# Patient Record
Sex: MALE | Race: WHITE | Employment: FULL TIME | URBAN - METROPOLITAN AREA
[De-identification: names, ages, dates, MRNs, and addresses within clinical notes are randomized per-mention and may not be internally consistent; named-entity substitution may affect disease eponyms.]

---

## 2017-01-05 ENCOUNTER — OFFICE VISIT (OUTPATIENT)
Dept: FAMILY MEDICINE | Facility: CLINIC | Age: 52
End: 2017-01-05
Payer: COMMERCIAL

## 2017-01-05 VITALS
BODY MASS INDEX: 42.76 KG/M2 | WEIGHT: 298 LBS | DIASTOLIC BLOOD PRESSURE: 78 MMHG | SYSTOLIC BLOOD PRESSURE: 134 MMHG | OXYGEN SATURATION: 97 % | TEMPERATURE: 98 F | HEART RATE: 101 BPM

## 2017-01-05 DIAGNOSIS — Z91.89 10 YEAR RISK OF MI OR STROKE 7.5% OR GREATER: ICD-10-CM

## 2017-01-05 DIAGNOSIS — J30.2 SEASONAL ALLERGIC RHINITIS, UNSPECIFIED ALLERGIC RHINITIS TRIGGER: ICD-10-CM

## 2017-01-05 DIAGNOSIS — J98.01 ACUTE BRONCHOSPASM: ICD-10-CM

## 2017-01-05 DIAGNOSIS — J01.90 ACUTE SINUSITIS WITH SYMPTOMS > 10 DAYS: ICD-10-CM

## 2017-01-05 DIAGNOSIS — G43.909 MIGRAINE WITHOUT STATUS MIGRAINOSUS, NOT INTRACTABLE, UNSPECIFIED MIGRAINE TYPE: ICD-10-CM

## 2017-01-05 DIAGNOSIS — E11.9 TYPE 2 DIABETES MELLITUS WITHOUT COMPLICATION, WITHOUT LONG-TERM CURRENT USE OF INSULIN (H): Primary | ICD-10-CM

## 2017-01-05 PROCEDURE — 99214 OFFICE O/P EST MOD 30 MIN: CPT | Performed by: FAMILY MEDICINE

## 2017-01-05 RX ORDER — FLUTICASONE PROPIONATE 50 MCG
2 SPRAY, SUSPENSION (ML) NASAL
Qty: 16 G | Refills: 11 | Status: SHIPPED | OUTPATIENT
Start: 2017-01-05 | End: 2017-11-04

## 2017-01-05 RX ORDER — ALBUTEROL SULFATE 90 UG/1
2 AEROSOL, METERED RESPIRATORY (INHALATION) EVERY 6 HOURS PRN
Qty: 1 INHALER | Refills: 0 | Status: SHIPPED | OUTPATIENT
Start: 2017-01-05 | End: 2018-04-05

## 2017-01-05 RX ORDER — PRAVASTATIN SODIUM 10 MG
10 TABLET ORAL DAILY
Qty: 90 TABLET | Refills: 3 | Status: CANCELLED | OUTPATIENT
Start: 2017-01-05

## 2017-01-05 RX ORDER — ELETRIPTAN HYDROBROMIDE 40 MG/1
40 TABLET, FILM COATED ORAL
Qty: 12 TABLET | Refills: 5 | Status: SHIPPED | OUTPATIENT
Start: 2017-01-05 | End: 2020-06-22

## 2017-01-05 RX ORDER — LIRAGLUTIDE 6 MG/ML
0.6 INJECTION SUBCUTANEOUS DAILY
Qty: 6 ML | Refills: 0 | Status: SHIPPED
Start: 2017-01-05 | End: 2017-01-17

## 2017-01-05 RX ORDER — AMOXICILLIN 875 MG
875 TABLET ORAL 2 TIMES DAILY
Qty: 20 TABLET | Refills: 0 | Status: SHIPPED | OUTPATIENT
Start: 2017-01-05 | End: 2017-01-11

## 2017-01-05 NOTE — PROGRESS NOTES
SUBJECTIVE:                                                    Lan Potts Jr. is a 51 year old male who presents to clinic today for the following health issues:      Diabetes, cough congestion    Problem list and histories reviewed & adjusted, as indicated.  --------------------------------------------------------------------------------------------------------------------------------------  To access diabetes educational materials with in EPIC use <dot>DALE      Put this in AFTER VISIT SUMMARY if needed for the patient to access information online [email or web address]      SUBJECTIVE:  Lan Potts Jr. is a 51 year old male who presents today for a follow up appointment for management of DIABETES MELLITUS.    Patient Active Problem List    Diagnosis Date Noted     Type 2 diabetes mellitus without complication, without long-term current use of insulin (H) 01/05/2017     Priority: Medium     Migraine without status migrainosus, not intractable, unspecified migraine type 01/21/2016     Priority: Medium     High triglycerides 12/11/2014     Priority: Medium     HDL deficiency 12/11/2014     Priority: Medium     Hyperlipidemia with target LDL less than 100 02/17/2014     Priority: Medium     Diagnosis updated by automated process. Provider to review and confirm.       Chronic back pain      Priority: Medium     Seasonal allergies      Priority: Medium          The patient checks his blood sugar as follows: one time daily, once daily.   Results as follows: post-lunch glucose- 130 -150    The patient reports that he IS taking the medication as prescribed.     He has pain in his calves when he takes the statin.    ----------------------------------------------------------------------------------------------------------------------------------------    BP Readings from Last 3 Encounters:   01/05/17 134/78   01/21/16 116/78   09/23/15 115/70     The patient reports that he IS NOT currently  "smoking.  History   Smoking status     Former Smoker     Types: Cigarettes   Smokeless tobacco     Never Used           The 10-year ASCVD risk score (Maribelerna OH Jr., et al., 2013) is: 13.3%    Values used to calculate the score:      Age: 51 years      Sex: Male      Is an : No      Diabetic: Yes      Tobacco smoker: No      Systolic Blood Pressure: 134 mmHg      Prescribed Antihypertensives: No      HDL Cholesterol: 29 mg/dL      Total Cholesterol: 223 mg/dL        Current Outpatient Prescriptions   Medication Sig Dispense Refill     liraglutide (VICTOZA) 18 MG/3ML soln Inject 0.6 mg Subcutaneous daily Need to keep upcoming appointment for further refills 6 mL 0     fluticasone (FLONASE) 50 MCG/ACT spray Spray 2 sprays into both nostrils 2 times daily 16 g 11     eletriptan (RELPAX) 40 MG tablet Take 1 tablet (40 mg) by mouth at onset of headache May repeat in 2 hours as needed but no more than 2 pills in 24 hours. 12 tablet 5     amoxicillin (AMOXIL) 875 MG tablet Take 1 tablet (875 mg) by mouth 2 times daily for 10 days 20 tablet 0     albuterol (PROAIR HFA/PROVENTIL HFA/VENTOLIN HFA) 108 (90 BASE) MCG/ACT Inhaler Inhale 2 puffs into the lungs every 6 hours as needed for shortness of breath / dyspnea or wheezing 1 Inhaler 0     STATIN NOT PRESCRIBED, INTENTIONAL, continuous prn for other Statin not prescribed intentionally due to Intolerance (with supporting documentation of trying a statin at least once within the last 5 years)  0     pravastatin (PRAVACHOL) 10 MG tablet Take 1 tablet (10 mg) by mouth daily 90 tablet 3     insulin pen needle (BD RAKEL U/F) 32G X 4 MM Use 1 daily or as directed. Please dispense per patients insurance preference. Patient requesting \"Rakel needle\" 100 each prn     fexofenadine (ALLEGRA ALLERGY) 180 MG tablet Take by mouth daily       blood glucose (ACCU-CHEK SMARTVIEW) test strip 1 strip by In Vitro route 3 times daily 3 Box 3     blood glucose monitoring (ACCU-CHEK " FASTCLIX) lancets 1 each 3 times daily 3 Box 3     blood glucose calibration (ACCU-CHEK SMARTVIEW CONTROL) solution 1 drop by In Vitro route as needed 1 each 3     ASPIRIN NOT PRESCRIBED, INTENTIONAL, Antiplatelet medication not prescribed intentionally due to not indicated at this age 1 each 0     [DISCONTINUED] liraglutide (VICTOZA) 18 MG/3ML soln Inject 0.6 mg Subcutaneous daily Need to keep upcoming appointment for further refills 6 mL 0       The patient reports that he IS taking a statin but it is causing calf pains.   (Reminder all diabetics with a ASCVD risk greater or equal to 7.5% should be on a high intensity statin, otherwise on a moderate intensity statin)    The patient reports that he IS NOT taking mg of  aspirin daily.  (Reminder all diabetic patients with a cardiovascular risk factor and > 50 should take a daily aspirin)   he is not taking aspirin because he is under 50.  (Reminder to intentionally not prescribe aspirin in )    The patient reports that he IS doing a self foot exam weekly.  The patient reports that he does not exercise lately secondary to foot and calf pain.      The patient reports that he IS following the recommended diabetic diet.  The patient reports that his last eye exam was 1 years ago.         Immunization History   Administered Date(s) Administered     Influenza (IIV3) 12/21/2012, 09/27/2013     Pneumococcal 23 valent 09/04/2014     TDAP (ADACEL AGES 11-64) 09/20/2011     The patient reports that he has not had the hepatitis B vaccine series in the past. (recommended for age 19-59 and can be given to age 60 or older)  The patient reports that he has had a pnuemovax in the past.  The patient reports that he has not had a flu shot for the current influenza season.  The patient would like to have a Influenza    Patient concerns: is concerned about see below    EXAM:  /78 mmHg  Pulse 101  Temp(Src) 98  F (36.7  C) (Oral)  Wt 298 lb (135.172 kg)  SpO2 97%  Wt  "Readings from Last 4 Encounters:   01/05/17 298 lb (135.172 kg)   01/21/16 296 lb (134.265 kg)   09/23/15 294 lb (133.358 kg)   12/19/14 286 lb (129.729 kg)     Body mass index is 42.76 kg/(m^2).    General:  Lan is awake, alert, and cooperative.  NAD.                                           Foot Exam: declined by the patient today.       Previsit labs drawn include:  Office Visit on 01/21/2016   Component Date Value Ref Range Status     Hemoglobin A1C 01/21/2016 6.6* 4.3 - 6.0 % Final         ASSESSMENT and PLAN:    Type II Diabetes, Unknown.    DIABETES Type II:                       A1C      6.6   1/21/2016    Control   unable to assess  A1C      6.6   1/21/2016  A1C      6.4   9/3/2015  A1C      6.4   12/11/2014      The pt will make a future lab appoinment for all bloodwork as they are not fasting today, Check a HGBA1C , Check a microalbumin, Check a Creatinine/GFR, Recommended to take ASA  mg daily for appropriate patient, Compliance with the recommended diabetic diet was stressed, Regular aerobic exercise was encouraged, Home glucose monitoring encouraged, Annual eye exam recommended, Flu vaccine recommended, Self foot exam demonstrated and recommended to be done nightly, Apply moisturizer to feet with in 3 minutes of showering or bathing, Follow up in clinic in 3 months for a diabetes check and Future labs ordered and the patient was instructed to make a lab appt 1 week prior to next diabetes visit      BP/HTN:   BP Readings from Last 3 Encounters:   01/05/17 134/78   01/21/16 116/78   09/23/15 115/70     Control   fair    - Medication:{WDS MEDICATION CHANGES:134572  (make sure to order \"Ace not prescribed intentionally if not on an ACE/ARB)  The patient was advised to do the following therapuetic life style changes  - Dietary sodium restriction and increase potassium and Calcium intake  - Regular aerobic exercise  - Weight loss  - Discontinue smoking if applicable  - Avoid regular NSAID use if " applicable  - Avoid regular decongestant use if applicable  - Follow up in clinic in 6 months for a recheck  - Check a basic metabolic panel today if needed    Patient Education: Reviewed risks of hypertension and principles of   Treatment.    HYPERCHOLESTEROLEMIA:     The 10-year ASCVD risk score (Maribel OH Jr., et al., 2013) is: 13.3%    Values used to calculate the score:      Age: 51 years      Sex: Male      Is an : No      Diabetic: Yes      Tobacco smoker: No      Systolic Blood Pressure: 134 mmHg      Prescribed Antihypertensives: No      HDL Cholesterol: 29 mg/dL      Total Cholesterol: 223 mg/dL    LDL      157   9/3/2015   Control   poor  CHOLESTEROL   Date Value Ref Range Status   09/03/2015 223* <200 mg/dL Final     Comment:     LDL Cholesterol is the primary guide to therapy.   The NCEP recommends further evaluation of: patients with cholesterol greater   than 200 mg/dL if additional risk factors are present, cholesterol greater   than   240 mg/dL, triglycerides greater than 150 mg/dL, or HDL less than 40 mg/dL.     12/11/2014 216* <200 mg/dL Final     Comment:     LDL Cholesterol is the primary guide to therapy.   The NCEP recommends further evaluation of: patients with cholesterol greater   than 200 mg/dL if additional risk factors are present, cholesterol greater   than   240 mg/dL, triglycerides greater than 150 mg/dL, or HDL less than 40 mg/dL.       HDL CHOLESTEROL   Date Value Ref Range Status   09/03/2015 29* >40 mg/dL Final   12/11/2014 37* >40 mg/dL Final     LDL CHOLESTEROL CALCULATED   Date Value Ref Range Status   09/03/2015 157* 0 - 129 mg/dL Final     Comment:     LDL Cholesterol is the primary guide to therapy: LDL-cholesterol goal in high   risk patients is <100 mg/dL and in very high risk patients is <70 mg/dL.     12/11/2014 142* 0 - 129 mg/dL Final     Comment:     LDL Cholesterol is the primary guide to therapy: LDL-cholesterol goal in high   risk patients is <100  mg/dL and in very high risk patients is <70 mg/dL.       TRIGLYCERIDES   Date Value Ref Range Status   09/03/2015 184* 0 - 150 mg/dL Final     Comment:     Fasting specimen   12/11/2014 183* 0 - 150 mg/dL Final     Comment:     Fasting specimen     CHOLESTEROL/HDL RATIO   Date Value Ref Range Status   09/03/2015 7.7* 0.0 - 5.0 Final   12/11/2014 5.8* 0.0 - 5.0 Final         The patient is not  fasting today and he  will recheck the fasting lipid panel at a future laboratory appointment.      --------------------------------------------------------------------------------------------------------------------------------------    SUBJECTIVE:   Lan Potts Jr. is a 51 year old male presenting with a chief complaint of sinus pressure.  The patient first noted the onset of symptoms was 2 week(s) ago.  The patient (or parent) reports that he first had symptoms of muscle aches and nasal congestion . After that he started having symptoms of sinus pressure, sinus pain and a cough. After that he started having symptoms of fever. Other symptoms that followed include rhinorrhea which is yellow .    He (or parent) denies: fever, shortness of breath and wheezing.    The patient (or parent) reports that he had tried a decongestant  and it has not been helpful.  The patient has the following predisposing factors for infection:None.    Patient Active Problem List   Diagnosis     Chronic back pain     Seasonal allergies     Hyperlipidemia with target LDL less than 100     High triglycerides     HDL deficiency     Migraine without status migrainosus, not intractable, unspecified migraine type     Type 2 diabetes mellitus without complication, without long-term current use of insulin (H)     Current Outpatient Prescriptions   Medication Sig Dispense Refill     liraglutide (VICTOZA) 18 MG/3ML soln Inject 0.6 mg Subcutaneous daily Need to keep upcoming appointment for further refills 6 mL 0     fluticasone (FLONASE) 50 MCG/ACT spray  "Spray 2 sprays into both nostrils 2 times daily 16 g 11     eletriptan (RELPAX) 40 MG tablet Take 1 tablet (40 mg) by mouth at onset of headache May repeat in 2 hours as needed but no more than 2 pills in 24 hours. 12 tablet 5     amoxicillin (AMOXIL) 875 MG tablet Take 1 tablet (875 mg) by mouth 2 times daily for 10 days 20 tablet 0     albuterol (PROAIR HFA/PROVENTIL HFA/VENTOLIN HFA) 108 (90 BASE) MCG/ACT Inhaler Inhale 2 puffs into the lungs every 6 hours as needed for shortness of breath / dyspnea or wheezing 1 Inhaler 0     STATIN NOT PRESCRIBED, INTENTIONAL, continuous prn for other Statin not prescribed intentionally due to Intolerance (with supporting documentation of trying a statin at least once within the last 5 years)  0     pravastatin (PRAVACHOL) 10 MG tablet Take 1 tablet (10 mg) by mouth daily 90 tablet 3     insulin pen needle (BD RAKEL U/F) 32G X 4 MM Use 1 daily or as directed. Please dispense per patients insurance preference. Patient requesting \"Rakel needle\" 100 each prn     fexofenadine (ALLEGRA ALLERGY) 180 MG tablet Take by mouth daily       blood glucose (ACCU-CHEK SMARTVIEW) test strip 1 strip by In Vitro route 3 times daily 3 Box 3     blood glucose monitoring (ACCU-CHEK FASTCLIX) lancets 1 each 3 times daily 3 Box 3     blood glucose calibration (ACCU-CHEK SMARTVIEW CONTROL) solution 1 drop by In Vitro route as needed 1 each 3     ASPIRIN NOT PRESCRIBED, INTENTIONAL, Antiplatelet medication not prescribed intentionally due to not indicated at this age 1 each 0     [DISCONTINUED] liraglutide (VICTOZA) 18 MG/3ML soln Inject 0.6 mg Subcutaneous daily Need to keep upcoming appointment for further refills 6 mL 0     Social History   Substance Use Topics     Smoking status: Former Smoker     Types: Cigarettes     Smokeless tobacco: Never Used     Alcohol Use: Yes      Comment: occ         OBJECTIVE  :/78 mmHg  Pulse 101  Temp(Src) 98  F (36.7  C) (Oral)  Wt 298 lb (135.172 kg)  SpO2 " 97%  GENERAL APPEARANCE: healthy, alert and no distress  EYES: EOMI,  PERRL, conjunctiva clear  HENT: ear canals and TM's normal.  Nose and mouth without ulcers, erythema or lesions  HENT: maxillary sinus tenderness   NECK: supple, nontender, no lymphadenopathy  RESP: lungs clear to auscultation - no rales, rhonchi or wheezes  CV: regular rates and rhythm, normal S1 S2, no murmur noted  ABDOMEN:  soft, nontender, no HSM or masses and bowel sounds normal  NEURO: Normal strength and tone, sensory exam grossly normal,  normal speech and mentation  SKIN: no suspicious lesions or rashes    ASSESSMENT:  Sinusitis    PLAN:  I recommended that the patient get lots of fluids and rest., I recommended an OTC decongestant like Sudafed or a generic equivalent and A prescription for amoxicillin  was given

## 2017-01-05 NOTE — MR AVS SNAPSHOT
After Visit Summary   1/5/2017    Lan Potts Jr.    MRN: [id number]           Patient Information     Date Of Birth          1965        Visit Information        Provider Department      1/5/2017 6:00 PM Eligio Quevedo MD [facility]        Today's Diagnoses     Type 2 diabetes mellitus without complication, without long-term current use of insulin (H)    -  1     10 year risk of MI or stroke 7.5% or greater         Migraine without status migrainosus, not intractable, unspecified migraine type         Acute sinusitis with symptoms > 10 days         Seasonal allergic rhinitis, unspecified allergic rhinitis trigger           Care Instructions    Please schedule a future laboratory appointment(s) and be sure to have fasted for 10 hours prior to arrival          Follow-ups after your visit        Additional Services     DIABETES EDUCATOR REFERRAL       DIABETES SELF MANAGEMENT TRAINING (DSMT)      Your provider has referred you to Diabetes Education: FMG: Diabetes Education - All [facility] [phone]   [email or web address]    Type of training and number of hours: Previous Diagnosis: Follow-up DSMT - 2 hours.    Medicare covers: 10 hours of initial DSMT in 12 month period from the time of first visit, plus 2 hours of follow-up DSMT annually, and additional hours as requested for insulin training.    Diabetes Type: Type 2 - On Oral Medication             Diabetes Co-Morbidities: none               A1C Goal:  <7.0       A1C is: A1C      6.6   1/21/2016    If an urgent visit is needed or A1C is above 12, Care Team to call the Diabetes Education Team at [phone] or send an In Basket message to the Diabetes Education Pool (P DIAB ED-PATIENT CARE).    Diabetes Education Topics: Comprehensive Knowledge Assessment and Instruction and Medication Start: per diabetes educator recommendation    Special Educational Needs Requiring  Individual DSMT: None       MEDICAL NUTRITION THERAPY (MNT) for Diabetes    Medical Nutrition Therapy with a Registered Dietitian can be provided in coordination with Diabetes Self-Management Training to assist in achieving optimal diabetes management.    MNT Type and Hours: Do not initiate MNT at this time.                       Medicare will cover: 3 hours initial MNT in 12 month period after first visit, plus 2 hours of follow-up MNT annually    Please be aware that coverage of these services is subject to the terms and limitations of your health insurance plan.  Call member services at your health plan to determine Diabetes Self-Management Training benefits and ask which blood glucose monitor brands are covered by your plan.      Please bring the following with you to your appointment:    (1)  List of current medications   (2)  List of Blood Glucose Monitor brands that are covered by your insurance plan  (3)  Blood Glucose Monitor and log book  (4)   Food records for the 3 days prior to your visit    The Certified Diabetes Educator may make diabetes medication adjustments per the CDE Protocol and Collaborative Practice Agreement.                  Future tests that were ordered for you today     Open Future Orders        Priority Expected Expires Ordered    Hemoglobin A1c Routine  1/18/2017 1/5/2017    Lipid panel reflex to direct LDL Routine  1/18/2017 1/5/2017    Albumin Random Urine Quantitative Routine  1/18/2017 1/5/2017    Comprehensive metabolic panel Routine  1/18/2017 1/5/2017    TSH with free T4 reflex Routine  1/18/2017 1/5/2017            Who to contact     If you have questions or need follow up information about today's clinic visit or your schedule please contact [facility] directly at [phone].  Normal or non-critical lab and imaging results will be communicated to you by MyChart, letter or phone within 4 business days after the clinic has received the results. If you do not hear  from us within 7 days, please contact the clinic through [employer] or phone. If you have a critical or abnormal lab result, we will notify you by phone as soon as possible.  Submit refill requests through [employer] or call your pharmacy and they will forward the refill request to us. Please allow 3 business days for your refill to be completed.          Additional Information About Your Visit        [employer] Information     [employer] gives you secure access to your electronic health record. If you see a primary care provider, you can also send messages to your care team and make appointments. If you have questions, please call your primary care clinic.  If you do not have a primary care provider, please call [phone] and they will assist you.        Care EveryWhere ID     This is your Care EveryWhere ID. This could be used by other organizations to access your [place] medical records  [id number]        Your Vitals Were     Pulse Temperature Pulse Oximetry             101 98  F (36.7  C) (Oral) 97%          Blood Pressure from Last 3 Encounters:   01/05/17 134/78   01/21/16 116/78   09/23/15 115/70    Weight from Last 3 Encounters:   01/05/17 298 lb (135.172 kg)   01/21/16 296 lb (134.265 kg)   09/23/15 294 lb (133.358 kg)              We Performed the Following     DIABETES EDUCATOR REFERRAL          Today's Medication Changes          These changes are accurate as of: 1/5/17  6:41 PM.  If you have any questions, ask your nurse or doctor.               Start taking these medicines.        Dose/Directions    amoxicillin 875 MG tablet   Commonly known as:  AMOXIL   Used for:  Acute sinusitis with symptoms > 10 days   Started by:  Eligio Quevedo MD        Dose:  875 mg   Take 1 tablet (875 mg) by mouth 2 times daily for 10 days   Quantity:  20 tablet   Refills:  0            Where to get your medicines      Information about where to get these medications is not yet available     ! Ask your nurse or doctor about  these medications    - amoxicillin 875 MG tablet  - eletriptan 40 MG tablet  - fluticasone 50 MCG/ACT spray  - liraglutide 18 MG/3ML soln             Primary Care Provider Office Phone # Fax #    Eligio Quevedo [phone]       [facility] [address] [address] [facility] [address]        Thank you!     Thank you for choosing [facility]  for your care. Our goal is always to provide you with excellent care. Hearing back from our patients is one way we can continue to improve our services. Please take a few minutes to complete the written survey that you may receive in the mail after your visit with us. Thank you!             Your Updated Medication List - Protect others around you: Learn how to safely use, store and throw away your medicines at www.disposemymeds.org.          This list is accurate as of: 1/5/17  6:41 PM.  Always use your most recent med list.                   Brand Name Dispense Instructions for use    ALLEGRA ALLERGY 180 MG tablet   Generic drug:  fexofenadine      Take by mouth daily       amoxicillin 875 MG tablet    AMOXIL    20 tablet    Take 1 tablet (875 mg) by mouth 2 times daily for 10 days       ASPIRIN NOT PRESCRIBED    INTENTIONAL    1 each    Antiplatelet medication not prescribed intentionally due to not indicated at this age       blood glucose calibration solution     1 each    1 drop by In Vitro route as needed       blood glucose monitoring lancets     3 Box    1 each 3 times daily       blood glucose monitoring test strip    ACCU-CHEK SMARTVIEW    3 Box    1 strip by In Vitro route 3 times daily       eletriptan 40 MG tablet    RELPAX    12 tablet    Take 1 tablet (40 mg) by mouth at onset of headache May repeat in 2 hours as needed but no more than 2 pills in 24 hours.       fluticasone 50 MCG/ACT spray    FLONASE    16 g    Spray 2 sprays into both nostrils 2 times daily       insulin pen needle 32G X 4 MM    BD RAKEL U/F    100 each     "Use 1 daily or as directed. Please dispense per patients insurance preference. Patient requesting \"Meghna needle\"       liraglutide 18 MG/3ML soln    VICTOZA    6 mL    Inject 0.6 mg Subcutaneous daily Need to keep upcoming appointment for further refills       pravastatin 10 MG tablet    PRAVACHOL    90 tablet    Take 1 tablet (10 mg) by mouth daily         "

## 2017-01-05 NOTE — NURSING NOTE
"Chief Complaint   Patient presents with     Diabetes     Cough     congestion       Initial /78 mmHg  Pulse 101  Temp(Src) 98  F (36.7  C) (Oral)  Wt 298 lb (135.172 kg)  SpO2 97% Estimated body mass index is 42.76 kg/(m^2) as calculated from the following:    Height as of 1/21/16: 5' 10\" (1.778 m).    Weight as of this encounter: 298 lb (135.172 kg).  BP completed using cuff size: yen Barnhart M.A.      "

## 2017-01-06 ASSESSMENT — ANXIETY QUESTIONNAIRES
6. BECOMING EASILY ANNOYED OR IRRITABLE: SEVERAL DAYS
7. FEELING AFRAID AS IF SOMETHING AWFUL MIGHT HAPPEN: NOT AT ALL
GAD7 TOTAL SCORE: 4
5. BEING SO RESTLESS THAT IT IS HARD TO SIT STILL: SEVERAL DAYS
1. FEELING NERVOUS, ANXIOUS, OR ON EDGE: NOT AT ALL
2. NOT BEING ABLE TO STOP OR CONTROL WORRYING: NOT AT ALL
3. WORRYING TOO MUCH ABOUT DIFFERENT THINGS: SEVERAL DAYS

## 2017-01-06 ASSESSMENT — PATIENT HEALTH QUESTIONNAIRE - PHQ9: 5. POOR APPETITE OR OVEREATING: SEVERAL DAYS

## 2017-01-06 NOTE — PATIENT INSTRUCTIONS
Please schedule a future laboratory appointment(s) and be sure to have fasted for 10 hours prior to arrival

## 2017-01-07 ASSESSMENT — PATIENT HEALTH QUESTIONNAIRE - PHQ9: SUM OF ALL RESPONSES TO PHQ QUESTIONS 1-9: 6

## 2017-01-07 ASSESSMENT — ANXIETY QUESTIONNAIRES: GAD7 TOTAL SCORE: 4

## 2017-01-10 ENCOUNTER — MYC MEDICAL ADVICE (OUTPATIENT)
Dept: FAMILY MEDICINE | Facility: CLINIC | Age: 52
End: 2017-01-10

## 2017-01-11 ENCOUNTER — MYC MEDICAL ADVICE (OUTPATIENT)
Dept: FAMILY MEDICINE | Facility: CLINIC | Age: 52
End: 2017-01-11

## 2017-01-11 DIAGNOSIS — J01.90 ACUTE SINUSITIS WITH SYMPTOMS > 10 DAYS: Primary | ICD-10-CM

## 2017-01-11 RX ORDER — AZITHROMYCIN 250 MG/1
TABLET, FILM COATED ORAL
Qty: 6 TABLET | Refills: 0 | Status: SHIPPED | OUTPATIENT
Start: 2017-01-11 | End: 2017-03-23

## 2017-01-11 NOTE — TELEPHONE ENCOUNTER
I have discussed this patient's issue with the RN involved and we have come up with this plan.  Eligio Quevedo MD

## 2017-01-11 NOTE — TELEPHONE ENCOUNTER
Per protocol, will route encounter to be cosigned by provider for Verbal Orders.  Yanelis Singletary RN

## 2017-01-13 DIAGNOSIS — E11.9 TYPE 2 DIABETES MELLITUS WITHOUT COMPLICATION, WITHOUT LONG-TERM CURRENT USE OF INSULIN (H): ICD-10-CM

## 2017-01-13 LAB
ALBUMIN SERPL-MCNC: 3.6 G/DL (ref 3.4–5)
ALP SERPL-CCNC: 124 U/L (ref 40–150)
ALT SERPL W P-5'-P-CCNC: 101 U/L (ref 0–70)
ANION GAP SERPL CALCULATED.3IONS-SCNC: 8 MMOL/L (ref 3–14)
AST SERPL W P-5'-P-CCNC: 47 U/L (ref 0–45)
BILIRUB SERPL-MCNC: 0.4 MG/DL (ref 0.2–1.3)
BUN SERPL-MCNC: 11 MG/DL (ref 7–30)
CALCIUM SERPL-MCNC: 8.6 MG/DL (ref 8.5–10.1)
CHLORIDE SERPL-SCNC: 102 MMOL/L (ref 94–109)
CHOLEST SERPL-MCNC: 252 MG/DL
CO2 SERPL-SCNC: 28 MMOL/L (ref 20–32)
CREAT SERPL-MCNC: 0.72 MG/DL (ref 0.66–1.25)
CREAT UR-MCNC: 188 MG/DL
GFR SERPL CREATININE-BSD FRML MDRD: ABNORMAL ML/MIN/1.7M2
GLUCOSE SERPL-MCNC: 169 MG/DL (ref 70–99)
HBA1C MFR BLD: 7.7 % (ref 4.3–6)
HDLC SERPL-MCNC: 33 MG/DL
LDLC SERPL CALC-MCNC: 172 MG/DL
MICROALBUMIN UR-MCNC: 8 MG/L
MICROALBUMIN/CREAT UR: 3.99 MG/G CR (ref 0–17)
NONHDLC SERPL-MCNC: 219 MG/DL
POTASSIUM SERPL-SCNC: 4 MMOL/L (ref 3.4–5.3)
PROT SERPL-MCNC: 6.9 G/DL (ref 6.8–8.8)
SODIUM SERPL-SCNC: 138 MMOL/L (ref 133–144)
TRIGL SERPL-MCNC: 234 MG/DL
TSH SERPL DL<=0.005 MIU/L-ACNC: 3.26 MU/L (ref 0.4–4)

## 2017-01-13 PROCEDURE — 80053 COMPREHEN METABOLIC PANEL: CPT | Performed by: FAMILY MEDICINE

## 2017-01-13 PROCEDURE — 84443 ASSAY THYROID STIM HORMONE: CPT | Performed by: FAMILY MEDICINE

## 2017-01-13 PROCEDURE — 36415 COLL VENOUS BLD VENIPUNCTURE: CPT | Performed by: FAMILY MEDICINE

## 2017-01-13 PROCEDURE — 80061 LIPID PANEL: CPT | Performed by: FAMILY MEDICINE

## 2017-01-13 PROCEDURE — 82043 UR ALBUMIN QUANTITATIVE: CPT | Performed by: FAMILY MEDICINE

## 2017-01-13 PROCEDURE — 83036 HEMOGLOBIN GLYCOSYLATED A1C: CPT | Performed by: FAMILY MEDICINE

## 2017-01-17 DIAGNOSIS — E11.9 TYPE 2 DIABETES MELLITUS WITHOUT COMPLICATION, WITHOUT LONG-TERM CURRENT USE OF INSULIN (H): Primary | ICD-10-CM

## 2017-01-17 RX ORDER — LIRAGLUTIDE 6 MG/ML
1.2 INJECTION SUBCUTANEOUS DAILY
Qty: 6 ML | Refills: 0 | Status: SHIPPED | OUTPATIENT
Start: 2017-01-17 | End: 2017-03-08

## 2017-01-18 ENCOUNTER — TELEPHONE (OUTPATIENT)
Dept: FAMILY MEDICINE | Facility: CLINIC | Age: 52
End: 2017-01-18

## 2017-01-18 DIAGNOSIS — E11.9 TYPE 2 DIABETES MELLITUS WITHOUT COMPLICATION, WITHOUT LONG-TERM CURRENT USE OF INSULIN (H): Primary | ICD-10-CM

## 2017-01-18 RX ORDER — LIRAGLUTIDE 6 MG/ML
1.2 INJECTION SUBCUTANEOUS DAILY
Qty: 6 ML | Refills: 0 | Status: CANCELLED | OUTPATIENT
Start: 2017-01-18

## 2017-01-18 NOTE — TELEPHONE ENCOUNTER
Rejection message  [id number]  Phone [phone]

## 2017-01-18 NOTE — TELEPHONE ENCOUNTER
Spoke to [facility] insurance only covers this medication for 90 days at a time. States last fill was 12/8/16 and not due again until 2/15/17. Notified pharmacy./Shelbie Herman,

## 2017-03-23 ENCOUNTER — OFFICE VISIT (OUTPATIENT)
Dept: FAMILY MEDICINE | Facility: CLINIC | Age: 52
End: 2017-03-23
Payer: COMMERCIAL

## 2017-03-23 VITALS
TEMPERATURE: 98.5 F | SYSTOLIC BLOOD PRESSURE: 110 MMHG | DIASTOLIC BLOOD PRESSURE: 60 MMHG | BODY MASS INDEX: 41.9 KG/M2 | HEART RATE: 87 BPM | WEIGHT: 292 LBS

## 2017-03-23 DIAGNOSIS — J32.1 CHRONIC FRONTAL SINUSITIS: Primary | ICD-10-CM

## 2017-03-23 DIAGNOSIS — R42 VERTIGO: ICD-10-CM

## 2017-03-23 PROCEDURE — 99214 OFFICE O/P EST MOD 30 MIN: CPT | Performed by: FAMILY MEDICINE

## 2017-03-23 RX ORDER — MECLIZINE HYDROCHLORIDE 25 MG/1
25 TABLET ORAL EVERY 6 HOURS PRN
Qty: 30 TABLET | Refills: 1 | Status: SHIPPED | OUTPATIENT
Start: 2017-03-23 | End: 2018-04-05

## 2017-03-23 RX ORDER — LEVOFLOXACIN 500 MG/1
500 TABLET, FILM COATED ORAL DAILY
Qty: 10 TABLET | Refills: 0 | Status: SHIPPED | OUTPATIENT
Start: 2017-03-23 | End: 2017-04-06

## 2017-03-23 NOTE — PROGRESS NOTES
"SUBJECTIVE:  Lan Potts Jr. is a 51 year old male who presents to clinic for evaluation of symptoms of dizziness which he describes as a sensation that the room is spinning, tilting or moving abnormally.    This/these symptoms were first noticed about 1 week ago. When the the patient first noticed this symptom he was walking and looked down and then looked up.. The second time the patient noticed this symptom he was looking up.  This symptom(s) usually last about 5-10 minute(s). At baseline he still feels a little dizzy.  The patient reports that head movement typically causes the symptoms to occur.    The patient reports that there is (are) ringing in his ears and he has had that for years. He reports that there is (are) pain or pressure in his right ear now.  The patient denies that he also get symptoms of nausea when he experiences the dizziness but he has had an upset stomach lately.   He reports that his sinuses have been bothering since January. He has has \"green goo\" from his noise. He has sinus pressure especially in the frontal sinus area.  He had sinus surgery in 2010 with a male Doctor at [facility].      OBJECTIVE:  Blood pressure 110/60, pulse 87, temperature 98.5  F (36.9  C), temperature source Oral, weight 292 lb (132.5 kg).  General appearance - healthy, alert and no distress  Skin - Skin color, texture, turgor normal. No rashes or lesions.  Head - Normocephalic. No masses, lesions, tenderness or abnormalities  Eyes - conjunctivae/corneas clear. PERRL, EOM's intact. Fundi benign, positive findings:  The patient experienced severe nausea with eye movement to the left and shut his eyes until it passed   Ears - External ears normal. Canals clear. TM's normal.  Nose/Sinuses - positive findings: mucosa erythematous and swollen, nasal septal deviation to left, purulent rhinorrhea  Oropharynx - Lips, mucosa, and tongue normal. Teeth and gums normal.  Neck - positive findings:  Adenopathy " inferior to the ear bilateral(ly)  which is also tender  Lungs - Percussion normal. Good diaphragmatic excursion. Lungs clear  Heart - PMI normal. No lifts, heaves, or thrills. RRR. No murmurs, clicks gallops or rub      Strength was +5 globally in the upper extremities    Strength was +5 globally in the lower extremities    CN 2-12 intact  EOM showed some erratic movement  Cerebellar function showed intact  finger to nose, heel to shin testing and there was no dysdiadochokinesis.    Tandem walking was normal and rhomberg's was normal.      Results for orders placed or performed in visit on 01/13/17   Hemoglobin A1c   Result Value Ref Range    Hemoglobin A1C 7.7 (H) 4.3 - 6.0 %   Lipid panel reflex to direct LDL   Result Value Ref Range    Cholesterol 252 (H) <200 mg/dL    Triglycerides 234 (H) <150 mg/dL    HDL Cholesterol 33 (L) >39 mg/dL    LDL Cholesterol Calculated 172 (H) <100 mg/dL    Non HDL Cholesterol 219 (H) <130 mg/dL   Albumin Random Urine Quantitative   Result Value Ref Range    Creatinine Urine 188 mg/dL    Albumin Urine mg/L 8 mg/L    Albumin Urine mg/g Cr 3.99 0 - 17 mg/g Cr   Comprehensive metabolic panel   Result Value Ref Range    Sodium 138 133 - 144 mmol/L    Potassium 4.0 3.4 - 5.3 mmol/L    Chloride 102 94 - 109 mmol/L    Carbon Dioxide 28 20 - 32 mmol/L    Anion Gap 8 3 - 14 mmol/L    Glucose 169 (H) 70 - 99 mg/dL    Urea Nitrogen 11 7 - 30 mg/dL    Creatinine 0.72 0.66 - 1.25 mg/dL    GFR Estimate >90  Non  GFR Calc   >60 mL/min/1.7m2    GFR Estimate If Black >90   GFR Calc   >60 mL/min/1.7m2    Calcium 8.6 8.5 - 10.1 mg/dL    Bilirubin Total 0.4 0.2 - 1.3 mg/dL    Albumin 3.6 3.4 - 5.0 g/dL    Protein Total 6.9 6.8 - 8.8 g/dL    Alkaline Phosphatase 124 40 - 150 U/L     (H) 0 - 70 U/L    AST 47 (H) 0 - 45 U/L   TSH with free T4 reflex   Result Value Ref Range    TSH 3.26 0.40 - 4.00 mU/L          ASSESSMENT/PLAN  The patient has signs and symptoms and  a physical exam that is consistent with the diagnosis of Benign positional vertigo and he also has sinusitis that has not responded to a short course of amoxicillin and a full course of zithromax. We will start levaquin and some meclizine but I thinks he needs the attention of an EAR NOSE AND THROAT.

## 2017-03-23 NOTE — PATIENT INSTRUCTIONS
You should call specialty scheduling at [phone] to schedule the EAR NOSE AND THROAT  appointment.

## 2017-03-23 NOTE — NURSING NOTE
"Chief Complaint   Patient presents with     Dizziness     ear pain, facial pain/pressure x 2 weeks       Initial /60 (BP Location: Right arm, Cuff Size: Adult Large)  Pulse 87  Temp 98.5  F (36.9  C) (Oral)  Wt 292 lb (132.5 kg)  BMI 41.9 kg/m2 Estimated body mass index is 41.9 kg/(m^2) as calculated from the following:    Height as of 1/21/16: 5' 10\" (1.778 m).    Weight as of this encounter: 292 lb (132.5 kg).  Medication Reconciliation: complete   Liberty Barnhart M.A.      "

## 2017-03-23 NOTE — MR AVS SNAPSHOT
After Visit Summary   3/23/2017    Lan Potts Jr.    MRN: [id number]           Patient Information     Date Of Birth          1965        Visit Information        Provider Department      3/23/2017 1:30 PM Eligio Quevedo MD [facility]        Today's Diagnoses     Chronic frontal sinusitis    -  1    Vertigo          Care Instructions    You should call specialty scheduling at [phone] to schedule the EAR NOSE AND THROAT  appointment.          Follow-ups after your visit        Additional Services     OTOLARYNGOLOGY REFERRAL       Your provider has referred you to: [facility]: [facility] [phone]   [email or web address]  [facility]: [facility] [phone]   [email or web address]    Please be aware that coverage of these services is subject to the terms and limitations of your health insurance plan.  Call member services at your health plan with any benefit or coverage questions.      Please bring the following with you to your appointment:    (1) Any X-Rays, CTs or MRIs which have been performed.  Contact the facility where they were done to arrange for  prior to your scheduled appointment.   (2) List of current medications  (3) This referral request   (4) Any documents/labs given to you for this referral                  Your next 10 appointments already scheduled     Apr 06, 2017  6:00 PM CDT   Office Visit with Eligio Quevedo MD   [facility]    [address] [address] [phone]           Bring a current list of meds and any records pertaining to this visit.  For Physicals, please bring immunization records and any forms needing to be filled out.  Please arrive 10 minutes early to complete paperwork.              Who to contact     If you have questions or need follow up information about today's clinic visit or your  schedule please contact [facility] directly at [phone].  Normal or non-critical lab and imaging results will be communicated to you by MyChart, letter or phone within 4 business days after the clinic has received the results. If you do not hear from us within 7 days, please contact the clinic through [facility] or phone. If you have a critical or abnormal lab result, we will notify you by phone as soon as possible.  Submit refill requests through [facility] or call your pharmacy and they will forward the refill request to us. Please allow 3 business days for your refill to be completed.          Additional Information About Your Visit        [facility] Information     [facility] gives you secure access to your electronic health record. If you see a primary care provider, you can also send messages to your care team and make appointments. If you have questions, please call your primary care clinic.  If you do not have a primary care provider, please call [phone] and they will assist you.        Care EveryWhere ID     This is your Care EveryWhere ID. This could be used by other organizations to access your [facility] medical records  [id number]        Your Vitals Were     Pulse Temperature BMI (Body Mass Index)             87 98.5  F (36.9  C) (Oral) 41.9 kg/m2          Blood Pressure from Last 3 Encounters:   03/23/17 110/60   01/05/17 134/78   01/21/16 116/78    Weight from Last 3 Encounters:   03/23/17 292 lb (132.5 kg)   01/05/17 298 lb (135.2 kg)   01/21/16 296 lb (134.3 kg)              We Performed the Following     OTOLARYNGOLOGY REFERRAL          Today's Medication Changes          These changes are accurate as of: 3/23/17  2:07 PM.  If you have any questions, ask your nurse or doctor.               Start taking these medicines.        Dose/Directions    levofloxacin 500 MG tablet   Commonly known as:  LEVAQUIN   Used for:  Chronic frontal sinusitis   Started by:  Eligio Quevedo MD         Dose:  500 mg   Take 1 tablet (500 mg) by mouth daily   Quantity:  10 tablet   Refills:  0       meclizine 25 MG tablet   Commonly known as:  ANTIVERT   Used for:  Vertigo   Started by:  Eligio Quevedo MD        Dose:  25 mg   Take 1 tablet (25 mg) by mouth every 6 hours as needed for dizziness   Quantity:  30 tablet   Refills:  1            Where to get your medicines      These medications were sent to [facility]/pharmacy #[id number] - [place] [facility] - [address],   [address] , [place] [facility] [address]     Phone:  [phone]     levofloxacin 500 MG tablet    meclizine 25 MG tablet                Primary Care Provider Office Phone # Fax #    Eligio Quevedo [phone]       [facility] [address] [address] [facility] [address]        Thank you!     Thank you for choosing [facility]  for your care. Our goal is always to provide you with excellent care. Hearing back from our patients is one way we can continue to improve our services. Please take a few minutes to complete the written survey that you may receive in the mail after your visit with us. Thank you!             Your Updated Medication List - Protect others around you: Learn how to safely use, store and throw away your medicines at www.disposemymeds.org.          This list is accurate as of: 3/23/17  2:07 PM.  Always use your most recent med list.                   Brand Name Dispense Instructions for use    albuterol 108 (90 BASE) MCG/ACT Inhaler    PROAIR HFA/PROVENTIL HFA/VENTOLIN HFA    1 Inhaler    Inhale 2 puffs into the lungs every 6 hours as needed for shortness of breath / dyspnea or wheezing       ALLEGRA ALLERGY 180 MG tablet   Generic drug:  fexofenadine      Take by mouth daily Reported on 3/23/2017       ASPIRIN NOT PRESCRIBED    INTENTIONAL    1 each    Antiplatelet medication not prescribed intentionally due to not indicated at this age       blood glucose calibration solution     1  "each    1 drop by In Vitro route as needed       blood glucose monitoring lancets     3 Box    1 each 3 times daily       blood glucose monitoring test strip    ACCU-CHEK SMARTVIEW    3 Box    1 strip by In Vitro route 3 times daily       eletriptan 40 MG tablet    RELPAX    12 tablet    Take 1 tablet (40 mg) by mouth at onset of headache May repeat in 2 hours as needed but no more than 2 pills in 24 hours.       fluticasone 50 MCG/ACT spray    FLONASE    16 g    Spray 2 sprays into both nostrils 2 times daily       insulin pen needle 32G X 4 MM    BD RAKEL U/F    100 each    Use 1 daily or as directed. Please dispense per patients insurance preference. Patient requesting \"Rakel needle\"       levofloxacin 500 MG tablet    LEVAQUIN    10 tablet    Take 1 tablet (500 mg) by mouth daily       liraglutide 18 MG/3ML soln    VICTOZA    12 mL    Inject 1.2 mg Subcutaneous daily Due for office visit April       meclizine 25 MG tablet    ANTIVERT    30 tablet    Take 1 tablet (25 mg) by mouth every 6 hours as needed for dizziness       pravastatin 10 MG tablet    PRAVACHOL    90 tablet    Take 1 tablet (10 mg) by mouth daily       STATIN NOT PRESCRIBED (INTENTIONAL)      continuous prn for other Statin not prescribed intentionally due to Intolerance (with supporting documentation of trying a statin at least once within the last 5 years)         "

## 2017-04-06 ENCOUNTER — OFFICE VISIT (OUTPATIENT)
Dept: FAMILY MEDICINE | Facility: CLINIC | Age: 52
End: 2017-04-06
Payer: COMMERCIAL

## 2017-04-06 VITALS
HEIGHT: 70 IN | WEIGHT: 296 LBS | TEMPERATURE: 98.1 F | BODY MASS INDEX: 42.37 KG/M2 | DIASTOLIC BLOOD PRESSURE: 70 MMHG | HEART RATE: 97 BPM | SYSTOLIC BLOOD PRESSURE: 127 MMHG

## 2017-04-06 DIAGNOSIS — E78.5 HYPERLIPIDEMIA WITH TARGET LDL LESS THAN 100: ICD-10-CM

## 2017-04-06 DIAGNOSIS — Z91.89 10 YEAR RISK OF MI OR STROKE 7.5% OR GREATER: ICD-10-CM

## 2017-04-06 DIAGNOSIS — Z72.0 TOBACCO ABUSE: Primary | ICD-10-CM

## 2017-04-06 DIAGNOSIS — E11.9 TYPE 2 DIABETES MELLITUS WITHOUT COMPLICATION, WITHOUT LONG-TERM CURRENT USE OF INSULIN (H): ICD-10-CM

## 2017-04-06 LAB — HBA1C MFR BLD: 7.4 % (ref 4.3–6)

## 2017-04-06 PROCEDURE — 99214 OFFICE O/P EST MOD 30 MIN: CPT | Mod: 25 | Performed by: FAMILY MEDICINE

## 2017-04-06 PROCEDURE — 83036 HEMOGLOBIN GLYCOSYLATED A1C: CPT | Performed by: FAMILY MEDICINE

## 2017-04-06 PROCEDURE — 90471 IMMUNIZATION ADMIN: CPT | Performed by: FAMILY MEDICINE

## 2017-04-06 PROCEDURE — 36415 COLL VENOUS BLD VENIPUNCTURE: CPT | Performed by: FAMILY MEDICINE

## 2017-04-06 PROCEDURE — 90746 HEPB VACCINE 3 DOSE ADULT IM: CPT | Performed by: FAMILY MEDICINE

## 2017-04-06 RX ORDER — EZETIMIBE 10 MG/1
10 TABLET ORAL DAILY
Qty: 90 TABLET | Refills: 3 | Status: SHIPPED | OUTPATIENT
Start: 2017-04-06 | End: 2018-03-05

## 2017-04-06 RX ORDER — LIRAGLUTIDE 6 MG/ML
1.2 INJECTION SUBCUTANEOUS DAILY
Qty: 12 ML | Refills: 0 | Status: SHIPPED | OUTPATIENT
Start: 2017-04-06 | End: 2017-04-18

## 2017-04-06 RX ORDER — NICOTINE 21 MG/24HR
1 PATCH, TRANSDERMAL 24 HOURS TRANSDERMAL EVERY 24 HOURS
Qty: 30 PATCH | Refills: 0 | Status: SHIPPED | OUTPATIENT
Start: 2017-04-06 | End: 2017-05-02

## 2017-04-06 NOTE — PROGRESS NOTES
"  SUBJECTIVE:                                                    Lan Potts Jr. is a 51 year old male who presents to clinic today for the following health issues:      Diabetes Follow-up    Patient is checking blood sugars: \"couple times a day\"  Blood sugar testing frequency justification: Uncontrolled diabetes  Results are as follows:         am - 130         bedtime - 130    Diabetic concerns: None     Symptoms of hypoglycemia (low blood sugar): none     Paresthesias (numbness or burning in feet) or sores: No     Date of last diabetic eye exam: 12/2015       Amount of exercise or physical activity: daily walking    Problems taking medications regularly: No    Medication side effects: none    Diet: regular (no restrictions)        Reviewed and updated as needed this visit by clinical staff  Allergies  Meds       Reviewed and updated as needed this visit by Provider       --------------------------------------------------------------------------------------------------------------------------------------    To access diabetes educational materials with in EPIC use <dot>DALE      Put this in AFTER VISIT SUMMARY if needed for the patient to access information online [email or web address]      SUBJECTIVE:  Lan Potts Jr. is a 51 year old male who presents today for a follow up appointment for management of DIABETES MELLITUS.    Patient Active Problem List    Diagnosis Date Noted     Type 2 diabetes mellitus without complication, without long-term current use of insulin (H) 01/05/2017     Priority: Medium     Migraine without status migrainosus, not intractable, unspecified migraine type 01/21/2016     Priority: Medium     High triglycerides 12/11/2014     Priority: Medium     HDL deficiency 12/11/2014     Priority: Medium     Hyperlipidemia with target LDL less than 100 02/17/2014     Priority: Medium     Diagnosis updated by automated process. Provider to review and confirm.       Chronic " "back pain      Priority: Medium     Seasonal allergies      Priority: Medium          The patient checks his blood sugar as follows: two times daily, once daily.   Results as follows: post-breakfast glucose- 130-153 and post-supper glucose- 130-153    The patient reports that he IS taking the medication as prescribed.    ----------------------------------------------------------------------------------------------------------------------------------------    BP Readings from Last 3 Encounters:   04/06/17 127/70   03/23/17 110/60   01/05/17 134/78     The patient reports that he IS NOT currently smoking.  History   Smoking Status     Former Smoker     Types: Cigarettes   Smokeless Tobacco     Never Used       The patient's coronary risk factors in addition to diabetes include:      The 10-year ASCVD risk score ([place] Jr, et al., 2013) is: 12.6%    Values used to calculate the score:      Age: 51 years      Sex: Male      Is Non- : No      Diabetic: Yes      Tobacco smoker: No      Systolic Blood Pressure: 127 mmHg      Is BP treated: No      HDL Cholesterol: 33 mg/dL      Total Cholesterol: 252 mg/dL        Current Outpatient Prescriptions   Medication Sig Dispense Refill     meclizine (ANTIVERT) 25 MG tablet Take 1 tablet (25 mg) by mouth every 6 hours as needed for dizziness 30 tablet 1     insulin pen needle (BD RAKEL U/F) 32G X 4 MM Use 1 daily or as directed. Please dispense per patients insurance preference. Patient requesting \"Rakel needle\" 100 each prn     liraglutide (VICTOZA) 18 MG/3ML soln Inject 1.2 mg Subcutaneous daily Due for office visit April 12 mL 0     fluticasone (FLONASE) 50 MCG/ACT spray Spray 2 sprays into both nostrils 2 times daily 16 g 11     eletriptan (RELPAX) 40 MG tablet Take 1 tablet (40 mg) by mouth at onset of headache May repeat in 2 hours as needed but no more than 2 pills in 24 hours. 12 tablet 5     blood glucose (ACCU-CHEK SMARTVIEW) test strip 1 strip by " In Vitro route 3 times daily 3 Box 3     blood glucose monitoring (ACCU-CHEK FASTCLIX) lancets 1 each 3 times daily 3 Box 3     blood glucose calibration (ACCU-CHEK SMARTVIEW CONTROL) solution 1 drop by In Vitro route as needed 1 each 3     albuterol (PROAIR HFA/PROVENTIL HFA/VENTOLIN HFA) 108 (90 BASE) MCG/ACT Inhaler Inhale 2 puffs into the lungs every 6 hours as needed for shortness of breath / dyspnea or wheezing (Patient not taking: Reported on 3/23/2017) 1 Inhaler 0     STATIN NOT PRESCRIBED, INTENTIONAL, continuous prn for other Reported on 4/6/2017  0     pravastatin (PRAVACHOL) 10 MG tablet Take 1 tablet (10 mg) by mouth daily (Patient not taking: Reported on 4/6/2017) 90 tablet 3     fexofenadine (ALLEGRA ALLERGY) 180 MG tablet Take by mouth daily Reported on 4/6/2017       ASPIRIN NOT PRESCRIBED, INTENTIONAL, Antiplatelet medication not prescribed intentionally due to not indicated at this age (Patient not taking: Reported on 4/6/2017) 1 each 0       The patient reports that he IS NOT taking a statin.   (Reminder all diabetics with a ASCVD risk greater or equal to 7.5% should be on a high intensity statin, otherwise on a moderate intensity statin)  he is not taking a statin because he did not tolerate them. .   (Reminder to intentionally not prescribe statin in )    The patient reports that he IS NOT taking aspirin daily.  (Reminder all diabetic patients with a cardiovascular risk factor and > 50 should take a daily aspirin)   he is not taking aspirin because it is not indicated at his age..  (Reminder to intentionally not prescribe aspirin in )    The patient reports that he IS doing a self foot exam weekly.  The patient reports that he does exercise in the form of walking  6 times a week.  The patient reports that he IS following the recommended diabetic diet.   The patient reports that his last eye exam was over  1 years ago.         Immunization History   Administered Date(s)  "Administered     Influenza (IIV3) 12/21/2012, 09/27/2013     Pneumococcal 23 valent 09/04/2014     TDAP Vaccine (Adacel) 09/20/2011     The patient reports that he has not had the hepatitis B vaccine series in the past. (recommended for age 19-59 and can be given to age 60 or older)  The patient reports that he has had a pnuemovax in the past.  The patient reports that he has not had a flu shot for the current influenza season.  The patient would like to have a Hepatitis B        EXAM:  /70  Pulse 97  Temp 98.1  F (36.7  C) (Oral)  Ht 5' 10.08\" (1.78 m)  Wt 296 lb (134.3 kg)  BMI 42.38 kg/m2  Wt Readings from Last 4 Encounters:   04/06/17 296 lb (134.3 kg)   03/23/17 292 lb (132.5 kg)   01/05/17 298 lb (135.2 kg)   01/21/16 296 lb (134.3 kg)     Body mass index is 42.38 kg/(m^2).    General:  Lan is awake, alert, and cooperative.  NAD.                                           Foot Exam: Areas of numbess as outlined above  Right Foot none  Left Foot none  normal DP pulses, capillary refill less than 2 seconds, no trophic changes or ulcerative lesions, dry skin globally on the feet and normal monofilament exam, except for findings noted on diabetic foot graphical image.                  Previsit labs drawn include:  Orders Only on 01/13/2017   Component Date Value Ref Range Status     Hemoglobin A1C 01/13/2017 7.7* 4.3 - 6.0 % Final     Cholesterol 01/13/2017 252* <200 mg/dL Final    Desirable:       <200 mg/dl     Triglycerides 01/13/2017 234* <150 mg/dL Final    Comment: Borderline high:  150-199 mg/dl   High:             200-499 mg/dl   Very high:       >499 mg/dl   Fasting specimen       HDL Cholesterol 01/13/2017 33* >39 mg/dL Final     LDL Cholesterol Calculated 01/13/2017 172* <100 mg/dL Final    Comment: Above desirable:  100-129 mg/dl   Borderline High:  130-159 mg/dL   High:             160-189 mg/dL   Very high:       >189 mg/dl       Non HDL Cholesterol 01/13/2017 219* <130 mg/dL Final    " Comment: Above Desirable:  130-159 mg/dl   Borderline high:  160-189 mg/dl   High:             190-219 mg/dl   Very high:       >219 mg/dl       Creatinine Urine 01/13/2017 188  mg/dL Final     Albumin Urine mg/L 01/13/2017 8  mg/L Final     Albumin Urine mg/g Cr 01/13/2017 3.99  0 - 17 mg/g Cr Final     Sodium 01/13/2017 138  133 - 144 mmol/L Final     Potassium 01/13/2017 4.0  3.4 - 5.3 mmol/L Final     Chloride 01/13/2017 102  94 - 109 mmol/L Final     Carbon Dioxide 01/13/2017 28  20 - 32 mmol/L Final     Anion Gap 01/13/2017 8  3 - 14 mmol/L Final     Glucose 01/13/2017 169* 70 - 99 mg/dL Final    Fasting specimen     Urea Nitrogen 01/13/2017 11  7 - 30 mg/dL Final     Creatinine 01/13/2017 0.72  0.66 - 1.25 mg/dL Final     GFR Estimate 01/13/2017   >60 mL/min/1.7m2 Final                    Value:>90  Non  GFR Calc       GFR Estimate If Black 01/13/2017   >60 mL/min/1.7m2 Final                    Value:>90   GFR Calc       Calcium 01/13/2017 8.6  8.5 - 10.1 mg/dL Final     Bilirubin Total 01/13/2017 0.4  0.2 - 1.3 mg/dL Final     Albumin 01/13/2017 3.6  3.4 - 5.0 g/dL Final     Protein Total 01/13/2017 6.9  6.8 - 8.8 g/dL Final     Alkaline Phosphatase 01/13/2017 124  40 - 150 U/L Final     ALT 01/13/2017 101* 0 - 70 U/L Final     AST 01/13/2017 47* 0 - 45 U/L Final     TSH 01/13/2017 3.26  0.40 - 4.00 mU/L Final         ASSESSMENT and PLAN:    Type II Diabetes,.    DIABETES Type II:                       Lab Results   Component Value Date    A1C 7.7 01/13/2017       Control   unable to assess  Lab Results   Component Value Date    A1C 7.7 01/13/2017    A1C 6.6 01/21/2016    A1C 6.4 09/03/2015     Lab Results   Component Value Date    MICROL 8 01/13/2017     No results found for: MICROALBUMIN Control   good    Check a HGBA1C , Hep B vaccination(s) today, compliance with the recommended diabetic diet was stressed, Regular aerobic exercise was encouraged, Home glucose monitoring  "encouraged, Annual eye exam recommended, Self foot exam demonstrated and recommended to be done nightly, Apply moisturizer to feet with in 3 minutes of showering or bathing, Follow up in clinic in 3 months for a diabetes check and Future labs ordered and the patient was instructed to make a lab appt 1 week prior to next diabetes visit      BP/HTN:   BP Readings from Last 3 Encounters:   04/06/17 127/70   03/23/17 110/60   01/05/17 134/78     Control   good    - Medication: n/a  (make sure to order \"Ace not prescribed intentionally if not on an ACE/ARB)  The patient was advised to do the following therapuetic life style changes  - Dietary sodium restriction and increase potassium and Calcium intake  - Regular aerobic exercise  - Weight loss  - Discontinue smoking if applicable  - Avoid regular NSAID use if applicable  - Avoid regular decongestant use if applicable  - Follow up in clinic in 6 months for a recheck      Patient Education: Reviewed risks of hypertension and principles of   Treatment.    HYPERCHOLESTEROLEMIA:     The 10-year ASCVD risk score (Maribel ADRIANO Jr, et al., 2013) is: 12.6%    Values used to calculate the score:      Age: 51 years      Sex: Male      Is Non- : No      Diabetic: Yes      Tobacco smoker: No      Systolic Blood Pressure: 127 mmHg      Is BP treated: No      HDL Cholesterol: 33 mg/dL      Total Cholesterol: 252 mg/dL    Lab Results   Component Value Date     01/13/2017      Control   poor  Cholesterol   Date Value Ref Range Status   01/13/2017 252 (H) <200 mg/dL Final     Comment:     Desirable:       <200 mg/dl   09/03/2015 223 (H) <200 mg/dL Final     Comment:     LDL Cholesterol is the primary guide to therapy.   The NCEP recommends further evaluation of: patients with cholesterol greater   than 200 mg/dL if additional risk factors are present, cholesterol greater   than   240 mg/dL, triglycerides greater than 150 mg/dL, or HDL less than 40 mg/dL.   "     HDL Cholesterol   Date Value Ref Range Status   01/13/2017 33 (L) >39 mg/dL Final   09/03/2015 29 (L) >40 mg/dL Final     LDL Cholesterol Calculated   Date Value Ref Range Status   01/13/2017 172 (H) <100 mg/dL Final     Comment:     Above desirable:  100-129 mg/dl   Borderline High:  130-159 mg/dL   High:             160-189 mg/dL   Very high:       >189 mg/dl     09/03/2015 157 (H) 0 - 129 mg/dL Final     Comment:     LDL Cholesterol is the primary guide to therapy: LDL-cholesterol goal in high   risk patients is <100 mg/dL and in very high risk patients is <70 mg/dL.       Triglycerides   Date Value Ref Range Status   01/13/2017 234 (H) <150 mg/dL Final     Comment:     Borderline high:  150-199 mg/dl   High:             200-499 mg/dl   Very high:       >499 mg/dl   Fasting specimen     09/03/2015 184 (H) 0 - 150 mg/dL Final     Comment:     Fasting specimen     Cholesterol/HDL Ratio   Date Value Ref Range Status   09/03/2015 7.7 (H) 0.0 - 5.0 Final   12/11/2014 5.8 (H) 0.0 - 5.0 Final         The patient can not tolerate statins so we will start zetia.

## 2017-04-06 NOTE — PATIENT INSTRUCTIONS
He was intructed to use mineral or olive oil to prevent reacummulation of the cerumen. he was advised to put 2-3 drops into each ear 2-3 times a week before showers of baths and to rinse away any of the oily residue at the end of his shower.    Please schedule and eye exam with you eye care provider and continue to do so every 1 year(s).

## 2017-04-06 NOTE — NURSING NOTE
"Chief Complaint   Patient presents with     Diabetes       Initial /70  Pulse 97  Temp 98.1  F (36.7  C) (Oral)  Ht 5' 10.08\" (1.78 m)  Wt 296 lb (134.3 kg)  BMI 42.38 kg/m2 Estimated body mass index is 42.38 kg/(m^2) as calculated from the following:    Height as of this encounter: 5' 10.08\" (1.78 m).    Weight as of this encounter: 296 lb (134.3 kg).  Medication Reconciliation: complete   Juana Robins CMA      "

## 2017-04-06 NOTE — MR AVS SNAPSHOT
After Visit Summary   4/6/2017    Lan Potts Jr.    MRN: [id number]           Patient Information     Date Of Birth          1965        Visit Information        Provider Department      4/6/2017 6:00 PM Eligio Quevedo MD [facility]        Today's Diagnoses     Tobacco abuse    -  1    Type 2 diabetes mellitus without complication, without long-term current use of insulin (H)        Hyperlipidemia with target LDL less than 100        10 year risk of MI or stroke 7.5% or greater, 12.6% in April 2017          Care Instructions    He was intructed to use mineral or olive oil to prevent reacummulation of the cerumen. he was advised to put 2-3 drops into each ear 2-3 times a week before showers of baths and to rinse away any of the oily residue at the end of his shower.    Please schedule and eye exam with you eye care provider and continue to do so every 1 year(s).          Follow-ups after your visit        Who to contact     If you have questions or need follow up information about today's clinic visit or your schedule please contact [facility] directly at [phone].  Normal or non-critical lab and imaging results will be communicated to you by [employer], letter or phone within 4 business days after the clinic has received the results. If you do not hear from us within 7 days, please contact the clinic through [employer] or phone. If you have a critical or abnormal lab result, we will notify you by phone as soon as possible.  Submit refill requests through [employer] or call your pharmacy and they will forward the refill request to us. Please allow 3 business days for your refill to be completed.          Additional Information About Your Visit        MyChart Information     [employer] gives you secure access to your electronic health record. If you see a primary care provider, you can also send messages to your care team and make appointments. If you have questions,  "please call your primary care clinic.  If you do not have a primary care provider, please call [phone] and they will assist you.        Care EveryWhere ID     This is your Care EveryWhere ID. This could be used by other organizations to access your [place] medical records  [id number]        Your Vitals Were     Pulse Temperature Height BMI (Body Mass Index)          97 98.1  F (36.7  C) (Oral) 5' 10.08\" (1.78 m) 42.38 kg/m2         Blood Pressure from Last 3 Encounters:   04/06/17 127/70   03/23/17 110/60   01/05/17 134/78    Weight from Last 3 Encounters:   04/06/17 296 lb (134.3 kg)   03/23/17 292 lb (132.5 kg)   01/05/17 298 lb (135.2 kg)              We Performed the Following     Hemoglobin A1c     HEPATITIS B VACCINE,ADULT,IM          Today's Medication Changes          These changes are accurate as of: 4/6/17  6:45 PM.  If you have any questions, ask your nurse or doctor.               Start taking these medicines.        Dose/Directions    ezetimibe 10 MG tablet   Commonly known as:  ZETIA   Used for:  Type 2 diabetes mellitus without complication, without long-term current use of insulin (H), Hyperlipidemia with target LDL less than 100, 10 year risk of MI or stroke 7.5% or greater        Dose:  10 mg   Take 1 tablet (10 mg) by mouth daily   Quantity:  90 tablet   Refills:  3       * nicotine 14 MG/24HR 24 hr patch   Commonly known as:  NICODERM CQ   Used for:  Tobacco abuse        Dose:  1 patch   Place 1 patch onto the skin every 24 hours Use this strength first   Quantity:  30 patch   Refills:  0       * nicotine 7 MG/24HR 24 hr patch   Commonly known as:  [facility] NICOTINE   Used for:  Tobacco abuse        Dose:  1 patch   Place 1 patch onto the skin every 24 hours   Quantity:  30 patch   Refills:  0       * Notice:  This list has 2 medication(s) that are the same as other medications prescribed for you. Read the directions carefully, and ask your doctor or other care provider to review them with " you.      Stop taking these medicines if you haven't already. Please contact your care team if you have questions.     pravastatin 10 MG tablet   Commonly known as:  PRAVACHOL                Where to get your medicines      These medications were sent to [facility]/pharmacy #[id number] - [place], [place] - [address]  [address] [address], , [place] [address]     Phone:  [phone]     ezetimibe 10 MG tablet    liraglutide 18 MG/3ML soln    nicotine 14 MG/24HR 24 hr patch    nicotine 7 MG/24HR 24 hr patch                Primary Care Provider Office Phone # Fax #    Eligio Quevedo [phone]       [facility] [address] [facility] [address]        Thank you!     Thank you for choosing [facility]  for your care. Our goal is always to provide you with excellent care. Hearing back from our patients is one way we can continue to improve our services. Please take a few minutes to complete the written survey that you may receive in the mail after your visit with us. Thank you!             Your Updated Medication List - Protect others around you: Learn how to safely use, store and throw away your medicines at www.disposemymeds.org.          This list is accurate as of: 4/6/17  6:45 PM.  Always use your most recent med list.                   Brand Name Dispense Instructions for use    albuterol 108 (90 BASE) MCG/ACT Inhaler    PROAIR HFA/PROVENTIL HFA/VENTOLIN HFA    1 Inhaler    Inhale 2 puffs into the lungs every 6 hours as needed for shortness of breath / dyspnea or wheezing       ALLEGRA ALLERGY 180 MG tablet   Generic drug:  fexofenadine      Take by mouth daily Reported on 4/6/2017       ASPIRIN NOT PRESCRIBED    INTENTIONAL    1 each    Antiplatelet medication not prescribed intentionally due to not indicated at this age       blood glucose calibration solution     1 each    1 drop by In Vitro route as needed       blood glucose monitoring lancets     3  "Box    1 each 3 times daily       blood glucose monitoring test strip    ACCU-CHEK SMARTVIEW    3 Box    1 strip by In Vitro route 3 times daily       eletriptan 40 MG tablet    RELPAX    12 tablet    Take 1 tablet (40 mg) by mouth at onset of headache May repeat in 2 hours as needed but no more than 2 pills in 24 hours.       ezetimibe 10 MG tablet    ZETIA    90 tablet    Take 1 tablet (10 mg) by mouth daily       fluticasone 50 MCG/ACT spray    FLONASE    16 g    Spray 2 sprays into both nostrils 2 times daily       insulin pen needle 32G X 4 MM    BD RAKEL U/F    100 each    Use 1 daily or as directed. Please dispense per patients insurance preference. Patient requesting \"Rakel needle\"       liraglutide 18 MG/3ML soln    VICTOZA    12 mL    Inject 1.2 mg Subcutaneous daily Due for office visit April       meclizine 25 MG tablet    ANTIVERT    30 tablet    Take 1 tablet (25 mg) by mouth every 6 hours as needed for dizziness       * nicotine 14 MG/24HR 24 hr patch    NICODERM CQ    30 patch    Place 1 patch onto the skin every 24 hours Use this strength first       * nicotine 7 MG/24HR 24 hr patch    [facility] NICOTINE    30 patch    Place 1 patch onto the skin every 24 hours       STATIN NOT PRESCRIBED (INTENTIONAL)      continuous prn for other Reported on 4/6/2017       * Notice:  This list has 2 medication(s) that are the same as other medications prescribed for you. Read the directions carefully, and ask your doctor or other care provider to review them with you.      "

## 2017-04-06 NOTE — NURSING NOTE
Screening Questionnaire for Adult Immunization    Are you sick today?   No   Do you have allergies to medications, food, a vaccine component or latex?   No   Have you ever had a serious reaction after receiving a vaccination?   No   Do you have a long-term health problem with heart disease, lung disease, asthma, kidney disease, metabolic disease (e.g. diabetes), anemia, or other blood disorder?   No   Do you have cancer, leukemia, HIV/AIDS, or any other immune system problem?   No   In the past 3 months, have you taken medications that affect  your immune system, such as prednisone, other steroids, or anticancer drugs; drugs for the treatment of rheumatoid arthritis, Crohn s disease, or psoriasis; or have you had radiation treatments?   No   Have you had a seizure, or a brain or other nervous system problem?   No   During the past year, have you received a transfusion of blood or blood     products, or been given immune (gamma) globulin or antiviral drug?   No   For women: Are you pregnant or is there a chance you could become        pregnant during the next month?   No   Have you received any vaccinations in the past 4 weeks?   No     Immunization questionnaire answers were all negative.      MNVFC doesn't apply on this patient     Screening performed by Juana Robins on 4/6/2017 at 6:59 PM.

## 2017-05-02 DIAGNOSIS — Z72.0 TOBACCO ABUSE: ICD-10-CM

## 2017-05-03 RX ORDER — NICOTINE 14MG/24HR
PATCH, TRANSDERMAL 24 HOURS TRANSDERMAL
Qty: 28 PATCH | Refills: 0 | Status: SHIPPED | OUTPATIENT
Start: 2017-05-03 | End: 2018-04-05

## 2017-07-07 DIAGNOSIS — E11.9 TYPE 2 DIABETES, HBA1C GOAL < 7% (H): ICD-10-CM

## 2017-07-07 RX ORDER — PEN NEEDLE, DIABETIC 32GX 5/32"
NEEDLE, DISPOSABLE MISCELLANEOUS
Qty: 100 EACH | Refills: 3 | Status: SHIPPED | OUTPATIENT
Start: 2017-07-07 | End: 2018-04-05

## 2017-10-26 ENCOUNTER — OFFICE VISIT (OUTPATIENT)
Dept: FAMILY MEDICINE | Facility: CLINIC | Age: 52
End: 2017-10-26
Payer: COMMERCIAL

## 2017-10-26 VITALS
WEIGHT: 295 LBS | HEART RATE: 97 BPM | HEIGHT: 71 IN | DIASTOLIC BLOOD PRESSURE: 80 MMHG | OXYGEN SATURATION: 95 % | SYSTOLIC BLOOD PRESSURE: 126 MMHG | BODY MASS INDEX: 41.3 KG/M2 | TEMPERATURE: 97.5 F

## 2017-10-26 DIAGNOSIS — E11.9 TYPE 2 DIABETES MELLITUS WITHOUT COMPLICATION, WITHOUT LONG-TERM CURRENT USE OF INSULIN (H): Primary | ICD-10-CM

## 2017-10-26 DIAGNOSIS — Z23 NEED FOR PROPHYLACTIC VACCINATION AND INOCULATION AGAINST INFLUENZA: ICD-10-CM

## 2017-10-26 LAB — HBA1C MFR BLD: 7.9 % (ref 4.3–6)

## 2017-10-26 PROCEDURE — 99214 OFFICE O/P EST MOD 30 MIN: CPT | Mod: 25 | Performed by: FAMILY MEDICINE

## 2017-10-26 PROCEDURE — 90686 IIV4 VACC NO PRSV 0.5 ML IM: CPT | Performed by: FAMILY MEDICINE

## 2017-10-26 PROCEDURE — 90471 IMMUNIZATION ADMIN: CPT | Performed by: FAMILY MEDICINE

## 2017-10-26 PROCEDURE — 83036 HEMOGLOBIN GLYCOSYLATED A1C: CPT | Performed by: FAMILY MEDICINE

## 2017-10-26 PROCEDURE — 36415 COLL VENOUS BLD VENIPUNCTURE: CPT | Performed by: FAMILY MEDICINE

## 2017-10-26 RX ORDER — GLIMEPIRIDE 4 MG/1
4 TABLET ORAL 2 TIMES DAILY WITH MEALS
Qty: 180 TABLET | Refills: 1 | Status: SHIPPED | OUTPATIENT
Start: 2017-10-26 | End: 2018-04-05

## 2017-10-26 NOTE — PROGRESS NOTES

## 2017-10-26 NOTE — MR AVS SNAPSHOT
After Visit Summary   10/26/2017    Lan Potts Jr.    MRN: [id number]           Patient Information     Date Of Birth          1965        Visit Information        Provider Department      10/26/2017 6:00 PM Eligio Quevedo MD [facility]        Today's Diagnoses     Type 2 diabetes mellitus without complication, without long-term current use of insulin (H)    -  1    Need for prophylactic vaccination and inoculation against influenza          Care Instructions    Please schedule and eye exam with you eye care provider and continue to do so every 1 year(s).      Let me know via Crossfader your blood sugar(s) for 3 day(s)   Wait about 1 week to let me know.           Follow-ups after your visit        Who to contact     If you have questions or need follow up information about today's clinic visit or your schedule please contact [facility] directly at [phone].  Normal or non-critical lab and imaging results will be communicated to you by Thwaprhart, letter or phone within 4 business days after the clinic has received the results. If you do not hear from us within 7 days, please contact the clinic through CodinGamet or phone. If you have a critical or abnormal lab result, we will notify you by phone as soon as possible.  Submit refill requests through Crossfader or call your pharmacy and they will forward the refill request to us. Please allow 3 business days for your refill to be completed.          Additional Information About Your Visit        MyChart Information     Crossfader gives you secure access to your electronic health record. If you see a primary care provider, you can also send messages to your care team and make appointments. If you have questions, please call your primary care clinic.  If you do not have a primary care provider, please call [phone] and they will assist you.        Care EveryWhere ID     This is your Care EveryWhere ID. This could be used  "by other organizations to access your [facility] medical records  [id number]        Your Vitals Were     Pulse Temperature Height Pulse Oximetry BMI (Body Mass Index)       97 97.5  F (36.4  C) (Oral) 5' 10.75\" (1.797 m) 95% 41.44 kg/m2        Blood Pressure from Last 3 Encounters:   10/26/17 126/80   04/06/17 127/70   03/23/17 110/60    Weight from Last 3 Encounters:   10/26/17 295 lb (133.8 kg)   04/06/17 296 lb (134.3 kg)   03/23/17 292 lb (132.5 kg)              We Performed the Following     FLU VAC, SPLIT VIRUS IM > 3 YO (QUADRIVALENT) [76808]     Vaccine Administration, Initial [54416]          Today's Medication Changes          These changes are accurate as of: 10/26/17  6:32 PM.  If you have any questions, ask your nurse or doctor.               Start taking these medicines.        Dose/Directions    ACE/ARB/ARNI NOT PRESCRIBED (INTENTIONAL)   Used for:  Type 2 diabetes mellitus without complication, without long-term current use of insulin (H)   Started by:  Eligio Quevedo MD        Please choose reason not prescribed, below   Refills:  0       glimepiride 4 MG tablet   Commonly known as:  AMARYL   Used for:  Type 2 diabetes mellitus without complication, without long-term current use of insulin (H)   Started by:  Eligio Quevedo MD        Dose:  4 mg   Take 1 tablet (4 mg) by mouth 2 times daily (with meals) Before breakfast and before dinner   Quantity:  180 tablet   Refills:  1         Stop taking these medicines if you haven't already. Please contact your care team if you have questions.     liraglutide 18 MG/3ML soln   Commonly known as:  VICTOZA   Stopped by:  Eligio Quevedo MD                Where to get your medicines      These medications were sent to [facility] #[id number] - [place], [place] - [address]  [address] , [place] [address]     Phone:  [phone]     glimepiride 4 MG tablet         Some of these will need a paper prescription and others can be " bought over the counter.  Ask your nurse if you have questions.     You don't need a prescription for these medications     ACE/ARB/ARNI NOT PRESCRIBED (INTENTIONAL)                Primary Care Provider Office Phone # Fax #    Eligio Quevedo [phone] [address] [facility] [address]        Equal Access to Services     BALTA JONES : Hadii aad ku hadasho Soomaali, waaxda luqadaha, qaybta kaalmada adeegyada, waxay idiin hayaan adeeg kharash lasarina . So [facility] [phone].    ATENCIÓN: Si habla español, tiene a cordoba disposición servicios gratuitos de asistencia lingüística. Llame al [phone].    We comply with applicable federal civil rights laws and [place] laws. We do not discriminate on the basis of race, color, national origin, age, disability, sex, sexual orientation, or gender identity.            Thank you!     Thank you for choosing [facility]  for your care. Our goal is always to provide you with excellent care. Hearing back from our patients is one way we can continue to improve our services. Please take a few minutes to complete the written survey that you may receive in the mail after your visit with us. Thank you!             Your Updated Medication List - Protect others around you: Learn how to safely use, store and throw away your medicines at www.disposemymeds.org.          This list is accurate as of: 10/26/17  6:32 PM.  Always use your most recent med list.                   Brand Name Dispense Instructions for use Diagnosis    ACE/ARB/ARNI NOT PRESCRIBED (INTENTIONAL)      Please choose reason not prescribed, below    Type 2 diabetes mellitus without complication, without long-term current use of insulin (H)       albuterol 108 (90 BASE) MCG/ACT Inhaler    PROAIR HFA/PROVENTIL HFA/VENTOLIN HFA    1 Inhaler    Inhale 2 puffs into the lungs every 6 hours as needed for shortness of breath / dyspnea or wheezing    Acute bronchospasm       ALLEGRA  "ALLERGY 180 MG tablet   Generic drug:  fexofenadine      Take by mouth daily Reported on 4/6/2017        ASPIRIN NOT PRESCRIBED    INTENTIONAL    1 each    Antiplatelet medication not prescribed intentionally due to not indicated at this age    Type 2 diabetes, HbA1c goal < 7% (H)       blood glucose calibration solution     1 each    1 drop by In Vitro route as needed    Type 2 diabetes, HbA1c goal < 7% (H)       blood glucose monitoring lancets     3 Box    1 each 3 times daily    Type 2 diabetes, HbA1c goal < 7% (H)       blood glucose monitoring test strip    ACCU-CHEK SMARTVIEW    3 Box    1 strip by In Vitro route 3 times daily    Type 2 diabetes, HbA1c goal < 7% (H)       eletriptan 40 MG tablet    RELPAX    12 tablet    Take 1 tablet (40 mg) by mouth at onset of headache May repeat in 2 hours as needed but no more than 2 pills in 24 hours.    Migraine without status migrainosus, not intractable, unspecified migraine type       ezetimibe 10 MG tablet    ZETIA    90 tablet    Take 1 tablet (10 mg) by mouth daily    Type 2 diabetes mellitus without complication, without long-term current use of insulin (H), Hyperlipidemia with target LDL less than 100, 10 year risk of MI or stroke 7.5% or greater       fluticasone 50 MCG/ACT spray    FLONASE    16 g    Spray 2 sprays into both nostrils 2 times daily    Seasonal allergic rhinitis, unspecified allergic rhinitis trigger       glimepiride 4 MG tablet    AMARYL    180 tablet    Take 1 tablet (4 mg) by mouth 2 times daily (with meals) Before breakfast and before dinner    Type 2 diabetes mellitus without complication, without long-term current use of insulin (H)       * insulin pen needle 32G X 4 MM    BD RAKEL U/F    100 each    Use 1 daily or as directed. Please dispense per patients insurance preference. Patient requesting \"Rakel needle\"    Type 2 diabetes, HbA1c goal < 7% (H)       * BD RAKEL U/F 32G X 4 MM   Generic drug:  insulin pen needle     100 each    USE 1 " TIME DAILY    Type 2 diabetes, HbA1c goal < 7% (H)       meclizine 25 MG tablet    ANTIVERT    30 tablet    Take 1 tablet (25 mg) by mouth every 6 hours as needed for dizziness    Vertigo       * nicotine 7 MG/24HR 24 hr patch    [facility] NICOTINE    30 patch    Place 1 patch onto the skin every 24 hours    Tobacco abuse       * [facility] NICOTINE 14 MG/24HR 24 hr patch   Generic drug:  nicotine     28 patch    PLACE 1 PATCH ONTO THE SKIN EVERY 24 HOURS USE THIS STRENGTH FIRST    Tobacco abuse       STATIN NOT PRESCRIBED (INTENTIONAL)      continuous prn for other Reported on 4/6/2017        * Notice:  This list has 4 medication(s) that are the same as other medications prescribed for you. Read the directions carefully, and ask your doctor or other care provider to review them with you.

## 2017-10-26 NOTE — PATIENT INSTRUCTIONS
Please schedule and eye exam with you eye care provider and continue to do so every 1 year(s).      Let me know via MyChart your blood sugar(s) for 3 day(s)   Wait about 1 week to let me know.

## 2017-10-26 NOTE — NURSING NOTE
"Chief Complaint   Patient presents with     Diabetes       Initial /80  Pulse 97  Temp 97.5  F (36.4  C) (Oral)  Ht 5' 10.75\" (1.797 m)  Wt 295 lb (133.8 kg)  SpO2 95%  BMI 41.44 kg/m2 Estimated body mass index is 41.44 kg/(m^2) as calculated from the following:    Height as of this encounter: 5' 10.75\" (1.797 m).    Weight as of this encounter: 295 lb (133.8 kg).  Medication Reconciliation: complete  Traci Oro M.A.  .  "

## 2017-10-26 NOTE — PROGRESS NOTES
Diabetes recheck   --------------------------------------------------------------------------------------------------------------------------------------    To access diabetes educational materials with in EPIC use <dot>DALE      Put this in AFTER VISIT SUMMARY if needed for the patient to access information online [email or web address]      SUBJECTIVE:  Lan Potts Jr. is a 52 year old male who presents today for a follow up appointment for management of DIABETES MELLITUS.    Patient Active Problem List    Diagnosis Date Noted     10 year risk of MI or stroke 7.5% or greater, 12.6% in April 2017 04/06/2017     Priority: Medium     Type 2 diabetes mellitus without complication, without long-term current use of insulin (H) 01/05/2017     Priority: Medium     Migraine without status migrainosus, not intractable, unspecified migraine type 01/21/2016     Priority: Medium     High triglycerides 12/11/2014     Priority: Medium     HDL deficiency 12/11/2014     Priority: Medium     Hyperlipidemia with target LDL less than 100 02/17/2014     Priority: Medium     Diagnosis updated by automated process. Provider to review and confirm.       Chronic back pain      Priority: Medium     Seasonal allergies      Priority: Medium          The patient checks his blood sugar as follows: two times daily, once daily.   Results as follows: fasting glucose- 230-286 and pre-supper glucose- 104- 120  ----------------------------------------------------------------------------------------------------------------------------------------    BP Readings from Last 3 Encounters:   10/26/17 126/80   04/06/17 127/70   03/23/17 110/60     The patient reports that he IS currently smoking.  History   Smoking Status     Former Smoker     Types: Cigarettes   Smokeless Tobacco     Never Used         The 10-year ASCVD risk score (Henryettaerna [place] Jr, et al., 2013) is: 13.4%    Values used to calculate the score:      Age: 52 years      Sex:  "Male      Is Non- : No      Diabetic: Yes      Tobacco smoker: No      Systolic Blood Pressure: 126 mmHg      Is BP treated: No      HDL Cholesterol: 33 mg/dL      Total Cholesterol: 252 mg/dL        Current Outpatient Prescriptions   Medication Sig Dispense Refill     BD RAKEL U/F 32G X 4 MM insulin pen needle USE 1 TIME DAILY 100 each 3     CVS NICOTINE 14 MG/24HR 24 hr patch PLACE 1 PATCH ONTO THE SKIN EVERY 24 HOURS USE THIS STRENGTH FIRST 28 patch 0     liraglutide (VICTOZA) 18 MG/3ML soln Inject 1.2 mg Subcutaneous daily 18 mL 1     nicotine (CVS NICOTINE) 7 MG/24HR 24 hr patch Place 1 patch onto the skin every 24 hours 30 patch 0     ezetimibe (ZETIA) 10 MG tablet Take 1 tablet (10 mg) by mouth daily 90 tablet 3     meclizine (ANTIVERT) 25 MG tablet Take 1 tablet (25 mg) by mouth every 6 hours as needed for dizziness 30 tablet 1     insulin pen needle (BD RAKEL U/F) 32G X 4 MM Use 1 daily or as directed. Please dispense per patients insurance preference. Patient requesting \"Rakel needle\" 100 each prn     fluticasone (FLONASE) 50 MCG/ACT spray Spray 2 sprays into both nostrils 2 times daily 16 g 11     eletriptan (RELPAX) 40 MG tablet Take 1 tablet (40 mg) by mouth at onset of headache May repeat in 2 hours as needed but no more than 2 pills in 24 hours. 12 tablet 5     albuterol (PROAIR HFA/PROVENTIL HFA/VENTOLIN HFA) 108 (90 BASE) MCG/ACT Inhaler Inhale 2 puffs into the lungs every 6 hours as needed for shortness of breath / dyspnea or wheezing 1 Inhaler 0     STATIN NOT PRESCRIBED, INTENTIONAL, continuous prn for other Reported on 4/6/2017  0     fexofenadine (ALLEGRA ALLERGY) 180 MG tablet Take by mouth daily Reported on 4/6/2017       blood glucose (ACCU-CHEK SMARTVIEW) test strip 1 strip by In Vitro route 3 times daily 3 Box 3     blood glucose monitoring (ACCU-CHEK FASTCLIX) lancets 1 each 3 times daily 3 Box 3     blood glucose calibration (ACCU-CHEK SMARTVIEW CONTROL) solution 1 " "drop by In Vitro route as needed 1 each 3     ASPIRIN NOT PRESCRIBED, INTENTIONAL, Antiplatelet medication not prescribed intentionally due to not indicated at this age 1 each 0       The patient reports that he IS NOT taking a statin.   (Reminder all diabetics with a ASCVD risk greater or equal to 7.5% should be on a high intensity statin, otherwise on a moderate intensity statin)  he is not taking a statin because he is not tolerant of .   (Reminder to intentionally not prescribe statin in )    The patient reports that he IS NOT taking  aspirin daily.  (Reminder all diabetic patients with a cardiovascular risk factor and > 50 should take a daily aspirin)   he is not taking aspirin because it is not indicated.  (Reminder to intentionally not prescribe aspirin in )    The patient reports that he IS doing a self foot exam weekly.  The patient reports that he does not exercise  Recently.  He was exercising ths summer and then developed a blister on his great toe.       The patient reports that he IS following the recommended diabetic diet. He  would give himself a A- grade on his diet.  The patient reports that his last eye exam was 2 years ago.     The last time he had an appointment with a diabetic educator was  2 years ago.    Immunization History   Administered Date(s) Administered     HepB 04/06/2017     Influenza (IIV3) 12/21/2012, 09/27/2013     Pneumococcal 23 valent 09/04/2014     TDAP Vaccine (Adacel) 09/20/2011     The patient reports that he has started the hepatitis B vaccine series in the past. (recommended for age 19-59 and can be given to age 60 or older)  The patient reports that he has had a pnuemovax in the past.  The patient reports that he has not had a flu shot for the current influenza season.  The patient would like to have a Influenza        EXAM:  /80  Pulse 97  Temp 97.5  F (36.4  C) (Oral)  Ht 5' 10.75\" (1.797 m)  Wt 295 lb (133.8 kg)  SpO2 95%  BMI 41.44 " kg/m2  Wt Readings from Last 4 Encounters:   10/26/17 295 lb (133.8 kg)   04/06/17 296 lb (134.3 kg)   03/23/17 292 lb (132.5 kg)   01/05/17 298 lb (135.2 kg)     Body mass index is 41.44 kg/(m^2).    General:  Lan is awake, alert, and cooperative.  NAD.                                             Foot Exam: Areas of numbess as outlined above  Right Foot none  Left Foot none  normal DP pulses, capillary refill less than 2 seconds, no trophic changes or ulcerative lesions, dry skin globally on the feet and normal monofilament exam, except for findings noted on diabetic foot graphical image.    Diabetic Foot Screen:  There is a callus under the right great toe  Any complaints of increased pain or numbness ? No  Is there a foot ulcer now or a history of foot ulcer? No  Does the foot have an abnormal shape? No  Are the nails thick, too long or ingrown? No  Are there any redness or open areas? No         Sensation Testing done at all points on the diagram with monofilament     Right Foot: Sensation Normal at all points  Left Foot: Sensation Normal at all points     Risk Category: 0- No loss of protective sensation  Performed by Eligio Quevedo MD                  Previsit labs drawn include:  Office Visit on 04/06/2017   Component Date Value Ref Range Status     Hemoglobin A1C 04/06/2017 7.4* 4.3 - 6.0 % Final         ASSESSMENT and PLAN:    Type II Diabetes,.    DIABETES Type II:                       Lab Results   Component Value Date    A1C 7.4 04/06/2017       Control   unable to assess  Lab Results   Component Value Date    A1C 7.4 04/06/2017    A1C 7.7 01/13/2017    A1C 6.6 01/21/2016     Lab Results   Component Value Date    MICROL 8 01/13/2017     No results found for: MICROALBUMIN Control   good    He does not wantt to take the victoza as it needs to be refrigerated and he travels a lot.   He wants to go back on glimeperide.  Check a HGBA1C , Recommended to take ASA  mg daily for appropriate patient,  "Compliance with the recommended diabetic diet was stressed, Regular aerobic exercise was encouraged, Home glucose monitoring encouraged, Annual eye exam recommended, Self foot exam demonstrated and recommended to be done nightly, Apply moisturizer to feet with in 3 minutes of showering or bathing, Follow up in clinic in 3 months for a diabetes check and Future labs ordered and the patient was instructed to make a lab appt 1 week prior to next diabetes visit      BP/HTN:   BP Readings from Last 3 Encounters:   10/26/17 126/80   04/06/17 127/70   03/23/17 110/60     Control   good    - Medication:N/A  (make sure to order \"Ace not prescribed intentionally if not on an ACE/ARB)  The patient was advised to do the following therapuetic life style changes  - Dietary sodium restriction and increase potassium and Calcium intake  - Regular aerobic exercise  - Weight loss  - Discontinue smoking if applicable  - Avoid regular NSAID use if applicable  - Avoid regular decongestant use if applicable  - Follow up in clinic in 6 months for a recheck  - Check a basic metabolic panel today if needed    Patient Education: Reviewed risks of hypertension and principles of   Treatment.    HYPERCHOLESTEROLEMIA:     The 10-year ASCVD risk score (Nelson DC Jr, et al., 2013) is: 13.4%    Values used to calculate the score:      Age: 52 years      Sex: Male      Is Non- : No      Diabetic: Yes      Tobacco smoker: No      Systolic Blood Pressure: 126 mmHg      Is BP treated: No      HDL Cholesterol: 33 mg/dL      Total Cholesterol: 252 mg/dL    Lab Results   Component Value Date     01/13/2017      Control   poor  Cholesterol   Date Value Ref Range Status   01/13/2017 252 (H) <200 mg/dL Final     Comment:     Desirable:       <200 mg/dl   09/03/2015 223 (H) <200 mg/dL Final     Comment:     LDL Cholesterol is the primary guide to therapy.   The NCEP recommends further evaluation of: patients with cholesterol " greater   than 200 mg/dL if additional risk factors are present, cholesterol greater   than   240 mg/dL, triglycerides greater than 150 mg/dL, or HDL less than 40 mg/dL.       HDL Cholesterol   Date Value Ref Range Status   01/13/2017 33 (L) >39 mg/dL Final   09/03/2015 29 (L) >40 mg/dL Final     LDL Cholesterol Calculated   Date Value Ref Range Status   01/13/2017 172 (H) <100 mg/dL Final     Comment:     Above desirable:  100-129 mg/dl   Borderline High:  130-159 mg/dL   High:             160-189 mg/dL   Very high:       >189 mg/dl     09/03/2015 157 (H) 0 - 129 mg/dL Final     Comment:     LDL Cholesterol is the primary guide to therapy: LDL-cholesterol goal in high   risk patients is <100 mg/dL and in very high risk patients is <70 mg/dL.       Triglycerides   Date Value Ref Range Status   01/13/2017 234 (H) <150 mg/dL Final     Comment:     Borderline high:  150-199 mg/dl   High:             200-499 mg/dl   Very high:       >499 mg/dl   Fasting specimen     09/03/2015 184 (H) 0 - 150 mg/dL Final     Comment:     Fasting specimen     Cholesterol/HDL Ratio   Date Value Ref Range Status   09/03/2015 7.7 (H) 0.0 - 5.0 Final   12/11/2014 5.8 (H) 0.0 - 5.0 Final         The patient is not on a statin as he did non tender tolerate them      We discussed regular aerobic exercise and a low fat and a low cholesterol as therapueutic life style changes that can improve his cholesterol panel.    Information on diet and exercise was given to the patient.    Pumice stone to the callus on his right great toe.

## 2017-10-27 NOTE — PROGRESS NOTES
Lan,  I have reviewed the results of the laboratory tests that we recently ordered. The hemoglobin A1C was higher than our goal of under 7.0 so lets stick with the plan.     Sincerely,   Eligio Quevedo

## 2017-10-31 DIAGNOSIS — E11.9 TYPE 2 DIABETES MELLITUS WITHOUT COMPLICATION, WITHOUT LONG-TERM CURRENT USE OF INSULIN (H): ICD-10-CM

## 2017-11-01 RX ORDER — LIRAGLUTIDE 6 MG/ML
INJECTION SUBCUTANEOUS
Refills: 0 | OUTPATIENT
Start: 2017-11-01

## 2017-11-04 DIAGNOSIS — J30.2 SEASONAL ALLERGIC RHINITIS: ICD-10-CM

## 2017-11-06 ENCOUNTER — MYC MEDICAL ADVICE (OUTPATIENT)
Dept: FAMILY MEDICINE | Facility: CLINIC | Age: 52
End: 2017-11-06

## 2017-11-06 DIAGNOSIS — E11.9 TYPE 2 DIABETES MELLITUS WITHOUT COMPLICATION, WITHOUT LONG-TERM CURRENT USE OF INSULIN (H): Primary | ICD-10-CM

## 2017-11-06 RX ORDER — FLUTICASONE PROPIONATE 50 MCG
SPRAY, SUSPENSION (ML) NASAL
Qty: 16 ML | Refills: 11 | Status: SHIPPED | OUTPATIENT
Start: 2017-11-06 | End: 2018-06-11

## 2017-11-06 NOTE — TELEPHONE ENCOUNTER
Prescription approved per [facility] [place].    Florida Jarquin RN

## 2017-11-07 RX ORDER — METFORMIN HCL 500 MG
500 TABLET, EXTENDED RELEASE 24 HR ORAL 2 TIMES DAILY WITH MEALS
Qty: 60 TABLET | Refills: 1 | Status: SHIPPED | OUTPATIENT
Start: 2017-11-07 | End: 2017-11-30

## 2017-11-28 ENCOUNTER — MYC MEDICAL ADVICE (OUTPATIENT)
Dept: FAMILY MEDICINE | Facility: CLINIC | Age: 52
End: 2017-11-28

## 2017-11-28 DIAGNOSIS — E11.9 TYPE 2 DIABETES MELLITUS WITHOUT COMPLICATION, WITHOUT LONG-TERM CURRENT USE OF INSULIN (H): ICD-10-CM

## 2017-11-30 NOTE — TELEPHONE ENCOUNTER
No documentation from PCP.   RN must've gotten verbal orders.     Will leave this in basket for tomorrow for RN that received verbal order from provider to order metformin with new sig.    Allie Foley RN.

## 2017-12-04 RX ORDER — METFORMIN HYDROCHLORIDE 750 MG/1
750 TABLET, EXTENDED RELEASE ORAL 2 TIMES DAILY WITH MEALS
Qty: 60 TABLET | Refills: 1 | Status: SHIPPED | OUTPATIENT
Start: 2017-12-04 | End: 2018-02-06

## 2018-03-05 DIAGNOSIS — Z91.89 10 YEAR RISK OF MI OR STROKE 7.5% OR GREATER: ICD-10-CM

## 2018-03-05 DIAGNOSIS — E78.5 HYPERLIPIDEMIA WITH TARGET LDL LESS THAN 100: ICD-10-CM

## 2018-03-05 DIAGNOSIS — E11.9 TYPE 2 DIABETES MELLITUS WITHOUT COMPLICATION, WITHOUT LONG-TERM CURRENT USE OF INSULIN (H): ICD-10-CM

## 2018-03-06 RX ORDER — EZETIMIBE 10 MG/1
TABLET ORAL
Qty: 90 TABLET | Refills: 0 | Status: SHIPPED | OUTPATIENT
Start: 2018-03-06 | End: 2019-08-15

## 2018-03-06 NOTE — TELEPHONE ENCOUNTER
Patient has sent been sent letter and informed via [employer] he is due for an appointment.     Last lipid panel 1/2017    Lab Results   Component Value Date    CHOL 252 01/13/2017     Lab Results   Component Value Date    HDL 33 01/13/2017     Lab Results   Component Value Date     01/13/2017     Lab Results   Component Value Date    TRIG 234 01/13/2017     Lab Results   Component Value Date    CHOLHDLRATIO 7.7 09/03/2015     Please advise on refill.  Yanelis Singletary RN

## 2018-03-16 DIAGNOSIS — E11.9 TYPE 2 DIABETES MELLITUS WITHOUT COMPLICATION, WITHOUT LONG-TERM CURRENT USE OF INSULIN (H): ICD-10-CM

## 2018-03-16 RX ORDER — METFORMIN HYDROCHLORIDE 750 MG/1
750 TABLET, EXTENDED RELEASE ORAL 2 TIMES DAILY WITH MEALS
Qty: 60 TABLET | Refills: 2 | OUTPATIENT
Start: 2018-03-16

## 2018-03-19 DIAGNOSIS — E11.9 TYPE 2 DIABETES MELLITUS WITHOUT COMPLICATION, WITHOUT LONG-TERM CURRENT USE OF INSULIN (H): ICD-10-CM

## 2018-03-20 ENCOUNTER — TELEPHONE (OUTPATIENT)
Dept: FAMILY MEDICINE | Facility: CLINIC | Age: 53
End: 2018-03-20

## 2018-03-20 DIAGNOSIS — E78.6 HDL DEFICIENCY: ICD-10-CM

## 2018-03-20 DIAGNOSIS — E78.1 HIGH TRIGLYCERIDES: ICD-10-CM

## 2018-03-20 DIAGNOSIS — E11.9 TYPE 2 DIABETES MELLITUS WITHOUT COMPLICATION, WITHOUT LONG-TERM CURRENT USE OF INSULIN (H): Primary | ICD-10-CM

## 2018-03-20 RX ORDER — METFORMIN HYDROCHLORIDE 750 MG/1
750 TABLET, EXTENDED RELEASE ORAL 2 TIMES DAILY WITH MEALS
Qty: 180 TABLET | Refills: 0 | Status: SHIPPED | OUTPATIENT
Start: 2018-03-20 | End: 2018-04-05

## 2018-03-20 NOTE — TELEPHONE ENCOUNTER
Information below is reviewed with  Dr Quevedo, patient needs to schedule an appointment and then will refill prescription for 90 days, however will need to keep upcoming appointment for further refills.  Please help the pt schedule an appointment   Yanelis Singletary RN

## 2018-03-20 NOTE — TELEPHONE ENCOUNTER
[facility] is calling stating RX: metFORMIN (GLUCOPHAGE-XR) 750 MG 24 hr tablet insurance requires RX to be 90days. Please call to discuss. Thank you.

## 2018-03-22 NOTE — TELEPHONE ENCOUNTER
Please review and sign pending labs in Epic.Patient has an appointment 03/26/18 for Labs, Unable to schedule Fasting apt. No time working with Labs. Diabetic Ck with provider 04/05/18.

## 2018-03-22 NOTE — TELEPHONE ENCOUNTER
A different RN has already sent the 3 month prescription to the pharmacy on 3/20/18.  ALESHA Escobar RN

## 2018-03-26 DIAGNOSIS — E78.1 HIGH TRIGLYCERIDES: ICD-10-CM

## 2018-03-26 DIAGNOSIS — E11.9 TYPE 2 DIABETES MELLITUS WITHOUT COMPLICATION, WITHOUT LONG-TERM CURRENT USE OF INSULIN (H): ICD-10-CM

## 2018-03-26 DIAGNOSIS — E78.6 HDL DEFICIENCY: ICD-10-CM

## 2018-03-26 LAB
ALBUMIN SERPL-MCNC: 3.8 G/DL (ref 3.4–5)
ALP SERPL-CCNC: 110 U/L (ref 40–150)
ALT SERPL W P-5'-P-CCNC: 88 U/L (ref 0–70)
ANION GAP SERPL CALCULATED.3IONS-SCNC: 11 MMOL/L (ref 3–14)
AST SERPL W P-5'-P-CCNC: 39 U/L (ref 0–45)
BILIRUB SERPL-MCNC: 0.4 MG/DL (ref 0.2–1.3)
BUN SERPL-MCNC: 13 MG/DL (ref 7–30)
CALCIUM SERPL-MCNC: 8.8 MG/DL (ref 8.5–10.1)
CHLORIDE SERPL-SCNC: 104 MMOL/L (ref 94–109)
CHOLEST SERPL-MCNC: 233 MG/DL
CO2 SERPL-SCNC: 23 MMOL/L (ref 20–32)
CREAT SERPL-MCNC: 0.67 MG/DL (ref 0.66–1.25)
CREAT UR-MCNC: 148 MG/DL
GFR SERPL CREATININE-BSD FRML MDRD: >90 ML/MIN/1.7M2
GLUCOSE SERPL-MCNC: 100 MG/DL (ref 70–99)
HBA1C MFR BLD: 7.4 % (ref 4.3–6)
HDLC SERPL-MCNC: 35 MG/DL
LDLC SERPL CALC-MCNC: 146 MG/DL
MICROALBUMIN UR-MCNC: 6 MG/L
MICROALBUMIN/CREAT UR: 4.03 MG/G CR (ref 0–17)
NONHDLC SERPL-MCNC: 198 MG/DL
POTASSIUM SERPL-SCNC: 4.3 MMOL/L (ref 3.4–5.3)
PROT SERPL-MCNC: 7.6 G/DL (ref 6.8–8.8)
SODIUM SERPL-SCNC: 138 MMOL/L (ref 133–144)
TRIGL SERPL-MCNC: 262 MG/DL

## 2018-03-26 PROCEDURE — 82043 UR ALBUMIN QUANTITATIVE: CPT | Performed by: FAMILY MEDICINE

## 2018-03-26 PROCEDURE — 80061 LIPID PANEL: CPT | Performed by: FAMILY MEDICINE

## 2018-03-26 PROCEDURE — 83036 HEMOGLOBIN GLYCOSYLATED A1C: CPT | Performed by: FAMILY MEDICINE

## 2018-03-26 PROCEDURE — 36415 COLL VENOUS BLD VENIPUNCTURE: CPT | Performed by: FAMILY MEDICINE

## 2018-03-26 PROCEDURE — 80053 COMPREHEN METABOLIC PANEL: CPT | Performed by: FAMILY MEDICINE

## 2018-03-27 NOTE — PROGRESS NOTES
I have reviewed the schedule of future appointments and this patient has an appointment scheduled for 4-5-2018.    Visit date not found

## 2018-04-05 ENCOUNTER — OFFICE VISIT (OUTPATIENT)
Dept: FAMILY MEDICINE | Facility: CLINIC | Age: 53
End: 2018-04-05
Payer: COMMERCIAL

## 2018-04-05 VITALS
HEART RATE: 89 BPM | BODY MASS INDEX: 39.9 KG/M2 | TEMPERATURE: 98.6 F | HEIGHT: 71 IN | RESPIRATION RATE: 14 BRPM | OXYGEN SATURATION: 96 % | WEIGHT: 285 LBS | DIASTOLIC BLOOD PRESSURE: 69 MMHG | SYSTOLIC BLOOD PRESSURE: 121 MMHG

## 2018-04-05 DIAGNOSIS — N52.9 ERECTILE DYSFUNCTION, UNSPECIFIED ERECTILE DYSFUNCTION TYPE: ICD-10-CM

## 2018-04-05 DIAGNOSIS — L97.519 DIABETIC ULCER OF TOE OF RIGHT FOOT ASSOCIATED WITH TYPE 2 DIABETES MELLITUS, UNSPECIFIED ULCER STAGE (H): Primary | ICD-10-CM

## 2018-04-05 DIAGNOSIS — E11.621 DIABETIC ULCER OF TOE OF RIGHT FOOT ASSOCIATED WITH TYPE 2 DIABETES MELLITUS, UNSPECIFIED ULCER STAGE (H): Primary | ICD-10-CM

## 2018-04-05 DIAGNOSIS — Z12.11 SCREEN FOR COLON CANCER: ICD-10-CM

## 2018-04-05 DIAGNOSIS — E11.9 TYPE 2 DIABETES MELLITUS WITHOUT COMPLICATION, WITHOUT LONG-TERM CURRENT USE OF INSULIN (H): ICD-10-CM

## 2018-04-05 DIAGNOSIS — Z72.0 TOBACCO ABUSE: ICD-10-CM

## 2018-04-05 DIAGNOSIS — Z13.89 SCREENING FOR DIABETIC PERIPHERAL NEUROPATHY: ICD-10-CM

## 2018-04-05 PROCEDURE — 90746 HEPB VACCINE 3 DOSE ADULT IM: CPT | Performed by: FAMILY MEDICINE

## 2018-04-05 PROCEDURE — 99214 OFFICE O/P EST MOD 30 MIN: CPT | Mod: 25 | Performed by: FAMILY MEDICINE

## 2018-04-05 PROCEDURE — 99207 C FOOT EXAM  NO CHARGE: CPT | Performed by: FAMILY MEDICINE

## 2018-04-05 PROCEDURE — 90471 IMMUNIZATION ADMIN: CPT | Performed by: FAMILY MEDICINE

## 2018-04-05 RX ORDER — NICOTINE 21 MG/24HR
1 PATCH, TRANSDERMAL 24 HOURS TRANSDERMAL EVERY 24 HOURS
Qty: 30 PATCH | Refills: 0 | Status: SHIPPED | OUTPATIENT
Start: 2018-04-05 | End: 2019-01-31

## 2018-04-05 RX ORDER — METFORMIN HCL 500 MG
1000 TABLET, EXTENDED RELEASE 24 HR ORAL
Qty: 90 TABLET | Refills: 3 | Status: SHIPPED | OUTPATIENT
Start: 2018-04-05 | End: 2018-04-09

## 2018-04-05 RX ORDER — SILDENAFIL CITRATE 20 MG/1
TABLET ORAL
Qty: 30 TABLET | Refills: 11 | Status: SHIPPED | OUTPATIENT
Start: 2018-04-05 | End: 2021-11-15

## 2018-04-05 ASSESSMENT — ANXIETY QUESTIONNAIRES
GAD7 TOTAL SCORE: 0
6. BECOMING EASILY ANNOYED OR IRRITABLE: NOT AT ALL
5. BEING SO RESTLESS THAT IT IS HARD TO SIT STILL: NOT AT ALL
IF YOU CHECKED OFF ANY PROBLEMS ON THIS QUESTIONNAIRE, HOW DIFFICULT HAVE THESE PROBLEMS MADE IT FOR YOU TO DO YOUR WORK, TAKE CARE OF THINGS AT HOME, OR GET ALONG WITH OTHER PEOPLE: NOT DIFFICULT AT ALL
7. FEELING AFRAID AS IF SOMETHING AWFUL MIGHT HAPPEN: NOT AT ALL
2. NOT BEING ABLE TO STOP OR CONTROL WORRYING: NOT AT ALL
3. WORRYING TOO MUCH ABOUT DIFFERENT THINGS: NOT AT ALL
1. FEELING NERVOUS, ANXIOUS, OR ON EDGE: NOT AT ALL

## 2018-04-05 ASSESSMENT — PAIN SCALES - GENERAL: PAINLEVEL: NO PAIN (0)

## 2018-04-05 ASSESSMENT — PATIENT HEALTH QUESTIONNAIRE - PHQ9: 5. POOR APPETITE OR OVEREATING: NOT AT ALL

## 2018-04-05 NOTE — MR AVS SNAPSHOT
After Visit Summary   4/5/2018    Lan Potts Jr.    MRN: [id number]           Patient Information     Date Of Birth          1965        Visit Information        Provider Department      4/5/2018 5:15 PM Eligio Quevedo MD [facility]        Today's Diagnoses     Diabetic ulcer of toe of right foot associated with type 2 diabetes mellitus, unspecified ulcer stage (H)    -  1    Type 2 diabetes mellitus without complication, without long-term current use of insulin (H)        Screen for colon cancer        Screening for diabetic peripheral neuropathy        Tobacco abuse        Erectile dysfunction, unspecified erectile dysfunction type          Care Instructions    Please schedule and eye exam with you eye care provider and continue to do so every 1 year(s). You are due now.      You should call specialty scheduling at [phone] to schedule the podiatry appointment.            Follow-ups after your visit        Additional Services     PODIATRY/FOOT & ANKLE SURGERY REFERRAL       Your provider has referred you to: [facility]: [facility] [phone]   [email or web address]    Please be aware that coverage of these services is subject to the terms and limitations of your health insurance plan.  Call member services at your health plan with any benefit or coverage questions.      Please bring the following to your appointment:  >>   Any x-rays, CTs or MRIs which have been performed.  Contact the facility where they were done to arrange for  prior to your scheduled appointment.    >>   List of current medications   >>   This referral request   >>   Any documents/labs given to you for this referral                  Follow-up notes from your care team     Return in about 3 months (around 7/5/2018) for Diabetes Recheck.      Who to contact     If you have questions or need follow up information about today's clinic visit or your  "schedule please contact [facility] directly at [phone].  Normal or non-critical lab and imaging results will be communicated to you by MyChart, letter or phone within 4 business days after the clinic has received the results. If you do not hear from us within 7 days, please contact the clinic through [employer] or phone. If you have a critical or abnormal lab result, we will notify you by phone as soon as possible.  Submit refill requests through [employer] or call your pharmacy and they will forward the refill request to us. Please allow 3 business days for your refill to be completed.          Additional Information About Your Visit        [employer] Information     [employer] gives you secure access to your electronic health record. If you see a primary care provider, you can also send messages to your care team and make appointments. If you have questions, please call your primary care clinic.  If you do not have a primary care provider, please call [phone] and they will assist you.        Care EveryWhere ID     This is your Care EveryWhere ID. This could be used by other organizations to access your [place] medical records  [id number]        Your Vitals Were     Pulse Temperature Respirations Height Pulse Oximetry BMI (Body Mass Index)    89 98.6  F (37  C) (Oral) 14 5' 10.75\" (1.797 m) 96% 40.03 kg/m2       Blood Pressure from Last 3 Encounters:   04/05/18 121/69   10/26/17 126/80   04/06/17 127/70    Weight from Last 3 Encounters:   04/05/18 285 lb (129.3 kg)   10/26/17 295 lb (133.8 kg)   04/06/17 296 lb (134.3 kg)              We Performed the Following     FOOT EXAM  NO CHARGE [97540.114]     HEPATITIS B VACCINE,ADULT,IM     PODIATRY/FOOT & ANKLE SURGERY REFERRAL          Today's Medication Changes          These changes are accurate as of 4/5/18  5:55 PM.  If you have any questions, ask your nurse or doctor.               Start taking these medicines.        Dose/Directions    aspirin 81 MG " tablet   Used for:  Type 2 diabetes mellitus without complication, without long-term current use of insulin (H)   Started by:  Eligio Quevedo MD        Dose:  81 mg   Take 1 tablet (81 mg) by mouth daily   Quantity:  90 tablet   Refills:  3       sildenafil 20 MG tablet   Commonly known as:  REVATIO   Used for:  Erectile dysfunction, unspecified erectile dysfunction type   Started by:  Eligio Quevedo MD        Take 1 to 5 tabs 30-60 minutes before intercourse.  Never use with nitroglycerin, terazosin or doxazosin.   Quantity:  30 tablet   Refills:  11         These medicines have changed or have updated prescriptions.        Dose/Directions    metFORMIN 500 MG 24 hr tablet   Commonly known as:  GLUCOPHAGE-XR   This may have changed:    - medication strength  - how much to take  - when to take this   Used for:  Type 2 diabetes mellitus without complication, without long-term current use of insulin (H)   Changed by:  Eligio Quevedo MD        Dose:  1000 mg   Take 2 tablets (1,000 mg) by mouth daily (with dinner)   Quantity:  90 tablet   Refills:  3       * nicotine 21 MG/24HR 24 hr patch   Commonly known as:  NICODERM CQ   This may have changed:  You were already taking a medication with the same name, and this prescription was added. Make sure you understand how and when to take each.   Used for:  Tobacco abuse   Replaces:  nicotine 7 MG/24HR 24 hr patch   Changed by:  Eligio Quevedo MD        Dose:  1 patch   Place 1 patch onto the skin every 24 hours   Quantity:  30 patch   Refills:  0       * nicotine 14 MG/24HR 24 hr patch   Commonly known as:  NICODERM CQ   This may have changed:  See the new instructions.   Used for:  Tobacco abuse   Changed by:  Eligio Quevedo MD        Dose:  1 patch   Place 1 patch onto the skin every 24 hours   Quantity:  30 patch   Refills:  0       * nicotine 7 MG/24HR 24 hr patch   Commonly known as:  NICODERM CQ   This may have changed:  You were already taking a  medication with the same name, and this prescription was added. Make sure you understand how and when to take each.   Used for:  Tobacco abuse   Changed by:  Eligio Quevedo MD        Dose:  1 patch   Place 1 patch onto the skin every 24 hours   Quantity:  30 patch   Refills:  0       * Notice:  This list has 3 medication(s) that are the same as other medications prescribed for you. Read the directions carefully, and ask your doctor or other care provider to review them with you.      Stop taking these medicines if you haven't already. Please contact your care team if you have questions.     albuterol 108 (90 BASE) MCG/ACT Inhaler   Commonly known as:  PROAIR HFA/PROVENTIL HFA/VENTOLIN HFA   Stopped by:  Eligio Quevedo MD           BD RAKEL U/F 32G X 4 MM   Generic drug:  insulin pen needle   Stopped by:  Eligio Quevedo MD           glimepiride 4 MG tablet   Commonly known as:  AMARYL   Stopped by:  Eligio Quevedo MD           insulin pen needle 32G X 4 MM   Commonly known as:  BD RAKEL U/F   Stopped by:  Eligio Quevedo MD           meclizine 25 MG tablet   Commonly known as:  ANTIVERT   Stopped by:  Eligio Quevedo MD           nicotine 7 MG/24HR 24 hr patch   Commonly known as:  [facility] NICOTINE   Replaced by:  nicotine 21 MG/24HR 24 hr patch   You also have another medication with the same name that you need to continue taking as instructed.   Stopped by:  Eligio Quevedo MD                Where to get your medicines      These medications were sent to [facility]/pharmacy #[id number] - [place], [place] - [address]  [address] [address], [facility] [place] [address]     Phone:  [phone]     metFORMIN 500 MG 24 hr tablet    nicotine 14 MG/24HR 24 hr patch    nicotine 21 MG/24HR 24 hr patch    nicotine 7 MG/24HR 24 hr patch         Some of these will need a paper prescription and others can be bought over the counter.  Ask your nurse if you have questions.     Bring a paper prescription for  each of these medications     aspirin 81 MG tablet    sildenafil 20 MG tablet                Primary Care Provider Office Phone # Fax #    Eligio Quevedo [phone] [id number] [facility] [address]        Equal Access to Services     BALTA JONES : Hadtr stacie tineo shirleneo Soomaali, waaxda luqadaha, qaybta kaalmada adeaprilda, marcel zepedan nicolsteve gimenez karena dodson. So [facility] [phone].    ATENCIÓN: Si habla español, tiene a cordoba disposición servicios gratuitos de asistencia lingüística. [facility] [phone].    We comply with applicable federal civil rights laws and [place] laws. We do not discriminate on the basis of race, color, national origin, age, disability, sex, sexual orientation, or gender identity.            Thank you!     Thank you for choosing [facility]  for your care. Our goal is always to provide you with excellent care. Hearing back from our patients is one way we can continue to improve our services. Please take a few minutes to complete the written survey that you may receive in the mail after your visit with us. Thank you!             Your Updated Medication List - Protect others around you: Learn how to safely use, store and throw away your medicines at www.disposemymeds.org.          This list is accurate as of 4/5/18  5:55 PM.  Always use your most recent med list.                   Brand Name Dispense Instructions for use Diagnosis    ACE/ARB/ARNI NOT PRESCRIBED (INTENTIONAL)      Please choose reason not prescribed, below    Type 2 diabetes mellitus without complication, without long-term current use of insulin (H)       ALLEGRA ALLERGY 180 MG tablet   Generic drug:  fexofenadine      Take by mouth daily Reported on 4/6/2017        aspirin 81 MG tablet     90 tablet    Take 1 tablet (81 mg) by mouth daily    Type 2 diabetes mellitus without complication, without long-term current use of insulin (H)       ASPIRIN NOT PRESCRIBED    INTENTIONAL     1 each    Antiplatelet medication not prescribed intentionally due to not indicated at this age    Type 2 diabetes, HbA1c goal < 7% (H)       blood glucose calibration solution     1 each    1 drop by In Vitro route as needed    Type 2 diabetes, HbA1c goal < 7% (H)       blood glucose monitoring lancets     3 Box    1 each 3 times daily    Type 2 diabetes, HbA1c goal < 7% (H)       blood glucose monitoring test strip    ACCU-CHEK SMARTVIEW    3 Box    1 strip by In Vitro route 3 times daily    Type 2 diabetes, HbA1c goal < 7% (H)       eletriptan 40 MG tablet    RELPAX    12 tablet    Take 1 tablet (40 mg) by mouth at onset of headache May repeat in 2 hours as needed but no more than 2 pills in 24 hours.    Migraine without status migrainosus, not intractable, unspecified migraine type       ezetimibe 10 MG tablet    ZETIA    90 tablet    TAKE 1 TABLET (10 MG) BY MOUTH DAILY    Type 2 diabetes mellitus without complication, without long-term current use of insulin (H), Hyperlipidemia with target LDL less than 100, 10 year risk of MI or stroke 7.5% or greater       fluticasone 50 MCG/ACT spray    FLONASE    16 mL    SPRAY 2 SPRAYS INTO BOTH NOSTRILS 2 TIMES DAILY    Seasonal allergic rhinitis       metFORMIN 500 MG 24 hr tablet    GLUCOPHAGE-XR    90 tablet    Take 2 tablets (1,000 mg) by mouth daily (with dinner)    Type 2 diabetes mellitus without complication, without long-term current use of insulin (H)       * nicotine 21 MG/24HR 24 hr patch    NICODERM CQ    30 patch    Place 1 patch onto the skin every 24 hours    Tobacco abuse       * nicotine 14 MG/24HR 24 hr patch    NICODERM CQ    30 patch    Place 1 patch onto the skin every 24 hours    Tobacco abuse       * nicotine 7 MG/24HR 24 hr patch    NICODERM CQ    30 patch    Place 1 patch onto the skin every 24 hours    Tobacco abuse       sildenafil 20 MG tablet    REVATIO    30 tablet    Take 1 to 5 tabs 30-60 minutes before intercourse.  Never use with  nitroglycerin, terazosin or doxazosin.    Erectile dysfunction, unspecified erectile dysfunction type       STATIN NOT PRESCRIBED (INTENTIONAL)      continuous prn for other Reported on 4/6/2017        * Notice:  This list has 3 medication(s) that are the same as other medications prescribed for you. Read the directions carefully, and ask your doctor or other care provider to review them with you.

## 2018-04-05 NOTE — PROGRESS NOTES
To access diabetes educational materials with in EPIC use <dot>DALE      Put this in AFTER VISIT SUMMARY if needed for the patient to access information online [email or web address]      SUBJECTIVE:  Lan Potts Jr. is a 52 year old male who presents today for a follow up appointment for management of DIABETES MELLITUS.    Patient Active Problem List    Diagnosis Date Noted     10 year risk of MI or stroke 7.5% or greater, 12.6% in April 2017 04/06/2017     Priority: Medium     Type 2 diabetes mellitus without complication, without long-term current use of insulin (H) 01/05/2017     Priority: Medium     Migraine without status migrainosus, not intractable, unspecified migraine type 01/21/2016     Priority: Medium     High triglycerides 12/11/2014     Priority: Medium     HDL deficiency 12/11/2014     Priority: Medium     Hyperlipidemia with target LDL less than 100 02/17/2014     Priority: Medium     Diagnosis updated by automated process. Provider to review and confirm.       Chronic back pain      Priority: Medium     Seasonal allergies      Priority: Medium        The patient checks his blood sugar as follows: two times daily, once daily.   Results as follows: pre-lunch glucose- 160's, pre-supper glucose- 170-190 and bedtime- 150-160    The patient reports that he IS taking the medication as prescribed. He reports side effects of medication.  These side effects include: gas and bloating from the metformin     ----------------------------------------------------------------------------------------------------------------------------------------    BP Readings from Last 3 Encounters:   04/05/18 121/69   10/26/17 126/80   04/06/17 127/70     The patient reports that he IS currently smoking.  He used the patch last time and it was helpful   He would like to try to quit this summer.     History   Smoking Status     Former Smoker     Types: Cigarettes   Smokeless Tobacco     Never Used         The  10-year ASCVD risk score ([place]erna [place] Jr, et al., 2013) is: 10.6%    Values used to calculate the score:      Age: 52 years      Sex: Male      Is Non- : No      Diabetic: Yes      Tobacco smoker: No      Systolic Blood Pressure: 121 mmHg      Is BP treated: No      HDL Cholesterol: 35 mg/dL      Total Cholesterol: 233 mg/dL        Current Outpatient Prescriptions   Medication Sig Dispense Refill     metFORMIN (GLUCOPHAGE-XR) 750 MG 24 hr tablet Take 1 tablet (750 mg) by mouth 2 times daily (with meals) 180 tablet 0     ezetimibe (ZETIA) 10 MG tablet TAKE 1 TABLET (10 MG) BY MOUTH DAILY 90 tablet 0     ACE/ARB/ARNI NOT PRESCRIBED, INTENTIONAL, Please choose reason not prescribed, below       glimepiride (AMARYL) 4 MG tablet Take 1 tablet (4 mg) by mouth 2 times daily (with meals) Before breakfast and before dinner 180 tablet 1     eletriptan (RELPAX) 40 MG tablet Take 1 tablet (40 mg) by mouth at onset of headache May repeat in 2 hours as needed but no more than 2 pills in 24 hours. 12 tablet 5     STATIN NOT PRESCRIBED, INTENTIONAL, continuous prn for other Reported on 4/6/2017  0     blood glucose (ACCU-CHEK SMARTVIEW) test strip 1 strip by In Vitro route 3 times daily 3 Box 3     blood glucose monitoring (ACCU-CHEK FASTCLIX) lancets 1 each 3 times daily 3 Box 3     blood glucose calibration (ACCU-CHEK SMARTVIEW CONTROL) solution 1 drop by In Vitro route as needed 1 each 3     ASPIRIN NOT PRESCRIBED, INTENTIONAL, Antiplatelet medication not prescribed intentionally due to not indicated at this age 1 each 0     fluticasone (FLONASE) 50 MCG/ACT spray SPRAY 2 SPRAYS INTO BOTH NOSTRILS 2 TIMES DAILY (Patient not taking: Reported on 4/5/2018) 16 mL 11     fexofenadine (ALLEGRA ALLERGY) 180 MG tablet Take by mouth daily Reported on 4/6/2017         The patient reports that he IS NOT taking a statin.   (Reminder all diabetics with a ASCVD risk greater or equal to 7.5% should be on a high intensity  "statin, otherwise on a moderate intensity statin)  he is not taking a statin because he did not tolerate them.   (Reminder to intentionally not prescribe statin in )    The patient reports that he IS NOT taking mg of  aspirin daily.  (Reminder all diabetic patients with a cardiovascular risk factor and > 50 should take a daily aspirin)   he is not taking aspirin because it has not been previously prescribed as he just turned 52.  (Reminder to intentionally not prescribe aspirin in )    The patient reports that he IS doing a self foot exam daily.  The patient reports that he does not exercise regulalry now because of his foot.   He had a blister develop on the bottom of his right great toe. He has debrided it himself. There was a bad smell from it once. There was no drainage. There is a black scab on it now.       The patient reports that he IS following the recommended diabetic diet.   The patient reports that his last eye exam was 3 years ago.           Immunization History   Administered Date(s) Administered     HepB 04/06/2017     Influenza (IIV3) PF 12/21/2012, 09/27/2013     Influenza Vaccine IM 3yrs+ 4 Valent IIV4 10/26/2017     Pneumococcal 23 valent 09/04/2014     TDAP Vaccine (Adacel) 09/20/2011     The patient reports that he has had the hepatitis B vaccine series in the past. (recommended for age 19-59 and can be given to age 60 or older)  The patient reports that he has had a pnuemovax in the past.  The patient reports that he has had a flu shot for the current influenza season.  The patient would like to have a Hepatitis B #2        EXAM:  /69  Pulse 89  Temp 98.6  F (37  C) (Oral)  Resp 14  Ht 5' 10.75\" (1.797 m)  Wt 285 lb (129.3 kg)  SpO2 96%  BMI 40.03 kg/m2  Wt Readings from Last 4 Encounters:   04/05/18 285 lb (129.3 kg)   10/26/17 295 lb (133.8 kg)   04/06/17 296 lb (134.3 kg)   03/23/17 292 lb (132.5 kg)     Body mass index is 40.03 kg/(m^2).    General:  Lan " is awake, alert, and cooperative.  NAD.      Diabetic Foot Screen:  Any complaints of increased pain or numbness ? No  Is there a foot ulcer now or a history of foot ulcer?  YES   Does the foot have an abnormal shape? No  Are the nails thick, too long or ingrown? No but they have an abl shape  Are there any redness or open areas? No         Sensation Testing done at all points on the diagram with monofilament     Right Foot: Sensation Absent at the following points , 1, 2 and 3  Left Foot: Sensation Absent at the following points , 1, 2 and 3     Risk Category: 3-  Plantar ulceration  Performed by Eligio Quevedo MD    On the plantar surface of the right great toe there is a circular large black scab. The surrounding tissue was normal in color, no induration or erythema and there was no drainage.              Previsit labs drawn include:  Orders Only on 03/26/2018   Component Date Value Ref Range Status     Cholesterol 03/26/2018 233* <200 mg/dL Final    Desirable:       <200 mg/dl     Triglycerides 03/26/2018 262* <150 mg/dL Final    Comment: Borderline high:  150-199 mg/dl  High:             200-499 mg/dl  Very high:       >499 mg/dl       HDL Cholesterol 03/26/2018 35* >39 mg/dL Final     LDL Cholesterol Calculated 03/26/2018 146* <100 mg/dL Final    Comment: Above desirable:  100-129 mg/dl  Borderline High:  130-159 mg/dL  High:             160-189 mg/dL  Very high:       >189 mg/dl       Non HDL Cholesterol 03/26/2018 198* <130 mg/dL Final    Comment: Above Desirable:  130-159 mg/dl  Borderline high:  160-189 mg/dl  High:             190-219 mg/dl  Very high:       >219 mg/dl       Creatinine Urine 03/26/2018 148  mg/dL Final     Albumin Urine mg/L 03/26/2018 6  mg/L Final     Albumin Urine mg/g Cr 03/26/2018 4.03  0 - 17 mg/g Cr Final     Hemoglobin A1C 03/26/2018 7.4* 4.3 - 6.0 % Final     Sodium 03/26/2018 138  133 - 144 mmol/L Final     Potassium 03/26/2018 4.3  3.4 - 5.3 mmol/L Final     Chloride  "03/26/2018 104  94 - 109 mmol/L Final     Carbon Dioxide 03/26/2018 23  20 - 32 mmol/L Final     Anion Gap 03/26/2018 11  3 - 14 mmol/L Final     Glucose 03/26/2018 100* 70 - 99 mg/dL Final     Urea Nitrogen 03/26/2018 13  7 - 30 mg/dL Final     Creatinine 03/26/2018 0.67  0.66 - 1.25 mg/dL Final     GFR Estimate 03/26/2018 >90  >60 mL/min/1.7m2 Final    Non  GFR Calc     GFR Estimate If Black 03/26/2018 >90  >60 mL/min/1.7m2 Final    African American GFR Calc     Calcium 03/26/2018 8.8  8.5 - 10.1 mg/dL Final     Bilirubin Total 03/26/2018 0.4  0.2 - 1.3 mg/dL Final     Albumin 03/26/2018 3.8  3.4 - 5.0 g/dL Final     Protein Total 03/26/2018 7.6  6.8 - 8.8 g/dL Final     Alkaline Phosphatase 03/26/2018 110  40 - 150 U/L Final     ALT 03/26/2018 88* 0 - 70 U/L Final     AST 03/26/2018 39  0 - 45 U/L Final         ASSESSMENT and PLAN:    Type II Diabetes, Improving.    DIABETES Type II:                       Lab Results   Component Value Date    A1C 7.4 03/26/2018       Control   fair  Lab Results   Component Value Date    A1C 7.4 03/26/2018    A1C 7.9 10/26/2017    A1C 7.4 04/06/2017     Lab Results   Component Value Date    MICROL 6 03/26/2018     No results found for: MICROALBUMIN Control   good    Recommended to take ASA  mg daily for appropriate patient, Compliance with the recommended diabetic diet was stressed, Regular aerobic exercise was encouraged, Home glucose monitoring encouraged, Annual eye exam recommended, Self foot exam demonstrated and recommended to be done nightly, Apply moisturizer to feet with in 3 minutes of showering or bathing, Follow up in clinic in 3 months for a diabetes check and Future labs ordered and the patient was instructed to make a lab appt 1 week prior to next diabetes visit      BP/HTN:   BP Readings from Last 3 Encounters:   04/05/18 121/69   10/26/17 126/80   04/06/17 127/70     Control   good    - Medication:N/A  (make sure to order \"Ace not prescribed " intentionally if not on an ACE/ARB)  The patient was advised to do the following therapuetic life style changes  - Dietary sodium restriction and increase potassium and Calcium intake  - Regular aerobic exercise  - Weight loss  - Discontinue smoking if applicable  - Avoid regular NSAID use if applicable  - Avoid regular decongestant use if applicable  - Follow up in clinic in 3 months for a recheck  - Check a basic metabolic panel today if needed    Patient Education: Reviewed risks of hypertension and principles of   Treatment.    HYPERCHOLESTEROLEMIA:     The 10-year ASCVD risk score ([place]erna [place] Jr, et al., 2013) is: 10.6%    Values used to calculate the score:      Age: 52 years      Sex: Male      Is Non- : No      Diabetic: Yes      Tobacco smoker: No      Systolic Blood Pressure: 121 mmHg      Is BP treated: No      HDL Cholesterol: 35 mg/dL      Total Cholesterol: 233 mg/dL    Lab Results   Component Value Date     03/26/2018      Control   poor  Cholesterol   Date Value Ref Range Status   03/26/2018 233 (H) <200 mg/dL Final     Comment:     Desirable:       <200 mg/dl   01/13/2017 252 (H) <200 mg/dL Final     Comment:     Desirable:       <200 mg/dl     HDL Cholesterol   Date Value Ref Range Status   03/26/2018 35 (L) >39 mg/dL Final   01/13/2017 33 (L) >39 mg/dL Final     LDL Cholesterol Calculated   Date Value Ref Range Status   03/26/2018 146 (H) <100 mg/dL Final     Comment:     Above desirable:  100-129 mg/dl  Borderline High:  130-159 mg/dL  High:             160-189 mg/dL  Very high:       >189 mg/dl     01/13/2017 172 (H) <100 mg/dL Final     Comment:     Above desirable:  100-129 mg/dl   Borderline High:  130-159 mg/dL   High:             160-189 mg/dL   Very high:       >189 mg/dl       Triglycerides   Date Value Ref Range Status   03/26/2018 262 (H) <150 mg/dL Final     Comment:     Borderline high:  150-199 mg/dl  High:             200-499 mg/dl  Very high:       >499  "mg/dl     01/13/2017 234 (H) <150 mg/dL Final     Comment:     Borderline high:  150-199 mg/dl   High:             200-499 mg/dl   Very high:       >499 mg/dl   Fasting specimen       Cholesterol/HDL Ratio   Date Value Ref Range Status   09/03/2015 7.7 (H) 0.0 - 5.0 Final   12/11/2014 5.8 (H) 0.0 - 5.0 Final       He has not started his zetia yet. I recommend(ed) that he start that now.       The patient's LDL is less than 70 so no statin is indicated at this time.    The patient's HDL is normal  The patient's triglycerides are normal.    The options for management was discussed with the patient.  We discussed regular aerobic exercise and a low fat and a low cholesterol as therapueutic life style changes that can improve his cholesterol panel.      I want him to see podiatry as soon as possible regarding his foot as this has become a barrier to physical activity.    --------------------------------------------------------------------------------------------------------------------------------------  SUBJECTIVE:  Lan Potts  is a 52 year old male who is here today with concerns of Erectile Dysfunction (ED).     He reports that he has not had difficulty obtaining an erection.  He reports that he has had difficulty maintaining an erection.    This has been going on for about 6 months    He denies a lowered libido.  He  denies any serious relationship problems at this time. He has a steady girlfriend       He has tried none of the following medications in the past:  Viagra  Levitra  Cialis        Past Medical, social, family histories, medications, and allergies reviewed and updated    OBJECTIVE:  /69  Pulse 89  Temp 98.6  F (37  C) (Oral)  Resp 14  Ht 5' 10.75\" (1.797 m)  Wt 285 lb (129.3 kg)  SpO2 96%  BMI 40.03 kg/m2       ASSESSMENT:  Erectile Dysfunction    PLAN:     We discussed the medication options for treatment. We discussed the difference in the duration of action of Viagra, Levitra and " Cialis. We discussed the dosing and this was printed for the patient as a letter (see below Erectile Dysfunction Medication). We also discussed the potential side effects.  The patient was most interested in trying generic sildenafil at this point.  He was prescribed a small amount. He will call back and let us know if he would like a refill on this medication or try an alternative.  I recommended that he follow up as needed.    The patient is not taking nitrates, and denies he has access to nitrates in any form at any time. I have counseled him that taking any erectile dysfunction medication with nitrates of any form can cause death. Additionally,  erectile dysfunction medication serum concentrations can be increased by the following: cimetidine, erythromycin, itraconazole or ketoconazole. This patient does not take these drugs, but I have counseled him to avoid Viagra if he does take any of these.    **The following was printed off for the patient.**    Erectile Dysfunction Medication    Warnings: Do NOT take these medications if you are taking any type of nitroglycerin/nitrates!!! (e.g. nitro, Isordil, Nitrodur, Imdur, etc )    Viagra (sildenafil)- take one 50 mg tablet by mouth approximately 1 hour prior to sexual activity. If this dose is ineffective you may try taking 2 of the 50 mg tablets the next day. Do not take this medication more than once per 24 hours.    Levitra (vardenafil)- take one 10 mg tablet 30-60 minutes prior to sexual activity. If this dose is ineffective you may try taking 2 of the 10 mg tablets the next day. Do not take this medication more than once per 24 hours.     Cialis (tadalifil)- take one 10 mg tablet  at least 45 minutes hour prior to sexual activity. If this dose is ineffective you may try taking 2 of the 10 mg tablets the next day. Do not take this medication more than once per 24 hours. The effect of this medication can last up to 36 hours.    *Do not take any of the above  medication within 24 hours of another*

## 2018-04-05 NOTE — NURSING NOTE
"Chief Complaint   Patient presents with     Diabetes     Health Maintenance     orders pended, PHQ-9/JUAN       Initial /69  Pulse 89  Temp 98.6  F (37  C) (Oral)  Resp 14  Ht 5' 10.75\" (1.797 m)  Wt 285 lb (129.3 kg)  SpO2 96%  BMI 40.03 kg/m2 Estimated body mass index is 40.03 kg/(m^2) as calculated from the following:    Height as of this encounter: 5' 10.75\" (1.797 m).    Weight as of this encounter: 285 lb (129.3 kg).  Medication Reconciliation: complete     Cait Mejias, luciano    "

## 2018-04-05 NOTE — PATIENT INSTRUCTIONS
Please schedule and eye exam with you eye care provider and continue to do so every 1 year(s). You are due now.      You should call specialty scheduling at [phone] to schedule the podiatry appointment.

## 2018-04-05 NOTE — NURSING NOTE
Screening Questionnaire for Adult Immunization    Are you sick today?   No   Do you have allergies to medications, food, a vaccine component or latex?   yes   Have you ever had a serious reaction after receiving a vaccination?   No   Do you have a long-term health problem with heart disease, lung disease, asthma, kidney disease, metabolic disease (e.g. diabetes), anemia, or other blood disorder?   No   Do you have cancer, leukemia, HIV/AIDS, or any other immune system problem?   No   In the past 3 months, have you taken medications that affect  your immune system, such as prednisone, other steroids, or anticancer drugs; drugs for the treatment of rheumatoid arthritis, Crohn s disease, or psoriasis; or have you had radiation treatments?   No   Have you had a seizure, or a brain or other nervous system problem?   No   During the past year, have you received a transfusion of blood or blood     products, or been given immune (gamma) globulin or antiviral drug?   No   For women: Are you pregnant or is there a chance you could become        pregnant during the next month?   No   Have you received any vaccinations in the past 4 weeks?   No     Immunization questionnaire was positive for at least one answer.  Notified Dr. Quevedo.               Screening performed by Cait Mejias on 4/5/2018 at 5:57 PM.

## 2018-04-06 ASSESSMENT — ANXIETY QUESTIONNAIRES: GAD7 TOTAL SCORE: 0

## 2018-04-06 ASSESSMENT — PATIENT HEALTH QUESTIONNAIRE - PHQ9: SUM OF ALL RESPONSES TO PHQ QUESTIONS 1-9: 3

## 2018-04-09 ENCOUNTER — MYC MEDICAL ADVICE (OUTPATIENT)
Dept: FAMILY MEDICINE | Facility: CLINIC | Age: 53
End: 2018-04-09

## 2018-04-09 DIAGNOSIS — E11.9 TYPE 2 DIABETES MELLITUS WITHOUT COMPLICATION, WITHOUT LONG-TERM CURRENT USE OF INSULIN (H): ICD-10-CM

## 2018-04-09 DIAGNOSIS — Z72.0 TOBACCO ABUSE: ICD-10-CM

## 2018-04-09 RX ORDER — METFORMIN HCL 500 MG
1000 TABLET, EXTENDED RELEASE 24 HR ORAL 2 TIMES DAILY WITH MEALS
Qty: 360 TABLET | Refills: 1 | Status: SHIPPED | OUTPATIENT
Start: 2018-04-09 | End: 2018-07-12

## 2018-04-12 DIAGNOSIS — E11.9 TYPE 2 DIABETES MELLITUS WITHOUT COMPLICATION, WITHOUT LONG-TERM CURRENT USE OF INSULIN (H): ICD-10-CM

## 2018-04-12 NOTE — TELEPHONE ENCOUNTER
I can not find the 1,000 mg tablets in [address]. Please call the pharmacy and ask if this strength is available or not.

## 2018-04-12 NOTE — TELEPHONE ENCOUNTER
Patient is requesting 1000mg tablets of Metformin. Please advise  [address]  Cait Mejias, luciano

## 2018-05-01 ENCOUNTER — OFFICE VISIT (OUTPATIENT)
Dept: PODIATRY | Facility: CLINIC | Age: 53
End: 2018-05-01
Payer: COMMERCIAL

## 2018-05-01 VITALS
DIASTOLIC BLOOD PRESSURE: 74 MMHG | SYSTOLIC BLOOD PRESSURE: 120 MMHG | HEART RATE: 94 BPM | WEIGHT: 285 LBS | BODY MASS INDEX: 40.03 KG/M2

## 2018-05-01 DIAGNOSIS — L84 CALLUS: ICD-10-CM

## 2018-05-01 DIAGNOSIS — L97.519 DIABETIC ULCER OF TOE OF RIGHT FOOT ASSOCIATED WITH TYPE 2 DIABETES MELLITUS, UNSPECIFIED ULCER STAGE (H): ICD-10-CM

## 2018-05-01 DIAGNOSIS — E11.9 TYPE 2 DIABETES MELLITUS WITHOUT COMPLICATION, WITHOUT LONG-TERM CURRENT USE OF INSULIN (H): Primary | ICD-10-CM

## 2018-05-01 DIAGNOSIS — E11.621 DIABETIC ULCER OF TOE OF RIGHT FOOT ASSOCIATED WITH TYPE 2 DIABETES MELLITUS, UNSPECIFIED ULCER STAGE (H): ICD-10-CM

## 2018-05-01 PROCEDURE — 99244 OFF/OP CNSLTJ NEW/EST MOD 40: CPT | Mod: 25 | Performed by: PODIATRIST

## 2018-05-01 PROCEDURE — 11055 PARING/CUTG B9 HYPRKER LES 1: CPT | Performed by: PODIATRIST

## 2018-05-01 NOTE — PATIENT INSTRUCTIONS
We wish you continued good healing. If you have any questions or concerns, please do not hesitate to contact us at [phone]    Please remember to call and schedule a follow up appointment if one was recommended at your earliest convenience.   PODIATRY CLINIC HOURS  TELEPHONE NUMBER    Dr. Osmel Angel D.P.[facility]    Clinics:  [facility]    [facility]   Tuesday 1PM-6PM  [place]  Wednesday 7AM-2PM  [facility]  Thursday 10AM-6PM  [place]  Friday 7AM-3PM  [place]  Specialty schedulers:   [phone] to make an appointment with any Specialty Provider.        Urgent Care locations:    [facility] Monday-Friday 5 pm - 9 pm. Saturday-Sunday 9 am -5pm    Monday-Friday 11 am - 9 pm Saturday 9 am - 5 pm     Monday-Sunday 12 noon-8PM [phone]     If you need a medication refill, please contact us you may need lab work and/or a follow up visit prior to your refill (i.e. Antifungal medications).    [facility] (secure e-mail communication and access to your chart) to send a message or to make an appointment.    If MRI needed please call [facility] Arevalo at [phone]        Weight management plan: Patient was referred to their PCP to discuss a diet and exercise plan.     Patient to follow up with Primary Care provider regarding elevated blood pressure.

## 2018-05-01 NOTE — LETTER
5/1/2018         RE: Lan Potts Jr.  [address] [facility]  [place] [address]        Dear Colleague,    Thank you for referring your patient, Lan Potts Jr., to the [facility] [place]. Please see a copy of my visit note below.    Subjective:    Pt is seen today in consult from Dr. Quevedo with the c/c of an ulcer right hallux.  This has been problematic for 3 month(s).  No history of trauma.  Denies drainage, erythema or edema.  Slightly tender upon long periods of standing/ambulating.  Aggravated by activity and relieved by rest.   This started after he was walking on his feet a lot.  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  He has a past history of smoking.  Would like to walk more to loose weight.      ROS:  A 10-point review of systems was performed and is positive for that noted in the HPI and as seen below.  All other areas are negative.          Allergies   Allergen Reactions     Amoxicillin      rash     Pravastatin      Bilateral(ly) calf pain       Current Outpatient Prescriptions   Medication Sig Dispense Refill     ACE/ARB/ARNI NOT PRESCRIBED, INTENTIONAL, Please choose reason not prescribed, below       aspirin 81 MG tablet Take 1 tablet (81 mg) by mouth daily 90 tablet 3     ASPIRIN NOT PRESCRIBED, INTENTIONAL, Antiplatelet medication not prescribed intentionally due to not indicated at this age 1 each 0     blood glucose (ACCU-CHEK SMARTVIEW) test strip 1 strip by In Vitro route 3 times daily 3 Box 3     blood glucose calibration (ACCU-CHEK SMARTVIEW CONTROL) solution 1 drop by In Vitro route as needed 1 each 3     blood glucose monitoring (ACCU-CHEK FASTCLIX) lancets 1 each 3 times daily 3 Box 3     eletriptan (RELPAX) 40 MG tablet Take 1 tablet (40 mg) by mouth at onset of headache May repeat in 2 hours as needed but no more than 2 pills in 24 hours. 12 tablet 5     ezetimibe (ZETIA) 10 MG tablet TAKE 1 TABLET (10 MG) BY MOUTH DAILY 90 tablet 0      fexofenadine (ALLEGRA ALLERGY) 180 MG tablet Take by mouth daily Reported on 4/6/2017       fluticasone (FLONASE) 50 MCG/ACT spray SPRAY 2 SPRAYS INTO BOTH NOSTRILS 2 TIMES DAILY 16 mL 11     metFORMIN (GLUCOPHAGE) 1000 MG tablet Take 1 tablet (1,000 mg) by mouth 2 times daily (with meals) 180 tablet 1     metFORMIN (GLUCOPHAGE-XR) 500 MG 24 hr tablet Take 2 tablets (1,000 mg) by mouth 2 times daily (with meals) 360 tablet 1     nicotine (NICODERM CQ) 14 MG/24HR 24 hr patch Place 1 patch onto the skin every 24 hours 30 patch 0     nicotine (NICODERM CQ) 21 MG/24HR 24 hr patch Place 1 patch onto the skin every 24 hours 30 patch 0     nicotine (NICODERM CQ) 7 MG/24HR 24 hr patch Place 1 patch onto the skin every 24 hours 30 patch 0     sildenafil (REVATIO) 20 MG tablet Take 1 to 5 tabs 30-60 minutes before intercourse.  Never use with nitroglycerin, terazosin or doxazosin. 30 tablet 11     STATIN NOT PRESCRIBED, INTENTIONAL, continuous prn for other Reported on 4/6/2017  0       Patient Active Problem List   Diagnosis     Chronic back pain     Seasonal allergies     Hyperlipidemia with target LDL less than 100     High triglycerides     HDL deficiency     Migraine without status migrainosus, not intractable, unspecified migraine type     Type 2 diabetes mellitus without complication, without long-term current use of insulin (H)     10 year risk of MI or stroke 7.5% or greater, 12.6% in April 2017     Tobacco abuse     Erectile dysfunction, unspecified erectile dysfunction type       Past Medical History:   Diagnosis Date     Chronic back pain      Diabetes (H)      Migraine headaches      TINA (obstructive sleep apnea)     mild does not use CPAP at present     Seasonal allergies      Tobacco use disorder        Past Surgical History:   Procedure Laterality Date     ENT SURGERY       GENITOURINARY SURGERY       NO HISTORY OF SURGERY         Family History   Problem Relation Age of Onset     Arthritis Mother       Alzheimer Disease Mother      Arthritis Father      Circulatory Father      abdominal artery issue     Hypertension Father      DIABETES Other      Blood Disease No family hx of      Anesthesia Reaction No family hx of        Social History   Substance Use Topics     Smoking status: Former Smoker     Types: Cigarettes     Smokeless tobacco: Never Used     Alcohol use Yes      Comment: occ         Exam:    Vitals: /74 (BP Location: Right arm, Patient Position: Sitting, Cuff Size: Adult Large)  Pulse 94  Wt 285 lb (129.3 kg)  BMI 40.03 kg/m2  BMI: Body mass index is 40.03 kg/(m^2).  Height: Data Unavailable    Constitutional/ general:  Pt is in no apparent distress, appears well-nourished.  Cooperative with history and physical exam.     Psych:  The patient answered questions appropriately.  Normal affect.  Seems to have reasonable expectations, in terms of treatment.     Eyes:  Visual scanning/ tracking without deficit.     Ears:  Response to auditory stimuli is normal.  negative hearing aid devices.  Auricles in proper alignment.     Lymphatic:  Popliteal lymph nodes not enlarged.     Lungs:  Non labored breathing, non labored speech. No cough.  No audible wheezing. Even, quiet breathing.       Vascular:  positive pedal pulses bilaterally for both the DP and PT arteries.  CFT < 3 sec.  Mild ankle edema and varicosities noted.  positive pedal hair growth.    Neuro:  Alert and oriented x 3. Coordinated gait.  Light touch sensation is intact to the L4, L5, S1 distributions. No obvious deficits.  No evidence of neurological-based weakness, spasticity, or contracture in the lower extremities.  Monofilament intact on all digits    Derm: Normal texture and turgor.  No erythema, ecchymosis, or cyanosis.      Musculoskeletal:    Lower extremity muscle strength is normal.  Patient is ambulatory without an assistive device or brace .  No gross deformities.        A callus with underlying dark tissue is located on the  right hallux.  Underlying this callus are 2 small partial thickness ulcers.  Wound base is healthy.  The are small, measuring 2 x 1 mm.  No surrounding erythema, edema, or crepitus with palpation.  No sinus tracts, purulence or odor.  No drainage noted.    Hemoglobin A1C 7.4 (H) 4.3 - 6.0 % Final 03/26/2018  4:56 PM         A:  Diabetes mellitus   Right hallux ulcer  Right foot callus    P:  Discussed the cause of this with the patient.  Callus debrided with a fifteen blade.  Dressed ulcers.  His shoes are very malleable.  Had long discussion on how he needs stiff shoes at all times to keep this offloaded.  Made suggestions on stiff house shoes.   He will call if he needs carbon plates.  Patient to minimize activities until healed.   He will keep callus down with pumice stone.  If chronically problematic discussed he may have an os here and could surgically remove this.  Return to clinic prn.  Thank you for allowing me participate in the care of this patient.         Osmel Angel DPM, FACFAS            Again, thank you for allowing me to participate in the care of your patient.        Sincerely,        Osmel Angel DPM

## 2018-05-01 NOTE — MR AVS SNAPSHOT
After Visit Summary   5/1/2018    Lan Potts Jr.    MRN: [id number]           Patient Information     Date Of Birth          1965        Visit Information        Provider Department      5/1/2018 5:30 PM Osmel Angel, [facility] [place]        Today's Diagnoses     Type 2 diabetes mellitus without complication, without long-term current use of insulin (H)    -  1    Callus        Diabetic ulcer of toe of right foot associated with type 2 diabetes mellitus, unspecified ulcer stage (H)          Care Instructions    We wish you continued good healing. If you have any questions or concerns, please do not hesitate to contact us at [phone]    Please remember to call and schedule a follow up appointment if one was recommended at your earliest convenience.   PODIATRY CLINIC HOURS  TELEPHONE NUMBER    Dr. Osmel Angel D.P.M [facility]    Clinics:  [facility]    [facility]   Tuesday 1PM-6PM  [place]  Wednesday 7AM-2PM  [place]  Thursday 10AM-6PM  [place]  Friday 7AM-3PM  [place]  Specialty schedulers:   [phone] to make an appointment with any Specialty Provider.        Urgent Care locations:    [facility] Monday-Friday 5 pm - 9 pm. Saturday-Sunday 9 am -5pm    Monday-Friday 11 am - 9 pm Saturday 9 am - 5 pm     Monday-Sunday 12 noon-8PM [phone]     If you need a medication refill, please contact us you may need lab work and/or a follow up visit prior to your refill (i.e. Antifungal medications).    [employer] (secure e-mail communication and access to your chart) to send a message or to make an appointment.    If MRI needed please call [facility] at [phone]        Weight management plan: Patient was referred to their PCP to discuss a diet and exercise plan.     Patient to follow up with  Primary Care provider regarding elevated blood pressure.              Follow-ups after your visit        Your next 10 appointments already scheduled     May 14, 2018  6:00 PM CDT   [facility]    [id number] [facility] [address] [phone]            Jul 12, 2018  5:15 PM CDT   Office Visit with Eligio [facility]vedo MD   [facility]    [id number] [facility] [address] [phone]           Bring a current list of meds and any records pertaining to this visit. For Physicals, please bring immunization records and any forms needing to be filled out. Please arrive 10 minutes early to complete paperwork.              Who to contact     If you have questions or need follow up information about today's clinic visit or your schedule please contact [facility] [place] directly at [phone].  Normal or non-critical lab and imaging results will be communicated to you by [employer], letter or phone within 4 business days after the clinic has received the results. If you do not hear from us within 7 days, please contact the clinic through [facility] or phone. If you have a critical or abnormal lab result, we will notify you by phone as soon as possible.  Submit refill requests through [facility] or call your pharmacy and they will forward the refill request to us. Please allow 3 business days for your refill to be completed.          Additional Information About Your Visit        [employer] Information     [facility] gives you secure access to your electronic health record. If you see a primary care provider, you can also send messages to your care team and make appointments. If you have questions, please call your primary care clinic.  If you do not have a primary care provider, please call [phone] and they will assist you.        Care EveryWhere ID     This is your Care  EveryWhere ID. This could be used by other organizations to access your [facility] medical records  [id number]        Your Vitals Were     Pulse BMI (Body Mass Index)                94 40.03 kg/m2           Blood Pressure from Last 3 Encounters:   05/01/18 120/74   04/05/18 121/69   10/26/17 126/80    Weight from Last 3 Encounters:   05/01/18 285 lb (129.3 kg)   04/05/18 285 lb (129.3 kg)   10/26/17 295 lb (133.8 kg)              We Performed the Following     TRIM HYPERKERATOTIC SKIN LESION, ONE        Primary Care Provider Office Phone # Fax #    Eligio Quevedo [phone] [address] [facility] [address]        Equal Access to Services     BALTA JONES : Ron garbero Sosharron, waaxda luqadaha, qaybta kaalmada adeegyada, marcel thurston . [facility] [phone].    ATENCIÓN: Si habla español, tiene a cordoba disposición servicios gratuitos de asistencia lingüística. Llame al [phone].    We comply with applicable federal civil rights laws and [place] laws. We do not discriminate on the basis of race, color, national origin, age, disability, sex, sexual orientation, or gender identity.            Thank you!     Thank you for choosing [facility]  for your care. Our goal is always to provide you with excellent care. Hearing back from our patients is one way we can continue to improve our services. Please take a few minutes to complete the written survey that you may receive in the mail after your visit with us. Thank you!             Your Updated Medication List - Protect others around you: Learn how to safely use, store and throw away your medicines at www.disposemymeds.org.          This list is accurate as of 5/1/18 11:59 PM.  Always use your most recent med list.                   Brand Name Dispense Instructions for use Diagnosis    ACE/ARB/ARNI NOT PRESCRIBED (INTENTIONAL)      Please choose reason not prescribed,  below    Type 2 diabetes mellitus without complication, without long-term current use of insulin (H)       ALLEGRA ALLERGY 180 MG tablet   Generic drug:  fexofenadine      Take by mouth daily Reported on 4/6/2017        aspirin 81 MG tablet     90 tablet    Take 1 tablet (81 mg) by mouth daily    Type 2 diabetes mellitus without complication, without long-term current use of insulin (H)       ASPIRIN NOT PRESCRIBED    INTENTIONAL    1 each    Antiplatelet medication not prescribed intentionally due to not indicated at this age    Type 2 diabetes, HbA1c goal < 7% (H)       blood glucose calibration solution     1 each    1 drop by In Vitro route as needed    Type 2 diabetes, HbA1c goal < 7% (H)       blood glucose monitoring lancets     3 Box    1 each 3 times daily    Type 2 diabetes, HbA1c goal < 7% (H)       blood glucose monitoring test strip    ACCU-CHEK SMARTVIEW    3 Box    1 strip by In Vitro route 3 times daily    Type 2 diabetes, HbA1c goal < 7% (H)       eletriptan 40 MG tablet    RELPAX    12 tablet    Take 1 tablet (40 mg) by mouth at onset of headache May repeat in 2 hours as needed but no more than 2 pills in 24 hours.    Migraine without status migrainosus, not intractable, unspecified migraine type       ezetimibe 10 MG tablet    ZETIA    90 tablet    TAKE 1 TABLET (10 MG) BY MOUTH DAILY    Type 2 diabetes mellitus without complication, without long-term current use of insulin (H), Hyperlipidemia with target LDL less than 100, 10 year risk of MI or stroke 7.5% or greater       fluticasone 50 MCG/ACT spray    FLONASE    16 mL    SPRAY 2 SPRAYS INTO BOTH NOSTRILS 2 TIMES DAILY    Seasonal allergic rhinitis       * metFORMIN 500 MG 24 hr tablet    GLUCOPHAGE-XR    360 tablet    Take 2 tablets (1,000 mg) by mouth 2 times daily (with meals)    Type 2 diabetes mellitus without complication, without long-term current use of insulin (H)       * metFORMIN 1000 MG tablet    GLUCOPHAGE    180 tablet    Take 1  tablet (1,000 mg) by mouth 2 times daily (with meals)    Type 2 diabetes mellitus without complication, without long-term current use of insulin (H)       * nicotine 21 MG/24HR 24 hr patch    NICODERM CQ    30 patch    Place 1 patch onto the skin every 24 hours    Tobacco abuse       * nicotine 14 MG/24HR 24 hr patch    NICODERM CQ    30 patch    Place 1 patch onto the skin every 24 hours    Tobacco abuse       * nicotine 7 MG/24HR 24 hr patch    NICODERM CQ    30 patch    Place 1 patch onto the skin every 24 hours    Tobacco abuse       sildenafil 20 MG tablet    REVATIO    30 tablet    Take 1 to 5 tabs 30-60 minutes before intercourse.  Never use with nitroglycerin, terazosin or doxazosin.    Erectile dysfunction, unspecified erectile dysfunction type       STATIN NOT PRESCRIBED (INTENTIONAL)      continuous prn for other Reported on 4/6/2017        * Notice:  This list has 5 medication(s) that are the same as other medications prescribed for you. Read the directions carefully, and ask your doctor or other care provider to review them with you.

## 2018-05-02 NOTE — PROGRESS NOTES
Subjective:    Pt is seen today in consult from Dr. Quevedo with the c/c of an ulcer right hallux.  This has been problematic for 3 month(s).  No history of trauma.  Denies drainage, erythema or edema.  Slightly tender upon long periods of standing/ambulating.  Aggravated by activity and relieved by rest.   This started after he was walking on his feet a lot.  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  He has a past history of smoking.  Would like to walk more to loose weight.      ROS:  A 10-point review of systems was performed and is positive for that noted in the HPI and as seen below.  All other areas are negative.          Allergies   Allergen Reactions     Amoxicillin      rash     Pravastatin      Bilateral(ly) calf pain       Current Outpatient Prescriptions   Medication Sig Dispense Refill     ACE/ARB/ARNI NOT PRESCRIBED, INTENTIONAL, Please choose reason not prescribed, below       aspirin 81 MG tablet Take 1 tablet (81 mg) by mouth daily 90 tablet 3     ASPIRIN NOT PRESCRIBED, INTENTIONAL, Antiplatelet medication not prescribed intentionally due to not indicated at this age 1 each 0     blood glucose (ACCU-CHEK SMARTVIEW) test strip 1 strip by In Vitro route 3 times daily 3 Box 3     blood glucose calibration (ACCU-CHEK SMARTVIEW CONTROL) solution 1 drop by In Vitro route as needed 1 each 3     blood glucose monitoring (ACCU-CHEK FASTCLIX) lancets 1 each 3 times daily 3 Box 3     eletriptan (RELPAX) 40 MG tablet Take 1 tablet (40 mg) by mouth at onset of headache May repeat in 2 hours as needed but no more than 2 pills in 24 hours. 12 tablet 5     ezetimibe (ZETIA) 10 MG tablet TAKE 1 TABLET (10 MG) BY MOUTH DAILY 90 tablet 0     fexofenadine (ALLEGRA ALLERGY) 180 MG tablet Take by mouth daily Reported on 4/6/2017       fluticasone (FLONASE) 50 MCG/ACT spray SPRAY 2 SPRAYS INTO BOTH NOSTRILS 2 TIMES DAILY 16 mL 11     metFORMIN (GLUCOPHAGE) 1000 MG tablet Take 1 tablet (1,000 mg) by mouth 2  times daily (with meals) 180 tablet 1     metFORMIN (GLUCOPHAGE-XR) 500 MG 24 hr tablet Take 2 tablets (1,000 mg) by mouth 2 times daily (with meals) 360 tablet 1     nicotine (NICODERM CQ) 14 MG/24HR 24 hr patch Place 1 patch onto the skin every 24 hours 30 patch 0     nicotine (NICODERM CQ) 21 MG/24HR 24 hr patch Place 1 patch onto the skin every 24 hours 30 patch 0     nicotine (NICODERM CQ) 7 MG/24HR 24 hr patch Place 1 patch onto the skin every 24 hours 30 patch 0     sildenafil (REVATIO) 20 MG tablet Take 1 to 5 tabs 30-60 minutes before intercourse.  Never use with nitroglycerin, terazosin or doxazosin. 30 tablet 11     STATIN NOT PRESCRIBED, INTENTIONAL, continuous prn for other Reported on 4/6/2017  0       Patient Active Problem List   Diagnosis     Chronic back pain     Seasonal allergies     Hyperlipidemia with target LDL less than 100     High triglycerides     HDL deficiency     Migraine without status migrainosus, not intractable, unspecified migraine type     Type 2 diabetes mellitus without complication, without long-term current use of insulin (H)     10 year risk of MI or stroke 7.5% or greater, 12.6% in April 2017     Tobacco abuse     Erectile dysfunction, unspecified erectile dysfunction type       Past Medical History:   Diagnosis Date     Chronic back pain      Diabetes (H)      Migraine headaches      TINA (obstructive sleep apnea)     mild does not use CPAP at present     Seasonal allergies      Tobacco use disorder        Past Surgical History:   Procedure Laterality Date     ENT SURGERY       GENITOURINARY SURGERY       NO HISTORY OF SURGERY         Family History   Problem Relation Age of Onset     Arthritis Mother      Alzheimer Disease Mother      Arthritis Father      Circulatory Father      abdominal artery issue     Hypertension Father      DIABETES Other      Blood Disease No family hx of      Anesthesia Reaction No family hx of        Social History   Substance Use Topics      Smoking status: Former Smoker     Types: Cigarettes     Smokeless tobacco: Never Used     Alcohol use Yes      Comment: occ         Exam:    Vitals: /74 (BP Location: Right arm, Patient Position: Sitting, Cuff Size: Adult Large)  Pulse 94  Wt 285 lb (129.3 kg)  BMI 40.03 kg/m2  BMI: Body mass index is 40.03 kg/(m^2).  Height: Data Unavailable    Constitutional/ general:  Pt is in no apparent distress, appears well-nourished.  Cooperative with history and physical exam.     Psych:  The patient answered questions appropriately.  Normal affect.  Seems to have reasonable expectations, in terms of treatment.     Eyes:  Visual scanning/ tracking without deficit.     Ears:  Response to auditory stimuli is normal.  negative hearing aid devices.  Auricles in proper alignment.     Lymphatic:  Popliteal lymph nodes not enlarged.     Lungs:  Non labored breathing, non labored speech. No cough.  No audible wheezing. Even, quiet breathing.       Vascular:  positive pedal pulses bilaterally for both the DP and PT arteries.  CFT < 3 sec.  Mild ankle edema and varicosities noted.  positive pedal hair growth.    Neuro:  Alert and oriented x 3. Coordinated gait.  Light touch sensation is intact to the L4, L5, S1 distributions. No obvious deficits.  No evidence of neurological-based weakness, spasticity, or contracture in the lower extremities.  Monofilament intact on all digits    Derm: Normal texture and turgor.  No erythema, ecchymosis, or cyanosis.      Musculoskeletal:    Lower extremity muscle strength is normal.  Patient is ambulatory without an assistive device or brace .  No gross deformities.        A callus with underlying dark tissue is located on the right hallux.  Underlying this callus are 2 small partial thickness ulcers.  Wound base is healthy.  The are small, measuring 2 x 1 mm.  No surrounding erythema, edema, or crepitus with palpation.  No sinus tracts, purulence or odor.  No drainage noted.    Hemoglobin  A1C 7.4 (H) 4.3 - 6.0 % Final 03/26/2018  4:56 PM         A:  Diabetes mellitus   Right hallux ulcer  Right foot callus    P:  Discussed the cause of this with the patient.  Callus debrided with a fifteen blade.  Dressed ulcers.  His shoes are very malleable.  Had long discussion on how he needs stiff shoes at all times to keep this offloaded.  Made suggestions on stiff house shoes.   He will call if he needs carbon plates.  Patient to minimize activities until healed.   He will keep callus down with pumice stone.  If chronically problematic discussed he may have an os here and could surgically remove this.  Return to clinic prn.  Thank you for allowing me participate in the care of this patient.         Osmel Angel DPM, FACFAS

## 2018-05-14 ENCOUNTER — OFFICE VISIT (OUTPATIENT)
Dept: OPTOMETRY | Facility: CLINIC | Age: 53
End: 2018-05-14
Payer: COMMERCIAL

## 2018-05-14 DIAGNOSIS — E11.9 CONTROLLED TYPE 2 DIABETES MELLITUS WITHOUT COMPLICATION, WITHOUT LONG-TERM CURRENT USE OF INSULIN (H): Primary | ICD-10-CM

## 2018-05-14 DIAGNOSIS — H52.4 PRESBYOPIA: ICD-10-CM

## 2018-05-14 PROCEDURE — 92015 DETERMINE REFRACTIVE STATE: CPT | Performed by: OPTOMETRIST

## 2018-05-14 PROCEDURE — 92004 COMPRE OPH EXAM NEW PT 1/>: CPT | Performed by: OPTOMETRIST

## 2018-05-14 ASSESSMENT — REFRACTION_MANIFEST
OS_SPHERE: +0.25
OS_AXIS: 005
OS_SPHERE: +0.25
OD_SPHERE: +0.25
METHOD_AUTOREFRACTION: 1
OD_CYLINDER: SPHERE
OS_AXIS: 178
OD_ADD: +1.50
OS_CYLINDER: +0.50
OD_CYLINDER: +0.25
OS_CYLINDER: +0.50
OD_AXIS: 165
OS_ADD: +1.50
OD_SPHERE: +0.25

## 2018-05-14 ASSESSMENT — SLIT LAMP EXAM - LIDS
COMMENTS: NORMAL
COMMENTS: NORMAL

## 2018-05-14 ASSESSMENT — CUP TO DISC RATIO
OS_RATIO: 0.6
OD_RATIO: 0.6

## 2018-05-14 ASSESSMENT — VISUAL ACUITY
OS_SC+: -1
METHOD: SNELLEN - LINEAR
OS_SC: 20/70-1
OD_SC: 20/70-1
OS_SC: 20/20
OD_SC: 20/20

## 2018-05-14 ASSESSMENT — KERATOMETRY
OD_K2POWER_DIOPTERS: 44.50
OS_AXISANGLE2_DEGREES: 180
OS_K1POWER_DIOPTERS: 44.25
OD_AXISANGLE2_DEGREES: 2
OS_K2POWER_DIOPTERS: 44.25
OD_K1POWER_DIOPTERS: 43.75

## 2018-05-14 ASSESSMENT — EXTERNAL EXAM - LEFT EYE: OS_EXAM: NORMAL

## 2018-05-14 ASSESSMENT — CONF VISUAL FIELD
OD_NORMAL: 1
METHOD: COUNTING FINGERS
OS_NORMAL: 1

## 2018-05-14 ASSESSMENT — TONOMETRY
IOP_METHOD: APPLANATION
OS_IOP_MMHG: 16
OD_IOP_MMHG: 16

## 2018-05-14 ASSESSMENT — EXTERNAL EXAM - RIGHT EYE: OD_EXAM: NORMAL

## 2018-05-14 NOTE — LETTER
5/14/2018         RE: Lan [place]an [place]  [address]  [place] [address]        Dear Colleague,    Thank you for referring your patient, Lan Potts Jr., to the [facility]. No diabetic retinopathy found at eye exam. Please see a copy of my visit note below.    Chief Complaint   Patient presents with     Diabetic Eye Exam        Lab Results   Component Value Date    A1C 7.4 03/26/2018    A1C 7.9 10/26/2017    A1C 7.4 04/06/2017    A1C 7.7 01/13/2017    A1C 6.6 01/21/2016       Last Eye Exam: 12/17/2014  Dilated Previously: Yes    What are you currently using to see?  Readers, as needed for small print and computer. He said that he uses +1.00. He did not bring the readers to this appointment     Distance Vision Acuity: Satisfied with vision, no changes noted     Near Vision Acuity: Satisfied with vision while reading and using computer with readers. Has questions about reading glasses, possibly with [employer] for the computer      Eye Comfort: Good, eyes do get itchy in the spring   Do you use eye drops? : No  Occupation or Hobbies: Technician, Lots of computer, and electronics     Kym Apple Optometric Assistant      Medical, surgical and family histories reviewed and updated 5/14/2018.       OBJECTIVE: See Ophthalmology exam    ASSESSMENT:    ICD-10-CM    1. Controlled type 2 diabetes mellitus without complication, without long-term current use of insulin (H) E11.9 EYE EXAM (SIMPLE-NONBILLABLE)     REFRACTION   2. Presbyopia H52.4 EYE EXAM (SIMPLE-NONBILLABLE)     REFRACTION      PLAN:    Lan Potts Jr. aware  eye exam results will be sent to Eligio Quevedo  Patient Instructions   Patient was advised of today's exam findings.  Fill glasses prescription  Keep blood sugar under good control  Return in 1 year for diabetic eye exam      Diabetes weakens the blood vessels all over the body, including the eyes. Damage to the blood vessels in the eyes can cause swelling or  bleeding into part of the eye (called the retina). This is called diabetic retinopathy (FARHANA-tin-AH-puh-thee). If not treated, this disease can cause vision loss or blindness.   Symptoms may include blurred or distorted vision, but many people have no symptoms. It's important to see your eye doctor regularly to check for problems.   Early treatment and good control can help protect your vision. Here are the things you can do to help prevent vision loss:      1. Keep your blood sugar levels under tight control.      2. Bring high blood pressure under control.      3. No smoking.      4. Have yearly dilated eye exams.      Danni Johnson O.D.  [facility]   [id number] [place] [address] [phone]               Again, thank you for allowing me to participate in the care of your patient.        Sincerely,        Danni Johnson, OD

## 2018-05-14 NOTE — PROGRESS NOTES
Chief Complaint   Patient presents with     Diabetic Eye Exam        Lab Results   Component Value Date    A1C 7.4 03/26/2018    A1C 7.9 10/26/2017    A1C 7.4 04/06/2017    A1C 7.7 01/13/2017    A1C 6.6 01/21/2016       Last Eye Exam: 12/17/2014  Dilated Previously: Yes    What are you currently using to see?  Readers, as needed for small print and computer. He said that he uses +1.00. He did not bring the readers to this appointment     Distance Vision Acuity: Satisfied with vision, no changes noted     Near Vision Acuity: Satisfied with vision while reading and using computer with readers. Has questions about reading glasses, possibly with [employer] for the computer      Eye Comfort: Good, eyes do get itchy in the spring   Do you use eye drops? : No  Occupation or Hobbies: Technician, Lots of computer, and electronics     Kym Apple Optometric Assistant      Medical, surgical and family histories reviewed and updated 5/14/2018.       OBJECTIVE: See Ophthalmology exam    ASSESSMENT:    ICD-10-CM    1. Controlled type 2 diabetes mellitus without complication, without long-term current use of insulin (H) E11.9 EYE EXAM (SIMPLE-NONBILLABLE)     REFRACTION   2. Presbyopia H52.4 EYE EXAM (SIMPLE-NONBILLABLE)     REFRACTION      PLAN:    Lan Potts Jr. aware  eye exam results will be sent to Eligio Quevedo  Patient Instructions   Patient was advised of today's exam findings.  Fill glasses prescription  Keep blood sugar under good control  Return in 1 year for diabetic eye exam      Diabetes weakens the blood vessels all over the body, including the eyes. Damage to the blood vessels in the eyes can cause swelling or bleeding into part of the eye (called the retina). This is called diabetic retinopathy (FARHANA-tin-AH-puh-thee). If not treated, this disease can cause vision loss or blindness.   Symptoms may include blurred or distorted vision, but many people have no symptoms. It's important to see your eye doctor  regularly to check for problems.   Early treatment and good control can help protect your vision. Here are the things you can do to help prevent vision loss:      1. Keep your blood sugar levels under tight control.      2. Bring high blood pressure under control.      3. No smoking.      4. Have yearly dilated eye exams.      Danni Johnson O.D.  [facility]   [address] [place], [place] [id number] [phone]

## 2018-05-14 NOTE — PATIENT INSTRUCTIONS
Patient was advised of today's exam findings.  Fill glasses prescription  Keep blood sugar under good control  Return in 1 year for diabetic eye exam      Diabetes weakens the blood vessels all over the body, including the eyes. Damage to the blood vessels in the eyes can cause swelling or bleeding into part of the eye (called the retina). This is called diabetic retinopathy (FARHANA-tin-AH-puh-thee). If not treated, this disease can cause vision loss or blindness.   Symptoms may include blurred or distorted vision, but many people have no symptoms. It's important to see your eye doctor regularly to check for problems.   Early treatment and good control can help protect your vision. Here are the things you can do to help prevent vision loss:      1. Keep your blood sugar levels under tight control.      2. Bring high blood pressure under control.      3. No smoking.      4. Have yearly dilated eye exams.      Danni Johnson O.D.  [facility]   [address] [place], [place] [id number] [phone]

## 2018-05-14 NOTE — MR AVS SNAPSHOT
After Visit Summary   5/14/2018    Lan Potts Jr.    MRN: [id number]           Patient Information     Date Of Birth          1965        Visit Information        Provider Department      5/14/2018 6:00 PM Danni Johnson OD [facility]        Today's Diagnoses     Presbyopia    -  1      Care Instructions    Patient was advised of today's exam findings.  Fill glasses prescription  Keep blood sugar under good control  Return in 1 year for diabetic eye exam      Diabetes weakens the blood vessels all over the body, including the eyes. Damage to the blood vessels in the eyes can cause swelling or bleeding into part of the eye (called the retina). This is called diabetic retinopathy (FARHANA-tin-AH-puh-thee). If not treated, this disease can cause vision loss or blindness.   Symptoms may include blurred or distorted vision, but many people have no symptoms. It's important to see your eye doctor regularly to check for problems.   Early treatment and good control can help protect your vision. Here are the things you can do to help prevent vision loss:      1. Keep your blood sugar levels under tight control.      2. Bring high blood pressure under control.      3. No smoking.      4. Have yearly dilated eye exams.      Danni Johnson O.D.  [facility]   [address] [place], [place] [id number] [phone]              Follow-ups after your visit        Your next 10 appointments already scheduled     Jul 12, 2018  5:15 PM CDT   Office Visit with Eligio Quevedo MD   [facility]    [address] [facility] [id number] [phone]           Bring a current list of meds and any records pertaining to this visit. For Physicals, please bring immunization records and any forms needing to be filled out. Please arrive 10 minutes early to complete paperwork.              Who to contact     If you have questions or need follow up  information about today's clinic visit or your schedule please contact [facility] directly at [phone].  Normal or non-critical lab and imaging results will be communicated to you by MyChart, letter or phone within 4 business days after the clinic has received the results. If you do not hear from us within 7 days, please contact the clinic through [employer] or phone. If you have a critical or abnormal lab result, we will notify you by phone as soon as possible.  Submit refill requests through [employer] or call your pharmacy and they will forward the refill request to us. Please allow 3 business days for your refill to be completed.          Additional Information About Your Visit        MyChart Information     [employer] gives you secure access to your electronic health record. If you see a primary care provider, you can also send messages to your care team and make appointments. If you have questions, please call your primary care clinic.  If you do not have a primary care provider, please call [phone] and they will assist you.        Care EveryWhere ID     This is your Care EveryWhere ID. This could be used by other organizations to access your [place] medical records  [id number]         Blood Pressure from Last 3 Encounters:   05/01/18 120/74   04/05/18 121/69   10/26/17 126/80    Weight from Last 3 Encounters:   05/01/18 129.3 kg (285 lb)   04/05/18 129.3 kg (285 lb)   10/26/17 133.8 kg (295 lb)              Today, you had the following     No orders found for display       Primary Care Provider Office Phone # Fax #    Eligio Quevedo [phone]       [address] [facility] [address]        Equal Access to Services     [facility] : Hadii stacie Acuna, wamichelle jones, qaybmurray latifalmarcel rollins. So [facility] [phone].    ATENCIÓN: Si habla español, tiene a cordoba disposición servicios gratuitos de asistencia  lingüísticaCarolina Hitchcock al [phone].    We comply with applicable federal civil rights laws and [place] laws. We do not discriminate on the basis of race, color, national origin, age, disability, sex, sexual orientation, or gender identity.            Thank you!     Thank you for choosing [facility]  [facility]. Our goal is always to provide you with excellent care. Hearing back from our patients is one way we can continue to improve our services. Please take a few minutes to complete the written survey that you may receive in the mail after your visit with us. Thank you!             Your Updated Medication List - Protect others around you: Learn how to safely use, store and throw away your medicines at www.disposemymeds.org.          This list is accurate as of 5/14/18  6:48 PM.  Always use your most recent med list.                   Brand Name Dispense Instructions for use Diagnosis    ACE/ARB/ARNI NOT PRESCRIBED (INTENTIONAL)      Please choose reason not prescribed, below    Type 2 diabetes mellitus without complication, without long-term current use of insulin (H)       ALLEGRA ALLERGY 180 MG tablet   Generic drug:  fexofenadine      Take by mouth daily Reported on 4/6/2017        aspirin 81 MG tablet     90 tablet    Take 1 tablet (81 mg) by mouth daily    Type 2 diabetes mellitus without complication, without long-term current use of insulin (H)       ASPIRIN NOT PRESCRIBED    INTENTIONAL    1 each    Antiplatelet medication not prescribed intentionally due to not indicated at this age    Type 2 diabetes, HbA1c goal < 7% (H)       blood glucose calibration solution     1 each    1 drop by In Vitro route as needed    Type 2 diabetes, HbA1c goal < 7% (H)       blood glucose monitoring lancets     3 Box    1 each 3 times daily    Type 2 diabetes, HbA1c goal < 7% (H)       blood glucose monitoring test strip    ACCU-CHEK SMARTVIEW    3 Box    1 strip by In Vitro route 3 times daily    Type 2  diabetes, HbA1c goal < 7% (H)       eletriptan 40 MG tablet    RELPAX    12 tablet    Take 1 tablet (40 mg) by mouth at onset of headache May repeat in 2 hours as needed but no more than 2 pills in 24 hours.    Migraine without status migrainosus, not intractable, unspecified migraine type       ezetimibe 10 MG tablet    ZETIA    90 tablet    TAKE 1 TABLET (10 MG) BY MOUTH DAILY    Type 2 diabetes mellitus without complication, without long-term current use of insulin (H), Hyperlipidemia with target LDL less than 100, 10 year risk of MI or stroke 7.5% or greater       fluticasone 50 MCG/ACT spray    FLONASE    16 mL    SPRAY 2 SPRAYS INTO BOTH NOSTRILS 2 TIMES DAILY    Seasonal allergic rhinitis       * metFORMIN 500 MG 24 hr tablet    GLUCOPHAGE-XR    360 tablet    Take 2 tablets (1,000 mg) by mouth 2 times daily (with meals)    Type 2 diabetes mellitus without complication, without long-term current use of insulin (H)       * metFORMIN 1000 MG tablet    GLUCOPHAGE    180 tablet    Take 1 tablet (1,000 mg) by mouth 2 times daily (with meals)    Type 2 diabetes mellitus without complication, without long-term current use of insulin (H)       * nicotine 21 MG/24HR 24 hr patch    NICODERM CQ    30 patch    Place 1 patch onto the skin every 24 hours    Tobacco abuse       * nicotine 14 MG/24HR 24 hr patch    NICODERM CQ    30 patch    Place 1 patch onto the skin every 24 hours    Tobacco abuse       * nicotine 7 MG/24HR 24 hr patch    NICODERM CQ    30 patch    Place 1 patch onto the skin every 24 hours    Tobacco abuse       sildenafil 20 MG tablet    REVATIO    30 tablet    Take 1 to 5 tabs 30-60 minutes before intercourse.  Never use with nitroglycerin, terazosin or doxazosin.    Erectile dysfunction, unspecified erectile dysfunction type       STATIN NOT PRESCRIBED (INTENTIONAL)      continuous prn for other Reported on 4/6/2017        * Notice:  This list has 5 medication(s) that are the same as other medications  prescribed for you. Read the directions carefully, and ask your doctor or other care provider to review them with you.

## 2018-05-25 ENCOUNTER — MYC MEDICAL ADVICE (OUTPATIENT)
Dept: FAMILY MEDICINE | Facility: CLINIC | Age: 53
End: 2018-05-25

## 2018-06-17 DIAGNOSIS — E11.9 TYPE 2 DIABETES MELLITUS WITHOUT COMPLICATION, WITHOUT LONG-TERM CURRENT USE OF INSULIN (H): ICD-10-CM

## 2018-06-19 RX ORDER — METFORMIN HYDROCHLORIDE 750 MG/1
TABLET, EXTENDED RELEASE ORAL
Qty: 180 TABLET | Refills: 0 | Status: SHIPPED | OUTPATIENT
Start: 2018-06-19 | End: 2019-01-09 | Stop reason: DRUGHIGH

## 2018-07-12 ENCOUNTER — OFFICE VISIT (OUTPATIENT)
Dept: FAMILY MEDICINE | Facility: CLINIC | Age: 53
End: 2018-07-12
Payer: COMMERCIAL

## 2018-07-12 VITALS
SYSTOLIC BLOOD PRESSURE: 123 MMHG | TEMPERATURE: 98.4 F | HEART RATE: 87 BPM | OXYGEN SATURATION: 95 % | RESPIRATION RATE: 16 BRPM | BODY MASS INDEX: 38.49 KG/M2 | WEIGHT: 274 LBS | DIASTOLIC BLOOD PRESSURE: 78 MMHG

## 2018-07-12 DIAGNOSIS — K42.9 UMBILICAL HERNIA WITHOUT OBSTRUCTION AND WITHOUT GANGRENE: ICD-10-CM

## 2018-07-12 DIAGNOSIS — E11.9 TYPE 2 DIABETES MELLITUS WITHOUT COMPLICATION, WITHOUT LONG-TERM CURRENT USE OF INSULIN (H): Primary | ICD-10-CM

## 2018-07-12 DIAGNOSIS — I83.92 VARICOSE VEINS OF LEFT LOWER EXTREMITY: ICD-10-CM

## 2018-07-12 DIAGNOSIS — E66.01 MORBID OBESITY (H): ICD-10-CM

## 2018-07-12 DIAGNOSIS — Z11.3 SCREENING EXAMINATION FOR SEXUALLY TRANSMITTED DISEASE: ICD-10-CM

## 2018-07-12 DIAGNOSIS — Z11.4 SCREENING FOR HIV (HUMAN IMMUNODEFICIENCY VIRUS): ICD-10-CM

## 2018-07-12 DIAGNOSIS — F41.1 GAD (GENERALIZED ANXIETY DISORDER): ICD-10-CM

## 2018-07-12 DIAGNOSIS — R21 RASH: ICD-10-CM

## 2018-07-12 LAB — HBA1C MFR BLD: 6.8 % (ref 0–5.6)

## 2018-07-12 PROCEDURE — 99214 OFFICE O/P EST MOD 30 MIN: CPT | Performed by: FAMILY MEDICINE

## 2018-07-12 PROCEDURE — 36415 COLL VENOUS BLD VENIPUNCTURE: CPT | Performed by: FAMILY MEDICINE

## 2018-07-12 PROCEDURE — 87389 HIV-1 AG W/HIV-1&-2 AB AG IA: CPT | Performed by: FAMILY MEDICINE

## 2018-07-12 PROCEDURE — 87491 CHLMYD TRACH DNA AMP PROBE: CPT | Performed by: FAMILY MEDICINE

## 2018-07-12 PROCEDURE — 86705 HEP B CORE ANTIBODY IGM: CPT | Performed by: FAMILY MEDICINE

## 2018-07-12 PROCEDURE — 87340 HEPATITIS B SURFACE AG IA: CPT | Performed by: FAMILY MEDICINE

## 2018-07-12 PROCEDURE — 86706 HEP B SURFACE ANTIBODY: CPT | Performed by: FAMILY MEDICINE

## 2018-07-12 PROCEDURE — 83036 HEMOGLOBIN GLYCOSYLATED A1C: CPT | Performed by: FAMILY MEDICINE

## 2018-07-12 RX ORDER — TRIAMCINOLONE ACETONIDE 1 MG/G
CREAM TOPICAL 2 TIMES DAILY
Qty: 100 G | Refills: 1 | Status: SHIPPED | OUTPATIENT
Start: 2018-07-12 | End: 2020-06-19

## 2018-07-12 RX ORDER — ALPRAZOLAM 0.25 MG
0.25 TABLET ORAL 3 TIMES DAILY PRN
Qty: 30 TABLET | Refills: 0 | Status: SHIPPED | OUTPATIENT
Start: 2018-07-12 | End: 2018-09-25

## 2018-07-12 ASSESSMENT — PAIN SCALES - GENERAL: PAINLEVEL: MILD PAIN (3)

## 2018-07-12 NOTE — PROGRESS NOTES
[place] diabetes resourses:  [email or web address]    To access diabetes educational materials with in EPIC use <dot>DALE      Put this in AFTER VISIT SUMMARY if needed for the patient to access information online [email or web address]      SUBJECTIVE:  Lan Potts Jr. is a 53 year old male who presents today for a follow up appointment for management of DIABETES MELLITUS.    Patient Active Problem List    Diagnosis Date Noted     Tobacco abuse 04/05/2018     Priority: Medium     Erectile dysfunction, unspecified erectile dysfunction type 04/05/2018     Priority: Medium     10 year risk of MI or stroke 7.5% or greater, 12.6% in April 2017 04/06/2017     Priority: Medium     Type 2 diabetes mellitus without complication, without long-term current use of insulin (H) 01/05/2017     Priority: Medium     Migraine without status migrainosus, not intractable, unspecified migraine type 01/21/2016     Priority: Medium     High triglycerides 12/11/2014     Priority: Medium     HDL deficiency 12/11/2014     Priority: Medium     Hyperlipidemia with target LDL less than 100 02/17/2014     Priority: Medium     Diagnosis updated by automated process. Provider to review and confirm.       Chronic back pain      Priority: Medium     Seasonal allergies      Priority: Medium          The patient checks his blood sugar as follows: one time daily, once daily.    133  Is his 30 day(s) average reading     The patient reports that he IS taking the medication as prescribed. He reports side effects of medication.  Diarrhea on a daily  He is on 1000 mg of metformin daily.     He has lost 30 pounds in the last 6 month(s)     ----------------------------------------------------------------------------------------------------------------------------------------    BP Readings from Last 3 Encounters:   07/12/18 123/78   05/01/18 120/74   04/05/18  121/69     The patient reports that he IS currently smoking. He is not ready to quit  History   Smoking Status     Current Every Day Smoker     Types: Cigarettes   Smokeless Tobacco     Never Used       The 10-year ASCVD risk score (Maribelerna OH Jr, et al., 2013) is: 23.9%    Values used to calculate the score:      Age: 53 years      Sex: Male      Is Non- : No      Diabetic: Yes      Tobacco smoker: Yes      Systolic Blood Pressure: 123 mmHg      Is BP treated: No      HDL Cholesterol: 35 mg/dL      Total Cholesterol: 233 mg/dL        Current Outpatient Prescriptions   Medication Sig Dispense Refill     ACE/ARB/ARNI NOT PRESCRIBED, INTENTIONAL, Please choose reason not prescribed, below       ASPIRIN NOT PRESCRIBED, INTENTIONAL, Antiplatelet medication not prescribed intentionally due to not indicated at this age 1 each 0     blood glucose (ACCU-CHEK SMARTVIEW) test strip 1 strip by In Vitro route 3 times daily 3 Box 3     blood glucose calibration (ACCU-CHEK SMARTVIEW CONTROL) solution 1 drop by In Vitro route as needed 1 each 3     blood glucose monitoring (ACCU-CHEK FASTCLIX) lancets 1 each 3 times daily 3 Box 3     eletriptan (RELPAX) 40 MG tablet Take 1 tablet (40 mg) by mouth at onset of headache May repeat in 2 hours as needed but no more than 2 pills in 24 hours. 12 tablet 5     fluticasone (FLONASE) 50 MCG/ACT spray Spray 2 sprays into both nostrils daily 16 mL 3     metFORMIN (GLUCOPHAGE) 1000 MG tablet Take 1 tablet (1,000 mg) by mouth 2 times daily (with meals) 180 tablet 1     metFORMIN (GLUCOPHAGE-XR) 750 MG 24 hr tablet TAKE 1 TABLET (750 MG) BY MOUTH 2 TIMES DAILY (WITH MEALS) 180 tablet 0     nicotine (NICODERM CQ) 14 MG/24HR 24 hr patch Place 1 patch onto the skin every 24 hours 30 patch 0     nicotine (NICODERM CQ) 21 MG/24HR 24 hr patch Place 1 patch onto the skin every 24 hours 30 patch 0     sildenafil (REVATIO) 20 MG tablet Take 1 to 5 tabs 30-60 minutes before  intercourse.  Never use with nitroglycerin, terazosin or doxazosin. 30 tablet 11     STATIN NOT PRESCRIBED, INTENTIONAL, continuous prn for other Reported on 4/6/2017  0     aspirin 81 MG tablet Take 1 tablet (81 mg) by mouth daily (Patient not taking: Reported on 7/12/2018) 90 tablet 3     ezetimibe (ZETIA) 10 MG tablet TAKE 1 TABLET (10 MG) BY MOUTH DAILY (Patient not taking: Reported on 7/12/2018) 90 tablet 0     fexofenadine (ALLEGRA ALLERGY) 180 MG tablet Take by mouth daily Reported on 4/6/2017       [DISCONTINUED] metFORMIN (GLUCOPHAGE-XR) 500 MG 24 hr tablet Take 2 tablets (1,000 mg) by mouth 2 times daily (with meals) 360 tablet 1       The patient reports that he IS NOT taking a statin.   (Reminder all diabetics with a ASCVD risk greater or equal to 7.5% should be on a high intensity statin, otherwise on a moderate intensity statin)  he is not taking a statin because he got side effects from a statin.   (Reminder to intentionally not prescribe statin in )    The patient reports that he IS NOT taking aspirin daily.  (Reminder all diabetic patients with a cardiovascular risk factor and > 50 should take a daily aspirin)       The patient reports that he IS doing a self foot exam daily.    The patient reports that his last eye exam was 2 months ago.         Immunization History   Administered Date(s) Administered     HepB 04/06/2017     HepB-Adult 04/05/2018     Influenza (IIV3) PF 12/21/2012, 09/27/2013     Influenza Vaccine IM 3yrs+ 4 Valent IIV4 10/26/2017     Pneumococcal 23 valent 09/04/2014     TDAP Vaccine (Adacel) 09/20/2011     The patient reports that he has started the hepatitis B vaccine series in the past. (recommended for age 19-59 and can be given to age 60 or older)  The patient reports that he has had a pnuemovax in the past.  The patient reports that he has not had a flu shot for the current influenza season.  The patient would like to have a Shingrix    Patient concerns: see  below        EXAM:  /78  Pulse 87  Temp 98.4  F (36.9  C) (Oral)  Resp 16  Wt 274 lb (124.3 kg)  SpO2 95%  BMI 38.49 kg/m2  Wt Readings from Last 4 Encounters:   07/12/18 274 lb (124.3 kg)   05/01/18 285 lb (129.3 kg)   04/05/18 285 lb (129.3 kg)   10/26/17 295 lb (133.8 kg)     Body mass index is 38.49 kg/(m^2).    General:  Lan is awake, alert, and cooperative.  NAD.      Diabetic Foot Screen:  Any complaints of increased pain or numbness ? No  Is there a foot ulcer now or a history of foot ulcer? No  Does the foot have an abnormal shape? No  Are the nails thick, too long or ingrown? No  Are there any redness or open areas? No the area previously affected appears almost completely healed         Sensation Testing done at all points on the diagram with monofilament     Right Foot: Sensation Normal at all points  Left Foot: Sensation Normal at all points     Risk Category: 0- No loss of protective sensation  Performed by Eligio Quevedo MD                  Previsit labs drawn include:  Orders Only on 03/26/2018   Component Date Value Ref Range Status     Cholesterol 03/26/2018 233* <200 mg/dL Final    Desirable:       <200 mg/dl     Triglycerides 03/26/2018 262* <150 mg/dL Final    Comment: Borderline high:  150-199 mg/dl  High:             200-499 mg/dl  Very high:       >499 mg/dl       HDL Cholesterol 03/26/2018 35* >39 mg/dL Final     LDL Cholesterol Calculated 03/26/2018 146* <100 mg/dL Final    Comment: Above desirable:  100-129 mg/dl  Borderline High:  130-159 mg/dL  High:             160-189 mg/dL  Very high:       >189 mg/dl       Non HDL Cholesterol 03/26/2018 198* <130 mg/dL Final    Comment: Above Desirable:  130-159 mg/dl  Borderline high:  160-189 mg/dl  High:             190-219 mg/dl  Very high:       >219 mg/dl       Creatinine Urine 03/26/2018 148  mg/dL Final     Albumin Urine mg/L 03/26/2018 6  mg/L Final     Albumin Urine mg/g Cr 03/26/2018 4.03  0 - 17 mg/g Cr Final      Hemoglobin A1C 03/26/2018 7.4* 4.3 - 6.0 % Final     Sodium 03/26/2018 138  133 - 144 mmol/L Final     Potassium 03/26/2018 4.3  3.4 - 5.3 mmol/L Final     Chloride 03/26/2018 104  94 - 109 mmol/L Final     Carbon Dioxide 03/26/2018 23  20 - 32 mmol/L Final     Anion Gap 03/26/2018 11  3 - 14 mmol/L Final     Glucose 03/26/2018 100* 70 - 99 mg/dL Final     Urea Nitrogen 03/26/2018 13  7 - 30 mg/dL Final     Creatinine 03/26/2018 0.67  0.66 - 1.25 mg/dL Final     GFR Estimate 03/26/2018 >90  >60 mL/min/1.7m2 Final    Non  GFR Calc     GFR Estimate If Black 03/26/2018 >90  >60 mL/min/1.7m2 Final    African American GFR Calc     Calcium 03/26/2018 8.8  8.5 - 10.1 mg/dL Final     Bilirubin Total 03/26/2018 0.4  0.2 - 1.3 mg/dL Final     Albumin 03/26/2018 3.8  3.4 - 5.0 g/dL Final     Protein Total 03/26/2018 7.6  6.8 - 8.8 g/dL Final     Alkaline Phosphatase 03/26/2018 110  40 - 150 U/L Final     ALT 03/26/2018 88* 0 - 70 U/L Final     AST 03/26/2018 39  0 - 45 U/L Final         ASSESSMENT and PLAN:    Type II Diabetes, .    DIABETES Type II:                       Lab Results   Component Value Date    A1C 7.4 03/26/2018       Control   unable to assess  Lab Results   Component Value Date    A1C 7.4 03/26/2018    A1C 7.9 10/26/2017    A1C 7.4 04/06/2017     Lab Results   Component Value Date    MICROL 6 03/26/2018     No results found for: MICROALBUMIN Control   good    Check a HGBA1C , Recommended to take ASA  mg daily for appropriate patient, Compliance with the recommended diabetic diet was stressed, Regular aerobic exercise was encouraged, Home glucose monitoring encouraged, Annual eye exam recommended, Self foot exam demonstrated and recommended to be done nightly, Apply moisturizer to feet with in 3 minutes of showering or bathing, Follow up in clinic in 3-6 months for a diabetes check and Future labs ordered and the patient was instructed to make a lab appt 1 week prior to next diabetes  "visit      BP/HTN:   BP Readings from Last 3 Encounters:   07/12/18 123/78   05/01/18 120/74   04/05/18 121/69     Control   good    - Medication:continue the current doses of medication.  (make sure to order \"Ace not prescribed intentionally if not on an ACE/ARB)  The patient was advised to do the following therapuetic life style changes  - Dietary sodium restriction and increase potassium and Calcium intake  - Regular aerobic exercise  - Weight loss  - Discontinue smoking if applicable  - Avoid regular NSAID use if applicable  - Avoid regular decongestant use if applicable  - Follow up in clinic in 6 months for a recheck  - Check a basic metabolic panel today if needed    Patient Education: Reviewed risks of hypertension and principles of   Treatment.    HYPERCHOLESTEROLEMIA:     The 10-year ASCVD risk score (Maribel OH Jr, et al., 2013) is: 23.9%    Values used to calculate the score:      Age: 53 years      Sex: Male      Is Non- : No      Diabetic: Yes      Tobacco smoker: Yes      Systolic Blood Pressure: 123 mmHg      Is BP treated: No      HDL Cholesterol: 35 mg/dL      Total Cholesterol: 233 mg/dL    Lab Results   Component Value Date     03/26/2018      Control   unable to assess  Cholesterol   Date Value Ref Range Status   03/26/2018 233 (H) <200 mg/dL Final     Comment:     Desirable:       <200 mg/dl   01/13/2017 252 (H) <200 mg/dL Final     Comment:     Desirable:       <200 mg/dl     HDL Cholesterol   Date Value Ref Range Status   03/26/2018 35 (L) >39 mg/dL Final   01/13/2017 33 (L) >39 mg/dL Final     LDL Cholesterol Calculated   Date Value Ref Range Status   03/26/2018 146 (H) <100 mg/dL Final     Comment:     Above desirable:  100-129 mg/dl  Borderline High:  130-159 mg/dL  High:             160-189 mg/dL  Very high:       >189 mg/dl     01/13/2017 172 (H) <100 mg/dL Final     Comment:     Above desirable:  100-129 mg/dl   Borderline High:  130-159 mg/dL   High:         "     160-189 mg/dL   Very high:       >189 mg/dl       Triglycerides   Date Value Ref Range Status   03/26/2018 262 (H) <150 mg/dL Final     Comment:     Borderline high:  150-199 mg/dl  High:             200-499 mg/dl  Very high:       >499 mg/dl     01/13/2017 234 (H) <150 mg/dL Final     Comment:     Borderline high:  150-199 mg/dl   High:             200-499 mg/dl   Very high:       >499 mg/dl   Fasting specimen       Cholesterol/HDL Ratio   Date Value Ref Range Status   09/03/2015 7.7 (H) 0.0 - 5.0 Final   12/11/2014 5.8 (H) 0.0 - 5.0 Final           The patient is not  fasting today and he  will recheck the fasting lipid panel at a future laboratory appointment.      --------------------------------------------------------------------------------------------------------------------------------------  SUBJECTIVE:  Lan Potts Jr. is a 53 year old male who presents for an evaluation of possible depression and anxiety. The patient reports that his current symptoms include depressed mood, anxiety and diminished interest in activities.  He reports that he gets worked up and nervous and if this lasts too long he will start to lose motivation.    Last PHQ-9 score on record= No Value exists for the : HP#PHQ9    Depression Symptoms Unchanged See comments: See comments:   S-Disruption of Sleep x Falling asleep Staying asleep    I-  Lack of normal Interests  Yes    G- Feelings of Guilt   No   E- Change in Energy level x Increased Decreased   C- Difficulty Concentrating  Poor    A- Change in Appetite x Increased Decreased   P- Psychomotor Symptoms x Agitation Retardation   S- Suicide Ideation   No       The patient reports having these symptoms for approximately 6months. The patient reports that these symptoms have been waxing and waning since that time.     The patient deniesany symptom(s) of cycles of excessive energy, inabilty to sleep, risk taking behaivor (such as driving fast), hypersexuality, spending lots  of money, gambling, or similar behaivors.    Previous history of depression: No  Current thoughts of suicide or homicide:No      The patient reports that he has not attended mental health counseling for his current symptoms.      Social History     Social History     Marital status: Single     Spouse name: N/A     Number of children: N/A     Years of education: N/A     Social History Main Topics     Smoking status: Current Every Day Smoker     Types: Cigarettes     Smokeless tobacco: Never Used     Alcohol use Yes      Comment: occ     Drug use: No     Sexual activity: Yes     Partners: Female     Other Topics Concern     None     Social History Narrative     Patient Active Problem List   Diagnosis     Chronic back pain     Seasonal allergies     Hyperlipidemia with target LDL less than 100     High triglycerides     HDL deficiency     Migraine without status migrainosus, not intractable, unspecified migraine type     Type 2 diabetes mellitus without complication, without long-term current use of insulin (H)     10 year risk of MI or stroke 7.5% or greater, 12.6% in April 2017     Tobacco abuse     Erectile dysfunction, unspecified erectile dysfunction type     Morbid obesity (H)     Current Outpatient Prescriptions   Medication Sig Dispense Refill     ACE/ARB/ARNI NOT PRESCRIBED, INTENTIONAL, Please choose reason not prescribed, below       ASPIRIN NOT PRESCRIBED, INTENTIONAL, Antiplatelet medication not prescribed intentionally due to not indicated at this age 1 each 0     blood glucose (ACCU-CHEK SMARTVIEW) test strip 1 strip by In Vitro route 3 times daily 3 Box 3     blood glucose calibration (ACCU-CHEK SMARTVIEW CONTROL) solution 1 drop by In Vitro route as needed 1 each 3     blood glucose monitoring (ACCU-CHEK FASTCLIX) lancets 1 each 3 times daily 3 Box 3     eletriptan (RELPAX) 40 MG tablet Take 1 tablet (40 mg) by mouth at onset of headache May repeat in 2 hours as needed but no more than 2 pills in 24  hours. 12 tablet 5     fluticasone (FLONASE) 50 MCG/ACT spray Spray 2 sprays into both nostrils daily 16 mL 3     metFORMIN (GLUCOPHAGE) 1000 MG tablet Take 1 tablet (1,000 mg) by mouth 2 times daily (with meals) 180 tablet 1     metFORMIN (GLUCOPHAGE-XR) 750 MG 24 hr tablet TAKE 1 TABLET (750 MG) BY MOUTH 2 TIMES DAILY (WITH MEALS) 180 tablet 0     nicotine (NICODERM CQ) 14 MG/24HR 24 hr patch Place 1 patch onto the skin every 24 hours 30 patch 0     nicotine (NICODERM CQ) 21 MG/24HR 24 hr patch Place 1 patch onto the skin every 24 hours 30 patch 0     sildenafil (REVATIO) 20 MG tablet Take 1 to 5 tabs 30-60 minutes before intercourse.  Never use with nitroglycerin, terazosin or doxazosin. 30 tablet 11     STATIN NOT PRESCRIBED, INTENTIONAL, continuous prn for other Reported on 4/6/2017  0     aspirin 81 MG tablet Take 1 tablet (81 mg) by mouth daily (Patient not taking: Reported on 7/12/2018) 90 tablet 3     ezetimibe (ZETIA) 10 MG tablet TAKE 1 TABLET (10 MG) BY MOUTH DAILY (Patient not taking: Reported on 7/12/2018) 90 tablet 0     fexofenadine (ALLEGRA ALLERGY) 180 MG tablet Take by mouth daily Reported on 4/6/2017       [DISCONTINUED] metFORMIN (GLUCOPHAGE-XR) 500 MG 24 hr tablet Take 2 tablets (1,000 mg) by mouth 2 times daily (with meals) 360 tablet 1           OBJECTIVE:  Affect and mood: Lan's affect is described as normal/appropriate and his emotional attitude was open and cooperative.     Eyes: Eyes grossly normal to inspection and conjunctivae and sclerae normal  Neck: supple without adenopathy or thyromegaly.  Lungs: Clear to auscultation with normal respiratory effort.   Heart: Regular rate and rhythm with normal S1, S2.  No murmurs, clicks, or gallops.  Extremities: No edema.   Neuro: DTR symmetrically normal in lower extremities    ASSESSMENT:  Generalized Anxiety Disorder and possible(sayra) some mild depression symptom(s) but he does not meet criteria for a depressive disorder        PLAN:  We will  use some xanax on a as needed basis.       The patient was recommended to seek individual counseling through his insurance company as an adjunct treatment to the prescribed medications.

## 2018-07-12 NOTE — NURSING NOTE
"Chief Complaint   Patient presents with     Diabetes     Hernia     Health Maintenance     order pended       Initial /78  Pulse 87  Temp 98.4  F (36.9  C) (Oral)  Resp 16  Wt 274 lb (124.3 kg)  SpO2 95%  BMI 38.49 kg/m2 Estimated body mass index is 38.49 kg/(m^2) as calculated from the following:    Height as of 4/5/18: 5' 10.75\" (1.797 m).    Weight as of this encounter: 274 lb (124.3 kg).  Medication Reconciliation: complete  Cait Mejias CMA  "

## 2018-07-12 NOTE — PATIENT INSTRUCTIONS
Start taking a daily 81 mg aspirin     You should call specialty scheduling at [phone] to schedule the surgery appointment with Dr Mesa to discuss the  umbilcal hernia possible(sayra) diastasis recti and varicose veins

## 2018-07-12 NOTE — MR AVS SNAPSHOT
After Visit Summary   7/12/2018    Lan Potts Jr.    MRN: [id number]           Patient Information     Date Of Birth          1965        Visit Information        Provider Department      7/12/2018 5:15 PM Eligio Quevedo MD [facility]        Today's Diagnoses     Type 2 diabetes mellitus without complication, without long-term current use of insulin (H)    -  1    Screening for HIV (human immunodeficiency virus)        Morbid obesity (H)        Screening examination for sexually transmitted disease        Umbilical hernia without obstruction and without gangrene        Varicose veins of left lower extremity        JUAN (generalized anxiety disorder)        Rash          Care Instructions    Start taking a daily 81 mg aspirin     You should call specialty scheduling at [phone] to schedule the surgery appointment with Dr Mesa to discuss the  umbilcal hernia possible(sayra) diastasis recti and varicose veins            Follow-ups after your visit        Additional Services     GENERAL SURG ADULT REFERRAL       Your provider has referred you to: FM[facility]: [facility] [phone]   [email or web address]    Please be aware that coverage of these services is subject to the terms and limitations of your health insurance plan.  Call member services at your health plan with any benefit or coverage questions.      Please bring the following with you to your appointment:    (1) Any X-Rays, CTs or MRIs which have been performed.  Contact the facility where they were done to arrange for  prior to your scheduled appointment.   (2) List of current medications   (3) This referral request   (4) Any documents/labs given to you for this referral                  Future tests that were ordered for you today     Open Future Orders        Priority Expected Expires Ordered    GENERAL SURG ADULT REFERRAL Routine  10/4/2018 7/12/2018             Who to contact     If you have questions or need follow up information about today's clinic visit or your schedule please contact [facility] directly at [phone].  Normal or non-critical lab and imaging results will be communicated to you by MyChart, letter or phone within 4 business days after the clinic has received the results. If you do not hear from us within 7 days, please contact the clinic through [employer] or phone. If you have a critical or abnormal lab result, we will notify you by phone as soon as possible.  Submit refill requests through [employer] or call your pharmacy and they will forward the refill request to us. Please allow 3 business days for your refill to be completed.          Additional Information About Your Visit        [employer] Information     [employer] gives you secure access to your electronic health record. If you see a primary care provider, you can also send messages to your care team and make appointments. If you have questions, please call your primary care clinic.  If you do not have a primary care provider, please call [phone] and they will assist you.        Care EveryWhere ID     This is your Care EveryWhere ID. This could be used by other organizations to access your [place] medical records  [id number]        Your Vitals Were     Pulse Temperature Respirations Pulse Oximetry BMI (Body Mass Index)       87 98.4  F (36.9  C) (Oral) 16 95% 38.49 kg/m2        Blood Pressure from Last 3 Encounters:   07/12/18 123/78   05/01/18 120/74   04/05/18 121/69    Weight from Last 3 Encounters:   07/12/18 274 lb (124.3 kg)   05/01/18 285 lb (129.3 kg)   04/05/18 285 lb (129.3 kg)              We Performed the Following     Chlamydia trachomatis PCR     Hemoglobin A1c     Hepatitis B core antibody IgM     Hepatitis B Surface Antibody     Hepatitis B surface antigen     HIV Screening          Today's Medication Changes          These changes are accurate as of 7/12/18  5:57  PM.  If you have any questions, ask your nurse or doctor.               Start taking these medicines.        Dose/Directions    ALPRAZolam 0.25 MG tablet   Commonly known as:  XANAX   Used for:  JUAN (generalized anxiety disorder)   Started by:  Eligio Quevedo MD        Dose:  0.25 mg   Take 1 tablet (0.25 mg) by mouth 3 times daily as needed for anxiety   Quantity:  30 tablet   Refills:  0       triamcinolone 0.1 % cream   Commonly known as:  KENALOG   Used for:  Rash   Started by:  Eligio Quevedo MD        Apply topically 2 times daily Apply topically twice a day to the affected area Avoid contact with skin of face, armpits and groin   Quantity:  100 g   Refills:  1         These medicines have changed or have updated prescriptions.        Dose/Directions    * metFORMIN 1000 MG tablet   Commonly known as:  GLUCOPHAGE   This may have changed:  Another medication with the same name was removed. Continue taking this medication, and follow the directions you see here.   Used for:  Type 2 diabetes mellitus without complication, without long-term current use of insulin (H)   Changed by:  Eligio Quevedo MD        Dose:  1000 mg   Take 1 tablet (1,000 mg) by mouth 2 times daily (with meals)   Quantity:  180 tablet   Refills:  1       * metFORMIN 750 MG 24 hr tablet   Commonly known as:  GLUCOPHAGE-XR   This may have changed:  Another medication with the same name was removed. Continue taking this medication, and follow the directions you see here.   Used for:  Type 2 diabetes mellitus without complication, without long-term current use of insulin (H)   Changed by:  Eligio Quevedo MD        TAKE 1 TABLET (750 MG) BY MOUTH 2 TIMES DAILY (WITH MEALS)   Quantity:  180 tablet   Refills:  0       * nicotine 21 MG/24HR 24 hr patch   Commonly known as:  NICODERM CQ   This may have changed:  Another medication with the same name was removed. Continue taking this medication, and follow the directions you see here.    Used for:  Tobacco abuse   Changed by:  Eligio Quevedo MD        Dose:  1 patch   Place 1 patch onto the skin every 24 hours   Quantity:  30 patch   Refills:  0       * nicotine 14 MG/24HR 24 hr patch   Commonly known as:  NICODERM CQ   This may have changed:  Another medication with the same name was removed. Continue taking this medication, and follow the directions you see here.   Used for:  Tobacco abuse   Changed by:  Eligio Quevedo MD        Dose:  1 patch   Place 1 patch onto the skin every 24 hours   Quantity:  30 patch   Refills:  0       * Notice:  This list has 4 medication(s) that are the same as other medications prescribed for you. Read the directions carefully, and ask your doctor or other care provider to review them with you.         Where to get your medicines      These medications were sent to [facility]/pharmacy #[id number] - [place], [place] - [address],   [address], [facility] [address]     Phone:  [phone]     triamcinolone 0.1 % cream         Some of these will need a paper prescription and others can be bought over the counter.  Ask your nurse if you have questions.     Bring a paper prescription for each of these medications     ALPRAZolam 0.25 MG tablet                Primary Care Provider Office Phone # Fax #    Eligio Quevedo [phone] [address] [facility] [address]        Equal Access to Services     [facility] AH: Hadii aad ku hadasho Soomaali, waaxda luqadaha, qaybta kaalmada adeegyada, marcel thurston ah. So [facility] [phone].    ATENCIÓN: Si habla español, tiene a cordoba disposición servicios gratuitos de asistencia lingüística. Llame al [phone].    We comply with applicable federal civil rights laws and [place] laws. We do not discriminate on the basis of race, color, national origin, age, disability, sex, sexual orientation, or gender identity.            Thank you!     Thank you for  choosing [facility]  for your care. Our goal is always to provide you with excellent care. Hearing back from our patients is one way we can continue to improve our services. Please take a few minutes to complete the written survey that you may receive in the mail after your visit with us. Thank you!             Your Updated Medication List - Protect others around you: Learn how to safely use, store and throw away your medicines at www.disposemymeds.org.          This list is accurate as of 7/12/18  5:57 PM.  Always use your most recent med list.                   Brand Name Dispense Instructions for use Diagnosis    ACE/ARB/ARNI NOT PRESCRIBED (INTENTIONAL)      Please choose reason not prescribed, below    Type 2 diabetes mellitus without complication, without long-term current use of insulin (H)       ALLEGRA ALLERGY 180 MG tablet   Generic drug:  fexofenadine      Take by mouth daily Reported on 4/6/2017        ALPRAZolam 0.25 MG tablet    XANAX    30 tablet    Take 1 tablet (0.25 mg) by mouth 3 times daily as needed for anxiety    JUAN (generalized anxiety disorder)       aspirin 81 MG tablet     90 tablet    Take 1 tablet (81 mg) by mouth daily    Type 2 diabetes mellitus without complication, without long-term current use of insulin (H)       ASPIRIN NOT PRESCRIBED    INTENTIONAL    1 each    Antiplatelet medication not prescribed intentionally due to not indicated at this age    Type 2 diabetes, HbA1c goal < 7% (H)       blood glucose calibration solution     1 each    1 drop by In Vitro route as needed    Type 2 diabetes, HbA1c goal < 7% (H)       blood glucose monitoring lancets     3 Box    1 each 3 times daily    Type 2 diabetes, HbA1c goal < 7% (H)       blood glucose monitoring test strip    ACCU-CHEK SMARTVIEW    3 Box    1 strip by In Vitro route 3 times daily    Type 2 diabetes, HbA1c goal < 7% (H)       eletriptan 40 MG tablet    RELPAX    12 tablet    Take 1 tablet (40 mg) by mouth at  onset of headache May repeat in 2 hours as needed but no more than 2 pills in 24 hours.    Migraine without status migrainosus, not intractable, unspecified migraine type       ezetimibe 10 MG tablet    ZETIA    90 tablet    TAKE 1 TABLET (10 MG) BY MOUTH DAILY    Type 2 diabetes mellitus without complication, without long-term current use of insulin (H), Hyperlipidemia with target LDL less than 100, 10 year risk of MI or stroke 7.5% or greater       fluticasone 50 MCG/ACT spray    FLONASE    16 mL    Spray 2 sprays into both nostrils daily    Seasonal allergic rhinitis       * metFORMIN 1000 MG tablet    GLUCOPHAGE    180 tablet    Take 1 tablet (1,000 mg) by mouth 2 times daily (with meals)    Type 2 diabetes mellitus without complication, without long-term current use of insulin (H)       * metFORMIN 750 MG 24 hr tablet    GLUCOPHAGE-XR    180 tablet    TAKE 1 TABLET (750 MG) BY MOUTH 2 TIMES DAILY (WITH MEALS)    Type 2 diabetes mellitus without complication, without long-term current use of insulin (H)       * nicotine 21 MG/24HR 24 hr patch    NICODERM CQ    30 patch    Place 1 patch onto the skin every 24 hours    Tobacco abuse       * nicotine 14 MG/24HR 24 hr patch    NICODERM CQ    30 patch    Place 1 patch onto the skin every 24 hours    Tobacco abuse       sildenafil 20 MG tablet    REVATIO    30 tablet    Take 1 to 5 tabs 30-60 minutes before intercourse.  Never use with nitroglycerin, terazosin or doxazosin.    Erectile dysfunction, unspecified erectile dysfunction type       STATIN NOT PRESCRIBED (INTENTIONAL)      continuous prn for other Reported on 4/6/2017        triamcinolone 0.1 % cream    KENALOG    100 g    Apply topically 2 times daily Apply topically twice a day to the affected area Avoid contact with skin of face, armpits and groin    Rash       * Notice:  This list has 4 medication(s) that are the same as other medications prescribed for you. Read the directions carefully, and ask your  doctor or other care provider to review them with you.

## 2018-07-13 NOTE — PROGRESS NOTES
Lan,  I have reviewed the results of the laboratory tests that we recently ordered. All of the laboratory that are back so far are normal or considered normal for you. There are still some labs pending and I will let you know those results when they are available.  Sincerely,   Eligio Quevedo

## 2018-07-15 LAB
C TRACH DNA SPEC QL NAA+PROBE: NEGATIVE
SPECIMEN SOURCE: NORMAL

## 2018-07-17 ENCOUNTER — OFFICE VISIT (OUTPATIENT)
Dept: SURGERY | Facility: CLINIC | Age: 53
End: 2018-07-17
Payer: COMMERCIAL

## 2018-07-17 VITALS
TEMPERATURE: 97.3 F | DIASTOLIC BLOOD PRESSURE: 72 MMHG | SYSTOLIC BLOOD PRESSURE: 138 MMHG | OXYGEN SATURATION: 96 % | HEART RATE: 88 BPM | BODY MASS INDEX: 37.92 KG/M2 | RESPIRATION RATE: 18 BRPM | WEIGHT: 270 LBS

## 2018-07-17 DIAGNOSIS — I83.813 VARICOSE VEINS OF LEG WITH PAIN, BILATERAL: Primary | ICD-10-CM

## 2018-07-17 LAB
HBV CORE IGM SERPL QL IA: NONREACTIVE
HBV SURFACE AB SERPL IA-ACNC: 0 M[IU]/ML
HBV SURFACE AG SERPL QL IA: NONREACTIVE
HIV 1+2 AB+HIV1 P24 AG SERPL QL IA: NONREACTIVE

## 2018-07-17 PROCEDURE — 99244 OFF/OP CNSLTJ NEW/EST MOD 40: CPT | Performed by: SURGERY

## 2018-07-17 ASSESSMENT — PAIN SCALES - GENERAL: PAINLEVEL: MILD PAIN (2)

## 2018-07-17 NOTE — PROGRESS NOTES
Dear Eligio Lea  I was asked to see this patient by Eligio Quevedo for please see below.  I have seen Lan Potts . and as you know his chief complaint is periumbilical hernia but has pain in the epigastric area.    Denies nausea, vomitting, , constipation   Has diarrhea since on metformin  HPI:  Patient is a 53 year old male  with complaints see above  The patient noticed the symptoms about 3 weeks ago.    Patient has not family history of hernia problems  Pushing it back in makes the episode better.  Smoker 1/2 ppd    Review Of Systems  Respiratory: No shortness of breath, dyspnea on exertion, cough, or hemoptysis  Cardiovascular: negative  Gastrointestinal: diarrhea  Endocrine: diabetes  :  negative  /72  Pulse 88  Temp 97.3  F (36.3  C) (Oral)  Resp 18  Wt 122.5 kg (270 lb)  SpO2 96%  BMI 37.92 kg/m2    Past Medical History:   Diagnosis Date     Chronic back pain      Diabetes (H)      Migraine headaches      TINA (obstructive sleep apnea)     mild does not use CPAP at present     Seasonal allergies      Tobacco use disorder        Past Surgical History:   Procedure Laterality Date     ENT SURGERY       GENITOURINARY SURGERY       NO HISTORY OF SURGERY         Social History     Social History     Marital status: Single     Spouse name: N/A     Number of children: N/A     Years of education: N/A     Occupational History     Not on file.     Social History Main Topics     Smoking status: Current Every Day Smoker     Types: Cigarettes     Smokeless tobacco: Never Used     Alcohol use Yes      Comment: occ     Drug use: No     Sexual activity: Yes     Partners: Female     Other Topics Concern     Not on file     Social History Narrative       Current Outpatient Prescriptions   Medication Sig Dispense Refill     ACE/ARB/ARNI NOT PRESCRIBED, INTENTIONAL, Please choose reason not prescribed, below       ASPIRIN NOT PRESCRIBED, INTENTIONAL, Antiplatelet medication not prescribed  intentionally due to not indicated at this age 1 each 0     blood glucose (ACCU-CHEK SMARTVIEW) test strip 1 strip by In Vitro route 3 times daily 3 Box 3     blood glucose calibration (ACCU-CHEK SMARTVIEW CONTROL) solution 1 drop by In Vitro route as needed 1 each 3     blood glucose monitoring (ACCU-CHEK FASTCLIX) lancets 1 each 3 times daily 3 Box 3     eletriptan (RELPAX) 40 MG tablet Take 1 tablet (40 mg) by mouth at onset of headache May repeat in 2 hours as needed but no more than 2 pills in 24 hours. 12 tablet 5     fluticasone (FLONASE) 50 MCG/ACT spray Spray 2 sprays into both nostrils daily 16 mL 3     metFORMIN (GLUCOPHAGE) 1000 MG tablet Take 1 tablet (1,000 mg) by mouth 2 times daily (with meals) 180 tablet 1     sildenafil (REVATIO) 20 MG tablet Take 1 to 5 tabs 30-60 minutes before intercourse.  Never use with nitroglycerin, terazosin or doxazosin. 30 tablet 11     STATIN NOT PRESCRIBED, INTENTIONAL, continuous prn for other Reported on 4/6/2017  0     ALPRAZolam (XANAX) 0.25 MG tablet Take 1 tablet (0.25 mg) by mouth 3 times daily as needed for anxiety (Patient not taking: Reported on 7/17/2018) 30 tablet 0     aspirin 81 MG tablet Take 1 tablet (81 mg) by mouth daily (Patient not taking: Reported on 7/12/2018) 90 tablet 3     ezetimibe (ZETIA) 10 MG tablet TAKE 1 TABLET (10 MG) BY MOUTH DAILY (Patient not taking: Reported on 7/12/2018) 90 tablet 0     fexofenadine (ALLEGRA ALLERGY) 180 MG tablet Take by mouth daily Reported on 4/6/2017       metFORMIN (GLUCOPHAGE-XR) 750 MG 24 hr tablet TAKE 1 TABLET (750 MG) BY MOUTH 2 TIMES DAILY (WITH MEALS) (Patient not taking: Reported on 7/17/2018) 180 tablet 0     nicotine (NICODERM CQ) 14 MG/24HR 24 hr patch Place 1 patch onto the skin every 24 hours (Patient not taking: Reported on 7/17/2018) 30 patch 0     nicotine (NICODERM CQ) 21 MG/24HR 24 hr patch Place 1 patch onto the skin every 24 hours (Patient not taking: Reported on 7/17/2018) 30 patch 0      triamcinolone (KENALOG) 0.1 % cream Apply topically 2 times daily Apply topically twice a day to the affected area Avoid contact with skin of face, armpits and groin (Patient not taking: Reported on 7/17/2018) 100 g 1       10 Point review of systems all others are negative.   Above was reviewed  Physical exam: /72  Pulse 88  Temp 97.3  F (36.3  C) (Oral)  Resp 18  Wt 122.5 kg (270 lb)  SpO2 96%  BMI 37.92 kg/m2   Patient able to get up on table without difficulty.   Patient is alert and orientated.   Head eyes, nose and mouth within normal limits.  No supraclavicular or cervical adenopathy palpated.  Thyroid within normal limits.  No carotid bruits auscultated.  Lungs are clear to auscultation  Heart is regular rate and rhythm with no murmur or thrills noted.  No costal vertebral angle tenderness noted.  Abdomen is abdomen is soft without significant tenderness, masses, organomegaly or guarding  bowel sounds are positive and no caput medusa noted.  Is more tender in the epigastric area  Has an umbilical hernias noted. reducible   Testicles are normal.    No inguinal hernia noted.   Skin was warm and pink  Normal Affect    Easily palpable posterior tibial pulse or dorsalis pedis pulse bilaterally.  Lower extremity edema is mildly more on the right side.     Has large varicose veins on his right leg.  Less on left leg.  Has been wearing the knee high compression stockings and notice that it helps.   Has an umbilical hernia and some pain in the epigastric area that is worse when the abdomen is relaxed not there when tight.    So can do laparoscopic robotic repair and close the fascia more for the diastasis recti but think this pain may be due to the stomach inflamed so will have patient start on prilosec and then get his ultrasound done for the legs and see how he is doing and decide at that time what to do first.   Assessment: Has large varicose veins on his right leg.  Less on left leg.    Has an  umbilical hernia and some pain in the epigastric area that is worse when the abdomen is relaxed not there when tight.    So can do laparoscopic robotic repair and close the fascia more for the diastasis recti but think this pain may be due to the stoamch inflamed so will have patient start on prilosec and then get his ultrasound done for the legs and see how he is doing and decide at that time what to do first.    Plan to do will see what the above shows and how he feels about his epigastric area.  .    Risks of surgery include damage to nerves, bleeding, infection, damage to  Vessels, recurrence.  Although mesh is a better long term repair if it gets infected it must be removed.   If the patient has any bacterial infection the week before and is seen by their doctor and started on antibiotics, I can probably still do the surgery if they are vastly improved by the time of surgery, but if the infection starts closer to the surgery date it will be better to cancel and reschedule to a later date.  A cough will also be hard on the repair and uncomfortable post operative.  If the patient is a smoker I did discuss increase risk of recurrence and more pain with the cough.  If the patient is willing to quit smoking would encourage to do so and start at least a week before surgery.  However, if patient is not going to quit then must understand that his repair is more likely to fail.    Risks of surgery discussed including, but not limited to bleeding, infection, recurrence, damage to nerves and what is in the hernia sac.  Risks of anesthesia also discussed.    Discussed massaging hernia back in and using ice if becomes more painful.  If not able to reduce then go to emergency room.  Also discussed hernia belt to use until able to get in for surgery.    Please wear compression stockings and see if they help your discomfort.  Then when you come back to see me let me know.  You will need to bring these with you for  sclerotherapy or for your post op visit after surgery.  I would suggest going to either the [employer] medical supply store next to [facility] or to [employer] ([phone]) on [address] and [place].  They will need to measure your legs to get the right fit.  If you go somewhere else and they do not measure your legs do not get them there.  It is important that you get the right size.  Please allow several days after you are measured to get your stockings.  They may not have your size in stock and will have to order them.    Please wear your compression stocking as some insurance companies will want to wear them for at least 3 months before they even consider paying for your surgery or sclerotherapy.    CALL [phone]  to get the utrasound set up.        You must follow up with me in the clinic to go over your utrasound results.  I will not be calling you with the results.   It is very difficult to do this over  the phone.  I am not able to show you while looking at your leg what the next step should be.      Time spent with the patient with greater that 50% of the time in discussion was 45 minutes.  In discussing the hernia and the veins and the epigastric pain.      Juan Carlos Mesa MD

## 2018-07-17 NOTE — MR AVS SNAPSHOT
After Visit Summary   7/17/2018    Lan Potts Jr.    MRN: [id number]           Patient Information     Date Of Birth          1965        Visit Information        Provider Department      7/17/2018 11:30 AM Juan Carlos Mesa MD [facility]        Today's Diagnoses     Varicose veins of leg with pain, bilateral    -  1      Care Instructions    Has large varicose veins on his right leg.  Less on left leg.    Has an umbilical hernia and some pain in the epigastric area that is worse when the abdomen is relaxed not there when tight.    So can do laparoscopic robotic repair and close the fascia more for the diastasis recti but think this pain may be due to the stoamch inflamed so will have patient start on prilosec and then get his ultrasound done for the legs and see how he is doing and decide at that time what to do first.   Assessment: Has large varicose veins on his right leg.  Less on left leg.    Has an umbilical hernia and some pain in the epigastric area that is worse when the abdomen is relaxed not there when tight.    So can do laparoscopic robotic repair and close the fascia more for the diastasis recti but think this pain may be due to the stoamch inflamed so will have patient start on prilosec and then get his ultrasound done for the legs and see how he is doing and decide at that time what to do first.    Plan to do will see what the above shows and how he feels about his epigastric area.  .    Risks of surgery include damage to nerves, bleeding, infection, damage to  Vessels, recurrence.  Although mesh is a better long term repair if it gets infected it must be removed.   If the patient has any bacterial infection the week before and is seen by their doctor and started on antibiotics, I can probably still do the surgery if they are vastly improved by the time of surgery, but if the infection starts closer to the surgery date it will be better to cancel and  reschedule to a later date.  A cough will also be hard on the repair and uncomfortable post operative.  If the patient is a smoker I did discuss increase risk of recurrence and more pain with the cough.  If the patient is willing to quit smoking would encourage to do so and start at least a week before surgery.  However, if patient is not going to quit then must understand that his repair is more likely to fail.    Risks of surgery discussed including, but not limited to bleeding, infection, recurrence, damage to nerves and what is in the hernia sac.  Risks of anesthesia also discussed.    Discussed massaging hernia back in and using ice if becomes more painful.  If not able to reduce then go to emergency room.  Also discussed hernia belt to use until able to get in for surgery.    Please wear compression stockings and see if they help your discomfort.  Then when you come back to see me let me know.  You will need to bring these with you for sclerotherapy or for your post op visit after surgery.  I would suggest going to either the [employer] medical supply store next to [facility] or to [employer] [phone]) on [address] [address] and [place].  They will need to measure your legs to get the right fit.  If you go somewhere else and they do not measure your legs do not get them there.  It is important that you get the right size.  Please allow several days after you are measured to get your stockings.  They may not have your size in stock and will have to order them.    Please wear your compression stocking as some insurance companies will want to wear them for at least 3 months before they even consider paying for your surgery or sclerotherapy.    CALL [phone]  to get the utrasound set up.        You must follow up with me in the clinic to go over your utrasound results.  I will not be calling you with the results.   It is very difficult to do this over  the phone.  I am not able to  show you while looking at your leg what the next step should be.      HERNIORRHAPHY DISCHARGE INSTRUCTIONS  DR. TRESA MENDEZ    1. You may resume your regular diet when you feel you are ready to. DO NOT drink alcoholic beverages for 24 hours or while you are taking prescription medication.    2. Limit your activities for the first 48 hours. Gradually, increase them as tolerated. You may use stairs. I encourage you to walk as tolerated. No lifting greater that 20 pounds for 3 weeks.    3. You will have some discomfort at the incision sites. This is expected. This should improve over the next 2-3 days. Ice and pain medication will help with this pain. Use prescribed pain medication as instructed.    4. Bruising and mild swelling is normal after surgery. For males it is common to have bruising going into the penis and scrotum. The area below and around the incision(s) will be hard and elevated. This is normal. I call it the healing ridge. This will resolve slowly over the next several months. If you feel the pain is increasing and cannot explain it by increasing activity please call us at [phone].    5. The dressing will often have some blood on it. You may shower 24 hours after surgery. Clean gently over incision site. If clear plastic covering or steri-strip comes off and there is still some bleeding or drainage then cover with gauze or band-aid. If no bleeding, there is no reason to cover site. The abdominal binder may be removed after 24 hours after surgery. You may continue to wear it however for comfort. I suggest  you wear an old t-shirt under the abdominal binder for a more comfortable wear.    6. Avoid Aspirin for the first 72 hours after the procedure. This medication may increase the tendency to bleed.    7. Use the following medications (in addition to your normal meds) as shown:  a. Percocet 5 mg 1-2 every 6 hours as needed for severe pain. This contains 325 mg of Tylenol (acetaminophen) per  tablet.  Please do not take more than 4 grams of Tylenol (acetaminophen) per day. For example, you may take 1 Percocet and 1 Tylenol, or 2 Percocet and no Tylenol, or 2 Tylenol and no Percocet every 6 hours.  b. Tylenol (acetaminophen) 500 mg every 6 hours as needed for mild pain. Do not take more than 1000 mg every 6 hours. (see above).  c. Motrin (ibuprofen) 200-800 mg every 6 hours as needed for mild to moderate pain. Take with food.     8. Notify Dr. Mesa's clinic at [phone] if:    Your discomfort is not relieved by your pain medication.    You have signs of infection such as temperature above 100.5 degrees orally, chills, or increasing daily discomfort.    Incision site is becoming more red and/or there is purulent drainage.    You have questions or concerns.    9. Please call [phone] to schedule a follow up appointment in about 2 weeks.    10. When taking narcotics (pain medication more than Tylenol [acetaminophen] and Motrin [ibuprofen]) it is important to keep your stools soft to avoid constipation and pain with straining. This is best done by drinking fluids (non-alcoholic and non-caffeinated) and taking a stool softener (i.e. Metamucil or milk of magnesia). You may be able to use non-narcotics for pain relief especially by the 3rd post- operative day. Tylenol (acetaminophen) 500 mg every 6 hours and/or Motrin (ibuprofen) 200-800 mg every 6 hours. Please do not take more than 4 grams of Tylenol (acetaminophen) per day. Remember your Percocet does have Tylenol (acetaminophen) already in it. Please take Motrin (ibuprofen) with food to help protect the stomach. If you have a history of stomach ulcers or stomach problems, do not take Motrin (ibuprofen).     11. Do not drive or operate heavy machinery for 24 hours after surgery or when taking narcotics. You may resume driving when feel that you can safely avoid an accident and are not taking narcotics. This is usually 5 to 7 days after  surgery. You should not be alone for 24 hours after surgery.    12. Have milk of magnesia available at home so that when you take the pain medications you take 1-2 teaspoons a day,  to help reduce problems with constipation.                Follow-ups after your visit        Future tests that were ordered for you today     Open Future Orders        Priority Expected Expires Ordered    US Venous Competency Bilateral Routine  8/31/2018 7/17/2018            Who to contact     If you have questions or need follow up information about today's clinic visit or your schedule please contact [facility] directly at [phone].  Normal or non-critical lab and imaging results will be communicated to you by [employer], letter or phone within 4 business days after the clinic has received the results. If you do not hear from us within 7 days, please contact the clinic through [employer] or phone. If you have a critical or abnormal lab result, we will notify you by phone as soon as possible.  Submit refill requests through [employer] or call your pharmacy and they will forward the refill request to us. Please allow 3 business days for your refill to be completed.          Additional Information About Your Visit        MyChart Information     [employer] gives you secure access to your electronic health record. If you see a primary care provider, you can also send messages to your care team and make appointments. If you have questions, please call your primary care clinic.  If you do not have a primary care provider, please call [phone] and they will assist you.        Care EveryWhere ID     This is your Care EveryWhere ID. This could be used by other organizations to access your [place] medical records  [id number]        Your Vitals Were     Pulse Temperature Respirations Pulse Oximetry BMI (Body Mass Index)       88 97.3  F (36.3  C) (Oral) 18 96% 37.92 kg/m2        Blood Pressure from Last 3 Encounters:   07/17/18 138/72    07/12/18 123/78   05/01/18 120/74    Weight from Last 3 Encounters:   07/17/18 122.5 kg (270 lb)   07/12/18 124.3 kg (274 lb)   05/01/18 129.3 kg (285 lb)                 Today's Medication Changes          These changes are accurate as of 7/17/18 12:06 PM.  If you have any questions, ask your nurse or doctor.               Start taking these medicines.        Dose/Directions    omeprazole 20 MG CR capsule   Commonly known as:  priLOSEC   Used for:  Varicose veins of leg with pain, bilateral   Started by:  Juan Carlos Mesa MD        Dose:  20 mg   Take 1 capsule (20 mg) by mouth daily   Quantity:  30 capsule   Refills:  11       order for DME   Used for:  Varicose veins of leg with pain, bilateral   Started by:  Juan Carlos Mesa MD        15-20 mm mercury thigh high compression stockings 1-2 pairs   Quantity:  2 Package   Refills:  5            Where to get your medicines      These medications were sent to [facility]/pharmacy #[id number] - [place], [place] - [address]   [address], , [facility] [address]     Phone:  [phone]     omeprazole 20 MG CR capsule         Some of these will need a paper prescription and others can be bought over the counter.  Ask your nurse if you have questions.     Bring a paper prescription for each of these medications     order for DME                Primary Care Provider Office Phone # Fax #    Eligio Quevedo [phone] [address] [facility] [address]        Equal Access to Services     TSERING JONES AH: Hadii aad ku hadasho Soomaali, waaxda luqadaha, qaybta kaalmada adeegyada, marcel dodson. So [facility] [place] [phone].    ATENCIÓN: Si habla español, tiene a cordoba disposición servicios gratuitos de asistencia lingüística. Llame al [phone].    We comply with applicable federal civil rights laws and [place] laws. We do not discriminate on the basis of race, color, national origin, age,  disability, sex, sexual orientation, or gender identity.            Thank you!     [facility]  [facility]. Our goal is always to provide you with excellent care. Hearing back from our patients is one way we can continue to improve our services. Please take a few minutes to complete the written survey that you may receive in the mail after your visit with us. Thank you!             Your Updated Medication List - Protect others around you: Learn how to safely use, store and throw away your medicines at www.disposemymeds.org.          This list is accurate as of 7/17/18 12:06 PM.  Always use your most recent med list.                   Brand Name Dispense Instructions for use Diagnosis    ACE/ARB/ARNI NOT PRESCRIBED (INTENTIONAL)      Please choose reason not prescribed, below    Type 2 diabetes mellitus without complication, without long-term current use of insulin (H)       ALLEGRA ALLERGY 180 MG tablet   Generic drug:  fexofenadine      Take by mouth daily Reported on 4/6/2017        ALPRAZolam 0.25 MG tablet    XANAX    30 tablet    Take 1 tablet (0.25 mg) by mouth 3 times daily as needed for anxiety    JUAN (generalized anxiety disorder)       aspirin 81 MG tablet     90 tablet    Take 1 tablet (81 mg) by mouth daily    Type 2 diabetes mellitus without complication, without long-term current use of insulin (H)       ASPIRIN NOT PRESCRIBED    INTENTIONAL    1 each    Antiplatelet medication not prescribed intentionally due to not indicated at this age    Type 2 diabetes, HbA1c goal < 7% (H)       blood glucose calibration solution     1 each    1 drop by In Vitro route as needed    Type 2 diabetes, HbA1c goal < 7% (H)       blood glucose monitoring lancets     3 Box    1 each 3 times daily    Type 2 diabetes, HbA1c goal < 7% (H)       blood glucose monitoring test strip    ACCU-CHEK SMARTVIEW    3 Box    1 strip by In Vitro route 3 times daily    Type 2 diabetes, HbA1c goal <  7% (H)       eletriptan 40 MG tablet    RELPAX    12 tablet    Take 1 tablet (40 mg) by mouth at onset of headache May repeat in 2 hours as needed but no more than 2 pills in 24 hours.    Migraine without status migrainosus, not intractable, unspecified migraine type       ezetimibe 10 MG tablet    ZETIA    90 tablet    TAKE 1 TABLET (10 MG) BY MOUTH DAILY    Type 2 diabetes mellitus without complication, without long-term current use of insulin (H), Hyperlipidemia with target LDL less than 100, 10 year risk of MI or stroke 7.5% or greater       fluticasone 50 MCG/ACT spray    FLONASE    16 mL    Spray 2 sprays into both nostrils daily    Seasonal allergic rhinitis       * metFORMIN 1000 MG tablet    GLUCOPHAGE    180 tablet    Take 1 tablet (1,000 mg) by mouth 2 times daily (with meals)    Type 2 diabetes mellitus without complication, without long-term current use of insulin (H)       * metFORMIN 750 MG 24 hr tablet    GLUCOPHAGE-XR    180 tablet    TAKE 1 TABLET (750 MG) BY MOUTH 2 TIMES DAILY (WITH MEALS)    Type 2 diabetes mellitus without complication, without long-term current use of insulin (H)       * nicotine 21 MG/24HR 24 hr patch    NICODERM CQ    30 patch    Place 1 patch onto the skin every 24 hours    Tobacco abuse       * nicotine 14 MG/24HR 24 hr patch    NICODERM CQ    30 patch    Place 1 patch onto the skin every 24 hours    Tobacco abuse       omeprazole 20 MG CR capsule    priLOSEC    30 capsule    Take 1 capsule (20 mg) by mouth daily    Varicose veins of leg with pain, bilateral       order for DME     2 Package    15-20 mm mercury thigh high compression stockings 1-2 pairs    Varicose veins of leg with pain, bilateral       sildenafil 20 MG tablet    REVATIO    30 tablet    Take 1 to 5 tabs 30-60 minutes before intercourse.  Never use with nitroglycerin, terazosin or doxazosin.    Erectile dysfunction, unspecified erectile dysfunction type       STATIN NOT PRESCRIBED (INTENTIONAL)       continuous prn for other Reported on 4/6/2017        triamcinolone 0.1 % cream    KENALOG    100 g    Apply topically 2 times daily Apply topically twice a day to the affected area Avoid contact with skin of face, armpits and groin    Rash       * Notice:  This list has 4 medication(s) that are the same as other medications prescribed for you. Read the directions carefully, and ask your doctor or other care provider to review them with you.

## 2018-07-17 NOTE — PROGRESS NOTES
Lan,  I have reviewed the results of the laboratory tests that we recently ordered. All of the lab work performed was normal or considered normal for you.  Sincerely,   Eligio Quevedo

## 2018-07-17 NOTE — LETTER
7/17/2018         RE: Lan Potts Jr.  [address] [facility]  [place] [address]        Dear Colleague,    Thank you for referring your patient, Lan Potts Jr., to the [facility]. Please see a copy of my visit note below.    Dear Eligio Lea  I was asked to see this patient by Eligio Quevedo for please see below.  I have seen Lan Potts Jr. and as you know his chief complaint is periumbilical hernia but has pain in the epigastric area.    Denies nausea, vomitting, , constipation   Has diarrhea since on metformin  HPI:  Patient is a 53 year old male  with complaints see above  The patient noticed the symptoms about 3 weeks ago.    Patient has not family history of hernia problems  Pushing it back in makes the episode better.  Smoker 1/2 ppd    Review Of Systems  Respiratory: No shortness of breath, dyspnea on exertion, cough, or hemoptysis  Cardiovascular: negative  Gastrointestinal: diarrhea  Endocrine: diabetes  :  negative  /72  Pulse 88  Temp 97.3  F (36.3  C) (Oral)  Resp 18  Wt 122.5 kg (270 lb)  SpO2 96%  BMI 37.92 kg/m2    Past Medical History:   Diagnosis Date     Chronic back pain      Diabetes (H)      Migraine headaches      TINA (obstructive sleep apnea)     mild does not use CPAP at present     Seasonal allergies      Tobacco use disorder        Past Surgical History:   Procedure Laterality Date     ENT SURGERY       GENITOURINARY SURGERY       NO HISTORY OF SURGERY         Social History     Social History     Marital status: Single     Spouse name: N/A     Number of children: N/A     Years of education: N/A     Occupational History     Not on file.     Social History Main Topics     Smoking status: Current Every Day Smoker     Types: Cigarettes     Smokeless tobacco: Never Used     Alcohol use Yes      Comment: occ     Drug use: No     Sexual activity: Yes     Partners: Female     Other Topics Concern     Not on file     Social History  Narrative       Current Outpatient Prescriptions   Medication Sig Dispense Refill     ACE/ARB/ARNI NOT PRESCRIBED, INTENTIONAL, Please choose reason not prescribed, below       ASPIRIN NOT PRESCRIBED, INTENTIONAL, Antiplatelet medication not prescribed intentionally due to not indicated at this age 1 each 0     blood glucose (ACCU-CHEK SMARTVIEW) test strip 1 strip by In Vitro route 3 times daily 3 Box 3     blood glucose calibration (ACCU-CHEK SMARTVIEW CONTROL) solution 1 drop by In Vitro route as needed 1 each 3     blood glucose monitoring (ACCU-CHEK FASTCLIX) lancets 1 each 3 times daily 3 Box 3     eletriptan (RELPAX) 40 MG tablet Take 1 tablet (40 mg) by mouth at onset of headache May repeat in 2 hours as needed but no more than 2 pills in 24 hours. 12 tablet 5     fluticasone (FLONASE) 50 MCG/ACT spray Spray 2 sprays into both nostrils daily 16 mL 3     metFORMIN (GLUCOPHAGE) 1000 MG tablet Take 1 tablet (1,000 mg) by mouth 2 times daily (with meals) 180 tablet 1     sildenafil (REVATIO) 20 MG tablet Take 1 to 5 tabs 30-60 minutes before intercourse.  Never use with nitroglycerin, terazosin or doxazosin. 30 tablet 11     STATIN NOT PRESCRIBED, INTENTIONAL, continuous prn for other Reported on 4/6/2017  0     ALPRAZolam (XANAX) 0.25 MG tablet Take 1 tablet (0.25 mg) by mouth 3 times daily as needed for anxiety (Patient not taking: Reported on 7/17/2018) 30 tablet 0     aspirin 81 MG tablet Take 1 tablet (81 mg) by mouth daily (Patient not taking: Reported on 7/12/2018) 90 tablet 3     ezetimibe (ZETIA) 10 MG tablet TAKE 1 TABLET (10 MG) BY MOUTH DAILY (Patient not taking: Reported on 7/12/2018) 90 tablet 0     fexofenadine (ALLEGRA ALLERGY) 180 MG tablet Take by mouth daily Reported on 4/6/2017       metFORMIN (GLUCOPHAGE-XR) 750 MG 24 hr tablet TAKE 1 TABLET (750 MG) BY MOUTH 2 TIMES DAILY (WITH MEALS) (Patient not taking: Reported on 7/17/2018) 180 tablet 0     nicotine (NICODERM CQ) 14 MG/24HR 24 hr patch  Place 1 patch onto the skin every 24 hours (Patient not taking: Reported on 7/17/2018) 30 patch 0     nicotine (NICODERM CQ) 21 MG/24HR 24 hr patch Place 1 patch onto the skin every 24 hours (Patient not taking: Reported on 7/17/2018) 30 patch 0     triamcinolone (KENALOG) 0.1 % cream Apply topically 2 times daily Apply topically twice a day to the affected area Avoid contact with skin of face, armpits and groin (Patient not taking: Reported on 7/17/2018) 100 g 1       10 Point review of systems all others are negative.   Above was reviewed  Physical exam: /72  Pulse 88  Temp 97.3  F (36.3  C) (Oral)  Resp 18  Wt 122.5 kg (270 lb)  SpO2 96%  BMI 37.92 kg/m2   Patient able to get up on table without difficulty.   Patient is alert and orientated.   Head eyes, nose and mouth within normal limits.  No supraclavicular or cervical adenopathy palpated.  Thyroid within normal limits.  No carotid bruits auscultated.  Lungs are clear to auscultation  Heart is regular rate and rhythm with no murmur or thrills noted.  No costal vertebral angle tenderness noted.  Abdomen is abdomen is soft without significant tenderness, masses, organomegaly or guarding  bowel sounds are positive and no caput medusa noted.  Is more tender in the epigastric area  Has an umbilical hernias noted. reducible   Testicles are normal.    No inguinal hernia noted.   Skin was warm and pink  Normal Affect    Easily palpable posterior tibial pulse or dorsalis pedis pulse bilaterally.  Lower extremity edema is mildly more on the right side.     Has large varicose veins on his right leg.  Less on left leg.  Has been wearing the knee high compression stockings and notice that it helps.   Has an umbilical hernia and some pain in the epigastric area that is worse when the abdomen is relaxed not there when tight.    So can do laparoscopic robotic repair and close the fascia more for the diastasis recti but think this pain may be due to the stomach  inflamed so will have patient start on prilosec and then get his ultrasound done for the legs and see how he is doing and decide at that time what to do first.   Assessment: Has large varicose veins on his right leg.  Less on left leg.    Has an umbilical hernia and some pain in the epigastric area that is worse when the abdomen is relaxed not there when tight.    So can do laparoscopic robotic repair and close the fascia more for the diastasis recti but think this pain may be due to the stoamch inflamed so will have patient start on prilosec and then get his ultrasound done for the legs and see how he is doing and decide at that time what to do first.    Plan to do will see what the above shows and how he feels about his epigastric area.  .    Risks of surgery include damage to nerves, bleeding, infection, damage to  Vessels, recurrence.  Although mesh is a better long term repair if it gets infected it must be removed.   If the patient has any bacterial infection the week before and is seen by their doctor and started on antibiotics, I can probably still do the surgery if they are vastly improved by the time of surgery, but if the infection starts closer to the surgery date it will be better to cancel and reschedule to a later date.  A cough will also be hard on the repair and uncomfortable post operative.  If the patient is a smoker I did discuss increase risk of recurrence and more pain with the cough.  If the patient is willing to quit smoking would encourage to do so and start at least a week before surgery.  However, if patient is not going to quit then must understand that his repair is more likely to fail.    Risks of surgery discussed including, but not limited to bleeding, infection, recurrence, damage to nerves and what is in the hernia sac.  Risks of anesthesia also discussed.    Discussed massaging hernia back in and using ice if becomes more painful.  If not able to reduce then go to emergency room.   Also discussed hernia belt to use until able to get in for surgery.    Please wear compression stockings and see if they help your discomfort.  Then when you come back to see me let me know.  You will need to bring these with you for sclerotherapy or for your post op visit after surgery.  I would suggest going to either the [employer] medical supply store next to [facility] or to [facility] ([phone]) on [address].  They will need to measure your legs to get the right fit.  If you go somewhere else and they do not measure your legs do not get them there.  It is important that you get the right size.  Please allow several days after you are measured to get your stockings.  They may not have your size in stock and will have to order them.    Please wear your compression stocking as some insurance companies will want to wear them for at least 3 months before they even consider paying for your surgery or sclerotherapy.    CALL [phone]  to get the utrasound set up.        You must follow up with me in the clinic to go over your utrasound results.  I will not be calling you with the results.   It is very difficult to do this over  the phone.  I am not able to show you while looking at your leg what the next step should be.      Time spent with the patient with greater that 50% of the time in discussion was 45 minutes.  In discussing the hernia and the veins and the epigastric pain.      Juan Carlos Mesa MD        Again, thank you for allowing me to participate in the care of your patient.        Sincerely,        Juan Carlos Mesa MD

## 2018-07-17 NOTE — PATIENT INSTRUCTIONS
Has large varicose veins on his right leg.  Less on left leg.    Has an umbilical hernia and some pain in the epigastric area that is worse when the abdomen is relaxed not there when tight.    So can do laparoscopic robotic repair and close the fascia more for the diastasis recti but think this pain may be due to the stoamch inflamed so will have patient start on prilosec and then get his ultrasound done for the legs and see how he is doing and decide at that time what to do first.   Assessment: Has large varicose veins on his right leg.  Less on left leg.    Has an umbilical hernia and some pain in the epigastric area that is worse when the abdomen is relaxed not there when tight.    So can do laparoscopic robotic repair and close the fascia more for the diastasis recti but think this pain may be due to the stoamch inflamed so will have patient start on prilosec and then get his ultrasound done for the legs and see how he is doing and decide at that time what to do first.    Plan to do will see what the above shows and how he feels about his epigastric area.  .    Risks of surgery include damage to nerves, bleeding, infection, damage to  Vessels, recurrence.  Although mesh is a better long term repair if it gets infected it must be removed.   If the patient has any bacterial infection the week before and is seen by their doctor and started on antibiotics, I can probably still do the surgery if they are vastly improved by the time of surgery, but if the infection starts closer to the surgery date it will be better to cancel and reschedule to a later date.  A cough will also be hard on the repair and uncomfortable post operative.  If the patient is a smoker I did discuss increase risk of recurrence and more pain with the cough.  If the patient is willing to quit smoking would encourage to do so and start at least a week before surgery.  However, if patient is not going to quit then must understand that his repair is  more likely to fail.    Risks of surgery discussed including, but not limited to bleeding, infection, recurrence, damage to nerves and what is in the hernia sac.  Risks of anesthesia also discussed.    Discussed massaging hernia back in and using ice if becomes more painful.  If not able to reduce then go to emergency room.  Also discussed hernia belt to use until able to get in for surgery.    Please wear compression stockings and see if they help your discomfort.  Then when you come back to see me let me know.  You will need to bring these with you for sclerotherapy or for your post op visit after surgery.  I would suggest going to either the [employer] medical supply store next to [facility] or to [employer] ([phone]) on [address].  They will need to measure your legs to get the right fit.  If you go somewhere else and they do not measure your legs do not get them there.  It is important that you get the right size.  Please allow several days after you are measured to get your stockings.  They may not have your size in stock and will have to order them.    Please wear your compression stocking as some insurance companies will want to wear them for at least 3 months before they even consider paying for your surgery or sclerotherapy.    CALL [phone]  to get the utrasound set up.        You must follow up with me in the clinic to go over your utrasound results.  I will not be calling you with the results.   It is very difficult to do this over  the phone.  I am not able to show you while looking at your leg what the next step should be.      HERNIORRHAPHY DISCHARGE INSTRUCTIONS  DR. TRESA MENDEZ    1. You may resume your regular diet when you feel you are ready to. DO NOT drink alcoholic beverages for 24 hours or while you are taking prescription medication.    2. Limit your activities for the first 48 hours. Gradually, increase them as tolerated. You may use  stairs. I encourage you to walk as tolerated. No lifting greater that 20 pounds for 3 weeks.    3. You will have some discomfort at the incision sites. This is expected. This should improve over the next 2-3 days. Ice and pain medication will help with this pain. Use prescribed pain medication as instructed.    4. Bruising and mild swelling is normal after surgery. For males it is common to have bruising going into the penis and scrotum. The area below and around the incision(s) will be hard and elevated. This is normal. I call it the healing ridge. This will resolve slowly over the next several months. If you feel the pain is increasing and cannot explain it by increasing activity please call us at [phone].    5. The dressing will often have some blood on it. You may shower 24 hours after surgery. Clean gently over incision site. If clear plastic covering or steri-strip comes off and there is still some bleeding or drainage then cover with gauze or band-aid. If no bleeding, there is no reason to cover site. The abdominal binder may be removed after 24 hours after surgery. You may continue to wear it however for comfort. I suggest  you wear an old t-shirt under the abdominal binder for a more comfortable wear.    6. Avoid Aspirin for the first 72 hours after the procedure. This medication may increase the tendency to bleed.    7. Use the following medications (in addition to your normal meds) as shown:  a. Percocet 5 mg 1-2 every 6 hours as needed for severe pain. This contains 325 mg of Tylenol (acetaminophen) per tablet.  Please do not take more than 4 grams of Tylenol (acetaminophen) per day. For example, you may take 1 Percocet and 1 Tylenol, or 2 Percocet and no Tylenol, or 2 Tylenol and no Percocet every 6 hours.  b. Tylenol (acetaminophen) 500 mg every 6 hours as needed for mild pain. Do not take more than 1000 mg every 6 hours. (see above).  c. Motrin (ibuprofen) 200-800 mg every 6 hours as needed for  mild to moderate pain. Take with food.     8. Notify Dr. Mesa's clinic at [phone] if:    Your discomfort is not relieved by your pain medication.    You have signs of infection such as temperature above 100.5 degrees orally, chills, or increasing daily discomfort.    Incision site is becoming more red and/or there is purulent drainage.    You have questions or concerns.    9. Please call [phone] to schedule a follow up appointment in about 2 weeks.    10. When taking narcotics (pain medication more than Tylenol [acetaminophen] and Motrin [ibuprofen]) it is important to keep your stools soft to avoid constipation and pain with straining. This is best done by drinking fluids (non-alcoholic and non-caffeinated) and taking a stool softener (i.e. Metamucil or milk of magnesia). You may be able to use non-narcotics for pain relief especially by the 3rd post- operative day. Tylenol (acetaminophen) 500 mg every 6 hours and/or Motrin (ibuprofen) 200-800 mg every 6 hours. Please do not take more than 4 grams of Tylenol (acetaminophen) per day. Remember your Percocet does have Tylenol (acetaminophen) already in it. Please take Motrin (ibuprofen) with food to help protect the stomach. If you have a history of stomach ulcers or stomach problems, do not take Motrin (ibuprofen).     11. Do not drive or operate heavy machinery for 24 hours after surgery or when taking narcotics. You may resume driving when feel that you can safely avoid an accident and are not taking narcotics. This is usually 5 to 7 days after surgery. You should not be alone for 24 hours after surgery.    12. Have milk of magnesia available at home so that when you take the pain medications you take 1-2 teaspoons a day,  to help reduce problems with constipation.

## 2018-07-23 ENCOUNTER — RADIANT APPOINTMENT (OUTPATIENT)
Dept: ULTRASOUND IMAGING | Facility: CLINIC | Age: 53
End: 2018-07-23
Attending: SURGERY
Payer: COMMERCIAL

## 2018-07-23 DIAGNOSIS — I83.813 VARICOSE VEINS OF LEG WITH PAIN, BILATERAL: ICD-10-CM

## 2018-07-23 PROCEDURE — 93970 EXTREMITY STUDY: CPT | Performed by: INTERNAL MEDICINE

## 2018-07-31 ENCOUNTER — OFFICE VISIT (OUTPATIENT)
Dept: SURGERY | Facility: CLINIC | Age: 53
End: 2018-07-31
Payer: COMMERCIAL

## 2018-07-31 VITALS
WEIGHT: 273 LBS | HEART RATE: 90 BPM | HEIGHT: 71 IN | SYSTOLIC BLOOD PRESSURE: 124 MMHG | DIASTOLIC BLOOD PRESSURE: 80 MMHG | BODY MASS INDEX: 38.22 KG/M2

## 2018-07-31 DIAGNOSIS — I83.813 VARICOSE VEINS OF LEG WITH PAIN, BILATERAL: Primary | ICD-10-CM

## 2018-07-31 PROCEDURE — 99215 OFFICE O/P EST HI 40 MIN: CPT | Performed by: SURGERY

## 2018-07-31 NOTE — PROGRESS NOTES
Patient is here to follow up for both the epigastric pain that is not better with Prilosec.   Patient is convinced it is due to the diastasis recti.  We can close the fascia more in this are. But usually place mesh in the area.   On exam do not feel a defect of the abdomen wall in the upper abdomen but can feel that the rectus muscle and fascia does not give.  Patient is now interested in doing just the umbilicus hernia and doing it open.     Risks of surgery include damage to nerves, bleeding, infection, damage to  Vessels, recurrence.  Although mesh is a better long term repair if it gets infected it must be removed.   If the patient has any bacterial infection the week before and is seen by their doctor and started on antibiotics, I can probably still do the surgery if they are vastly improved by the time of surgery, but if the infection starts closer to the surgery date it will be better to cancel and reschedule to a later date.  A cough will also be hard on the repair and uncomfortable post operative.  If the patient is a smoker I did discuss increase risk of recurrence and more pain with the cough.  If the patient is willing to quit smoking would encourage to do so and start at least a week before surgery.  However, if patient is not going to quit then must understand that his repair is more likely to fail.     Risks of surgery discussed including, but not limited to bleeding, infection, recurrence, damage to nerves and what is in the hernia sac.  Risks of anesthesia also discussed.     Discussed massaging hernia back in and using ice if becomes more painful.  If not able to reduce then go to emergency room.  Also discussed hernia belt to use until able to get in for surgery.  Patient does lift up to 90 pounds at work.  And may need to be off work or at least not lifting for 6 weeks.     He wants to get the legs done first.   Patient has been wearing knee high compression stockings and that helps has not  bought thigh high ones yet.          varicose veins.    Right leg:  Posterior accessory saphenous (PASV): Competent  Great saphenous vein (GSV)      sapheno-femoral junction: Incompetent      prox thigh: Incompetent      mid thigh: Incompetent      dist thigh: Incompetent       knee: Incompetent      prox calf: Competent      mid calf: Competent      ankle: Competent  Vein of Giacomini (V of G): Competent  Small saphenous vein:      sapheno-popliteal junction: Competent      prox calf: Competent      mid calf: Incompetent  Will do right greater saphenous vein ligation and stripping and removal of secondary varicose veins along the the medal lower thigh and lower leg.    Has a few veins in the posterior calf but they are small   Will worry about the lesser saphenous vein later as may be abl to take care of mos of that with sclerotherapy .       On left leg has pain lana in the anterior medial calf where we can see the veins.   Has some on the posterior calf but more medial than lateral.     Great saphenous vein (GSV)      sapheno-femoral junction: Competent      prox thigh: Competent      mid thigh: Incompetent      dist thigh: Incompetent       knee: Incompetent      prox calf: Incompetent      mid calf: Incompetent      ankle: Competent  2. Left leg: No identifiable deep or superficial thrombus. 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 There is superficial  vein incompetency of the GSV at the mid thigh to the ankle.  Incompetent varicosities in the proximal calf are related to the GSV  as detailed above.  So for the left leg can do greater saphenous vein ligation and stripping and secondary varicose veins or ablation will how patient does after the right leg is done.     Plan is to bring the betsy christian to the office when we do follow up.    Risks of surgery include damage to nerves, bleeding, infection, not getting all the veins and multiple punch biopsies over the veins that will drain over  several days after surgery.  These punch hole usually do heal well but may leave some scarring.  Also discussed what to expect after surgery and when to follow up with me in clinic and to bring their compression stockings with them to clinic.    Time spent with the patient with greater that 50% of the time in discussion was 45 minutes.  In discussing the veins and the hernia.      Juan Carlos Mesa MD        Study Result   US VENOUS COMPETENCY BILATERAL 7/23/2018 6:14 PM     Comparison study: None available     Clinical History: ; Varicose veins of leg with pain, bilateral     Technique: B-mode (grayscale) and Duplex Doppler ultrasound of the  lower extremity veins (superficial and deep), including incompetency  reflux time and compression for thrombus. Additional Duplex doppler  assessment of the PTA and CHIDI at the ankles. Superficial incompetency  exam performed upright, non-weight bearing with distal compression  using rapid inflation/deflation cuffs.     Ordering provider: JUAN CARLOS MESA      Venous Competency Diagnostic Criteria Adopted 11/29/11.     Venous competency criteria defining abnormal reflux duration:  Femoral - popliteal reflux > 1.0 sec.  Deep femoral, deep calf veins, and superficial vein reflux > 0.5 sec.   vein reflux > 0.35 sec.     Supporting document: J Vasc Surg 2003; 38:793-8. Definition of reflux  in lower extremity veins.     Findings:        Right Lower Extremity:      Right ankle arterial waveforms:   Posterior Tibial artery: triphasic  Dorsalis Pedis artery: biphasic     Deep Venous System Evaluation:     Duplex Evaluation for Deep Vein Thrombus (which includes CFV, FV,  popliteal vein, and posterior tibial veins): Negative.     Common femoral vein: Incompetent  Deep femoral vein: Competent  Femoral vein:      proximal thigh: Competent      mid thigh: Competent      distal thigh: Competent  Popliteal vein: Incompetent  Posterior tibial veins at the ankle:  Competent     Superficial Venous System Evaluation:     Duplex Evaluation for Superficial Vein Thrombus (which includes GSV,  SSV, AASV, and PASV): Negative.      Anterior accessory saphenous (AASV): Competent  Posterior accessory saphenous (PASV): Competent  Great saphenous vein (GSV)      sapheno-femoral junction: Incompetent      prox thigh: Incompetent      mid thigh: Incompetent      dist thigh: Incompetent       knee: Incompetent      prox calf: Competent      mid calf: Competent      ankle: Competent  Vein of Giacomini (V of G): Competent  Small saphenous vein:      sapheno-popliteal junction: Competent      prox calf: Competent      mid calf: Incompetent     Diameters of superficial veins:     AASV: Diameter not measured  PASV: Diameter was not measured  SFJ: Diameter* 18 mm  GSV prox thigh*: Diameter 9 mm   GSV mid thigh: Diameter 9 mm  GSV dist thigh: Diameter 9 mm  GSV knee*: Diameter 14 mm  GSV Prox Calf: Diameter 11 mm  GSV Mid Calf: Diameter 5 mm  V. of Giacomini (V of G): Diameter not measured   SSV SPJ*: Diameter 4 mm  SSV prox calf: Diameter 3 mm  SSV mid calf: Diameter 5 mm     Varicosities and Perforators:     Two varicosities related to the GSV at the proximal calf, one  varicosity related to the GSV at the mid calf, one varicosity related  to the SSV at the popliteal fossa, and two varicosities related to the  SSV at the proximal calf.     There are no incompetent perforators identified.            Left lower extremity:     Left ankle arterial waveforms:  Posterior Tibial artery: triphasic  Dorsalis Pedis artery: triphasic     Deep Venous System Evaluation:     Duplex Evaluation for Deep Vein Thrombus (which includes CFV, FV,  popliteal vein, and posterior tibial veins): Negative.      Common femoral vein: Competent  Deep femoral vein: Competent  Femoral vein:      proximal thigh: Competent      mid thigh: Incompetent      distal thigh: Competent  Popliteal vein: Competent  Posterior tibial  veins at the ankle: Competent     Superficial Venous System Evaluation:     Duplex Evaluation for Superficial Vein Thrombus (which includes GSV,  SSV, AASV, and PASV): Negative.  Anterior accessory saphenous (AASV): Competent  Posterior accessory saphenous (PASV): Competent  Great saphenous vein (GSV)      sapheno-femoral junction: Competent      prox thigh: Competent      mid thigh: Incompetent      dist thigh: Incompetent       knee: Incompetent      prox calf: Incompetent      mid calf: Incompetent      ankle: Competent  Vein of Giacomini (V of G): Competent  Small saphenous vein:      sapheno-popliteal junction: Competent      prox calf: Competent      mid calf: Competent     Diameters of superficial veins:     AASV: Diameter not measured  PASV: Diameter not measured  SFJ: Diameter* 14 mm  GSV prox thigh*: Diameter 7 mm   GSV mid thigh: Diameter 8 mm  GSV dist thigh: Diameter 9 mm  GSV knee*: Diameter 9 mm  GSV Prox Calf: Diameter 8 mm  GSV Mid Calf: Diameter 7 mm  V. of Giacomini (V of G): Not measured  SSV SPJ*: Diameter not measured  SSV prox calf: Diameter not measured  SSV mid calf: Diameter not measured     Incompetent varicosities in the  proximal calf are related to the   GSV.  There are no incompetent perforators identified.         Impression:  1. Right leg: No identifiable deep or superficial thrombus. There is  deep vein incompetency of the common femoral vein and popliteal vein.  There is superficial vein incompetency of the GSV from the SFJ to the  knee and of the mid SSV. Incompetent varicosities in the proximal  calf, mid calf, and popliteal fossa are associated with the GSV and  SSV as detailed above.     2. Left leg: No identifiable deep or superficial thrombus. 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 There is superficial  vein incompetency of the GSV at the mid thigh to the ankle.  Incompetent varicosities in the proximal calf are related to the GSV  as detailed  "above.     Reference: \"Duplex Ultrasound in the Diagnosis of Lower-Extremity Deep  Venous Thrombosis\"- Lucinda Lyman MD, S; Gilmer Mckinley MD  (Circulation. 2014;129:917-921. http://circ.ahajournals.org )     I have personally reviewed the examination and initial interpretation  and I agree with the findings.     HANNAH SULLIVAN MD           [facility] Visit     [facility]2018  [facility]   [facility]    Varicose veins of leg with pain, bilateral   Dx    Consult    Reason for visit    Progress Notes   Expand All Collapse All    Dear Eligio Lea  I was asked to see this patient by Eligio Quevedo for please see below.  I have seen Lan SUSAN Potts Jr. and as you know his chief complaint is periumbilical hernia but has pain in the epigastric area.    Denies nausea, vomitting, , constipation   Has diarrhea since on metformin  HPI:  Patient is a 53 year old male  with complaints see above  The patient noticed the symptoms about 3 weeks ago.    Patient has not family history of hernia problems  Pushing it back in makes the episode better.  Smoker 1/2 ppd     Review Of Systems  Respiratory: No shortness of breath, dyspnea on exertion, cough, or hemoptysis  Cardiovascular: negative  Gastrointestinal: diarrhea  Endocrine: diabetes  :  negative  /72  Pulse 88  Temp 97.3  F (36.3  C) (Oral)  Resp 18  Wt 122.5 kg (270 lb)  SpO2 96%  BMI 37.92 kg/m2      Past Medical History         Past Medical History:   Diagnosis Date     Chronic back pain       Diabetes (H)       Migraine headaches       TINA (obstructive sleep apnea)       mild does not use CPAP at present     Seasonal allergies       Tobacco use disorder               Past Surgical History          Past Surgical History:   Procedure Laterality Date     ENT SURGERY         GENITOURINARY SURGERY         NO HISTORY OF SURGERY                 Social History    Social History            Social History     " Marital status: Single       Spouse name: N/A     Number of children: N/A     Years of education: N/A      Occupational History     Not on file.              Social History Main Topics     Smoking status: Current Every Day Smoker       Types: Cigarettes     Smokeless tobacco: Never Used     Alcohol use Yes          Comment: occ     Drug use: No     Sexual activity: Yes       Partners: Female           Other Topics Concern     Not on file      Social History Narrative             Current Outpatient Prescriptions           Current Outpatient Prescriptions   Medication Sig Dispense Refill     ACE/ARB/ARNI NOT PRESCRIBED, INTENTIONAL, Please choose reason not prescribed, below         ASPIRIN NOT PRESCRIBED, INTENTIONAL, Antiplatelet medication not prescribed intentionally due to not indicated at this age 1 each 0     blood glucose (ACCU-CHEK SMARTVIEW) test strip 1 strip by In Vitro route 3 times daily 3 Box 3     blood glucose calibration (ACCU-CHEK SMARTVIEW CONTROL) solution 1 drop by In Vitro route as needed 1 each 3     blood glucose monitoring (ACCU-CHEK FASTCLIX) lancets 1 each 3 times daily 3 Box 3     eletriptan (RELPAX) 40 MG tablet Take 1 tablet (40 mg) by mouth at onset of headache May repeat in 2 hours as needed but no more than 2 pills in 24 hours. 12 tablet 5     fluticasone (FLONASE) 50 MCG/ACT spray Spray 2 sprays into both nostrils daily 16 mL 3     metFORMIN (GLUCOPHAGE) 1000 MG tablet Take 1 tablet (1,000 mg) by mouth 2 times daily (with meals) 180 tablet 1     sildenafil (REVATIO) 20 MG tablet Take 1 to 5 tabs 30-60 minutes before intercourse.  Never use with nitroglycerin, terazosin or doxazosin. 30 tablet 11     STATIN NOT PRESCRIBED, INTENTIONAL, continuous prn for other Reported on 4/6/2017   0     ALPRAZolam (XANAX) 0.25 MG tablet Take 1 tablet (0.25 mg) by mouth 3 times daily as needed for anxiety (Patient not taking: Reported on 7/17/2018) 30 tablet 0     aspirin 81 MG tablet Take 1 tablet  (81 mg) by mouth daily (Patient not taking: Reported on 7/12/2018) 90 tablet 3     ezetimibe (ZETIA) 10 MG tablet TAKE 1 TABLET (10 MG) BY MOUTH DAILY (Patient not taking: Reported on 7/12/2018) 90 tablet 0     fexofenadine (ALLEGRA ALLERGY) 180 MG tablet Take by mouth daily Reported on 4/6/2017         metFORMIN (GLUCOPHAGE-XR) 750 MG 24 hr tablet TAKE 1 TABLET (750 MG) BY MOUTH 2 TIMES DAILY (WITH MEALS) (Patient not taking: Reported on 7/17/2018) 180 tablet 0     nicotine (NICODERM CQ) 14 MG/24HR 24 hr patch Place 1 patch onto the skin every 24 hours (Patient not taking: Reported on 7/17/2018) 30 patch 0     nicotine (NICODERM CQ) 21 MG/24HR 24 hr patch Place 1 patch onto the skin every 24 hours (Patient not taking: Reported on 7/17/2018) 30 patch 0     triamcinolone (KENALOG) 0.1 % cream Apply topically 2 times daily Apply topically twice a day to the affected area Avoid contact with skin of face, armpits and groin (Patient not taking: Reported on 7/17/2018) 100 g 1            10 Point review of systems all others are negative.   Above was reviewed  Physical exam: /72  Pulse 88  Temp 97.3  F (36.3  C) (Oral)  Resp 18  Wt 122.5 kg (270 lb)  SpO2 96%  BMI 37.92 kg/m2   Patient able to get up on table without difficulty.   Patient is alert and orientated.   Head eyes, nose and mouth within normal limits.  No supraclavicular or cervical adenopathy palpated.  Thyroid within normal limits.  No carotid bruits auscultated.  Lungs are clear to auscultation  Heart is regular rate and rhythm with no murmur or thrills noted.  No costal vertebral angle tenderness noted.  Abdomen is abdomen is soft without significant tenderness, masses, organomegaly or guarding  bowel sounds are positive and no caput medusa noted.  Is more tender in the epigastric area  Has an umbilical hernias noted. reducible   Testicles are normal.    No inguinal hernia noted.   Skin was warm and pink  Normal Affect     Easily palpable  posterior tibial pulse or dorsalis pedis pulse bilaterally.  Lower extremity edema is mildly more on the right side.      Has large varicose veins on his right leg.  Less on left leg.  Has been wearing the knee high compression stockings and notice that it helps.   Has an umbilical hernia and some pain in the epigastric area that is worse when the abdomen is relaxed not there when tight.    So can do laparoscopic robotic repair and close the fascia more for the diastasis recti but think this pain may be due to the stomach inflamed so will have patient start on prilosec and then get his ultrasound done for the legs and see how he is doing and decide at that time what to do first.   Assessment: Has large varicose veins on his right leg.  Less on left leg.    Has an umbilical hernia and some pain in the epigastric area that is worse when the abdomen is relaxed not there when tight.    So can do laparoscopic robotic repair and close the fascia more for the diastasis recti but think this pain may be due to the stoamch inflamed so will have patient start on prilosec and then get his ultrasound done for the legs and see how he is doing and decide at that time what to do first.    Plan to do will see what the above shows and how he feels about his epigastric area.  .     Risks of surgery include damage to nerves, bleeding, infection, damage to  Vessels, recurrence.  Although mesh is a better long term repair if it gets infected it must be removed.   If the patient has any bacterial infection the week before and is seen by their doctor and started on antibiotics, I can probably still do the surgery if they are vastly improved by the time of surgery, but if the infection starts closer to the surgery date it will be better to cancel and reschedule to a later date.  A cough will also be hard on the repair and uncomfortable post operative.  If the patient is a smoker I did discuss increase risk of recurrence and more pain with  the cough.  If the patient is willing to quit smoking would encourage to do so and start at least a week before surgery.  However, if patient is not going to quit then must understand that his repair is more likely to fail.     Risks of surgery discussed including, but not limited to bleeding, infection, recurrence, damage to nerves and what is in the hernia sac.  Risks of anesthesia also discussed.     Discussed massaging hernia back in and using ice if becomes more painful.  If not able to reduce then go to emergency room.  Also discussed hernia belt to use until able to get in for surgery.     Please wear compression stockings and see if they help your discomfort.  Then when you come back to see me let me know.  You will need to bring these with you for sclerotherapy or for your post op visit after surgery.  I would suggest going to either the [employer] medical supply store next to [facility] or to [employer] ([phone]) on [address] [place].  They will need to measure your legs to get the right fit.  If you go somewhere else and they do not measure your legs do not get them there.  It is important that you get the right size.  Please allow several days after you are measured to get your stockings.  They may not have your size in stock and will have to order them.     Please wear your compression stocking as some insurance companies will want to wear them for at least 3 months before they even consider paying for your surgery or sclerotherapy.     CALL [phone]  to get the utrasound set up.          You must follow up with me in the clinic to go over your utrasound results.  I will not be calling you with the results.   It is very difficult to do this over  the phone.  I am not able to show you while looking at your leg what the next step should be.       Time spent with the patient with greater that 50% of the time in discussion was 45 minutes.  In discussing the  hernia and the veins and the epigastric pain.        Juan Carlos Mesa MD

## 2018-07-31 NOTE — MR AVS SNAPSHOT
After Visit Summary   7/31/2018    Lan Potts Jr.    MRN: [id number]           Patient Information     Date Of Birth          1965        Visit Information        Provider Department      7/31/2018 3:00 PM Juan Carlos Mesa MD [facility]        Today's Diagnoses     Varicose veins of leg with pain, bilateral    -  1      Care Instructions    Patient is here to follow up for both the epigastric pain that is not better with Prilosec.   Patient is convinced it is due to the diastasis recti.  We can close the fascia more in this are. But usually place mesh in the area.   On exam do not feel a defect of the abdomen wall in the upper abdomen but can feel that the rectus muscle and fascia does not give.  Patient is now interested in doing just the umbilicus hernia and doing it open.     Risks of surgery include damage to nerves, bleeding, infection, damage to  Vessels, recurrence.  Although mesh is a better long term repair if it gets infected it must be removed.   If the patient has any bacterial infection the week before and is seen by their doctor and started on antibiotics, I can probably still do the surgery if they are vastly improved by the time of surgery, but if the infection starts closer to the surgery date it will be better to cancel and reschedule to a later date.  A cough will also be hard on the repair and uncomfortable post operative.  If the patient is a smoker I did discuss increase risk of recurrence and more pain with the cough.  If the patient is willing to quit smoking would encourage to do so and start at least a week before surgery.  However, if patient is not going to quit then must understand that his repair is more likely to fail.     Risks of surgery discussed including, but not limited to bleeding, infection, recurrence, damage to nerves and what is in the hernia sac.  Risks of anesthesia also discussed.     Discussed massaging hernia back in and  using ice if becomes more painful.  If not able to reduce then go to emergency room.  Also discussed hernia belt to use until able to get in for surgery.  Patient does lift up to 90 pounds at work.  And may need to be off work or at least not lifting for 6 weeks.     He wants to get the legs done first.   Patient has been wearing knee high compression stockings and that helps has not bought thigh high ones yet.          varicose veins.    Right leg:  Posterior accessory saphenous (PASV): Competent  Great saphenous vein (GSV)      sapheno-femoral junction: Incompetent      prox thigh: Incompetent      mid thigh: Incompetent      dist thigh: Incompetent       knee: Incompetent      prox calf: Competent      mid calf: Competent      ankle: Competent  Vein of Giacomini (V of G): Competent  Small saphenous vein:      sapheno-popliteal junction: Competent      prox calf: Competent      mid calf: Incompetent  Will do right greater saphenous vein ligation and stripping and removal of secondary varicose veins along the the medal lower thigh and lower leg.    Has a few veins in the posterior calf but they are small   Will worry about the lesser saphenous vein later as may be abl to take care of mos of that with sclerotherapy .       On left leg has pain lana in the anterior medial calf where we can see the veins.   Has some on the posterior calf but more medial than lateral.     Great saphenous vein (GSV)      sapheno-femoral junction: Competent      prox thigh: Competent      mid thigh: Incompetent      dist thigh: Incompetent       knee: Incompetent      prox calf: Incompetent      mid calf: Incompetent      ankle: Competent  2. Left leg: No identifiable deep or superficial thrombus. 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 There is superficial  vein incompetency of the GSV at the mid thigh to the ankle.  Incompetent varicosities in the proximal calf are related to the GSV  as detailed above.  So for the  left leg can do greater saphenous vein ligation and stripping and secondary varicose veins or ablation will how patient does after the right leg is done.     Plan is to bring the betsy shorts to the office when we do follow up.    Risks of surgery include damage to nerves, bleeding, infection, not getting all the veins and multiple punch biopsies over the veins that will drain over several days after surgery.  These punch hole usually do heal well but may leave some scarring.  Also discussed what to expect after surgery and when to follow up with me in clinic and to bring their compression stockings with them to clinic.    DISCHARGE INSTRUCTIONS  Postoperative Varicose Vein Surgery Instructions Following Transilluminated Powdered Phlebectomy  Dr. Juan Carlos Mesa    1. For the first 2 days your leg will be wrapped from toe to groin in an ace wrap. Please leave it on until Friday then you may remove dressing and take shower before your clinic visit. If it feels too tight please cut and tape the ace wrap at area of tightness. It is normal to have drainage on the dressings. Remember we have put 1 to 2 liters of IV fluid in your leg and some of it will leak out. Protect your clothes and bedding with plastic and old towels.    2. Make sure you bring compression stockings to the clinic with you. We will help you put them on for the first time.    3. During the immediate post op period, avoid prolong sitting or standing. We encourage you to walk and when lying down, elevate your leg higher than your heart. Try to elevate your legs for at least 15 minutes twice a day.    4. Return to the office this Friday. Please call to make an appointment if not already done. [phone].    5. You will be bruised and have areas beneath your incisions that are lumpy/bumpy, and you may experience numbness, tingling, burning or intermittent sharp shooting pains. These will usually resolve over a period of a few weeks.    6. After your  bandages have been removed please wear your compression stocking at all times for the next 3 days, except to take a shower and to massage them. After the first 3 days wear them during the days when you are on your feet. At night elevate your leg on pillows.    7. When your original ace wrapping is removed, deeply massage your leg for 20-30 minutes in the areas of resection with antibiotic ointment, or when the wounds are closed with bag balm, or your regular skin cream.    8. You may resume your regular diet when you feel you are ready to. DO NOT drink alcoholic beverages for 24 hours or while you are taking prescription pain medication.    9. Avoid aspirin for the first 72 hours after the procedure. This medication may increase the tendency to bleed.    Use the following medications (in addition to your normal meds) as shown:  A. Percocet 5 mg 1-2 every 6 hours as needed for pain. This contains 500 mg of Tylenol (acetaminophen) per tablet. Please do not take more than 4 grams of Tylenol (acetaminophen) per day. For example, you may take 1 Percocet and 1 Tylenol, or 2 Percocet and no Tylenol, or Tylenol and no Percocet every 6 hours.  B. Tylenol (acetaminophen) 500 mg every 6 hors as needed for pain. Do not take more than 1000 mg every 6 hours (see above).  C. Motrin (ibuprofen) 200-800 mg every 6 hours as needed for pain. Take with food.    10. Notify Dr. Mesa s clinic at [phone] if:    Your discomfort is not relieved by your pain medication.    You have signs of infection such as temperature above 100.5 degrees orally, chills, or increasing daily discomfort.    You incision site is becoming more red and/or there is purulent drainage.    You have questions or concerns.    11. We do have physical therapy that will help reduce swelling quicker. Please let us know if you are interested. It is usually started the week after the surgery.    12. When taking narcotics (pain medication more than Tylenol  (acetaminophen) and Motrin (ibuprofen) it is important to keep your stools soft to avoid constipation and pain with straining. This is best done by drinking fluids (non-alcoholic and non-caffeinated) and taking a stool softener i.e. Metamucil or milk of magnesia. You may be able to use non-narcotic for pain relief especially by the 3rd post-operative day. Tylenol 500 mg  every 6 hours and/or Motrin (ibuprofen) 200-800 mg every 6 hours. Please do not take more than 4 grams of Tylenol (acetaminophen) per day. Remember your Percocet does have Tylenol (acetaminophen) already in it. Please take Motrin (ibuprofen) with food to help protect the stomach.If you have a history of stomach ulcers or stomach problems, do not take Motrin (ibuprofen).     13. Do not drive or operate heavy machinery for 24 hours after surgery or when taking narcotics. You may resume driving when you feel that you can safely avoid an accident and are not taking narcotics. This is usually 5-7 days after surgery. You should not be alone for 24 hours after surgery.      14. Have milk of magnesia available at home so that when you take the pain medications you take 1-2 teaspoons a day,  to help reduce problems with constipation.                  Follow-ups after your visit        Who to contact     If you have questions or need follow up information about today's clinic visit or your schedule please contact [facility] directly at [phone].  Normal or non-critical lab and imaging results will be communicated to you by MyChart, letter or phone within 4 business days after the clinic has received the results. If you do not hear from us within 7 days, please contact the clinic through [employer] or phone. If you have a critical or abnormal lab result, we will notify you by phone as soon as possible.  Submit refill requests through [employer] or call your pharmacy and they will forward the refill request to us. Please allow 3 business days for  "your refill to be completed.          Additional Information About Your Visit        MyChart Information     [employer] gives you secure access to your electronic health record. If you see a primary care provider, you can also send messages to your care team and make appointments. If you have questions, please call your primary care clinic.  If you do not have a primary care provider, please call [phone] and they will assist you.        Care EveryWhere ID     This is your Care EveryWhere ID. This could be used by other organizations to access your [facility] medical records  [id number]        Your Vitals Were     Pulse Height BMI (Body Mass Index)             90 1.803 m (5' 11\") 38.08 kg/m2          Blood Pressure from Last 3 Encounters:   07/31/18 124/80   07/17/18 138/72   07/12/18 123/78    Weight from Last 3 Encounters:   07/31/18 123.8 kg (273 lb)   07/17/18 122.5 kg (270 lb)   07/12/18 124.3 kg (274 lb)              We Performed the Following     Beatriz-Operative Worksheet        Primary Care Provider Office Phone # Fax #    Eligio Quevedo [phone] [address] [address]        Equal Access to Services     BALTA JONES : Hadii stacie tineo hadasho Soomaali, waaxda luqadaha, qaybta kaalmada adeegyada, marcel dodson. So [facility] [phone].    ATENCIÓN: Si habla español, tiene a cordoba disposición servicios gratuitos de asistencia lingüística. Llame al [phone].    We comply with applicable federal civil rights laws and [place] laws. We do not discriminate on the basis of race, color, national origin, age, disability, sex, sexual orientation, or gender identity.            Thank you!     Thank you for choosing [facility]  for your care. Our goal is always to provide you with excellent care. Hearing back from our patients is one way we can continue to improve our services. Please take a few minutes to complete the written survey that " you may receive in the mail after your visit with us. Thank you!             Your Updated Medication List - Protect others around you: Learn how to safely use, store and throw away your medicines at www.disposemymeds.org.          This list is accurate as of 7/31/18  4:11 PM.  Always use your most recent med list.                   Brand Name Dispense Instructions for use Diagnosis    ACE/ARB/ARNI NOT PRESCRIBED (INTENTIONAL)      Please choose reason not prescribed, below    Type 2 diabetes mellitus without complication, without long-term current use of insulin (H)       ALLEGRA ALLERGY 180 MG tablet   Generic drug:  fexofenadine      Take by mouth daily Reported on 4/6/2017        ALPRAZolam 0.25 MG tablet    XANAX    30 tablet    Take 1 tablet (0.25 mg) by mouth 3 times daily as needed for anxiety    JUAN (generalized anxiety disorder)       aspirin 81 MG tablet     90 tablet    Take 1 tablet (81 mg) by mouth daily    Type 2 diabetes mellitus without complication, without long-term current use of insulin (H)       ASPIRIN NOT PRESCRIBED    INTENTIONAL    1 each    Antiplatelet medication not prescribed intentionally due to not indicated at this age    Type 2 diabetes, HbA1c goal < 7% (H)       blood glucose calibration solution     1 each    1 drop by In Vitro route as needed    Type 2 diabetes, HbA1c goal < 7% (H)       blood glucose monitoring lancets     3 Box    1 each 3 times daily    Type 2 diabetes, HbA1c goal < 7% (H)       blood glucose monitoring test strip    ACCU-CHEK SMARTVIEW    3 Box    1 strip by In Vitro route 3 times daily    Type 2 diabetes, HbA1c goal < 7% (H)       eletriptan 40 MG tablet    RELPAX    12 tablet    Take 1 tablet (40 mg) by mouth at onset of headache May repeat in 2 hours as needed but no more than 2 pills in 24 hours.    Migraine without status migrainosus, not intractable, unspecified migraine type       ezetimibe 10 MG tablet    ZETIA    90 tablet    TAKE 1 TABLET (10 MG) BY  MOUTH DAILY    Type 2 diabetes mellitus without complication, without long-term current use of insulin (H), Hyperlipidemia with target LDL less than 100, 10 year risk of MI or stroke 7.5% or greater       fluticasone 50 MCG/ACT spray    FLONASE    16 mL    Spray 2 sprays into both nostrils daily    Seasonal allergic rhinitis       * metFORMIN 1000 MG tablet    GLUCOPHAGE    180 tablet    Take 1 tablet (1,000 mg) by mouth 2 times daily (with meals)    Type 2 diabetes mellitus without complication, without long-term current use of insulin (H)       * metFORMIN 750 MG 24 hr tablet    GLUCOPHAGE-XR    180 tablet    TAKE 1 TABLET (750 MG) BY MOUTH 2 TIMES DAILY (WITH MEALS)    Type 2 diabetes mellitus without complication, without long-term current use of insulin (H)       * nicotine 21 MG/24HR 24 hr patch    NICODERM CQ    30 patch    Place 1 patch onto the skin every 24 hours    Tobacco abuse       * nicotine 14 MG/24HR 24 hr patch    NICODERM CQ    30 patch    Place 1 patch onto the skin every 24 hours    Tobacco abuse       omeprazole 20 MG CR capsule    priLOSEC    30 capsule    Take 1 capsule (20 mg) by mouth daily    Varicose veins of leg with pain, bilateral       order for DME     2 Package    15-20 mm mercury thigh high compression stockings 1-2 pairs    Varicose veins of leg with pain, bilateral       sildenafil 20 MG tablet    REVATIO    30 tablet    Take 1 to 5 tabs 30-60 minutes before intercourse.  Never use with nitroglycerin, terazosin or doxazosin.    Erectile dysfunction, unspecified erectile dysfunction type       STATIN NOT PRESCRIBED (INTENTIONAL)      continuous prn for other Reported on 4/6/2017        triamcinolone 0.1 % cream    KENALOG    100 g    Apply topically 2 times daily Apply topically twice a day to the affected area Avoid contact with skin of face, armpits and groin    Rash       * Notice:  This list has 4 medication(s) that are the same as other medications prescribed for you. Read  the directions carefully, and ask your doctor or other care provider to review them with you.

## 2018-07-31 NOTE — LETTER
7/31/2018         RE: Lan Potts Jr.  [address]  [place] [address]        Dear Colleague,    Thank you for referring your patient, Lan Potts Jr., to the [facility]. Please see a copy of my visit note below.    Patient is here to follow up for both the epigastric pain that is not better with Prilosec.   Patient is convinced it is due to the diastasis recti.  We can close the fascia more in this are. But usually place mesh in the area.   On exam do not feel a defect of the abdomen wall in the upper abdomen but can feel that the rectus muscle and fascia does not give.  Patient is now interested in doing just the umbilicus hernia and doing it open.     Risks of surgery include damage to nerves, bleeding, infection, damage to  Vessels, recurrence.  Although mesh is a better long term repair if it gets infected it must be removed.   If the patient has any bacterial infection the week before and is seen by their doctor and started on antibiotics, I can probably still do the surgery if they are vastly improved by the time of surgery, but if the infection starts closer to the surgery date it will be better to cancel and reschedule to a later date.  A cough will also be hard on the repair and uncomfortable post operative.  If the patient is a smoker I did discuss increase risk of recurrence and more pain with the cough.  If the patient is willing to quit smoking would encourage to do so and start at least a week before surgery.  However, if patient is not going to quit then must understand that his repair is more likely to fail.     Risks of surgery discussed including, but not limited to bleeding, infection, recurrence, damage to nerves and what is in the hernia sac.  Risks of anesthesia also discussed.     Discussed massaging hernia back in and using ice if becomes more painful.  If not able to reduce then go to emergency room.  Also discussed hernia belt to use until able to get  in for surgery.  Patient does lift up to 90 pounds at work.  And may need to be off work or at least not lifting for 6 weeks.     He wants to get the legs done first.   Patient has been wearing knee high compression stockings and that helps has not bought thigh high ones yet.          varicose veins.    Right leg:  Posterior accessory saphenous (PASV): Competent  Great saphenous vein (GSV)      sapheno-femoral junction: Incompetent      prox thigh: Incompetent      mid thigh: Incompetent      dist thigh: Incompetent       knee: Incompetent      prox calf: Competent      mid calf: Competent      ankle: Competent  Vein of Giacomini (V of G): Competent  Small saphenous vein:      sapheno-popliteal junction: Competent      prox calf: Competent      mid calf: Incompetent  Will do right greater saphenous vein ligation and stripping and removal of secondary varicose veins along the the medal lower thigh and lower leg.    Has a few veins in the posterior calf but they are small   Will worry about the lesser saphenous vein later as may be abl to take care of mos of that with sclerotherapy .       On left leg has pain lana in the anterior medial calf where we can see the veins.   Has some on the posterior calf but more medial than lateral.     Great saphenous vein (GSV)      sapheno-femoral junction: Competent      prox thigh: Competent      mid thigh: Incompetent      dist thigh: Incompetent       knee: Incompetent      prox calf: Incompetent      mid calf: Incompetent      ankle: Competent  2. Left leg: No identifiable deep or superficial thrombus. 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 There is superficial  vein incompetency of the GSV at the mid thigh to the ankle.  Incompetent varicosities in the proximal calf are related to the GSV  as detailed above.  So for the left leg can do greater saphenous vein ligation and stripping and secondary varicose veins or ablation will how patient does after the right  leg is done.     Plan is to bring the biking shorts to the office when we do follow up.    Risks of surgery include damage to nerves, bleeding, infection, not getting all the veins and multiple punch biopsies over the veins that will drain over several days after surgery.  These punch hole usually do heal well but may leave some scarring.  Also discussed what to expect after surgery and when to follow up with me in clinic and to bring their compression stockings with them to clinic.    Time spent with the patient with greater that 50% of the time in discussion was 45 minutes.  In discussing the veins and the hernia.      Juan Carlos Mesa MD        Study Result   US VENOUS COMPETENCY BILATERAL 7/23/2018 6:14 PM     Comparison study: None available     Clinical History: ; Varicose veins of leg with pain, bilateral     Technique: B-mode (grayscale) and Duplex Doppler ultrasound of the  lower extremity veins (superficial and deep), including incompetency  reflux time and compression for thrombus. Additional Duplex doppler  assessment of the PTA and CHIDI at the ankles. Superficial incompetency  exam performed upright, non-weight bearing with distal compression  using rapid inflation/deflation cuffs.     Ordering provider: JUAN CARLOS MESA      Venous Competency Diagnostic Criteria Adopted 11/29/11.     Venous competency criteria defining abnormal reflux duration:  Femoral - popliteal reflux > 1.0 sec.  Deep femoral, deep calf veins, and superficial vein reflux > 0.5 sec.   vein reflux > 0.35 sec.     Supporting document: J Vasc Surg 2003; 38:793-8. Definition of reflux  in lower extremity veins.     Findings:        Right Lower Extremity:      Right ankle arterial waveforms:   Posterior Tibial artery: triphasic  Dorsalis Pedis artery: biphasic     Deep Venous System Evaluation:     Duplex Evaluation for Deep Vein Thrombus (which includes CFV, FV,  popliteal vein, and posterior tibial veins):  Negative.     Common femoral vein: Incompetent  Deep femoral vein: Competent  Femoral vein:      proximal thigh: Competent      mid thigh: Competent      distal thigh: Competent  Popliteal vein: Incompetent  Posterior tibial veins at the ankle: Competent     Superficial Venous System Evaluation:     Duplex Evaluation for Superficial Vein Thrombus (which includes GSV,  SSV, AASV, and PASV): Negative.      Anterior accessory saphenous (AASV): Competent  Posterior accessory saphenous (PASV): Competent  Great saphenous vein (GSV)      sapheno-femoral junction: Incompetent      prox thigh: Incompetent      mid thigh: Incompetent      dist thigh: Incompetent       knee: Incompetent      prox calf: Competent      mid calf: Competent      ankle: Competent  Vein of Giacomini (V of G): Competent  Small saphenous vein:      sapheno-popliteal junction: Competent      prox calf: Competent      mid calf: Incompetent     Diameters of superficial veins:     AASV: Diameter not measured  PASV: Diameter was not measured  SFJ: Diameter* 18 mm  GSV prox thigh*: Diameter 9 mm   GSV mid thigh: Diameter 9 mm  GSV dist thigh: Diameter 9 mm  GSV knee*: Diameter 14 mm  GSV Prox Calf: Diameter 11 mm  GSV Mid Calf: Diameter 5 mm  V. of Giacomini (V of G): Diameter not measured   SSV SPJ*: Diameter 4 mm  SSV prox calf: Diameter 3 mm  SSV mid calf: Diameter 5 mm     Varicosities and Perforators:     Two varicosities related to the GSV at the proximal calf, one  varicosity related to the GSV at the mid calf, one varicosity related  to the SSV at the popliteal fossa, and two varicosities related to the  SSV at the proximal calf.     There are no incompetent perforators identified.            Left lower extremity:     Left ankle arterial waveforms:  Posterior Tibial artery: triphasic  Dorsalis Pedis artery: triphasic     Deep Venous System Evaluation:     Duplex Evaluation for Deep Vein Thrombus (which includes CFV, FV,  popliteal vein, and  posterior tibial veins): Negative.      Common femoral vein: Competent  Deep femoral vein: Competent  Femoral vein:      proximal thigh: Competent      mid thigh: Incompetent      distal thigh: Competent  Popliteal vein: Competent  Posterior tibial veins at the ankle: Competent     Superficial Venous System Evaluation:     Duplex Evaluation for Superficial Vein Thrombus (which includes GSV,  SSV, AASV, and PASV): Negative.  Anterior accessory saphenous (AASV): Competent  Posterior accessory saphenous (PASV): Competent  Great saphenous vein (GSV)      sapheno-femoral junction: Competent      prox thigh: Competent      mid thigh: Incompetent      dist thigh: Incompetent       knee: Incompetent      prox calf: Incompetent      mid calf: Incompetent      ankle: Competent  Vein of Giacomini (V of G): Competent  Small saphenous vein:      sapheno-popliteal junction: Competent      prox calf: Competent      mid calf: Competent     Diameters of superficial veins:     AASV: Diameter not measured  PASV: Diameter not measured  SFJ: Diameter* 14 mm  GSV prox thigh*: Diameter 7 mm   GSV mid thigh: Diameter 8 mm  GSV dist thigh: Diameter 9 mm  GSV knee*: Diameter 9 mm  GSV Prox Calf: Diameter 8 mm  GSV Mid Calf: Diameter 7 mm  V. of Giacomini (V of G): Not measured  SSV SPJ*: Diameter not measured  SSV prox calf: Diameter not measured  SSV mid calf: Diameter not measured     Incompetent varicosities in the  proximal calf are related to the   GSV.  There are no incompetent perforators identified.         Impression:  1. Right leg: No identifiable deep or superficial thrombus. There is  deep vein incompetency of the common femoral vein and popliteal vein.  There is superficial vein incompetency of the GSV from the SFJ to the  knee and of the mid SSV. Incompetent varicosities in the proximal  calf, mid calf, and popliteal fossa are associated with the GSV and  SSV as detailed above.     2. Left leg: No identifiable deep or  "superficial thrombus. 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 There is superficial  vein incompetency of the GSV at the mid thigh to the ankle.  Incompetent varicosities in the proximal calf are related to the GSV  as detailed above.     Reference: \"Duplex Ultrasound in the Diagnosis of Lower-Extremity Deep  Venous Thrombosis\"- Lucinda Lyman MD, S; Gilmer Mckinley MD  (Circulation. 2014;129:917-921. http://circ.ahajournals.org )     I have personally reviewed the examination and initial interpretation  and I agree with the findings.     HANNAH SULLIVAN MD           Office Visit     [facility]17/2018  [facility]    Varicose veins of leg with pain, bilateral   Dx    Consult    Reason for visit    Progress Notes   Expand All Collapse All    Dear Eligio eLa  I was asked to see this patient by Eligio Quevedo for please see below.  I have seen Lan SUSAN Potts Jr. and as you know his chief complaint is periumbilical hernia but has pain in the epigastric area.    Denies nausea, vomitting, , constipation   Has diarrhea since on metformin  HPI:  Patient is a 53 year old male  with complaints see above  The patient noticed the symptoms about 3 weeks ago.    Patient has not family history of hernia problems  Pushing it back in makes the episode better.  Smoker 1/2 ppd     Review Of Systems  Respiratory: No shortness of breath, dyspnea on exertion, cough, or hemoptysis  Cardiovascular: negative  Gastrointestinal: diarrhea  Endocrine: diabetes  :  negative  /72  Pulse 88  Temp 97.3  F (36.3  C) (Oral)  Resp 18  Wt 122.5 kg (270 lb)  SpO2 96%  BMI 37.92 kg/m2      Past Medical History         Past Medical History:   Diagnosis Date     Chronic back pain       Diabetes (H)       Migraine headaches       TINA (obstructive sleep apnea)       mild does not use CPAP at present     Seasonal allergies       Tobacco use disorder      "          Past Surgical History          Past Surgical History:   Procedure Laterality Date     ENT SURGERY         GENITOURINARY SURGERY         NO HISTORY OF SURGERY                 Social History    Social History            Social History     Marital status: Single       Spouse name: N/A     Number of children: N/A     Years of education: N/A          Occupational History     Not on file.              Social History Main Topics     Smoking status: Current Every Day Smoker       Types: Cigarettes     Smokeless tobacco: Never Used     Alcohol use Yes          Comment: occ     Drug use: No     Sexual activity: Yes       Partners: Female           Other Topics Concern     Not on file      Social History Narrative             Current Outpatient Prescriptions           Current Outpatient Prescriptions   Medication Sig Dispense Refill     ACE/ARB/ARNI NOT PRESCRIBED, INTENTIONAL, Please choose reason not prescribed, below         ASPIRIN NOT PRESCRIBED, INTENTIONAL, Antiplatelet medication not prescribed intentionally due to not indicated at this age 1 each 0     blood glucose (ACCU-CHEK SMARTVIEW) test strip 1 strip by In Vitro route 3 times daily 3 Box 3     blood glucose calibration (ACCU-CHEK SMARTVIEW CONTROL) solution 1 drop by In Vitro route as needed 1 each 3     blood glucose monitoring (ACCU-CHEK FASTCLIX) lancets 1 each 3 times daily 3 Box 3     eletriptan (RELPAX) 40 MG tablet Take 1 tablet (40 mg) by mouth at onset of headache May repeat in 2 hours as needed but no more than 2 pills in 24 hours. 12 tablet 5     fluticasone (FLONASE) 50 MCG/ACT spray Spray 2 sprays into both nostrils daily 16 mL 3     metFORMIN (GLUCOPHAGE) 1000 MG tablet Take 1 tablet (1,000 mg) by mouth 2 times daily (with meals) 180 tablet 1     sildenafil (REVATIO) 20 MG tablet Take 1 to 5 tabs 30-60 minutes before intercourse.  Never use with nitroglycerin, terazosin or doxazosin. 30 tablet 11     STATIN NOT PRESCRIBED, INTENTIONAL,  continuous prn for other Reported on 4/6/2017   0     ALPRAZolam (XANAX) 0.25 MG tablet Take 1 tablet (0.25 mg) by mouth 3 times daily as needed for anxiety (Patient not taking: Reported on 7/17/2018) 30 tablet 0     aspirin 81 MG tablet Take 1 tablet (81 mg) by mouth daily (Patient not taking: Reported on 7/12/2018) 90 tablet 3     ezetimibe (ZETIA) 10 MG tablet TAKE 1 TABLET (10 MG) BY MOUTH DAILY (Patient not taking: Reported on 7/12/2018) 90 tablet 0     fexofenadine (ALLEGRA ALLERGY) 180 MG tablet Take by mouth daily Reported on 4/6/2017         metFORMIN (GLUCOPHAGE-XR) 750 MG 24 hr tablet TAKE 1 TABLET (750 MG) BY MOUTH 2 TIMES DAILY (WITH MEALS) (Patient not taking: Reported on 7/17/2018) 180 tablet 0     nicotine (NICODERM CQ) 14 MG/24HR 24 hr patch Place 1 patch onto the skin every 24 hours (Patient not taking: Reported on 7/17/2018) 30 patch 0     nicotine (NICODERM CQ) 21 MG/24HR 24 hr patch Place 1 patch onto the skin every 24 hours (Patient not taking: Reported on 7/17/2018) 30 patch 0     triamcinolone (KENALOG) 0.1 % cream Apply topically 2 times daily Apply topically twice a day to the affected area Avoid contact with skin of face, armpits and groin (Patient not taking: Reported on 7/17/2018) 100 g 1            10 Point review of systems all others are negative.   Above was reviewed  Physical exam: /72  Pulse 88  Temp 97.3  F (36.3  C) (Oral)  Resp 18  Wt 122.5 kg (270 lb)  SpO2 96%  BMI 37.92 kg/m2   Patient able to get up on table without difficulty.   Patient is alert and orientated.   Head eyes, nose and mouth within normal limits.  No supraclavicular or cervical adenopathy palpated.  Thyroid within normal limits.  No carotid bruits auscultated.  Lungs are clear to auscultation  Heart is regular rate and rhythm with no murmur or thrills noted.  No costal vertebral angle tenderness noted.  Abdomen is abdomen is soft without significant tenderness, masses, organomegaly or guarding   bowel sounds are positive and no caput medusa noted.  Is more tender in the epigastric area  Has an umbilical hernias noted. reducible   Testicles are normal.    No inguinal hernia noted.   Skin was warm and pink  Normal Affect     Easily palpable posterior tibial pulse or dorsalis pedis pulse bilaterally.  Lower extremity edema is mildly more on the right side.      Has large varicose veins on his right leg.  Less on left leg.  Has been wearing the knee high compression stockings and notice that it helps.   Has an umbilical hernia and some pain in the epigastric area that is worse when the abdomen is relaxed not there when tight.    So can do laparoscopic robotic repair and close the fascia more for the diastasis recti but think this pain may be due to the stomach inflamed so will have patient start on prilosec and then get his ultrasound done for the legs and see how he is doing and decide at that time what to do first.   Assessment: Has large varicose veins on his right leg.  Less on left leg.    Has an umbilical hernia and some pain in the epigastric area that is worse when the abdomen is relaxed not there when tight.    So can do laparoscopic robotic repair and close the fascia more for the diastasis recti but think this pain may be due to the stoamch inflamed so will have patient start on prilosec and then get his ultrasound done for the legs and see how he is doing and decide at that time what to do first.    Plan to do will see what the above shows and how he feels about his epigastric area.  .     Risks of surgery include damage to nerves, bleeding, infection, damage to  Vessels, recurrence.  Although mesh is a better long term repair if it gets infected it must be removed.   If the patient has any bacterial infection the week before and is seen by their doctor and started on antibiotics, I can probably still do the surgery if they are vastly improved by the time of surgery, but if the infection starts  closer to the surgery date it will be better to cancel and reschedule to a later date.  A cough will also be hard on the repair and uncomfortable post operative.  If the patient is a smoker I did discuss increase risk of recurrence and more pain with the cough.  If the patient is willing to quit smoking would encourage to do so and start at least a week before surgery.  However, if patient is not going to quit then must understand that his repair is more likely to fail.     Risks of surgery discussed including, but not limited to bleeding, infection, recurrence, damage to nerves and what is in the hernia sac.  Risks of anesthesia also discussed.     Discussed massaging hernia back in and using ice if becomes more painful.  If not able to reduce then go to emergency room.  Also discussed hernia belt to use until able to get in for surgery.     Please wear compression stockings and see if they help your discomfort.  Then when you come back to see me let me know.  You will need to bring these with you for sclerotherapy or for your post op visit after surgery.  I would suggest going to either the [employer] medical supply store next to [facility] or to [employer] ([phone]) on [address] [address] and [place].  They will need to measure your legs to get the right fit.  If you go somewhere else and they do not measure your legs do not get them there.  It is important that you get the right size.  Please allow several days after you are measured to get your stockings.  They may not have your size in stock and will have to order them.     Please wear your compression stocking as some insurance companies will want to wear them for at least 3 months before they even consider paying for your surgery or sclerotherapy.     CALL [phone]  to get the utrasound set up.          You must follow up with me in the clinic to go over your utrasound results.  I will not be calling you with the results.   It  is very difficult to do this over  the phone.  I am not able to show you while looking at your leg what the next step should be.       Time spent with the patient with greater that 50% of the time in discussion was 45 minutes.  In discussing the hernia and the veins and the epigastric pain.        Juan Carlos Mesa MD          Again, thank you for allowing me to participate in the care of your patient.        Sincerely,        Juan Carlos Mesa MD

## 2018-07-31 NOTE — PATIENT INSTRUCTIONS
Patient is here to follow up for both the epigastric pain that is not better with Prilosec.   Patient is convinced it is due to the diastasis recti.  We can close the fascia more in this are. But usually place mesh in the area.   On exam do not feel a defect of the abdomen wall in the upper abdomen but can feel that the rectus muscle and fascia does not give.  Patient is now interested in doing just the umbilicus hernia and doing it open.     Risks of surgery include damage to nerves, bleeding, infection, damage to  Vessels, recurrence.  Although mesh is a better long term repair if it gets infected it must be removed.   If the patient has any bacterial infection the week before and is seen by their doctor and started on antibiotics, I can probably still do the surgery if they are vastly improved by the time of surgery, but if the infection starts closer to the surgery date it will be better to cancel and reschedule to a later date.  A cough will also be hard on the repair and uncomfortable post operative.  If the patient is a smoker I did discuss increase risk of recurrence and more pain with the cough.  If the patient is willing to quit smoking would encourage to do so and start at least a week before surgery.  However, if patient is not going to quit then must understand that his repair is more likely to fail.     Risks of surgery discussed including, but not limited to bleeding, infection, recurrence, damage to nerves and what is in the hernia sac.  Risks of anesthesia also discussed.     Discussed massaging hernia back in and using ice if becomes more painful.  If not able to reduce then go to emergency room.  Also discussed hernia belt to use until able to get in for surgery.  Patient does lift up to 90 pounds at work.  And may need to be off work or at least not lifting for 6 weeks.     He wants to get the legs done first.   Patient has been wearing knee high compression stockings and that helps has not  bought thigh high ones yet.          varicose veins.    Right leg:  Posterior accessory saphenous (PASV): Competent  Great saphenous vein (GSV)      sapheno-femoral junction: Incompetent      prox thigh: Incompetent      mid thigh: Incompetent      dist thigh: Incompetent       knee: Incompetent      prox calf: Competent      mid calf: Competent      ankle: Competent  Vein of Giacomini (V of G): Competent  Small saphenous vein:      sapheno-popliteal junction: Competent      prox calf: Competent      mid calf: Incompetent  Will do right greater saphenous vein ligation and stripping and removal of secondary varicose veins along the the medal lower thigh and lower leg.    Has a few veins in the posterior calf but they are small   Will worry about the lesser saphenous vein later as may be abl to take care of mos of that with sclerotherapy .       On left leg has pain lana in the anterior medial calf where we can see the veins.   Has some on the posterior calf but more medial than lateral.     Great saphenous vein (GSV)      sapheno-femoral junction: Competent      prox thigh: Competent      mid thigh: Incompetent      dist thigh: Incompetent       knee: Incompetent      prox calf: Incompetent      mid calf: Incompetent      ankle: Competent  2. Left leg: No identifiable deep or superficial thrombus. 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 There is superficial  vein incompetency of the GSV at the mid thigh to the ankle.  Incompetent varicosities in the proximal calf are related to the GSV  as detailed above.  So for the left leg can do greater saphenous vein ligation and stripping and secondary varicose veins or ablation will how patient does after the right leg is done.     Plan is to bring the betsy christian to the office when we do follow up.    Risks of surgery include damage to nerves, bleeding, infection, not getting all the veins and multiple punch biopsies over the veins that will drain over  several days after surgery.  These punch hole usually do heal well but may leave some scarring.  Also discussed what to expect after surgery and when to follow up with me in clinic and to bring their compression stockings with them to clinic.    DISCHARGE INSTRUCTIONS  Postoperative Varicose Vein Surgery Instructions Following Transilluminated Powdered Phlebectomy  Dr. Juan Carlos Mesa    1. For the first 2 days your leg will be wrapped from toe to groin in an ace wrap. Please leave it on until Friday then you may remove dressing and take shower before your clinic visit. If it feels too tight please cut and tape the ace wrap at area of tightness. It is normal to have drainage on the dressings. Remember we have put 1 to 2 liters of IV fluid in your leg and some of it will leak out. Protect your clothes and bedding with plastic and old towels.    2. Make sure you bring compression stockings to the clinic with you. We will help you put them on for the first time.    3. During the immediate post op period, avoid prolong sitting or standing. We encourage you to walk and when lying down, elevate your leg higher than your heart. Try to elevate your legs for at least 15 minutes twice a day.    4. Return to the office this Friday. Please call to make an appointment if not already done. [phone].    5. You will be bruised and have areas beneath your incisions that are lumpy/bumpy, and you may experience numbness, tingling, burning or intermittent sharp shooting pains. These will usually resolve over a period of a few weeks.    6. After your bandages have been removed please wear your compression stocking at all times for the next 3 days, except to take a shower and to massage them. After the first 3 days wear them during the days when you are on your feet. At night elevate your leg on pillows.    7. When your original ace wrapping is removed, deeply massage your leg for 20-30 minutes in the areas of resection with  antibiotic ointment, or when the wounds are closed with bag balm, or your regular skin cream.    8. You may resume your regular diet when you feel you are ready to. DO NOT drink alcoholic beverages for 24 hours or while you are taking prescription pain medication.    9. Avoid aspirin for the first 72 hours after the procedure. This medication may increase the tendency to bleed.    Use the following medications (in addition to your normal meds) as shown:  A. Percocet 5 mg 1-2 every 6 hours as needed for pain. This contains 500 mg of Tylenol (acetaminophen) per tablet. Please do not take more than 4 grams of Tylenol (acetaminophen) per day. For example, you may take 1 Percocet and 1 Tylenol, or 2 Percocet and no Tylenol, or Tylenol and no Percocet every 6 hours.  B. Tylenol (acetaminophen) 500 mg every 6 hors as needed for pain. Do not take more than 1000 mg every 6 hours (see above).  C. Motrin (ibuprofen) 200-800 mg every 6 hours as needed for pain. Take with food.    10. Notify Dr. Mesa s clinic at [phone] if:    Your discomfort is not relieved by your pain medication.    You have signs of infection such as temperature above 100.5 degrees orally, chills, or increasing daily discomfort.    You incision site is becoming more red and/or there is purulent drainage.    You have questions or concerns.    11. We do have physical therapy that will help reduce swelling quicker. Please let us know if you are interested. It is usually started the week after the surgery.    12. When taking narcotics (pain medication more than Tylenol (acetaminophen) and Motrin (ibuprofen) it is important to keep your stools soft to avoid constipation and pain with straining. This is best done by drinking fluids (non-alcoholic and non-caffeinated) and taking a stool softener i.e. Metamucil or milk of magnesia. You may be able to use non-narcotic for pain relief especially by the 3rd post-operative day. Tylenol 500 mg  every 6 hours  and/or Motrin (ibuprofen) 200-800 mg every 6 hours. Please do not take more than 4 grams of Tylenol (acetaminophen) per day. Remember your Percocet does have Tylenol (acetaminophen) already in it. Please take Motrin (ibuprofen) with food to help protect the stomach.If you have a history of stomach ulcers or stomach problems, do not take Motrin (ibuprofen).     13. Do not drive or operate heavy machinery for 24 hours after surgery or when taking narcotics. You may resume driving when you feel that you can safely avoid an accident and are not taking narcotics. This is usually 5-7 days after surgery. You should not be alone for 24 hours after surgery.      14. Have milk of magnesia available at home so that when you take the pain medications you take 1-2 teaspoons a day,  to help reduce problems with constipation.

## 2018-08-01 ENCOUNTER — TELEPHONE (OUTPATIENT)
Dept: SURGERY | Facility: CLINIC | Age: 53
End: 2018-08-01

## 2018-08-08 ENCOUNTER — TELEPHONE (OUTPATIENT)
Dept: FAMILY MEDICINE | Facility: CLINIC | Age: 53
End: 2018-08-08

## 2018-08-08 NOTE — TELEPHONE ENCOUNTER
Panel Management Review      Patient has the following on his problem list:     Diabetes    ASA: Passed    Last A1C  Lab Results   Component Value Date    A1C 6.8 07/12/2018    A1C 7.4 03/26/2018    A1C 7.9 10/26/2017    A1C 7.4 04/06/2017    A1C 7.7 01/13/2017     A1C tested: Passed    Last LDL:    Lab Results   Component Value Date    CHOL 233 03/26/2018     Lab Results   Component Value Date    HDL 35 03/26/2018     Lab Results   Component Value Date     03/26/2018     Lab Results   Component Value Date    TRIG 262 03/26/2018     Lab Results   Component Value Date    CHOLHDLRATIO 7.7 09/03/2015     Lab Results   Component Value Date    NHDL 198 03/26/2018       Is the patient on a Statin? NO             Is the patient on Aspirin? YES    Medications     HMG CoA Reductase Inhibitors    STATIN NOT PRESCRIBED, INTENTIONAL,    Salicylates    aspirin 81 MG tablet          Last three blood pressure readings:  BP Readings from Last 3 Encounters:   07/31/18 124/80   07/17/18 138/72   07/12/18 123/78       Date of last diabetes office visit: 7/1218     Tobacco History:     History   Smoking Status     Current Every Day Smoker     Types: Cigarettes   Smokeless Tobacco     Never Used           Composite cancer screening  Chart review shows that this patient is due/due soon for the following Colonoscopy  Summary:    Patient is due/failing the following:    COLONOSCOPY per last office note to follow in 3-6 months ( after October)    Action needed:   None      Type of outreach:    none    Questions for provider review:    None                                                                                                                                    Cait Mejias cma       Chart routed to closed .

## 2018-10-07 DIAGNOSIS — J30.2 SEASONAL ALLERGIC RHINITIS: ICD-10-CM

## 2018-10-08 RX ORDER — FLUTICASONE PROPIONATE 50 MCG
SPRAY, SUSPENSION (ML) NASAL
Qty: 16 ML | Refills: 3 | Status: SHIPPED | OUTPATIENT
Start: 2018-10-08 | End: 2019-06-12

## 2018-10-21 DIAGNOSIS — F41.1 GAD (GENERALIZED ANXIETY DISORDER): ICD-10-CM

## 2018-10-24 RX ORDER — ALPRAZOLAM 0.25 MG
TABLET ORAL
Qty: 30 TABLET | Refills: 0 | Status: SHIPPED | OUTPATIENT
Start: 2018-10-24 | End: 2018-11-27

## 2018-10-24 NOTE — TELEPHONE ENCOUNTER
Controlled Substance Refill Request for Alprazolam  Problem List Complete:  No      PROVIDER TO CONSIDER COMPLETION OF PROBLEM LIST AND OVERVIEW/CONTROLLED SUBSTANCE AGREEMENT     Last Written Prescription Date:  9/26/18  Last Fill Quantity: 30,   # refills: 0     Last Office Visit with [facility] primary care provider: 7/12/18     Future Office visit:      Controlled substance agreement on file: No.      Processing:     checked in past 3 months?  Yes 9/26/18   Ruth HERBERTN, RN

## 2018-10-24 NOTE — TELEPHONE ENCOUNTER
The requested prescription(s) has/have been approved and has/have been printed and signed. This note was forwarded to the patient care pool to contact the patient to arrange for the patient to obtain the signed prescription(s).  Eligio Quevedo

## 2018-10-29 DIAGNOSIS — F41.1 GAD (GENERALIZED ANXIETY DISORDER): ICD-10-CM

## 2018-10-30 RX ORDER — ALPRAZOLAM 0.25 MG
TABLET ORAL
OUTPATIENT
Start: 2018-10-30

## 2018-10-30 NOTE — TELEPHONE ENCOUNTER
Per chart review 10/24/18 order was faxed to pharmacy on 10/25/18. RN contacted [facility] pharmacy at [phone]. Pharmacy staff states that the order is ready for  at the pharmacy already today. RN asked pharmacy to notify the pt since Pharmacy sent the Rx request.    Brittany Rios RN

## 2018-11-12 ENCOUNTER — TELEPHONE (OUTPATIENT)
Dept: FAMILY MEDICINE | Facility: CLINIC | Age: 53
End: 2018-11-12

## 2018-11-12 NOTE — TELEPHONE ENCOUNTER
Panel Management Review      Patient has the following on his problem list:     Diabetes    ASA: Passed    Last A1C  Lab Results   Component Value Date    A1C 6.8 07/12/2018    A1C 7.4 03/26/2018    A1C 7.9 10/26/2017    A1C 7.4 04/06/2017    A1C 7.7 01/13/2017     A1C tested: Passed    Last LDL:    Lab Results   Component Value Date    CHOL 233 03/26/2018     Lab Results   Component Value Date    HDL 35 03/26/2018     Lab Results   Component Value Date     03/26/2018     Lab Results   Component Value Date    TRIG 262 03/26/2018     Lab Results   Component Value Date    CHOLHDLRATIO 7.7 09/03/2015     Lab Results   Component Value Date    NHDL 198 03/26/2018       Is the patient on a Statin? YES             Is the patient on Aspirin? YES    Medications     HMG CoA Reductase Inhibitors    STATIN NOT PRESCRIBED, INTENTIONAL,    Salicylates    aspirin 81 MG tablet          Last three blood pressure readings:  BP Readings from Last 3 Encounters:   07/31/18 124/80   07/17/18 138/72   07/12/18 123/78       Date of last diabetes office visit: 7/12/13     Tobacco History:     History   Smoking Status     Current Every Day Smoker     Types: Cigarettes   Smokeless Tobacco     Never Used           Composite cancer screening  Chart review shows that this patient is due/due soon for the following Colonoscopy  Summary:    Patient is due/failing the following:   Per last office note patient is to follow up in January 2019    Action needed:   None      Type of outreach:    none    Questions for provider review:    None                                                                                                                                    Cait Mejias cma       Chart routed to closed .

## 2018-11-27 DIAGNOSIS — F41.1 GAD (GENERALIZED ANXIETY DISORDER): ICD-10-CM

## 2018-11-27 RX ORDER — ALPRAZOLAM 0.25 MG
TABLET ORAL
Qty: 30 TABLET | Refills: 0 | Status: SHIPPED | OUTPATIENT
Start: 2018-11-27 | End: 2019-01-17

## 2018-11-27 NOTE — TELEPHONE ENCOUNTER
Controlled Substance Refill Request for ALPRAZolam (XANAX) 0.25 MG tablet  Problem List Complete:  No     PROVIDER TO CONSIDER COMPLETION OF PROBLEM LIST AND OVERVIEW/CONTROLLED SUBSTANCE AGREEMENT    Last Written Prescription Date:  9/26/18  Last Fill Quantity: 30,   # refills: 0    [place] [facility] [place]: 7/12/18    Future Office visit:     Controlled substance agreement on file: No.      checked in past 3 months?  Yes 9/26/18     Brittany Rios RN

## 2019-01-03 DIAGNOSIS — E11.9 TYPE 2 DIABETES MELLITUS WITHOUT COMPLICATION, WITHOUT LONG-TERM CURRENT USE OF INSULIN (H): ICD-10-CM

## 2019-01-04 ENCOUNTER — MYC MEDICAL ADVICE (OUTPATIENT)
Dept: FAMILY MEDICINE | Facility: CLINIC | Age: 54
End: 2019-01-04

## 2019-01-04 NOTE — TELEPHONE ENCOUNTER
Refill request received is for a strength not consistent with most recent orders.   [employer] message sent to pt to clarify dosing. Please review pt messages for a response to [employer] message sent.     Requested Prescriptions   Pending Prescriptions Disp Refills     metFORMIN (GLUCOPHAGE-XR) 500 MG 24 hr tablet [Pharmacy Med Name: METFORMIN  MG TABLET] 360 tablet 0     Sig: TAKE 2 TABLETS BY MOUTH TWICE A DAY    Biguanide Agents Passed - 1/3/2019  1:26 AM     Brittany Rios RN

## 2019-01-07 RX ORDER — METFORMIN HCL 500 MG
TABLET, EXTENDED RELEASE 24 HR ORAL
Qty: 120 TABLET | Refills: 0 | Status: SHIPPED | OUTPATIENT
Start: 2019-01-07 | End: 2019-04-16

## 2019-01-09 ENCOUNTER — TELEPHONE (OUTPATIENT)
Dept: FAMILY MEDICINE | Facility: CLINIC | Age: 54
End: 2019-01-09

## 2019-01-09 NOTE — TELEPHONE ENCOUNTER
Per [employer] message 1/4/19:    Hi Brittany,     The dosage I am on is 500mg ( 2 tablets by mouth twice per day). I have been on this dosage for some time now.     The 750 was the previous dosage.  I will schedule an appointment asap for an A1C.       Thanks,   Lan GIBBS sent only at 30 day supply since patient is due for appointment.   Allie Foley, PIEDADN RN

## 2019-01-09 NOTE — TELEPHONE ENCOUNTER
Pharmacy comments: Metformin 500mg 24 hr tablet: Need 90 day prescr per insurance please and thank you!

## 2019-01-17 DIAGNOSIS — F41.1 GAD (GENERALIZED ANXIETY DISORDER): ICD-10-CM

## 2019-01-18 RX ORDER — ALPRAZOLAM 0.25 MG
TABLET ORAL
Qty: 30 TABLET | Refills: 0 | Status: SHIPPED | OUTPATIENT
Start: 2019-01-18 | End: 2019-01-31

## 2019-01-18 NOTE — TELEPHONE ENCOUNTER
Controlled Substance Refill Request for ALPRAZolam (XANAX) 0.25 MG tablet  Problem List Complete:  No      PROVIDER TO CONSIDER COMPLETION OF PROBLEM LIST AND OVERVIEW/CONTROLLED SUBSTANCE AGREEMENT     Last Written Prescription Date:  11/27/18  Last Fill Quantity: 30,   # refills: 0     Last Office Visit with [facility] primary care provider: 7/12/18     Future Office visit:      Controlled substance agreement on file: No.      checked in past 3 months?  Yes 1/18/19  Ruth HERBERTN, RN

## 2019-01-18 NOTE — TELEPHONE ENCOUNTER
The requested prescription(s) has/have been approved and has/have been printed and signed. This note was forwarded to the patient care pool to contact the patient to arrange for the patient to obtain the signed prescription(s).  Eligio Sullivan

## 2019-01-20 DIAGNOSIS — F41.1 GAD (GENERALIZED ANXIETY DISORDER): ICD-10-CM

## 2019-01-22 RX ORDER — ALPRAZOLAM 0.25 MG
TABLET ORAL
Qty: 30 TABLET | Refills: 0 | OUTPATIENT
Start: 2019-01-22

## 2019-01-31 ENCOUNTER — OFFICE VISIT (OUTPATIENT)
Dept: FAMILY MEDICINE | Facility: CLINIC | Age: 54
End: 2019-01-31
Payer: COMMERCIAL

## 2019-01-31 VITALS
HEART RATE: 73 BPM | TEMPERATURE: 98.4 F | SYSTOLIC BLOOD PRESSURE: 123 MMHG | RESPIRATION RATE: 20 BRPM | WEIGHT: 273 LBS | OXYGEN SATURATION: 96 % | BODY MASS INDEX: 38.08 KG/M2 | DIASTOLIC BLOOD PRESSURE: 72 MMHG

## 2019-01-31 DIAGNOSIS — F41.1 GAD (GENERALIZED ANXIETY DISORDER): ICD-10-CM

## 2019-01-31 DIAGNOSIS — Z12.11 SCREEN FOR COLON CANCER: Primary | ICD-10-CM

## 2019-01-31 DIAGNOSIS — E11.9 TYPE 2 DIABETES MELLITUS WITHOUT COMPLICATION, WITHOUT LONG-TERM CURRENT USE OF INSULIN (H): ICD-10-CM

## 2019-01-31 LAB — HBA1C MFR BLD: 6.8 % (ref 0–5.6)

## 2019-01-31 PROCEDURE — 36415 COLL VENOUS BLD VENIPUNCTURE: CPT | Performed by: FAMILY MEDICINE

## 2019-01-31 PROCEDURE — 99214 OFFICE O/P EST MOD 30 MIN: CPT | Performed by: FAMILY MEDICINE

## 2019-01-31 PROCEDURE — 84443 ASSAY THYROID STIM HORMONE: CPT | Performed by: FAMILY MEDICINE

## 2019-01-31 PROCEDURE — 83036 HEMOGLOBIN GLYCOSYLATED A1C: CPT | Performed by: FAMILY MEDICINE

## 2019-01-31 RX ORDER — ALPRAZOLAM 0.25 MG
TABLET ORAL
Qty: 60 TABLET | Refills: 0 | Status: SHIPPED | OUTPATIENT
Start: 2019-01-31 | End: 2019-04-18

## 2019-01-31 RX ORDER — ESCITALOPRAM OXALATE 10 MG/1
10 TABLET ORAL EVERY MORNING
Qty: 30 TABLET | Refills: 1 | Status: SHIPPED | OUTPATIENT
Start: 2019-01-31 | End: 2019-03-31

## 2019-01-31 NOTE — NURSING NOTE
"Chief Complaint   Patient presents with     Diabetes     Headache     Health Maintenance     order pended       Initial /72   Pulse 73   Temp 98.4  F (36.9  C) (Oral)   Resp 20   Wt 123.8 kg (273 lb)   SpO2 96%   BMI 38.08 kg/m   Estimated body mass index is 38.08 kg/m  as calculated from the following:    Height as of 7/31/18: 1.803 m (5' 11\").    Weight as of this encounter: 123.8 kg (273 lb).  Medication Reconciliation: complete  Cait Mejias, BERNADETTE  "

## 2019-01-31 NOTE — PROGRESS NOTES
[place] diabetes resourses:  [email or web address]        To access diabetes educational materials with in EPIC use <dot>DALE      Put this in AFTER VISIT SUMMARY if needed for the patient to access information online [email or web address]      SUBJECTIVE:  Lan Potts Jr. is a 53 year old male who presents today for a follow up appointment for management of DIABETES MELLITUS.    Patient Active Problem List    Diagnosis Date Noted     Morbid obesity (H) 07/12/2018     Priority: Medium     Umbilical hernia without obstruction and without gangrene 07/12/2018     Priority: Medium     Varicose veins of left lower extremity 07/12/2018     Priority: Medium     Tobacco abuse 04/05/2018     Priority: Medium     Erectile dysfunction, unspecified erectile dysfunction type 04/05/2018     Priority: Medium     10 year risk of MI or stroke 7.5% or greater, 12.6% in April 2017 04/06/2017     Priority: Medium     Type 2 diabetes mellitus without complication, without long-term current use of insulin (H) 01/05/2017     Priority: Medium     Migraine without status migrainosus, not intractable, unspecified migraine type 01/21/2016     Priority: Medium     High triglycerides 12/11/2014     Priority: Medium     HDL deficiency 12/11/2014     Priority: Medium     Hyperlipidemia with target LDL less than 100 02/17/2014     Priority: Medium     Diagnosis updated by automated process. Provider to review and confirm.       Chronic back pain      Priority: Medium     Seasonal allergies      Priority: Medium      The patient checks his blood sugar as follows: four times daily, once daily.   Results as follows: fasting glucose- 180-200 and post-breakfast glucose- 120's  after lunch 120's  [id number] at 3-6 pm he is getting migraines and some tension headache(s)     He is having to help his family now that his mom is in hospice. He has taken some  time off work.   When his sugar is high he does not feel well. As seen above the sugars are highest in the evening and mornings.     He takes the alprazolam in the morning and it helps. He usually does not need I the rest of the day(s) .      The patient reports that he IS taking the medication as prescribed. He reports side effects of medication.  These side effects include: occasionally diarrhea  from the metformin.    PHQ-2  Over the last two weeks- Have you been bothered by little interest or pleasure in doing things?  Yes  Over the last two weeks- Have you been been feeling down, depressed, or hopeless?  Yes    \  ----------------------------------------------------------------------------------------------------------------------------------------    BP Readings from Last 3 Encounters:   01/31/19 123/72   07/31/18 124/80   07/17/18 138/72     The patient reports that he IS currently smoking.  History   Smoking Status     Current Every Day Smoker     Types: Cigarettes   Smokeless Tobacco     Never Used           The 10-year ASCVD risk score ([place] ADRIANO Jr., et al., 2013) is: 23.9%    Values used to calculate the score:      Age: 53 years      Sex: Male      Is Non- : No      Diabetic: Yes      Tobacco smoker: Yes      Systolic Blood Pressure: 123 mmHg      Is BP treated: No      HDL Cholesterol: 35 mg/dL      Total Cholesterol: 233 mg/dL        Current Outpatient Medications   Medication Sig Dispense Refill     ACE/ARB/ARNI NOT PRESCRIBED, INTENTIONAL, Please choose reason not prescribed, below       ALPRAZolam (XANAX) 0.25 MG tablet TAKE 1 TAB BY MOUTH 3 TIMES DAILY AS NEEDED 30 tablet 0     aspirin 81 MG tablet Take 1 tablet (81 mg) by mouth daily 90 tablet 3     ASPIRIN NOT PRESCRIBED, INTENTIONAL, Antiplatelet medication not prescribed intentionally due to not indicated at this age 1 each 0     blood glucose (ACCU-CHEK SMARTVIEW) test strip 1 strip by In Vitro route 3 times daily 3 Box  3     blood glucose calibration (ACCU-CHEK SMARTVIEW CONTROL) solution 1 drop by In Vitro route as needed 1 each 3     blood glucose monitoring (ACCU-CHEK FASTCLIX) lancets 1 each 3 times daily 3 Box 3     eletriptan (RELPAX) 40 MG tablet Take 1 tablet (40 mg) by mouth at onset of headache May repeat in 2 hours as needed but no more than 2 pills in 24 hours. 12 tablet 5     ezetimibe (ZETIA) 10 MG tablet TAKE 1 TABLET (10 MG) BY MOUTH DAILY 90 tablet 0     fluticasone (FLONASE) 50 MCG/ACT spray SPRAY 2 SPRAYS INTO BOTH NOSTRILS DAILY 16 mL 3     metFORMIN (GLUCOPHAGE-XR) 500 MG 24 hr tablet TAKE 2 TABLETS BY MOUTH TWICE A  tablet 0     omeprazole (PRILOSEC) 20 MG CR capsule Take 1 capsule (20 mg) by mouth daily 30 capsule 11     order for DME 15-20 mm mercury thigh high compression stockings 1-2 pairs 2 Package 5     order for DME 15-20 mm mercury thigh high compression stockings 1-2 pairs 2 Package 5     sildenafil (REVATIO) 20 MG tablet Take 1 to 5 tabs 30-60 minutes before intercourse.  Never use with nitroglycerin, terazosin or doxazosin. 30 tablet 11     STATIN NOT PRESCRIBED, INTENTIONAL, continuous prn for other Reported on 4/6/2017  0     triamcinolone (KENALOG) 0.1 % cream Apply topically 2 times daily Apply topically twice a day to the affected area Avoid contact with skin of face, armpits and groin 100 g 1     fexofenadine (ALLEGRA ALLERGY) 180 MG tablet Take by mouth daily Reported on 4/6/2017         The patient reports that he IS NOT taking a statin.   (Reminder all diabetics with a ASCVD risk greater or equal to 7.5% should be on a high intensity statin, otherwise on a moderate intensity statin)  he is not taking a statin because he is intolerant of them.   (Reminder to intentionally not prescribe statin in )    The patient reports that he IS NOT taking  aspirin daily.  (Reminder all diabetic patients with a cardiovascular risk factor and > 50 should take a daily aspirin)   he is not  taking aspirin because he does not want to despite my recommendations to take one aspirin daily. .  (Reminder to intentionally not prescribe aspirin in )    The patient reports that he IS doing a self foot exam daily.    The patient reports that his last eye exam was 7 months ago.       Immunization History   Administered Date(s) Administered     HepB 04/06/2017     HepB-Adult 04/05/2018     Influenza (IIV3) PF 12/21/2012, 09/27/2013     Influenza Quad, Recombinant, p-free (RIV4) 09/26/2018     Influenza Vaccine IM 3yrs+ 4 Valent IIV4 10/26/2017     Pneumococcal 23 valent 09/04/2014     TDAP Vaccine (Adacel) 09/20/2011     Zoster vaccine recombinant adjuvanted (SHINGRIX) 07/12/2018, 09/26/2018       The patient reports that he has had a pnuemovax in the past.  The patient reports that he has had a flu shot for the current influenza season.  The patient would like to have a no vaccinations today    Patient concerns: is concerned about his mental health since his mom has gotten so sick      EXAM:  /72   Pulse 73   Temp 98.4  F (36.9  C) (Oral)   Resp 20   Wt 123.8 kg (273 lb)   SpO2 96%   BMI 38.08 kg/m    Wt Readings from Last 4 Encounters:   01/31/19 123.8 kg (273 lb)   07/31/18 123.8 kg (273 lb)   07/17/18 122.5 kg (270 lb)   07/12/18 124.3 kg (274 lb)     Body mass index is 38.08 kg/m .    General:  Lan is awake, alert, and cooperative.  NAD.    Diabetic Foot Screen:  Any complaints of increased pain or numbness ? No  Is there a foot ulcer now or a history of foot ulcer? No  Does the foot have an abnormal shape? No  Are the nails thick, too long or ingrown? No  Are there any redness or open areas? No         Sensation Testing done at all points on the diagram with monofilament     Right Foot: Sensation Normal at all points  Left Foot: Sensation Normal at all points     Risk Category: 0- No loss of protective sensation    Performed by Eligio Quevedo MD                  Previsit labs  drawn include:  Office Visit on 07/12/2018   Component Date Value Ref Range Status     HIV Antigen Antibody Combo 07/12/2018 Nonreactive  NR^Nonreactive     Final    HIV-1 p24 Ag & HIV-1/HIV-2 Ab Not Detected     Hemoglobin A1C 07/12/2018 6.8* 0 - 5.6 % Final    Comment: Normal <5.7% Prediabetes 5.7-6.4%  Diabetes 6.5% or higher - adopted from ADA   consensus guidelines.       Specimen Description 07/12/2018 Urine   Final     Chlamydia Trachomatis PCR 07/12/2018 Negative  NEG^Negative Final    Comment: Negative for C. trachomatis rRNA by transcription mediated amplification.  A negative result by transcription mediated amplification does not preclude   the presence of C. trachomatis infection because results are dependent on   proper and adequate collection, absence of inhibitors, and sufficient rRNA to   be detected.       Hepatitis B Surface Antibody 07/12/2018 0.00  <8.00 m[IU]/mL Final    Nonreactive, No antibody detected when the value is less than 8.00 m[IU]/mL.     Hepatitis B Core IgM 07/12/2018 Nonreactive  NR^Nonreactive Final    Comment: A nonreactive result suggests lack of recent exposure to the virus in the   preceding 6 months.       Hep B Surface Agn 07/12/2018 Nonreactive  NR^Nonreactive Final         ASSESSMENT and PLAN:    Type II Diabetes.    DIABETES Type II:                       Lab Results   Component Value Date    A1C 6.8 07/12/2018       Control   unable to assess  Lab Results   Component Value Date    A1C 6.8 07/12/2018    A1C 7.4 03/26/2018    A1C 7.9 10/26/2017     Lab Results   Component Value Date    MICROL 6 03/26/2018     No results found for: MICROALBUMIN Control   good    Check a HGBA1C , Compliance with the recommended diabetic diet was stressed, Regular aerobic exercise was encouraged, Home glucose monitoring encouraged, Annual eye exam recommended, Self foot exam demonstrated and recommended to be done nightly, Apply moisturizer to feet with in 3 minutes of showering or bathing,  "Follow up in clinic in 3-6 months for a diabetes check and Future labs ordered and the patient was instructed to make a lab appt 1 week prior to next diabetes visit    I discussed starting long acting insulin but he was not keen on that idea because it was an injection.  He would like to maximize his metformin   He has the 750 mg dose at home right now if needed.         BP/HTN:   BP Readings from Last 3 Encounters:   01/31/19 123/72   07/31/18 124/80   07/17/18 138/72     Control   good    - Medication:N/A  (make sure to order \"Ace not prescribed intentionally if not on an ACE/ARB)  The patient was advised to do the following therapuetic life style changes  - Dietary sodium restriction and increase potassium and Calcium intake  - Regular aerobic exercise  - Weight loss  - Discontinue smoking if applicable  - Avoid regular NSAID use if applicable  - Avoid regular decongestant use if applicable  - Follow up in clinic in 6 months for a recheck  - Check a basic metabolic panel today if needed    Patient Education: Reviewed risks of hypertension and principles of   Treatment.    HYPERCHOLESTEROLEMIA:     The 10-year ASCVD risk score ([place] ADRIANO Jr., et al., 2013) is: 23.9%    Values used to calculate the score:      Age: 53 years      Sex: Male      Is Non- : No      Diabetic: Yes      Tobacco smoker: Yes      Systolic Blood Pressure: 123 mmHg      Is BP treated: No      HDL Cholesterol: 35 mg/dL      Total Cholesterol: 233 mg/dL    Lab Results   Component Value Date     03/26/2018      Control   poor  Cholesterol   Date Value Ref Range Status   03/26/2018 233 (H) <200 mg/dL Final     Comment:     Desirable:       <200 mg/dl   01/13/2017 252 (H) <200 mg/dL Final     Comment:     Desirable:       <200 mg/dl     HDL Cholesterol   Date Value Ref Range Status   03/26/2018 35 (L) >39 mg/dL Final   01/13/2017 33 (L) >39 mg/dL Final     LDL Cholesterol Calculated   Date Value Ref Range Status "   03/26/2018 146 (H) <100 mg/dL Final     Comment:     Above desirable:  100-129 mg/dl  Borderline High:  130-159 mg/dL  High:             160-189 mg/dL  Very high:       >189 mg/dl     01/13/2017 172 (H) <100 mg/dL Final     Comment:     Above desirable:  100-129 mg/dl   Borderline High:  130-159 mg/dL   High:             160-189 mg/dL   Very high:       >189 mg/dl       Triglycerides   Date Value Ref Range Status   03/26/2018 262 (H) <150 mg/dL Final     Comment:     Borderline high:  150-199 mg/dl  High:             200-499 mg/dl  Very high:       >499 mg/dl     01/13/2017 234 (H) <150 mg/dL Final     Comment:     Borderline high:  150-199 mg/dl   High:             200-499 mg/dl   Very high:       >499 mg/dl   Fasting specimen       Cholesterol/HDL Ratio   Date Value Ref Range Status   09/03/2015 7.7 (H) 0.0 - 5.0 Final   12/11/2014 5.8 (H) 0.0 - 5.0 Final         The patient is not on a statin as he has been intolerant of them. He is taking zetia.       The patient's HDL is abnormal  The patient's triglycerides are abnormal.    He is not interested in additional medication at this time. He is struugling with his mother who is in hospice.   We are going to start the patient on lexapro 10 mg every day and have the patient follow up in 3-4 weeks.   I refilled his xanax and we discussed tolerance and addiction to BDZ. I am hopeful he will need the xanax less after starting lexapro and I told him this.

## 2019-02-01 LAB — TSH SERPL DL<=0.005 MIU/L-ACNC: 2.35 MU/L (ref 0.4–4)

## 2019-03-20 ENCOUNTER — TELEPHONE (OUTPATIENT)
Dept: FAMILY MEDICINE | Facility: CLINIC | Age: 54
End: 2019-03-20

## 2019-03-20 NOTE — TELEPHONE ENCOUNTER
Returned call to patient.     He is lying in a room with a migraine and is feeling dizzy from the migraine.     Offered appointment today with another provider in 2 hours since PCP has no openings. Patient declined.   Patient said he doesn't feel well enough to drive and come in today.   Made appointment for patient to see PCP tomorrow at 2:15pm.  Advised if he is able to come into urgent care this evening, it might be best.  Gave him hours UC is open.   He states he understands this.     Allie Foley RN, BSN

## 2019-03-20 NOTE — TELEPHONE ENCOUNTER
Patient is calling to speak with nurse or pcp about the migraine he has had for 3 days the medication is only allowing him to sleep wondering  if he can be seen,  offered other clinics but patient declined.     Please call to advice  Thank you

## 2019-03-21 ENCOUNTER — OFFICE VISIT (OUTPATIENT)
Dept: FAMILY MEDICINE | Facility: CLINIC | Age: 54
End: 2019-03-21
Payer: COMMERCIAL

## 2019-03-21 VITALS
OXYGEN SATURATION: 99 % | HEART RATE: 101 BPM | BODY MASS INDEX: 36.54 KG/M2 | HEIGHT: 71 IN | SYSTOLIC BLOOD PRESSURE: 122 MMHG | DIASTOLIC BLOOD PRESSURE: 78 MMHG | WEIGHT: 261 LBS | RESPIRATION RATE: 20 BRPM | TEMPERATURE: 99.4 F

## 2019-03-21 DIAGNOSIS — Z12.11 SCREEN FOR COLON CANCER: ICD-10-CM

## 2019-03-21 DIAGNOSIS — R11.2 NAUSEA AND VOMITING, INTRACTABILITY OF VOMITING NOT SPECIFIED, UNSPECIFIED VOMITING TYPE: Primary | ICD-10-CM

## 2019-03-21 DIAGNOSIS — G43.819 OTHER MIGRAINE WITHOUT STATUS MIGRAINOSUS, INTRACTABLE: ICD-10-CM

## 2019-03-21 DIAGNOSIS — Z13.89 SCREENING FOR DIABETIC PERIPHERAL NEUROPATHY: ICD-10-CM

## 2019-03-21 PROCEDURE — 96372 THER/PROPH/DIAG INJ SC/IM: CPT | Performed by: FAMILY MEDICINE

## 2019-03-21 PROCEDURE — 99215 OFFICE O/P EST HI 40 MIN: CPT | Mod: 25 | Performed by: FAMILY MEDICINE

## 2019-03-21 RX ORDER — ELETRIPTAN HYDROBROMIDE 40 MG/1
40 TABLET, FILM COATED ORAL
COMMUNITY
End: 2019-03-21

## 2019-03-21 RX ORDER — ONDANSETRON 8 MG/1
8 TABLET, FILM COATED ORAL EVERY 8 HOURS PRN
Qty: 21 TABLET | Refills: 1 | Status: SHIPPED | OUTPATIENT
Start: 2019-03-21 | End: 2019-09-19

## 2019-03-21 RX ORDER — KETOROLAC TROMETHAMINE 30 MG/ML
60 INJECTION, SOLUTION INTRAMUSCULAR; INTRAVENOUS ONCE
Status: COMPLETED | OUTPATIENT
Start: 2019-03-21 | End: 2019-03-21

## 2019-03-21 RX ORDER — HYDROCODONE BITARTRATE AND ACETAMINOPHEN 5; 325 MG/1; MG/1
1-2 TABLET ORAL EVERY 6 HOURS PRN
Qty: 10 TABLET | Refills: 0 | Status: SHIPPED | OUTPATIENT
Start: 2019-03-21 | End: 2019-09-05

## 2019-03-21 RX ORDER — ONDANSETRON 4 MG/1
4 TABLET, ORALLY DISINTEGRATING ORAL ONCE
Status: COMPLETED | OUTPATIENT
Start: 2019-03-21 | End: 2019-03-21

## 2019-03-21 RX ORDER — METFORMIN HYDROCHLORIDE 750 MG/1
750 TABLET, EXTENDED RELEASE ORAL
Refills: 0 | COMMUNITY
Start: 2018-06-19 | End: 2019-05-02

## 2019-03-21 RX ADMIN — KETOROLAC TROMETHAMINE 60 MG: 30 INJECTION, SOLUTION INTRAMUSCULAR; INTRAVENOUS at 15:27

## 2019-03-21 RX ADMIN — ONDANSETRON 4 MG: 4 TABLET, ORALLY DISINTEGRATING ORAL at 14:46

## 2019-03-21 ASSESSMENT — PAIN SCALES - GENERAL: PAINLEVEL: SEVERE PAIN (7)

## 2019-03-21 ASSESSMENT — MIFFLIN-ST. JEOR: SCORE: 2061.02

## 2019-03-21 NOTE — NURSING NOTE
"Chief Complaint   Patient presents with     Headache     migraine day 4, feel run down, in a fog,      Health Maintenance       Initial /78   Pulse 101   Temp 99.4  F (37.4  C) (Oral)   Resp 20   Ht 1.803 m (5' 11\")   Wt 118.4 kg (261 lb)   SpO2 99%   BMI 36.40 kg/m   Estimated body mass index is 36.4 kg/m  as calculated from the following:    Height as of this encounter: 1.803 m (5' 11\").    Weight as of this encounter: 118.4 kg (261 lb).  Medication Reconciliation: complete  Cait Mejias, BERNADETTE  "

## 2019-03-21 NOTE — PROGRESS NOTES
SUBJECTIVE:  Lan Potts Jr. is a 53 year old male who presents to [facility] for evaluation of a headache.  The current headache began Monday morning about 6 am     He has taken two Relpax per day.     He feels nauseated, he is sensitive to light and sound and has pain around his eyes.     He reports neck pain.   He can not sleep well and is waking up due to the pain.     He has some diarrhea.   He is tired and hot and cold.   He has had epigastric abdominal pain and is burping a lot.   He has had some trembling in his hands that comes and goes.     He has not had a migraine headache(s) last this long.     It is a 7-8 today was a 10 maximum in the last few day(s)          Past Medical History:   Diagnosis Date     Chronic back pain      Diabetes (H)      Migraine headaches      TINA (obstructive sleep apnea)     mild does not use CPAP at present     Seasonal allergies      Tobacco use disorder      Current Outpatient Medications   Medication Sig Dispense Refill     ACE/ARB/ARNI NOT PRESCRIBED, INTENTIONAL, Please choose reason not prescribed, below       ALPRAZolam (XANAX) 0.25 MG tablet TAKE 1 TAB BY MOUTH 3 TIMES DAILY AS NEEDED 60 tablet 0     aspirin 81 MG tablet Take 1 tablet (81 mg) by mouth daily 90 tablet 3     ASPIRIN NOT PRESCRIBED, INTENTIONAL, Antiplatelet medication not prescribed intentionally due to not indicated at this age 1 each 0     blood glucose (ACCU-CHEK SMARTVIEW) test strip 1 strip by In Vitro route 3 times daily 3 Box 3     blood glucose calibration (ACCU-CHEK SMARTVIEW CONTROL) solution 1 drop by In Vitro route as needed 1 each 3     blood glucose monitoring (ACCU-CHEK FASTCLIX) lancets 1 each 3 times daily 3 Box 3     eletriptan (RELPAX) 40 MG tablet Take 1 tablet (40 mg) by mouth at onset of headache May repeat in 2 hours as needed but no more than 2 pills in 24 hours. 12 tablet 5     escitalopram (LEXAPRO) 10 MG tablet Take 1 tablet (10 mg) by mouth every morning 30 tablet 1      "ezetimibe (ZETIA) 10 MG tablet TAKE 1 TABLET (10 MG) BY MOUTH DAILY 90 tablet 0     metFORMIN (GLUCOPHAGE-XR) 500 MG 24 hr tablet TAKE 2 TABLETS BY MOUTH TWICE A  tablet 0     omeprazole (PRILOSEC) 20 MG CR capsule Take 1 capsule (20 mg) by mouth daily 30 capsule 11     order for DME 15-20 mm mercury thigh high compression stockings 1-2 pairs 2 Package 5     order for DME 15-20 mm mercury thigh high compression stockings 1-2 pairs 2 Package 5     sildenafil (REVATIO) 20 MG tablet Take 1 to 5 tabs 30-60 minutes before intercourse.  Never use with nitroglycerin, terazosin or doxazosin. 30 tablet 11     STATIN NOT PRESCRIBED, INTENTIONAL, continuous prn for other Reported on 4/6/2017  0     triamcinolone (KENALOG) 0.1 % cream Apply topically 2 times daily Apply topically twice a day to the affected area Avoid contact with skin of face, armpits and groin 100 g 1     fexofenadine (ALLEGRA ALLERGY) 180 MG tablet Take by mouth daily Reported on 4/6/2017       fluticasone (FLONASE) 50 MCG/ACT spray SPRAY 2 SPRAYS INTO BOTH NOSTRILS DAILY (Patient not taking: Reported on 3/21/2019) 16 mL 3     metFORMIN (GLUCOPHAGE-XR) 750 MG 24 hr tablet 750 mg  0     Social History     Tobacco Use     Smoking status: Current Every Day Smoker     Types: Cigarettes     Smokeless tobacco: Never Used   Substance Use Topics     Alcohol use: Yes     Comment: occ         OBJECTIVE:  /78   Pulse 101   Temp 99.4  F (37.4  C) (Oral)   Resp 20   Ht 1.803 m (5' 11\")   Wt 118.4 kg (261 lb)   SpO2 99%   BMI 36.40 kg/m      There was not tenderness of the temporal arteries.    GENERAL APPEARANCE: healthy, alert and no distress  EYES: EOMI,  PERRL, conjunctiva clear  HENT: ear canals and TM's normal.  Nose and mouth without ulcers, erythema or lesions  NECK: supple, nontender, no lymphadenopathy  RESP: lungs clear to auscultation - no rales, rhonchi or wheezes  CV: regular rates and rhythm, normal S1 S2, no murmur noted  ABDOMEN:  " soft, nontender, no HSM or masses and bowel sounds normal  ABDOMEN: tenderness mild epigastric  NEURO: Normal strength and tone, sensory exam grossly normal,  normal speech and mentation  SKIN: no suspicious lesions or rashes    DTR's  Right bicep 1+  Right brachiradialis 1+  Right tricep Abscent  Left bicep 1+  Left brachioradialis 1+  Left tricepAbscent  DTR's  Right knee 1+  Right ankle Abscent  Left knee 1+  Left ankleAbscent  Strength was +5 globally in the upper extremities.    Strength was +5 globally in the lower extremities.    CN 2-12 intact    Cerebellar function showed intact  finger to nose, heel to shin testing and there was no dysdiadochokinesis        ASSESSMENT:  Intractable migraine    PLAN:  The patient was laid down in a dark room and given 8 mg of ODT zofran. I recheck on him in 20 minute(s) and her reported that his abdominal symptom(s) were significant(ly) improved but he still had the headache(s) and felt foggy and tired.     At that point we gave him an IM injection of toradol 60 mg and had him sip on water and juice. I checked on him in 10 minutes and he was wanting to leave to go home.   I offered to let him stay until he got some relief from the toradol but he declined.  He was given a written prescription(s) for 10 vicodin and instructed to take them when he got home.   He was given a written prescription(s) for zofran 8 mg and instructed to take then 8 hours after his dose here if needed.         Prior to injection, verified patient identity using patient's name and date of birth.  Due to injection administration, patient instructed to remain in clinic for 15 minutes  afterwards, and to report any adverse reaction to me immediately.    Ondansetron    Drug Amount Wasted:  None.  Vial/Syringe: Multi dose vial  Expiration Date:  07/2021    Gave 2 4 mg tablets    Prior to injection, verified patient identity using patient's name and date of birth.  Due to injection administration, patient  instructed to remain in clinic for 15 minutes  afterwards, and to report any adverse reaction to me immediately.    Ketorolac 60 mg    Drug Amount Wasted:  None.  Vial/Syringe: Multi dose vial  Expiration Date:  02/20    Cait Mejias cma

## 2019-03-29 ENCOUNTER — TELEPHONE (OUTPATIENT)
Dept: FAMILY MEDICINE | Facility: CLINIC | Age: 54
End: 2019-03-29

## 2019-03-29 NOTE — LETTER
Lan Potts Jr.  [address] [address] [facility] [address]          March 29, 2019          Dear Lan Potts Jr.      Our records indicate that you have not scheduled for a(n)Diabetic check , Fasting bloodwork and Colonoscopy which was recommended by your health care team. Monitoring and managing your preventative and chronic health conditions are very important to us.       If you have received your health care elsewhere, please provide us with that information so it can be documented in your chart.    Please call [phone] or message us through your WhatsApp account to schedule an appointment or provide information for your chart.     We look forward to seeing you and working with you on your health care needs.     Sincerely,   Eligio Quevedo MD/ms        *If you have already scheduled an appointment, please disregard this reminder

## 2019-03-29 NOTE — TELEPHONE ENCOUNTER
Panel Management Review      Patient has the following on his problem list:     Diabetes    ASA: Passed    Last A1C  Lab Results   Component Value Date    A1C 6.8 01/31/2019    A1C 6.8 07/12/2018    A1C 7.4 03/26/2018    A1C 7.9 10/26/2017    A1C 7.4 04/06/2017     A1C tested: Passed    Last LDL:    Lab Results   Component Value Date    CHOL 233 03/26/2018     Lab Results   Component Value Date    HDL 35 03/26/2018     Lab Results   Component Value Date     03/26/2018     Lab Results   Component Value Date    TRIG 262 03/26/2018     Lab Results   Component Value Date    CHOLHDLRATIO 7.7 09/03/2015     Lab Results   Component Value Date    NHDL 198 03/26/2018       Is the patient on a Statin? YES             Is the patient on Aspirin? YES    Medications     HMG CoA Reductase Inhibitors     STATIN NOT PRESCRIBED, INTENTIONAL,       Salicylates     aspirin 81 MG tablet             Last three blood pressure readings:  BP Readings from Last 3 Encounters:   03/21/19 122/78   01/31/19 123/72   07/31/18 124/80       Date of last diabetes office visit: 7/12/18     Tobacco History:     History   Smoking Status     Current Every Day Smoker     Types: Cigarettes   Smokeless Tobacco     Never Used           Composite cancer screening  Chart review shows that this patient is due/due soon for the following Colonoscopy  Summary:    Patient is due/failing the following:   Diabetic check and COLONOSCOPY, fasting lab work    Action needed:   Patient needs office visit for Diabetic check and COLONOSCOPY, and fasting lab work.    Type of outreach:    Sent letter.    Questions for provider review:    None                                                                                                                                    Cait Mejias cma       Chart routed to letter sent .

## 2019-03-31 DIAGNOSIS — F41.1 GAD (GENERALIZED ANXIETY DISORDER): ICD-10-CM

## 2019-03-31 NOTE — LETTER
April 2, 2019    Lan Potts Jr.  [address] [address] [facility] [address]    Dear Lan,       We recently received a refill request for escitalopram (LEXAPRO) 10 MG tablet.  We have refilled this for a one time 30 day supply only because you are due for a:    escitalopram (LEXAPRO) 10 MG tablet office visit      Please call at your earliest convenience so that there will not be a delay with your future refills.          Thank you,   Your [facility] Team/[facility]  [phone]

## 2019-04-02 RX ORDER — ESCITALOPRAM OXALATE 10 MG/1
10 TABLET ORAL EVERY MORNING
Qty: 30 TABLET | Refills: 0 | Status: SHIPPED | OUTPATIENT
Start: 2019-04-02 | End: 2019-04-03

## 2019-04-02 NOTE — TELEPHONE ENCOUNTER
"Medication is being filled for 1 time refill only due to:  Patient needs to be seen because due for generalized anxiety follow-up appointment.      Requested Prescriptions   Pending Prescriptions Disp Refills     escitalopram (LEXAPRO) 10 MG tablet [Pharmacy Med Name: ESCITALOPRAM 10 MG TABLET] 30 tablet 1     Sig: TAKE 1 TABLET (10 MG) BY MOUTH EVERY MORNING    SSRIs Protocol Passed - 3/31/2019  8:32 AM       Passed - Recent (12 mo) or future (30 days) visit within the authorizing provider's specialty    Patient had office visit in the last 12 months or has a visit in the next 30 days with authorizing provider or within the authorizing provider's specialty.  See \"Patient Info\" tab in inbasket, or \"Choose Columns\" in Meds & Orders section of the refill encounter.       Brittany Rios RN    "

## 2019-04-03 ENCOUNTER — TELEPHONE (OUTPATIENT)
Dept: FAMILY MEDICINE | Facility: CLINIC | Age: 54
End: 2019-04-03

## 2019-04-03 DIAGNOSIS — F41.1 GAD (GENERALIZED ANXIETY DISORDER): ICD-10-CM

## 2019-04-03 RX ORDER — ESCITALOPRAM OXALATE 10 MG/1
10 TABLET ORAL EVERY MORNING
Qty: 90 TABLET | Refills: 0 | Status: SHIPPED | OUTPATIENT
Start: 2019-04-03 | End: 2019-06-11

## 2019-04-03 NOTE — TELEPHONE ENCOUNTER
Patient states he was seen 3/21/19 and wants his lexapro refilled for a 90-day supply.      He was last seen for his anxiety by PCP 1/31/19 and started on lexapro.  He was told at that time to follow up with PCP in 1 month. Patient has been very busy with his mom in hospice.    Routing to provider to advise on refill of lexapro..  Allie Foley RN, BSN

## 2019-04-03 NOTE — TELEPHONE ENCOUNTER
Pt ins requires 90 day supply for Escitalopram and states he was seen for f/u appt. Need 90 day rx asap as down to 3 tabs. Thanks.

## 2019-04-03 NOTE — TELEPHONE ENCOUNTER
I refilled once for 90 day(s)   He can follow up then as it sounds like he is struggling with his mom right now.

## 2019-04-12 ENCOUNTER — TELEPHONE (OUTPATIENT)
Dept: FAMILY MEDICINE | Facility: CLINIC | Age: 54
End: 2019-04-12

## 2019-04-12 NOTE — TELEPHONE ENCOUNTER
Panel Management Review      Patient has the following on his problem list:     Diabetes    ASA: Passed    Last A1C  Lab Results   Component Value Date    A1C 6.8 01/31/2019    A1C 6.8 07/12/2018    A1C 7.4 03/26/2018    A1C 7.9 10/26/2017    A1C 7.4 04/06/2017     A1C tested: Passed    Last LDL:    Lab Results   Component Value Date    CHOL 233 03/26/2018     Lab Results   Component Value Date    HDL 35 03/26/2018     Lab Results   Component Value Date     03/26/2018     Lab Results   Component Value Date    TRIG 262 03/26/2018     Lab Results   Component Value Date    CHOLHDLRATIO 7.7 09/03/2015     Lab Results   Component Value Date    NHDL 198 03/26/2018       Is the patient on a Statin? YES             Is the patient on Aspirin? YES    Medications     HMG CoA Reductase Inhibitors     STATIN NOT PRESCRIBED, INTENTIONAL,       Salicylates     aspirin 81 MG tablet             Last three blood pressure readings:  BP Readings from Last 3 Encounters:   03/21/19 122/78   01/31/19 123/72   07/31/18 124/80       Date of last diabetes office visit: 1/31/19     Tobacco History:     History   Smoking Status     Current Every Day Smoker     Types: Cigarettes   Smokeless Tobacco     Never Used           Composite cancer screening  Chart review shows that this patient is due/due soon for the following Colonoscopy  Summary:    Patient is due/failing the following:   Outreach has been made 3/29/19    Action needed:   Diabetic check, fasting labs    Type of outreach:    none at this time    Questions for provider review:    None                                                                                                                                    Cait Mejias cma       Chart routed to closed .

## 2019-04-16 DIAGNOSIS — E11.9 TYPE 2 DIABETES MELLITUS WITHOUT COMPLICATION, WITHOUT LONG-TERM CURRENT USE OF INSULIN (H): ICD-10-CM

## 2019-04-16 RX ORDER — METFORMIN HCL 500 MG
TABLET, EXTENDED RELEASE 24 HR ORAL
Qty: 360 TABLET | Refills: 0 | Status: SHIPPED | OUTPATIENT
Start: 2019-04-16 | End: 2019-05-02

## 2019-04-18 DIAGNOSIS — F41.1 GAD (GENERALIZED ANXIETY DISORDER): ICD-10-CM

## 2019-04-18 RX ORDER — ALPRAZOLAM 0.25 MG
TABLET ORAL
Qty: 30 TABLET | Refills: 0 | Status: SHIPPED | OUTPATIENT
Start: 2019-04-18 | End: 2019-05-13

## 2019-04-18 NOTE — TELEPHONE ENCOUNTER
Controlled Substance Refill Request for xanax  Problem List Complete:  No     PROVIDER TO CONSIDER COMPLETION OF PROBLEM LIST AND OVERVIEW/CONTROLLED SUBSTANCE AGREEMENT    Last Written Prescription Date:  1/31/19  Last Fill Quantity: 60,   # refills: 0    THE MOST RECENT OFFICE VISIT MUST BE WITHIN THE PAST 3 MONTHS. AT LEAST ONE FACE TO FACE VISIT MUST OCCUR EVERY 6 MONTHS. ADDITIONAL VISITS CAN BE VIRTUAL.  (THIS STATEMENT SHOULD BE DELETED.)    [place] [facility] [place] 3/21/19    Future Office visit:   Next 5 appointments (look out 90 days)    May 02, 2019  5:15 PM CDT  SHORT with Eligio Quevedo MD  [facility] ([facility] [address] [facility] [address] [phone]          Controlled substance agreement:   Encounter-Level CSA:    There are no encounter-level csa.     Patient-Level CSA:    There are no patient-level csa.         Last Urine Drug Screen: No results found for: CDAUT, No results found for: COMDAT, No results found for: THC13, PCP13, COC13, MAMP13, OPI13, AMP13, BZO13, TCA13, MTD13, BAR13, OXY13, PPX13, BUP13     [email or web address]     checked in past 3 months?  Yes 4/18/19 no concerns     Allie Foley RN, BSN

## 2019-04-19 NOTE — TELEPHONE ENCOUNTER
The requested prescription(s) has/have been approved and has/have been printed and signed. This note was forwarded to the patient care pool to contact the patient to arrange for the patient to obtain the signed prescription(s).  Eligio Quveedo

## 2019-05-02 ENCOUNTER — OFFICE VISIT (OUTPATIENT)
Dept: FAMILY MEDICINE | Facility: CLINIC | Age: 54
End: 2019-05-02
Payer: COMMERCIAL

## 2019-05-02 VITALS
OXYGEN SATURATION: 96 % | RESPIRATION RATE: 20 BRPM | TEMPERATURE: 98.9 F | HEART RATE: 84 BPM | SYSTOLIC BLOOD PRESSURE: 122 MMHG | BODY MASS INDEX: 37.66 KG/M2 | HEIGHT: 71 IN | WEIGHT: 269 LBS | DIASTOLIC BLOOD PRESSURE: 70 MMHG

## 2019-05-02 DIAGNOSIS — E11.9 TYPE 2 DIABETES MELLITUS WITHOUT COMPLICATION, WITHOUT LONG-TERM CURRENT USE OF INSULIN (H): ICD-10-CM

## 2019-05-02 DIAGNOSIS — Z12.11 SCREEN FOR COLON CANCER: ICD-10-CM

## 2019-05-02 DIAGNOSIS — Z13.89 SCREENING FOR DIABETIC PERIPHERAL NEUROPATHY: ICD-10-CM

## 2019-05-02 DIAGNOSIS — Z23 NEED FOR VACCINATION: Primary | ICD-10-CM

## 2019-05-02 LAB — HBA1C MFR BLD: 6.5 % (ref 0–5.6)

## 2019-05-02 PROCEDURE — 99207 C FOOT EXAM  NO CHARGE: CPT | Performed by: FAMILY MEDICINE

## 2019-05-02 PROCEDURE — 83036 HEMOGLOBIN GLYCOSYLATED A1C: CPT | Performed by: FAMILY MEDICINE

## 2019-05-02 PROCEDURE — 99214 OFFICE O/P EST MOD 30 MIN: CPT | Mod: 25 | Performed by: FAMILY MEDICINE

## 2019-05-02 PROCEDURE — 36415 COLL VENOUS BLD VENIPUNCTURE: CPT | Performed by: FAMILY MEDICINE

## 2019-05-02 PROCEDURE — 90746 HEPB VACCINE 3 DOSE ADULT IM: CPT | Performed by: FAMILY MEDICINE

## 2019-05-02 PROCEDURE — 90471 IMMUNIZATION ADMIN: CPT | Performed by: FAMILY MEDICINE

## 2019-05-02 PROCEDURE — 80053 COMPREHEN METABOLIC PANEL: CPT | Performed by: FAMILY MEDICINE

## 2019-05-02 RX ORDER — METFORMIN HYDROCHLORIDE 750 MG/1
750 TABLET, EXTENDED RELEASE ORAL 2 TIMES DAILY WITH MEALS
Qty: 180 TABLET | Refills: 3 | Status: SHIPPED | OUTPATIENT
Start: 2019-05-02 | End: 2020-05-11

## 2019-05-02 RX ORDER — DIPHENHYDRAMINE HCL 25 MG
25 CAPSULE ORAL EVERY 6 HOURS PRN
COMMUNITY
End: 2019-09-19

## 2019-05-02 RX ORDER — METFORMIN HCL 500 MG
500 TABLET, EXTENDED RELEASE 24 HR ORAL 2 TIMES DAILY WITH MEALS
Qty: 180 TABLET | Refills: 3 | Status: SHIPPED | OUTPATIENT
Start: 2019-05-02 | End: 2019-09-19

## 2019-05-02 ASSESSMENT — MIFFLIN-ST. JEOR: SCORE: 2097.31

## 2019-05-02 ASSESSMENT — PAIN SCALES - GENERAL: PAINLEVEL: NO PAIN (0)

## 2019-05-02 NOTE — PROGRESS NOTES
[place] diabetes resourses:  [email or web address]        To access diabetes educational materials with in EPIC use <dot>DALE      Put this in AFTER VISIT SUMMARY if needed for the patient to access information online [email or web address]      SUBJECTIVE:  Lan Potts Jr. is a 53 year old male who presents today for a follow up appointment for management of DIABETES MELLITUS.    Patient Active Problem List    Diagnosis Date Noted     Morbid obesity (H) 07/12/2018     Priority: Medium     Umbilical hernia without obstruction and without gangrene 07/12/2018     Priority: Medium     Varicose veins of left lower extremity 07/12/2018     Priority: Medium     Tobacco abuse 04/05/2018     Priority: Medium     Erectile dysfunction, unspecified erectile dysfunction type 04/05/2018     Priority: Medium     10 year risk of MI or stroke 7.5% or greater, 12.6% in April 2017 04/06/2017     Priority: Medium     Type 2 diabetes mellitus without complication, without long-term current use of insulin (H) 01/05/2017     Priority: Medium     Migraine without status migrainosus, not intractable, unspecified migraine type 01/21/2016     Priority: Medium     High triglycerides 12/11/2014     Priority: Medium     HDL deficiency 12/11/2014     Priority: Medium     Hyperlipidemia with target LDL less than 100 02/17/2014     Priority: Medium     Diagnosis updated by automated process. Provider to review and confirm.       Chronic back pain      Priority: Medium     Seasonal allergies      Priority: Medium      The patient checks his blood sugar as follows: 3 times a week, once daily.   Results as follows: post-supper glucose- 150-160's    The patient reports that he IS taking the medication as prescribed.    ----------------------------------------------------------------------------------------------------------------------------------------    BP Readings from Last 3 Encounters:   05/02/19 122/70   03/21/19 122/78   01/31/19 123/72     The patient reports that he IS currently smoking.  History   Smoking Status     Current Every Day Smoker     Types: Cigarettes   Smokeless Tobacco     Never Used           The 10-year ASCVD risk score (Maribel OH Jr., et al., 2013) is: 23.6%    Values used to calculate the score:      Age: 53 years      Sex: Male      Is Non- : No      Diabetic: Yes      Tobacco smoker: Yes      Systolic Blood Pressure: 122 mmHg      Is BP treated: No      HDL Cholesterol: 35 mg/dL      Total Cholesterol: 233 mg/dL        Current Outpatient Medications   Medication Sig Dispense Refill     ACE/ARB/ARNI NOT PRESCRIBED, INTENTIONAL, Please choose reason not prescribed, below       ALPRAZolam (XANAX) 0.25 MG tablet TAKE 1 TABLET BY MOUTH THREE TIMES DAILY AS NEEDED. 30 tablet 0     aspirin 81 MG tablet Take 1 tablet (81 mg) by mouth daily 90 tablet 3     ASPIRIN NOT PRESCRIBED, INTENTIONAL, Antiplatelet medication not prescribed intentionally due to not indicated at this age 1 each 0     blood glucose (ACCU-CHEK SMARTVIEW) test strip 1 strip by In Vitro route 3 times daily 3 Box 3     blood glucose calibration (ACCU-CHEK SMARTVIEW CONTROL) solution 1 drop by In Vitro route as needed 1 each 3     blood glucose monitoring (ACCU-CHEK FASTCLIX) lancets 1 each 3 times daily 3 Box 3     diphenhydrAMINE (BENADRYL) 25 MG capsule Take 25 mg by mouth every 6 hours as needed for itching or allergies       eletriptan (RELPAX) 40 MG tablet Take 1 tablet (40 mg) by mouth at onset of headache May repeat in 2 hours as needed but no more than 2 pills in 24 hours. 12 tablet 5     escitalopram (LEXAPRO) 10 MG tablet Take 1 tablet (10 mg) by mouth every morning 90 tablet 0     ezetimibe (ZETIA) 10 MG  tablet TAKE 1 TABLET (10 MG) BY MOUTH DAILY 90 tablet 0     fexofenadine (ALLEGRA ALLERGY) 180 MG tablet Take by mouth daily Reported on 4/6/2017       fluticasone (FLONASE) 50 MCG/ACT spray SPRAY 2 SPRAYS INTO BOTH NOSTRILS DAILY 16 mL 3     HYDROcodone-acetaminophen (NORCO) 5-325 MG tablet Take 1-2 tablets by mouth every 6 hours as needed for pain 10 tablet 0     metFORMIN (GLUCOPHAGE-XR) 500 MG 24 hr tablet TAKE 2 TABLETS BY MOUTH TWICE A  tablet 0     metFORMIN (GLUCOPHAGE-XR) 750 MG 24 hr tablet 750 mg  0     omeprazole (PRILOSEC) 20 MG CR capsule Take 1 capsule (20 mg) by mouth daily 30 capsule 11     ondansetron (ZOFRAN) 8 MG tablet Take 1 tablet (8 mg) by mouth every 8 hours as needed for nausea 21 tablet 1     order for DME 15-20 mm mercury thigh high compression stockings 1-2 pairs 2 Package 5     order for DME 15-20 mm mercury thigh high compression stockings 1-2 pairs 2 Package 5     sildenafil (REVATIO) 20 MG tablet Take 1 to 5 tabs 30-60 minutes before intercourse.  Never use with nitroglycerin, terazosin or doxazosin. 30 tablet 11     STATIN NOT PRESCRIBED, INTENTIONAL, continuous prn for other Reported on 4/6/2017  0     triamcinolone (KENALOG) 0.1 % cream Apply topically 2 times daily Apply topically twice a day to the affected area Avoid contact with skin of face, armpits and groin 100 g 1       The patient reports that he IS NOT taking a statin.   (Reminder all diabetics with a ASCVD risk greater or equal to 7.5% should be on a high intensity statin, otherwise on a moderate intensity statin)  he is not taking a statin because as he is intolerant of them .   (Reminder to intentionally not prescribe statin in )    The patient reports that he IS taking  aspirin daily.  (Reminder all diabetic patients with a cardiovascular risk factor and > 50 should take a daily aspirin)     The patient reports that he IS doing a self foot exam weekly.    The patient reports that he IS following the  "recommended diabetic diet.   The patient reports that his last eye exam was 1 years ago.       Immunization History   Administered Date(s) Administered     HepB 04/06/2017     HepB-Adult 04/05/2018     Influenza (IIV3) PF 12/21/2012, 09/27/2013     Influenza Quad, Recombinant, p-free (RIV4) 09/26/2018     Influenza Vaccine IM 3yrs+ 4 Valent IIV4 10/26/2017     Pneumococcal 23 valent 09/04/2014     TDAP Vaccine (Adacel) 09/20/2011     Zoster vaccine recombinant adjuvanted (SHINGRIX) 07/12/2018, 09/26/2018     The patient reports that he has started the hepatitis B vaccine series in the past. (recommended for age 19-59 and can be given to age 60 or older)  The patient reports that he has had a pnuemovax in the past.  The patient reports that he has had a flu shot for the current influenza season.  The patient would like to have a Hepatitis B    Patient concerns: has no specific complaints and has been feeling well.        EXAM:  /70   Pulse 84   Temp 98.9  F (37.2  C) (Oral)   Resp 20   Ht 1.803 m (5' 11\")   Wt 122 kg (269 lb)   SpO2 96%   BMI 37.52 kg/m    Wt Readings from Last 4 Encounters:   05/02/19 122 kg (269 lb)   03/21/19 118.4 kg (261 lb)   01/31/19 123.8 kg (273 lb)   07/31/18 123.8 kg (273 lb)     Body mass index is 37.52 kg/m .    General:  Lan is awake, alert, and cooperative.  NAD.      Diabetic Foot Screen:  Any complaints of increased pain or numbness ? No  Is there a foot ulcer now or a history of foot ulcer? No  Does the foot have an abnormal shape? No  Are the nails thick, too long or ingrown? No  Are there any redness or open areas? No         Sensation Testing done at all points on the diagram with monofilament     Right Foot: Sensation Normal at all points  Left Foot: Sensation Normal at all points     Risk Category: 0- No loss of protective sensation  Performed by Eligio Quevedo MD                  Previsit labs drawn include:  Office Visit on 01/31/2019   Component Date Value " "Ref Range Status     Hemoglobin A1C 01/31/2019 6.8* 0 - 5.6 % Final    Comment: Normal <5.7% Prediabetes 5.7-6.4%  Diabetes 6.5% or higher - adopted from ADA   consensus guidelines.       TSH 01/31/2019 2.35  0.40 - 4.00 mU/L Final         ASSESSMENT and PLAN:    Type II Diabetes.    DIABETES Type II:                       Lab Results   Component Value Date    A1C 6.8 01/31/2019       Control   unable to assess  Lab Results   Component Value Date    A1C 6.8 01/31/2019    A1C 6.8 07/12/2018    A1C 7.4 03/26/2018     Lab Results   Component Value Date    MICROL 6 03/26/2018     No results found for: MICROALBUMIN Control   good    Check a HGBA1C , Compliance with the recommended diabetic diet was stressed, Regular aerobic exercise was encouraged, Weight loss was encouraged, Home glucose monitoring encouraged, Annual eye exam recommended, Self foot exam demonstrated and recommended to be done nightly, Apply moisturizer to feet with in 3 minutes of showering or bathing, Follow up in clinic in 3 months for a diabetes check and Future labs ordered and the patient was instructed to make a lab appt 1 week prior to next diabetes visit      BP/HTN:   BP Readings from Last 3 Encounters:   05/02/19 122/70   03/21/19 122/78   01/31/19 123/72     Control   good    - Medication:continue the current doses of medication.  (make sure to order \"Ace not prescribed intentionally if not on an ACE/ARB)  The patient was advised to do the following therapuetic life style changes  - Dietary sodium restriction and increase potassium and Calcium intake  - Regular aerobic exercise  - Weight loss  - Discontinue smoking if applicable  - Avoid regular NSAID use if applicable  - Avoid regular decongestant use if applicable  - Follow up in clinic in 3 months for a recheck  - Check a basic metabolic panel today if needed    Patient Education: Reviewed risks of hypertension and principles of   Treatment.    HYPERCHOLESTEROLEMIA:     The 10-year ASCVD " risk score (Maribel OH Jr., et al., 2013) is: 23.6%    Values used to calculate the score:      Age: 53 years      Sex: Male      Is Non- : No      Diabetic: Yes      Tobacco smoker: Yes      Systolic Blood Pressure: 122 mmHg      Is BP treated: No      HDL Cholesterol: 35 mg/dL      Total Cholesterol: 233 mg/dL    Lab Results   Component Value Date     03/26/2018      Control   poor  Cholesterol   Date Value Ref Range Status   03/26/2018 233 (H) <200 mg/dL Final     Comment:     Desirable:       <200 mg/dl   01/13/2017 252 (H) <200 mg/dL Final     Comment:     Desirable:       <200 mg/dl     HDL Cholesterol   Date Value Ref Range Status   03/26/2018 35 (L) >39 mg/dL Final   01/13/2017 33 (L) >39 mg/dL Final     LDL Cholesterol Calculated   Date Value Ref Range Status   03/26/2018 146 (H) <100 mg/dL Final     Comment:     Above desirable:  100-129 mg/dl  Borderline High:  130-159 mg/dL  High:             160-189 mg/dL  Very high:       >189 mg/dl     01/13/2017 172 (H) <100 mg/dL Final     Comment:     Above desirable:  100-129 mg/dl   Borderline High:  130-159 mg/dL   High:             160-189 mg/dL   Very high:       >189 mg/dl       Triglycerides   Date Value Ref Range Status   03/26/2018 262 (H) <150 mg/dL Final     Comment:     Borderline high:  150-199 mg/dl  High:             200-499 mg/dl  Very high:       >499 mg/dl     01/13/2017 234 (H) <150 mg/dL Final     Comment:     Borderline high:  150-199 mg/dl   High:             200-499 mg/dl   Very high:       >499 mg/dl   Fasting specimen       Cholesterol/HDL Ratio   Date Value Ref Range Status   09/03/2015 7.7 (H) 0.0 - 5.0 Final   12/11/2014 5.8 (H) 0.0 - 5.0 Final         The patient is not on a statin as he is intolerant of them.   He is taking zetia.      (If LDL>69 on maximum dose statin and patient has CAD/ASCVD add additional LDL lowering therapy)  The patient's HDL is abnormal  The patient's triglycerides are  abnormal.    We have discussed the results and we will continue the current management for now.     Screening Questionnaire for Adult Immunization    Are you sick today?   No   Do you have allergies to medications, food, a vaccine component or latex?   No   Have you ever had a serious reaction after receiving a vaccination?   No   Do you have a long-term health problem with heart disease, lung disease, asthma, kidney disease, metabolic disease (e.g. diabetes), anemia, or other blood disorder?   No   Do you have cancer, leukemia, HIV/AIDS, or any other immune system problem?   No   In the past 3 months, have you taken medications that affect  your immune system, such as prednisone, other steroids, or anticancer drugs; drugs for the treatment of rheumatoid arthritis, Crohn s disease, or psoriasis; or have you had radiation treatments?   No   Have you had a seizure, or a brain or other nervous system problem?   No   During the past year, have you received a transfusion of blood or blood     products, or been given immune (gamma) globulin or antiviral drug?   No   For women: Are you pregnant or is there a chance you could become        pregnant during the next month?   No   Have you received any vaccinations in the past 4 weeks?   No     Immunization questionnaire answers were all negative.        Per orders of Dr. Quevedo, injection of Hep b given by Cait Mejias. Patient instructed to remain in clinic for 15 minutes afterwards, and to report any adverse reaction to me immediately.       Screening performed by Cait Mejias on 5/2/2019 at 5:54 PM.    Prior to injection, verified patient identity using patient's name and date of birth.  Due to injection administration, patient instructed to remain in clinic for 15 minutes  afterwards, and to report any adverse reaction to me immediately.

## 2019-05-02 NOTE — NURSING NOTE
"Chief Complaint   Patient presents with     Diabetes     Health Maintenance     orders pended, PHQ-9, Physical       Initial /70   Pulse 84   Temp 98.9  F (37.2  C) (Oral)   Resp 20   Ht 1.803 m (5' 11\")   Wt 122 kg (269 lb)   SpO2 96%   BMI 37.52 kg/m   Estimated body mass index is 37.52 kg/m  as calculated from the following:    Height as of this encounter: 1.803 m (5' 11\").    Weight as of this encounter: 122 kg (269 lb).  Medication Reconciliation: complete  Cait Mejias CMA  "

## 2019-05-03 LAB
ALBUMIN SERPL-MCNC: 4 G/DL (ref 3.4–5)
ALP SERPL-CCNC: 100 U/L (ref 40–150)
ALT SERPL W P-5'-P-CCNC: 29 U/L (ref 0–70)
ANION GAP SERPL CALCULATED.3IONS-SCNC: 4 MMOL/L (ref 3–14)
AST SERPL W P-5'-P-CCNC: 12 U/L (ref 0–45)
BILIRUB SERPL-MCNC: 0.4 MG/DL (ref 0.2–1.3)
BUN SERPL-MCNC: 12 MG/DL (ref 7–30)
CALCIUM SERPL-MCNC: 9 MG/DL (ref 8.5–10.1)
CHLORIDE SERPL-SCNC: 107 MMOL/L (ref 94–109)
CO2 SERPL-SCNC: 29 MMOL/L (ref 20–32)
CREAT SERPL-MCNC: 0.71 MG/DL (ref 0.66–1.25)
GFR SERPL CREATININE-BSD FRML MDRD: >90 ML/MIN/{1.73_M2}
GLUCOSE SERPL-MCNC: 97 MG/DL (ref 70–99)
POTASSIUM SERPL-SCNC: 4.8 MMOL/L (ref 3.4–5.3)
PROT SERPL-MCNC: 7.3 G/DL (ref 6.8–8.8)
SODIUM SERPL-SCNC: 140 MMOL/L (ref 133–144)

## 2019-05-03 ASSESSMENT — ANXIETY QUESTIONNAIRES
1. FEELING NERVOUS, ANXIOUS, OR ON EDGE: NOT AT ALL
3. WORRYING TOO MUCH ABOUT DIFFERENT THINGS: NOT AT ALL
GAD7 TOTAL SCORE: 0
2. NOT BEING ABLE TO STOP OR CONTROL WORRYING: NOT AT ALL
IF YOU CHECKED OFF ANY PROBLEMS ON THIS QUESTIONNAIRE, HOW DIFFICULT HAVE THESE PROBLEMS MADE IT FOR YOU TO DO YOUR WORK, TAKE CARE OF THINGS AT HOME, OR GET ALONG WITH OTHER PEOPLE: NOT DIFFICULT AT ALL
7. FEELING AFRAID AS IF SOMETHING AWFUL MIGHT HAPPEN: NOT AT ALL
6. BECOMING EASILY ANNOYED OR IRRITABLE: NOT AT ALL
5. BEING SO RESTLESS THAT IT IS HARD TO SIT STILL: NOT AT ALL

## 2019-05-03 ASSESSMENT — PATIENT HEALTH QUESTIONNAIRE - PHQ9
SUM OF ALL RESPONSES TO PHQ QUESTIONS 1-9: 6
5. POOR APPETITE OR OVEREATING: NOT AT ALL

## 2019-05-03 NOTE — RESULT ENCOUNTER NOTE
Lan,  I have reviewed the results of the laboratory tests that we recently ordered. All of the lab work performed was normal or considered normal for you. Hemoglobin A1C looks better. Nice work!!!  Sincerely,   Eligio Quevedo

## 2019-05-04 ASSESSMENT — ANXIETY QUESTIONNAIRES: GAD7 TOTAL SCORE: 0

## 2019-05-13 DIAGNOSIS — F41.1 GAD (GENERALIZED ANXIETY DISORDER): ICD-10-CM

## 2019-05-13 NOTE — TELEPHONE ENCOUNTER
Controlled Substance Refill Request for xanax  Problem List Complete:  No      PROVIDER TO CONSIDER COMPLETION OF PROBLEM LIST AND OVERVIEW/CONTROLLED SUBSTANCE AGREEMENT     Last Written Prescription Date:  4/18/19  (25 days ago)  Last Fill Quantity: 30,   # refills: 0     THE MOST RECENT OFFICE VISIT MUST BE WITHIN THE PAST 3 MONTHS. AT LEAST ONE FACE TO FACE VISIT MUST OCCUR EVERY 6 MONTHS. ADDITIONAL VISITS CAN BE VIRTUAL.  (THIS STATEMENT SHOULD BE DELETED.)     Last Office Visit with [facility] [place] care provider: 3/21/19     Future Office visit:       Next 5 appointments (look out 90 days)    May 02, 2019  5:15 PM CDT  SHORT with Eligio Quevedo MD  [facility] ([facility]) [address] [facility] [address] [phone]             Controlled substance agreement:   Encounter-Level CSA:    There are no encounter-level csa.      Patient-Level CSA:    There are no patient-level csa.            Last Urine Drug Screen: No results found for: CDAUT, No results found for: COMDAT, No results found for: THC13, PCP13, COC13, MAMP13, OPI13, AMP13, BZO13, TCA13, MTD13, BAR13, OXY13, PPX13, BUP13      [email or web address]      checked in past 3 months?  Yes 4/18/19 no concerns      Allie Foley RN, BSN

## 2019-05-13 NOTE — TELEPHONE ENCOUNTER
He was given 30 tabs and can be used up to TID. Please have another provider sign the prescription(s) in my absence.   Eligio Quevedo MD

## 2019-05-14 RX ORDER — ALPRAZOLAM 0.25 MG
TABLET ORAL
Qty: 30 TABLET | Refills: 0 | Status: SHIPPED | OUTPATIENT
Start: 2019-05-14 | End: 2019-06-06

## 2019-06-06 DIAGNOSIS — F41.1 GAD (GENERALIZED ANXIETY DISORDER): ICD-10-CM

## 2019-06-06 RX ORDER — ALPRAZOLAM 0.25 MG
TABLET ORAL
Qty: 30 TABLET | Refills: 0 | Status: SHIPPED | OUTPATIENT
Start: 2019-06-06 | End: 2019-06-25

## 2019-06-06 NOTE — TELEPHONE ENCOUNTER
Controlled Substance Refill Request for xanax  Problem List Complete:  No      PROVIDER TO CONSIDER COMPLETION OF PROBLEM LIST AND OVERVIEW/CONTROLLED SUBSTANCE AGREEMENT     Last Written Prescription Date:  5/14/19  (23 days ago)  Last Fill Quantity: 30,   # refills: 0     THE MOST RECENT OFFICE VISIT MUST BE WITHIN THE PAST 3 MONTHS. AT LEAST ONE FACE TO FACE VISIT MUST OCCUR EVERY 6 MONTHS. ADDITIONAL VISITS CAN BE VIRTUAL.  (THIS STATEMENT SHOULD BE DELETED.)     Last Office Visit with [facility] primary care provider: 5/2/19     Future Office visit:         Next 5 appointments (look out 90 days)     August 15, 2019 [place]  Diabetic visit         Controlled substance agreement:   Encounter-Level CSA:    There are no encounter-level csa.      Patient-Level CSA:    There are no patient-level csa.            Last Urine Drug Screen: No results found for: CDAUT, No results found for: COMDAT, No results found for: THC13, PCP13, COC13, MAMP13, OPI13, AMP13, BZO13, TCA13, MTD13, BAR13, OXY13, PPX13, BUP13      [email or web address]      checked in past 3 months?  Yes 4/18/19 no concerns      Allie Foley RN, BSN

## 2019-06-11 DIAGNOSIS — F41.1 GAD (GENERALIZED ANXIETY DISORDER): ICD-10-CM

## 2019-06-12 DIAGNOSIS — J30.2 SEASONAL ALLERGIC RHINITIS: ICD-10-CM

## 2019-06-13 RX ORDER — FLUTICASONE PROPIONATE 50 MCG
SPRAY, SUSPENSION (ML) NASAL
Qty: 16 ML | Refills: 2 | Status: SHIPPED | OUTPATIENT
Start: 2019-06-13 | End: 2019-08-05

## 2019-06-13 RX ORDER — ESCITALOPRAM OXALATE 10 MG/1
10 TABLET ORAL EVERY MORNING
Qty: 90 TABLET | Refills: 1 | Status: SHIPPED | OUTPATIENT
Start: 2019-06-13 | End: 2019-08-15

## 2019-06-13 NOTE — TELEPHONE ENCOUNTER
Prescription approved per [facility] Refill Protocol.    Last Written Prescription Date:     Disp Refills Start End JAREK   fluticasone (FLONASE) 50 MCG/ACT spray 16 mL 3 10/8/2018  No   Sig: SPRAY 2 SPRAYS INTO BOTH NOSTRILS DAILY     Future Office Visit:   Next 5 appointments (look out 90 days)    Aug 15, 2019  5:15 PM CDT  SHORT with Eligio Quevedo MD  [facility] ([facility]) [address] [facility] [address] [phone]           Requested Prescriptions   Pending Prescriptions Disp Refills     fluticasone (FLONASE) 50 MCG/ACT nasal spray [Pharmacy Med Name: FLUTICASONE PROP 50 MCG SPRAY] 16 mL 2     Sig: SPRAY 2 SPRAYS INTO BOTH NOSTRILS DAILY       Inhaled Steroids Protocol Passed - 6/12/2019  3:35 AM     ALESHA Contreras, RN

## 2019-06-25 DIAGNOSIS — F41.1 GAD (GENERALIZED ANXIETY DISORDER): ICD-10-CM

## 2019-06-26 RX ORDER — ALPRAZOLAM 0.25 MG
TABLET ORAL
Qty: 30 TABLET | Refills: 0 | Status: SHIPPED | OUTPATIENT
Start: 2019-06-26 | End: 2019-08-10

## 2019-06-26 NOTE — TELEPHONE ENCOUNTER
Controlled Substance Refill Request for xanax  Problem List Complete:  No      PROVIDER TO CONSIDER COMPLETION OF PROBLEM LIST AND OVERVIEW/CONTROLLED SUBSTANCE AGREEMENT         Last Written Prescription Dates:      6/7/19  (19 days ago)  #30,  0 RF    5/14/19  (23 days ago) #30   0 RF             THE MOST RECENT OFFICE VISIT MUST BE WITHIN THE PAST 3 MONTHS. AT LEAST ONE FACE TO FACE VISIT MUST OCCUR EVERY 6 MONTHS. ADDITIONAL VISITS CAN BE VIRTUAL.  (THIS STATEMENT SHOULD BE DELETED.)     [place] [facility] [place]: 5/2/19     Future Office visit:         Next 5 appointments (look out 90 days)     August 15, 2019 [place]  Diabetic visit         Controlled substance agreement:   Encounter-Level CSA:    There are no encounter-level csa.      Patient-Level CSA:    There are no patient-level csa.            Last Urine Drug Screen: No results found for: CDAUT, No results found for: COMDAT, No results found for: THC13, PCP13, COC13, MAMP13, OPI13, AMP13, BZO13, TCA13, MTD13, BAR13, OXY13, PPX13, BUP13      [email or web address]      checked in past 3 months?  Yes 6/26/19, early refills.  See above.     Allie Foley RN, BSN

## 2019-07-07 DIAGNOSIS — J30.2 SEASONAL ALLERGIC RHINITIS: ICD-10-CM

## 2019-07-10 RX ORDER — FLUTICASONE PROPIONATE 50 MCG
SPRAY, SUSPENSION (ML) NASAL
Qty: 16 ML | Refills: 2 | OUTPATIENT
Start: 2019-07-10

## 2019-07-16 DIAGNOSIS — E11.9 TYPE 2 DIABETES MELLITUS WITHOUT COMPLICATION, WITHOUT LONG-TERM CURRENT USE OF INSULIN (H): ICD-10-CM

## 2019-07-17 DIAGNOSIS — I83.813 VARICOSE VEINS OF LEG WITH PAIN, BILATERAL: ICD-10-CM

## 2019-07-17 RX ORDER — METFORMIN HCL 500 MG
TABLET, EXTENDED RELEASE 24 HR ORAL
Qty: 360 TABLET | Refills: 0 | Status: SHIPPED | OUTPATIENT
Start: 2019-07-17 | End: 2019-10-12

## 2019-07-17 NOTE — TELEPHONE ENCOUNTER
"Requested Prescriptions   Pending Prescriptions Disp Refills     metFORMIN (GLUCOPHAGE-XR) 500 MG 24 hr tablet [Pharmacy Med Name: METFORMIN HCL  MG TABLET] 360 tablet 0     Sig: TAKE 2 TABLETS BY MOUTH TWICE A DAY       Biguanide Agents Failed - 7/16/2019  1:14 AM        Failed - Patient has documented LDL within the past 12 mos.     Recent Labs   Lab Test 03/26/18  1656   *             Failed - Patient has had a Microalbumin in the past 15 mos.     Recent Labs   Lab Test 03/26/18  1704   MICROL 6   UMALCR 4.03             Passed - Blood pressure less than 140/90 in past 6 months     BP Readings from Last 3 Encounters:   05/02/19 122/70   03/21/19 122/78   01/31/19 123/72                 Passed - Patient is age 10 or older        Passed - Patient has documented A1c within the specified period of time.     If HgbA1C is 8 or greater, it needs to be on file within the past 3 months.  If less than 8, must be on file within the past 6 months.     Recent Labs   Lab Test 05/02/19  1751   A1C 6.5*             Passed - Patient's CR is NOT>1.4 OR Patient's EGFR is NOT<45 within past 12 mos.     Recent Labs   Lab Test 05/02/19  1751   GFRESTIMATED >90   GFRESTBLACK >90       Recent Labs   Lab Test 05/02/19  1751   CR 0.71             Passed - Patient does NOT have a diagnosis of CHF.        Passed - Medication is active on med list        Passed - Recent (6 mo) or future (30 days) visit within the authorizing provider's specialty     Patient had office visit in the last 6 months or has a visit in the next 30 days with authorizing provider or within the authorizing provider's specialty.  See \"Patient Info\" tab in inbasket, or \"Choose Columns\" in Meds & Orders section of the refill encounter.              "

## 2019-07-23 ENCOUNTER — TELEPHONE (OUTPATIENT)
Dept: FAMILY MEDICINE | Facility: CLINIC | Age: 54
End: 2019-07-23

## 2019-08-05 DIAGNOSIS — J30.2 SEASONAL ALLERGIC RHINITIS: ICD-10-CM

## 2019-08-06 RX ORDER — FLUTICASONE PROPIONATE 50 MCG
SPRAY, SUSPENSION (ML) NASAL
Qty: 16 ML | Refills: 0 | Status: SHIPPED | OUTPATIENT
Start: 2019-08-06 | End: 2019-09-05

## 2019-08-06 NOTE — TELEPHONE ENCOUNTER
Medication is being filled for 1 time refill only due to: Needs to be seen. Pt has future appointment scheduled.    PIEDAD ContrerasN, RN

## 2019-08-10 DIAGNOSIS — F41.1 GAD (GENERALIZED ANXIETY DISORDER): ICD-10-CM

## 2019-08-12 RX ORDER — ALPRAZOLAM 0.25 MG
TABLET ORAL
Qty: 30 TABLET | Refills: 0 | Status: SHIPPED | OUTPATIENT
Start: 2019-08-12 | End: 2019-09-16

## 2019-08-12 NOTE — TELEPHONE ENCOUNTER
Controlled Substance Refill Request for xanax  Problem List Complete:  No      PROVIDER TO CONSIDER COMPLETION OF PROBLEM LIST AND OVERVIEW/CONTROLLED SUBSTANCE AGREEMENT           Last Written Prescription Dates:    6/27/19 46 days ago # 30     6/7/19  (19 days ago)  #30,  0 RF     5/14/19  (23 days ago) #30   0 RF                 THE MOST RECENT OFFICE VISIT MUST BE WITHIN THE PAST 3 MONTHS. AT LEAST ONE FACE TO FACE VISIT MUST OCCUR EVERY 6 MONTHS. ADDITIONAL VISITS CAN BE VIRTUAL.  (THIS STATEMENT SHOULD BE DELETED.)     [place] [facility] 5/2/19     Future Office visit:         Next 5 appointments (look out 90 days)     August 15, 2019 [place]  Diabetic visit         Controlled substance agreement:   Encounter-Level CSA:    There are no encounter-level csa.      Patient-Level CSA:    There are no patient-level csa.            Last Urine Drug Screen: No results found for: CDAUT, No results found for: COMDAT, No results found for: THC13, PCP13, COC13, MAMP13, OPI13, AMP13, BZO13, TCA13, MTD13, BAR13, OXY13, PPX13, BUP13      [email or web address]      checked in past 3 months?  Yes 6/26/19, early refills

## 2019-08-15 ENCOUNTER — OFFICE VISIT (OUTPATIENT)
Dept: FAMILY MEDICINE | Facility: CLINIC | Age: 54
End: 2019-08-15
Payer: COMMERCIAL

## 2019-08-15 VITALS
OXYGEN SATURATION: 95 % | TEMPERATURE: 99.2 F | BODY MASS INDEX: 38.22 KG/M2 | WEIGHT: 274 LBS | RESPIRATION RATE: 20 BRPM | HEART RATE: 81 BPM | SYSTOLIC BLOOD PRESSURE: 128 MMHG | DIASTOLIC BLOOD PRESSURE: 76 MMHG

## 2019-08-15 DIAGNOSIS — E78.5 HYPERLIPIDEMIA WITH TARGET LDL LESS THAN 100: ICD-10-CM

## 2019-08-15 DIAGNOSIS — E11.9 TYPE 2 DIABETES MELLITUS WITHOUT COMPLICATION, WITHOUT LONG-TERM CURRENT USE OF INSULIN (H): ICD-10-CM

## 2019-08-15 DIAGNOSIS — Z91.89 10 YEAR RISK OF MI OR STROKE 7.5% OR GREATER: ICD-10-CM

## 2019-08-15 DIAGNOSIS — F41.1 GAD (GENERALIZED ANXIETY DISORDER): ICD-10-CM

## 2019-08-15 LAB — HBA1C MFR BLD: 6.6 % (ref 0–5.6)

## 2019-08-15 PROCEDURE — 99214 OFFICE O/P EST MOD 30 MIN: CPT | Performed by: FAMILY MEDICINE

## 2019-08-15 PROCEDURE — 80053 COMPREHEN METABOLIC PANEL: CPT | Performed by: FAMILY MEDICINE

## 2019-08-15 PROCEDURE — 36415 COLL VENOUS BLD VENIPUNCTURE: CPT | Performed by: FAMILY MEDICINE

## 2019-08-15 PROCEDURE — 83036 HEMOGLOBIN GLYCOSYLATED A1C: CPT | Performed by: FAMILY MEDICINE

## 2019-08-15 RX ORDER — ESCITALOPRAM OXALATE 20 MG/1
10 TABLET ORAL EVERY MORNING
Qty: 90 TABLET | Refills: 1 | Status: SHIPPED | OUTPATIENT
Start: 2019-08-15 | End: 2020-06-19

## 2019-08-15 RX ORDER — EZETIMIBE 10 MG/1
10 TABLET ORAL DAILY
Qty: 90 TABLET | Refills: 3 | Status: SHIPPED | OUTPATIENT
Start: 2019-08-15 | End: 2020-08-26

## 2019-08-15 ASSESSMENT — PAIN SCALES - GENERAL: PAINLEVEL: NO PAIN (0)

## 2019-08-15 NOTE — NURSING NOTE
"Chief Complaint   Patient presents with     Diabetes     Health Maintenance     order pended, physical, PHQ2, Last eye exam       Initial /76   Pulse 81   Temp 99.2  F (37.3  C) (Oral)   Resp 20   Wt 124.3 kg (274 lb)   SpO2 95%   BMI 38.22 kg/m   Estimated body mass index is 38.22 kg/m  as calculated from the following:    Height as of 5/2/19: 1.803 m (5' 11\").    Weight as of this encounter: 124.3 kg (274 lb).  Medication Reconciliation: complete  Cait Mejias, [facility]  "

## 2019-08-15 NOTE — PROGRESS NOTES
[place] diabetes resourses:  [email or web address]        To access diabetes educational materials with in EPIC use <dot>DALE      Put this in AFTER VISIT SUMMARY if needed for the patient to access information online [email or web address]      SUBJECTIVE:  Lan Potts Jr. is a 54 year old male who presents today for a follow up appointment for management of DIABETES MELLITUS.    Patient Active Problem List    Diagnosis Date Noted     Morbid obesity (H) 07/12/2018     Priority: Medium     Umbilical hernia without obstruction and without gangrene 07/12/2018     Priority: Medium     Varicose veins of left lower extremity 07/12/2018     Priority: Medium     Tobacco abuse 04/05/2018     Priority: Medium     Erectile dysfunction, unspecified erectile dysfunction type 04/05/2018     Priority: Medium     10 year risk of MI or stroke 7.5% or greater, 12.6% in April 2017 04/06/2017     Priority: Medium     Type 2 diabetes mellitus without complication, without long-term current use of insulin (H) 01/05/2017     Priority: Medium     Migraine without status migrainosus, not intractable, unspecified migraine type 01/21/2016     Priority: Medium     High triglycerides 12/11/2014     Priority: Medium     HDL deficiency 12/11/2014     Priority: Medium     Hyperlipidemia with target LDL less than 100 02/17/2014     Priority: Medium     Diagnosis updated by automated process. Provider to review and confirm.       Chronic back pain      Priority: Medium     Seasonal allergies      Priority: Medium          The patient checks his blood sugar as follows: rarely, once daily.   Results as follows: post-lunch glucose- 130's    The patient reports that he IS taking the medication as prescribed.  ----------------------------------------------------------------------------------------------------------------------------------------    BP Readings  from Last 3 Encounters:   08/15/19 128/76   05/02/19 122/70   03/21/19 122/78     The patient reports that he IS currently smoking.  History   Smoking Status     Current Every Day Smoker     Types: Cigarettes   Smokeless Tobacco     Never Used           The 10-year ASCVD risk score (Maribel [place] Jr., et al., 2013) is: 26.4%    Values used to calculate the score:      Age: 54 years      Sex: Male      Is Non- : No      Diabetic: Yes      Tobacco smoker: Yes      Systolic Blood Pressure: 128 mmHg      Is BP treated: No      HDL Cholesterol: 35 mg/dL      Total Cholesterol: 233 mg/dL        Current Outpatient Medications   Medication Sig Dispense Refill     ACE/ARB/ARNI NOT PRESCRIBED, INTENTIONAL, Please choose reason not prescribed, below       ALPRAZolam (XANAX) 0.25 MG tablet TAKE 1 TAB BY MOUTH 3 TIMES A DAY AS NEEDED 30 tablet 0     ASPIRIN NOT PRESCRIBED, INTENTIONAL, Antiplatelet medication not prescribed intentionally due to not indicated at this age 1 each 0     blood glucose (ACCU-CHEK SMARTVIEW) test strip 1 strip by In Vitro route 3 times daily 3 Box 3     blood glucose calibration (ACCU-CHEK SMARTVIEW CONTROL) solution 1 drop by In Vitro route as needed 1 each 3     blood glucose monitoring (ACCU-CHEK FASTCLIX) lancets 1 each 3 times daily 3 Box 3     diphenhydrAMINE (BENADRYL) 25 MG capsule Take 25 mg by mouth every 6 hours as needed for itching or allergies       eletriptan (RELPAX) 40 MG tablet Take 1 tablet (40 mg) by mouth at onset of headache May repeat in 2 hours as needed but no more than 2 pills in 24 hours. 12 tablet 5     escitalopram (LEXAPRO) 10 MG tablet TAKE 1 TABLET (10 MG) BY MOUTH EVERY MORNING 90 tablet 1     ezetimibe (ZETIA) 10 MG tablet TAKE 1 TABLET (10 MG) BY MOUTH DAILY 90 tablet 0     fexofenadine (ALLEGRA ALLERGY) 180 MG tablet Take by mouth daily Reported on 4/6/2017       fluticasone (FLONASE) 50 MCG/ACT nasal spray SPRAY 2 SPRAYS INTO BOTH NOSTRILS DAILY 16  mL 0     HYDROcodone-acetaminophen (NORCO) 5-325 MG tablet Take 1-2 tablets by mouth every 6 hours as needed for pain 10 tablet 0     metFORMIN (GLUCOPHAGE-XR) 500 MG 24 hr tablet TAKE 2 TABLETS BY MOUTH TWICE A  tablet 0     metFORMIN (GLUCOPHAGE-XR) 500 MG 24 hr tablet Take 1 tablet (500 mg) by mouth 2 times daily (with meals) 180 tablet 3     metFORMIN (GLUCOPHAGE-XR) 750 MG 24 hr tablet Take 1 tablet (750 mg) by mouth 2 times daily (with meals) 180 tablet 3     omeprazole (PRILOSEC) 20 MG CR capsule Take 1 capsule (20 mg) by mouth daily 30 capsule 11     ondansetron (ZOFRAN) 8 MG tablet Take 1 tablet (8 mg) by mouth every 8 hours as needed for nausea 21 tablet 1     order for DME 15-20 mm mercury thigh high compression stockings 1-2 pairs 2 Package 5     order for DME 15-20 mm mercury thigh high compression stockings 1-2 pairs 2 Package 5     sildenafil (REVATIO) 20 MG tablet Take 1 to 5 tabs 30-60 minutes before intercourse.  Never use with nitroglycerin, terazosin or doxazosin. 30 tablet 11     STATIN NOT PRESCRIBED, INTENTIONAL, continuous prn for other Reported on 4/6/2017  0     triamcinolone (KENALOG) 0.1 % cream Apply topically 2 times daily Apply topically twice a day to the affected area Avoid contact with skin of face, armpits and groin 100 g 1       The patient reports that he IS NOT taking a statin.   (Reminder all diabetics with a ASCVD risk greater or equal to 7.5% should be on a high intensity statin, otherwise on a moderate intensity statin)  he is not taking a statin because he has not tolerated them.  .     The patient reports that he IS NOT taking  aspirin daily.  (Reminder all diabetic patients with a cardiovascular risk factor and > 50 should take a daily aspirin)   )    The patient reports that he IS doing a self foot exam very infrequently.  The patient reports that he does exercise in the form of walking while at work and outside.     The patient reports that he IS following the  recommended diabetic diet.   The patient reports that his last eye exam was 15 months ago.       Immunization History   Administered Date(s) Administered     HepB 04/06/2017     HepB-Adult 04/05/2018, 05/02/2019     Influenza (IIV3) PF 12/21/2012, 09/27/2013     Influenza Quad, Recombinant, p-free (RIV4) 09/26/2018     Influenza Vaccine IM 3yrs+ 4 Valent IIV4 10/26/2017     Pneumococcal 23 valent 09/04/2014     TDAP Vaccine (Adacel) 09/20/2011     Zoster vaccine recombinant adjuvanted (SHINGRIX) 07/12/2018, 09/26/2018     The patient reports that he has had the hepatitis B vaccine series in the past. (recommended for age 19-59 and can be given to age 60 or older)  The patient reports that he has had a pnuemovax in the past.  The patient reports that he has had a flu shot for the current influenza season.  The patient would like to have a no vaccinations today    Patient concerns: has no specific complaints and has been feeling well.        EXAM:  /76   Pulse 81   Temp 99.2  F (37.3  C) (Oral)   Resp 20   Wt 124.3 kg (274 lb)   SpO2 95%   BMI 38.22 kg/m    Wt Readings from Last 4 Encounters:   08/15/19 124.3 kg (274 lb)   05/02/19 122 kg (269 lb)   03/21/19 118.4 kg (261 lb)   01/31/19 123.8 kg (273 lb)     Body mass index is 38.22 kg/m .    General:  Lan is awake, alert, and cooperative.  NAD.                            Diabetic Foot Screen:  Any complaints of increased pain or numbness ? No  Is there a foot ulcer now or a history of foot ulcer? No  Does the foot have an abnormal shape? No  Are the nails thick, too long or ingrown? No  Are there any redness or open areas? No         Sensation Testing done at all points on the diagram with monofilament     Right Foot: Sensation Normal at all points  Left Foot: Sensation Normal at all points     Risk Category: 0- No loss of protective sensation  Performed by Eligio Quevedo MD                  Previsit labs drawn include:  Office Visit on 05/02/2019    Component Date Value Ref Range Status     Hemoglobin A1C 05/02/2019 6.5* 0 - 5.6 % Final    Comment: Normal <5.7% Prediabetes 5.7-6.4%  Diabetes 6.5% or higher - adopted from ADA   consensus guidelines.       Sodium 05/02/2019 140  133 - 144 mmol/L Final     Potassium 05/02/2019 4.8  3.4 - 5.3 mmol/L Final     Chloride 05/02/2019 107  94 - 109 mmol/L Final     Carbon Dioxide 05/02/2019 29  20 - 32 mmol/L Final     Anion Gap 05/02/2019 4  3 - 14 mmol/L Final     Glucose 05/02/2019 97  70 - 99 mg/dL Final    Non Fasting     Urea Nitrogen 05/02/2019 12  7 - 30 mg/dL Final     Creatinine 05/02/2019 0.71  0.66 - 1.25 mg/dL Final     GFR Estimate 05/02/2019 >90  >60 mL/min/[1.73_m2] Final    Comment: Non  GFR Calc  Starting 12/18/2018, serum creatinine based estimated GFR (eGFR) will be   calculated using the Chronic Kidney Disease Epidemiology Collaboration   (CKD-EPI) equation.       GFR Estimate If Black 05/02/2019 >90  >60 mL/min/[1.73_m2] Final    Comment:  GFR Calc  Starting 12/18/2018, serum creatinine based estimated GFR (eGFR) will be   calculated using the Chronic Kidney Disease Epidemiology Collaboration   (CKD-EPI) equation.       Calcium 05/02/2019 9.0  8.5 - 10.1 mg/dL Final     Bilirubin Total 05/02/2019 0.4  0.2 - 1.3 mg/dL Final     Albumin 05/02/2019 4.0  3.4 - 5.0 g/dL Final     Protein Total 05/02/2019 7.3  6.8 - 8.8 g/dL Final     Alkaline Phosphatase 05/02/2019 100  40 - 150 U/L Final     ALT 05/02/2019 29  0 - 70 U/L Final     AST 05/02/2019 12  0 - 45 U/L Final         ASSESSMENT and PLAN:    Type II Diabetes.    DIABETES Type II:                       Lab Results   Component Value Date    A1C 6.5 05/02/2019       Control   unable to assess  Lab Results   Component Value Date    A1C 6.5 05/02/2019    A1C 6.8 01/31/2019    A1C 6.8 07/12/2018     Lab Results   Component Value Date    MICROL 6 03/26/2018     No results found for: MICROALBUMIN Control   good    Check a  "HGBA1C , Check a Creatinine/GFR, Recommended to take ASA  mg daily for appropriate patient, Compliance with the recommended diabetic diet was stressed, Regular aerobic exercise was encouraged, Home glucose monitoring encouraged, Annual eye exam recommended, Self foot exam demonstrated and recommended to be done nightly, Apply moisturizer to feet with in 3 minutes of showering or bathing, Follow up in clinic in 3 months for a diabetes check and Future labs ordered and the patient was instructed to make a lab appt 1 week prior to next diabetes visit      BP/HTN:   BP Readings from Last 3 Encounters:   08/15/19 128/76   05/02/19 122/70   03/21/19 122/78     Control   good    - Medication:N/A  (make sure to order \"Ace not prescribed intentionally if not on an ACE/ARB)  The patient was advised to do the following therapuetic life style changes  - Dietary sodium restriction and increase potassium and Calcium intake  - Regular aerobic exercise  - Weight loss  - Discontinue smoking if applicable  - Avoid regular NSAID use if applicable  - Avoid regular decongestant use if applicable    - Check a basic metabolic panel today if needed    Patient Education: Reviewed risks of hypertension and principles of   Treatment.    HYPERCHOLESTEROLEMIA:     The 10-year ASCVD risk score (Rich[place] ADRIANO Jr., et al., 2013) is: 26.4%    Values used to calculate the score:      Age: 54 years      Sex: Male      Is Non- : No      Diabetic: Yes      Tobacco smoker: Yes      Systolic Blood Pressure: 128 mmHg      Is BP treated: No      HDL Cholesterol: 35 mg/dL      Total Cholesterol: 233 mg/dL    Lab Results   Component Value Date     03/26/2018      Control   poor  Cholesterol   Date Value Ref Range Status   03/26/2018 233 (H) <200 mg/dL Final     Comment:     Desirable:       <200 mg/dl   01/13/2017 252 (H) <200 mg/dL Final     Comment:     Desirable:       <200 mg/dl     HDL Cholesterol   Date Value Ref Range " Status   03/26/2018 35 (L) >39 mg/dL Final   01/13/2017 33 (L) >39 mg/dL Final     LDL Cholesterol Calculated   Date Value Ref Range Status   03/26/2018 146 (H) <100 mg/dL Final     Comment:     Above desirable:  100-129 mg/dl  Borderline High:  130-159 mg/dL  High:             160-189 mg/dL  Very high:       >189 mg/dl     01/13/2017 172 (H) <100 mg/dL Final     Comment:     Above desirable:  100-129 mg/dl   Borderline High:  130-159 mg/dL   High:             160-189 mg/dL   Very high:       >189 mg/dl       Triglycerides   Date Value Ref Range Status   03/26/2018 262 (H) <150 mg/dL Final     Comment:     Borderline high:  150-199 mg/dl  High:             200-499 mg/dl  Very high:       >499 mg/dl     01/13/2017 234 (H) <150 mg/dL Final     Comment:     Borderline high:  150-199 mg/dl   High:             200-499 mg/dl   Very high:       >499 mg/dl   Fasting specimen       Cholesterol/HDL Ratio   Date Value Ref Range Status   09/03/2015 7.7 (H) 0.0 - 5.0 Final   12/11/2014 5.8 (H) 0.0 - 5.0 Final         The patient is not  fasting today and he  will recheck the fasting lipid panel at a future laboratory appointment.

## 2019-08-15 NOTE — PATIENT INSTRUCTIONS
Please schedule and eye exam with you eye care provider and continue to do so every 1 year(s). You are due for that now.       a Ped Egg for the calluses on your feet

## 2019-08-16 LAB
ALBUMIN SERPL-MCNC: 3.8 G/DL (ref 3.4–5)
ALP SERPL-CCNC: 97 U/L (ref 40–150)
ALT SERPL W P-5'-P-CCNC: 26 U/L (ref 0–70)
ANION GAP SERPL CALCULATED.3IONS-SCNC: 8 MMOL/L (ref 3–14)
AST SERPL W P-5'-P-CCNC: 11 U/L (ref 0–45)
BILIRUB SERPL-MCNC: 0.5 MG/DL (ref 0.2–1.3)
BUN SERPL-MCNC: 13 MG/DL (ref 7–30)
CALCIUM SERPL-MCNC: 8.9 MG/DL (ref 8.5–10.1)
CHLORIDE SERPL-SCNC: 104 MMOL/L (ref 94–109)
CO2 SERPL-SCNC: 26 MMOL/L (ref 20–32)
CREAT SERPL-MCNC: 0.76 MG/DL (ref 0.66–1.25)
GFR SERPL CREATININE-BSD FRML MDRD: >90 ML/MIN/{1.73_M2}
GLUCOSE SERPL-MCNC: 100 MG/DL (ref 70–99)
POTASSIUM SERPL-SCNC: 4.3 MMOL/L (ref 3.4–5.3)
PROT SERPL-MCNC: 7.2 G/DL (ref 6.8–8.8)
SODIUM SERPL-SCNC: 138 MMOL/L (ref 133–144)

## 2019-08-19 PROBLEM — F41.1 GAD (GENERALIZED ANXIETY DISORDER): Status: ACTIVE | Noted: 2019-08-19

## 2019-09-01 DIAGNOSIS — J30.2 SEASONAL ALLERGIC RHINITIS: ICD-10-CM

## 2019-09-03 ENCOUNTER — TELEPHONE (OUTPATIENT)
Dept: FAMILY MEDICINE | Facility: CLINIC | Age: 54
End: 2019-09-03

## 2019-09-03 NOTE — TELEPHONE ENCOUNTER
Patient is calling with a possible painful hernia.  He would like an appointment with Dr. Quevedo.  I offered a  Different provider and he asked if I would message the team to see if he can be worked in.  Please call to advise.  Thank you  Caller informed that calls received after 3pm may not be returned same day.

## 2019-09-04 NOTE — TELEPHONE ENCOUNTER
I spoke to the patient and I scheduled an appointment for him tomorrow 9/5/19.  I offered Lan an appointment today but he states that he pushed the hernia back in and he isn't in a lot of pain anymore so he wants to come in tomorrow.  Chani Daniel,

## 2019-09-04 NOTE — TELEPHONE ENCOUNTER
Can I work this patient into a same day or team slot?  Please advise.  Thank you.  Chani Daniel,

## 2019-09-04 NOTE — TELEPHONE ENCOUNTER
"Pt has scheduled office visit with PCP tomorrow (9/5/19 at 4:30pm).   Please review after appointment to verify this is refilled at appointment.      Requested Prescriptions   Pending Prescriptions Disp Refills     fluticasone (FLONASE) 50 MCG/ACT nasal spray [Pharmacy Med Name: FLUTICASONE PROP 50 MCG SPRAY] 16 mL 0     Sig: INSTILL 2 SPRAYS INTO BOTH NOSTRILS DAILY       Inhaled Steroids Protocol Passed - 9/1/2019  9:33 AM        Passed - Patient is age 12 or older        Passed - Recent (12 mo) or future (30 days) visit within the authorizing provider's specialty     Patient had office visit in the last 12 months or has a visit in the next 30 days with authorizing provider or within the authorizing provider's specialty.  See \"Patient Info\" tab in inbasket, or \"Choose Columns\" in Meds & Orders section of the refill encounter.              Passed - Medication is active on med list        ALESHA Contreras, RN    "

## 2019-09-05 ENCOUNTER — OFFICE VISIT (OUTPATIENT)
Dept: FAMILY MEDICINE | Facility: CLINIC | Age: 54
End: 2019-09-05
Payer: COMMERCIAL

## 2019-09-05 VITALS
TEMPERATURE: 99.5 F | HEART RATE: 77 BPM | WEIGHT: 271 LBS | BODY MASS INDEX: 37.8 KG/M2 | OXYGEN SATURATION: 98 % | DIASTOLIC BLOOD PRESSURE: 74 MMHG | SYSTOLIC BLOOD PRESSURE: 135 MMHG

## 2019-09-05 DIAGNOSIS — Z23 NEED FOR PROPHYLACTIC VACCINATION AND INOCULATION AGAINST INFLUENZA: Primary | ICD-10-CM

## 2019-09-05 DIAGNOSIS — K42.9 UMBILICAL HERNIA WITHOUT OBSTRUCTION AND WITHOUT GANGRENE: ICD-10-CM

## 2019-09-05 DIAGNOSIS — J30.2 SEASONAL ALLERGIC RHINITIS, UNSPECIFIED TRIGGER: ICD-10-CM

## 2019-09-05 PROCEDURE — 90682 RIV4 VACC RECOMBINANT DNA IM: CPT | Performed by: FAMILY MEDICINE

## 2019-09-05 PROCEDURE — 90471 IMMUNIZATION ADMIN: CPT | Performed by: FAMILY MEDICINE

## 2019-09-05 PROCEDURE — 99213 OFFICE O/P EST LOW 20 MIN: CPT | Mod: 25 | Performed by: FAMILY MEDICINE

## 2019-09-05 RX ORDER — FLUTICASONE PROPIONATE 50 MCG
SPRAY, SUSPENSION (ML) NASAL
Qty: 16 ML | Refills: 0 | OUTPATIENT
Start: 2019-09-05

## 2019-09-05 RX ORDER — FLUTICASONE PROPIONATE 50 MCG
SPRAY, SUSPENSION (ML) NASAL
Qty: 16 ML | Refills: 3 | Status: SHIPPED | OUTPATIENT
Start: 2019-09-05 | End: 2020-07-14

## 2019-09-05 ASSESSMENT — PAIN SCALES - GENERAL: PAINLEVEL: NO PAIN (0)

## 2019-09-05 NOTE — PROGRESS NOTES
SUBJECTIVE  HPI: Lan Potts Jr. is a 54 year old male who presents with the CC of abdominal/pelvic pain.  The patient reports that he has had a buldge in the umbilicus for the last year.  He noticed some pain in that area the next day after doing some heavy  lifting =  He reports that when he looked it was much bigger  He layed on his back and massaged it back inside  The pain resolved on Tuesday 2 day(s) ago    It is still mildly tender to touch.     He has never had surgery.       Since starting metformin he feels bloated.   His stools are looser and are a type 5-6   Before metformin they were usually a type 4             Social History     Socioeconomic History     Marital status: Single     Spouse name: Not on file     Number of children: Not on file     Years of education: Not on file     Highest education level: Not on file   Occupational History     Not on file   Social Needs     Financial resource strain: Not on file     Food insecurity:     Worry: Not on file     Inability: Not on file     Transportation needs:     Medical: Not on file     Non-medical: Not on file   Tobacco Use     Smoking status: Current Every Day Smoker     Types: Cigarettes     Smokeless tobacco: Never Used   Substance and Sexual Activity     Alcohol use: Yes     Comment: occ     Drug use: No     Sexual activity: Yes     Partners: Female   Lifestyle     Physical activity:     Days per week: Not on file     Minutes per session: Not on file     Stress: Not on file   Relationships     Social connections:     Talks on phone: Not on file     Gets together: Not on file     Attends [employer] service: Not on file     Active member of club or organization: Not on file     Attends meetings of clubs or organizations: Not on file     Relationship status: Not on file     Intimate partner violence:     Fear of current or ex partner: Not on file     Emotionally abused: Not on file     Physically abused: Not on file     Forced sexual activity: Not  on file   Other Topics Concern     Parent/sibling w/ CABG, MI or angioplasty before 65F 55M? Not Asked   Social History Narrative     Not on file         EXAMINATION:  /74   Pulse 77   Temp 99.5  F (37.5  C) (Oral)   Wt 122.9 kg (271 lb)   SpO2 98%   BMI 37.80 kg/m      GENERAL APPEARANCE: healthy, alert and no distress  EYES: EOMI,  PERRL, conjunctiva clear  HENT: ear canals and TM's normal.  Nose and mouth without ulcers, erythema or lesions  NECK: supple, nontender, no lymphadenopathy  RESP: lungs clear to auscultation - no rales, rhonchi or wheezes  CV: regular rates and rhythm, normal S1 S2, no murmur noted  ABDOMEN:  soft, nontender, no HSM or masses and bowel sounds normal  ABDOMEN: large umbilicus which protrudes with valsalva mild tenderness  NEURO: Normal strength and tone, sensory exam grossly normal,  normal speech and mentation  SKIN: no suspicious lesions or rashes    XRAY:      ASSESSMENT AND PLAN:  Symptomatic umbilical hernia    We will obtain a GI consultation with Dr Nuñez    Diarrhea secondary to metformin. He does not want to switch off the metformin just yet as it is really helping his diabetes numbers.

## 2019-09-05 NOTE — PATIENT INSTRUCTIONS
Your provider has referred you to: [facility]: [facility] [place] [phone]   [email or web address]   Make the appointment(s) with Dr Nuñez

## 2019-09-05 NOTE — NURSING NOTE
"Chief Complaint   Patient presents with     Hernia     Health Maintenance     orders pended,       Initial /74   Pulse 77   Temp 99.5  F (37.5  C) (Oral)   Wt 122.9 kg (271 lb)   SpO2 98%   BMI 37.80 kg/m   Estimated body mass index is 37.8 kg/m  as calculated from the following:    Height as of 5/2/19: 1.803 m (5' 11\").    Weight as of this encounter: 122.9 kg (271 lb).  Medication Reconciliation: complete    KATHY Collins MA    "

## 2019-09-11 ENCOUNTER — TELEPHONE (OUTPATIENT)
Dept: SURGERY | Facility: CLINIC | Age: 54
End: 2019-09-11

## 2019-09-11 ENCOUNTER — OFFICE VISIT (OUTPATIENT)
Dept: SURGERY | Facility: CLINIC | Age: 54
End: 2019-09-11
Attending: FAMILY MEDICINE
Payer: COMMERCIAL

## 2019-09-11 VITALS
HEART RATE: 77 BPM | SYSTOLIC BLOOD PRESSURE: 133 MMHG | WEIGHT: 269 LBS | BODY MASS INDEX: 37.66 KG/M2 | HEIGHT: 71 IN | DIASTOLIC BLOOD PRESSURE: 78 MMHG

## 2019-09-11 DIAGNOSIS — K42.9 UMBILICAL HERNIA WITHOUT OBSTRUCTION AND WITHOUT GANGRENE: Primary | ICD-10-CM

## 2019-09-11 PROCEDURE — 99244 OFF/OP CNSLTJ NEW/EST MOD 40: CPT | Performed by: SURGERY

## 2019-09-11 ASSESSMENT — MIFFLIN-ST. JEOR: SCORE: 2092.31

## 2019-09-11 NOTE — PROGRESS NOTES
Patient seen in consultation for umbilical hernia by Eligio Quevedo    HPI:  Patient is a 54 year old male  with complaints of umbilical hernia  The patient noticed the symptoms about 1 year ago.   Has been getting larger with time  2 weekends ago had issue where was hard to reduce. Typically was fairly easy to do  Once was able still had a radiating pain across the abdomen. Took 2 days to feel better   time makes the episode better.  Patient has not family history of hernia problems    Review Of Systems    Skin: negative  Ears/Nose/Throat: negative  Respiratory: No shortness of breath, dyspnea on exertion, cough, or hemoptysis  Cardiovascular: negative  Gastrointestinal: as above, normal bowel movements  Genitourinary: negative  Musculoskeletal: back pain  Neurologic: migraines  Hematologic/Lymphatic/Immunologic: negative  Endocrine: diabetes      Past Medical History:   Diagnosis Date     Chronic back pain      Diabetes (H)      Migraine headaches      TINA (obstructive sleep apnea)     mild does not use CPAP at present     Seasonal allergies      Tobacco use disorder        Past Surgical History:   Procedure Laterality Date     ENT SURGERY       GENITOURINARY SURGERY       NO HISTORY OF SURGERY         Social History     Socioeconomic History     Marital status: Single     Spouse name: Not on file     Number of children: Not on file     Years of education: Not on file     Highest education level: Not on file   Occupational History     Not on file   Social Needs     Financial resource strain: Not on file     Food insecurity:     Worry: Not on file     Inability: Not on file     Transportation needs:     Medical: Not on file     Non-medical: Not on file   Tobacco Use     Smoking status: Current Every Day Smoker     Types: Cigarettes     Smokeless tobacco: Never Used   Substance and Sexual Activity     Alcohol use: Yes     Comment: occ     Drug use: No     Sexual activity: Yes     Partners: Female   Lifestyle      Physical activity:     Days per week: Not on file     Minutes per session: Not on file     Stress: Not on file   Relationships     Social connections:     Talks on phone: Not on file     Gets together: Not on file     Attends Anabaptism service: Not on file     Active member of club or organization: Not on file     Attends meetings of clubs or organizations: Not on file     Relationship status: Not on file     Intimate partner violence:     Fear of current or ex partner: Not on file     Emotionally abused: Not on file     Physically abused: Not on file     Forced sexual activity: Not on file   Other Topics Concern     Parent/sibling w/ CABG, MI or angioplasty before 65F 55M? Not Asked   Social History Narrative     Not on file       Current Outpatient Medications   Medication Sig Dispense Refill     ACE/ARB/ARNI NOT PRESCRIBED, INTENTIONAL, Please choose reason not prescribed, below       ALPRAZolam (XANAX) 0.25 MG tablet TAKE 1 TAB BY MOUTH 3 TIMES A DAY AS NEEDED 30 tablet 0     aspirin (ASA) 81 MG tablet Take 1 tablet (81 mg) by mouth daily       ASPIRIN NOT PRESCRIBED, INTENTIONAL, Antiplatelet medication not prescribed intentionally due to not indicated at this age 1 each 0     blood glucose (ACCU-CHEK SMARTVIEW) test strip 1 strip by In Vitro route 3 times daily 3 Box 3     blood glucose calibration (ACCU-CHEK SMARTVIEW CONTROL) solution 1 drop by In Vitro route as needed 1 each 3     blood glucose monitoring (ACCU-CHEK FASTCLIX) lancets 1 each 3 times daily 3 Box 3     diphenhydrAMINE (BENADRYL) 25 MG capsule Take 25 mg by mouth every 6 hours as needed for itching or allergies       eletriptan (RELPAX) 40 MG tablet Take 1 tablet (40 mg) by mouth at onset of headache May repeat in 2 hours as needed but no more than 2 pills in 24 hours. 12 tablet 5     escitalopram (LEXAPRO) 20 MG tablet Take 0.5 tablets (10 mg) by mouth every morning 90 tablet 1     ezetimibe (ZETIA) 10 MG tablet Take 1 tablet (10 mg) by mouth  "daily 90 tablet 3     fexofenadine (ALLEGRA ALLERGY) 180 MG tablet Take by mouth daily Reported on 4/6/2017       fluticasone (FLONASE) 50 MCG/ACT nasal spray SPRAY 2 SPRAYS INTO BOTH NOSTRILS DAILY 16 mL 3     metFORMIN (GLUCOPHAGE-XR) 500 MG 24 hr tablet TAKE 2 TABLETS BY MOUTH TWICE A  tablet 0     metFORMIN (GLUCOPHAGE-XR) 500 MG 24 hr tablet Take 1 tablet (500 mg) by mouth 2 times daily (with meals) 180 tablet 3     metFORMIN (GLUCOPHAGE-XR) 750 MG 24 hr tablet Take 1 tablet (750 mg) by mouth 2 times daily (with meals) 180 tablet 3     omeprazole (PRILOSEC) 20 MG DR capsule Take 1 capsule (20 mg) by mouth daily 30 capsule 3     ondansetron (ZOFRAN) 8 MG tablet Take 1 tablet (8 mg) by mouth every 8 hours as needed for nausea 21 tablet 1     order for DME 15-20 mm mercury thigh high compression stockings 1-2 pairs 2 Package 5     order for DME 15-20 mm mercury thigh high compression stockings 1-2 pairs 2 Package 5     sildenafil (REVATIO) 20 MG tablet Take 1 to 5 tabs 30-60 minutes before intercourse.  Never use with nitroglycerin, terazosin or doxazosin. 30 tablet 11     STATIN NOT PRESCRIBED, INTENTIONAL, continuous prn for other Reported on 4/6/2017  0     triamcinolone (KENALOG) 0.1 % cream Apply topically 2 times daily Apply topically twice a day to the affected area Avoid contact with skin of face, armpits and groin 100 g 1       Medications and history reviewed    Physical exam:  Vitals: /78   Pulse 77   Ht 1.803 m (5' 11\")   Wt 122 kg (269 lb)   BMI 37.52 kg/m    BMI= Body mass index is 37.52 kg/m .    Constitutional: healthy, alert and no distress  Head: Normocephalic. No masses, lesions, tenderness or abnormalities  Cardiovascular: negative, PMI normal. No lifts, heaves, or thrills. RRR. No murmurs, clicks gallops or rub  Respiratory: negative, Percussion normal. Good diaphragmatic excursion. Lungs clear  Gastrointestinal: Abdomen soft, tender focally at the hernia. BS normal. No " masses, organomegaly, positive findings: umbilical hernia, reducible. Defect feels to be about finger tip size. Minimal to no diastasis  : Deferred  Musculoskeletal: extremities normal- no gross deformities noted, gait normal and normal muscle tone  Skin: no suspicious lesions or rashes  Psychiatric: mentation appears normal and affect normal/bright  Hematologic/Lymphatic/Immunologic: Normal cervical lymph nodes  Patient able to get up on table without difficulty.    Assessment:     ICD-10-CM    1. Umbilical hernia without obstruction and without gangrene K42.9 [facility]-Operative Worksheet     Plan: Discussed open and MIS approaches to repair. Offered open approach as hernia small.  Risks of surgery discussed including, but not limited to bleeding, infection, recurrence, damage to nerves and what is in the hernia sac.  Risks of anesthesia also discussed.  Although mesh is a better long term repair if it gets infected it must be removed.  If there is evidence of an infection at time of surgery it will be cancelled and rescheduled to when well.  If the patient is a smoker I did discuss increase risk of recurrence and more pain with the cough.  If the patient is willing to quit smoking would encourage to do so and start at least a week before surgery.  However, if patient is not going to quit then must understand that his repair is more likely to fail.  Discussed massaging hernia back in and using ice if becomes more painful.  If not able to reduce then go to emergency room.  Patient willing to work on quitting smoking  Working on scheduling      Tyrell Nuñez MD

## 2019-09-11 NOTE — LETTER
9/11/2019         RE: Lan Potts Jr.  [address] [facility] [id number]        Dear Colleague,    Thank you for referring your patient, Lan Potts Jr., to the [facility]. Please see a copy of my visit note below.    Patient seen in consultation for umbilical hernia by Eligio Quevedo    HPI:  Patient is a 54 year old male  with complaints of umbilical hernia  The patient noticed the symptoms about 1 year ago.   Has been getting larger with time  2 weekends ago had issue where was hard to reduce. Typically was fairly easy to do  Once was able still had a radiating pain across the abdomen. Took 2 days to feel better   time makes the episode better.  Patient has not family history of hernia problems    Review Of Systems    Skin: negative  Ears/Nose/Throat: negative  Respiratory: No shortness of breath, dyspnea on exertion, cough, or hemoptysis  Cardiovascular: negative  Gastrointestinal: as above, normal bowel movements  Genitourinary: negative  Musculoskeletal: back pain  Neurologic: migraines  Hematologic/Lymphatic/Immunologic: negative  Endocrine: diabetes      Past Medical History:   Diagnosis Date     Chronic back pain      Diabetes (H)      Migraine headaches      TINA (obstructive sleep apnea)     mild does not use CPAP at present     Seasonal allergies      Tobacco use disorder        Past Surgical History:   Procedure Laterality Date     ENT SURGERY       GENITOURINARY SURGERY       NO HISTORY OF SURGERY         Social History     Socioeconomic History     Marital status: Single     Spouse name: Not on file     Number of children: Not on file     Years of education: Not on file     Highest education level: Not on file   Occupational History     Not on file   Social Needs     Financial resource strain: Not on file     Food insecurity:     Worry: Not on file     Inability: Not on file     Transportation needs:     Medical: Not on file     Non-medical: Not on file   Tobacco  Use     Smoking status: Current Every Day Smoker     Types: Cigarettes     Smokeless tobacco: Never Used   Substance and Sexual Activity     Alcohol use: Yes     Comment: occ     Drug use: No     Sexual activity: Yes     Partners: Female   Lifestyle     Physical activity:     Days per week: Not on file     Minutes per session: Not on file     Stress: Not on file   Relationships     Social connections:     Talks on phone: Not on file     Gets together: Not on file     Attends Sabianist service: Not on file     Active member of club or organization: Not on file     Attends meetings of clubs or organizations: Not on file     Relationship status: Not on file     Intimate partner violence:     Fear of current or ex partner: Not on file     Emotionally abused: Not on file     Physically abused: Not on file     Forced sexual activity: Not on file   Other Topics Concern     Parent/sibling w/ CABG, MI or angioplasty before 65F 55M? Not Asked   Social History Narrative     Not on file       Current Outpatient Medications   Medication Sig Dispense Refill     ACE/ARB/ARNI NOT PRESCRIBED, INTENTIONAL, Please choose reason not prescribed, below       ALPRAZolam (XANAX) 0.25 MG tablet TAKE 1 TAB BY MOUTH 3 TIMES A DAY AS NEEDED 30 tablet 0     aspirin (ASA) 81 MG tablet Take 1 tablet (81 mg) by mouth daily       ASPIRIN NOT PRESCRIBED, INTENTIONAL, Antiplatelet medication not prescribed intentionally due to not indicated at this age 1 each 0     blood glucose (ACCU-CHEK SMARTVIEW) test strip 1 strip by In Vitro route 3 times daily 3 Box 3     blood glucose calibration (ACCU-CHEK SMARTVIEW CONTROL) solution 1 drop by In Vitro route as needed 1 each 3     blood glucose monitoring (ACCU-CHEK FASTCLIX) lancets 1 each 3 times daily 3 Box 3     diphenhydrAMINE (BENADRYL) 25 MG capsule Take 25 mg by mouth every 6 hours as needed for itching or allergies       eletriptan (RELPAX) 40 MG tablet Take 1 tablet (40 mg) by mouth at onset of  "headache May repeat in 2 hours as needed but no more than 2 pills in 24 hours. 12 tablet 5     escitalopram (LEXAPRO) 20 MG tablet Take 0.5 tablets (10 mg) by mouth every morning 90 tablet 1     ezetimibe (ZETIA) 10 MG tablet Take 1 tablet (10 mg) by mouth daily 90 tablet 3     fexofenadine (ALLEGRA ALLERGY) 180 MG tablet Take by mouth daily Reported on 4/6/2017       fluticasone (FLONASE) 50 MCG/ACT nasal spray SPRAY 2 SPRAYS INTO BOTH NOSTRILS DAILY 16 mL 3     metFORMIN (GLUCOPHAGE-XR) 500 MG 24 hr tablet TAKE 2 TABLETS BY MOUTH TWICE A  tablet 0     metFORMIN (GLUCOPHAGE-XR) 500 MG 24 hr tablet Take 1 tablet (500 mg) by mouth 2 times daily (with meals) 180 tablet 3     metFORMIN (GLUCOPHAGE-XR) 750 MG 24 hr tablet Take 1 tablet (750 mg) by mouth 2 times daily (with meals) 180 tablet 3     omeprazole (PRILOSEC) 20 MG DR capsule Take 1 capsule (20 mg) by mouth daily 30 capsule 3     ondansetron (ZOFRAN) 8 MG tablet Take 1 tablet (8 mg) by mouth every 8 hours as needed for nausea 21 tablet 1     order for DME 15-20 mm mercury thigh high compression stockings 1-2 pairs 2 Package 5     order for DME 15-20 mm mercury thigh high compression stockings 1-2 pairs 2 Package 5     sildenafil (REVATIO) 20 MG tablet Take 1 to 5 tabs 30-60 minutes before intercourse.  Never use with nitroglycerin, terazosin or doxazosin. 30 tablet 11     STATIN NOT PRESCRIBED, INTENTIONAL, continuous prn for other Reported on 4/6/2017  0     triamcinolone (KENALOG) 0.1 % cream Apply topically 2 times daily Apply topically twice a day to the affected area Avoid contact with skin of face, armpits and groin 100 g 1       Medications and history reviewed    Physical exam:  Vitals: /78   Pulse 77   Ht 1.803 m (5' 11\")   Wt 122 kg (269 lb)   BMI 37.52 kg/m     BMI= Body mass index is 37.52 kg/m .    Constitutional: healthy, alert and no distress  Head: Normocephalic. No masses, lesions, tenderness or abnormalities  Cardiovascular: " negative, PMI normal. No lifts, heaves, or thrills. RRR. No murmurs, clicks gallops or rub  Respiratory: negative, Percussion normal. Good diaphragmatic excursion. Lungs clear  Gastrointestinal: Abdomen soft, tender focally at the hernia. BS normal. No masses, organomegaly, positive findings: umbilical hernia, reducible. Defect feels to be about finger tip size. Minimal to no diastasis  : Deferred  Musculoskeletal: extremities normal- no gross deformities noted, gait normal and normal muscle tone  Skin: no suspicious lesions or rashes  Psychiatric: mentation appears normal and affect normal/bright  Hematologic/Lymphatic/Immunologic: Normal cervical lymph nodes  Patient able to get up on table without difficulty.    Assessment:     ICD-10-CM    1. Umbilical hernia without obstruction and without gangrene K42.9 [facility]-Operative Worksheet     Plan: Discussed open and MIS approaches to repair. Offered open approach as hernia small.  Risks of surgery discussed including, but not limited to bleeding, infection, recurrence, damage to nerves and what is in the hernia sac.  Risks of anesthesia also discussed.  Although mesh is a better long term repair if it gets infected it must be removed.  If there is evidence of an infection at time of surgery it will be cancelled and rescheduled to when well.  If the patient is a smoker I did discuss increase risk of recurrence and more pain with the cough.  If the patient is willing to quit smoking would encourage to do so and start at least a week before surgery.  However, if patient is not going to quit then must understand that his repair is more likely to fail.  Discussed massaging hernia back in and using ice if becomes more painful.  If not able to reduce then go to emergency room.  Patient willing to work on quitting smoking  Working on scheduling      Tyrell Nuñez MD      Again, thank you for allowing me to participate in the care of your patient.         Sincerely,        Tyrell Nuñez MD

## 2019-09-12 NOTE — TELEPHONE ENCOUNTER
Type of surgery: open umbilical hernia repair CPT code 08796  Umbilical hernia without obstruction and without gangrene [K42.9]  - Primary   Location of surgery: [facility]  Date and time of surgery: 09/26/2019 / 2:30 pm  Surgeon: Savannah  Pre-Op Appt Date: 09/19/2019  Post-Op Appt Date: 10/14/2019   Packet sent out: Yes  Pre-cert/Authorization completed:  Transaction ID: [id number] ID: [id number] Date: 2019-09-12  No Authorization Required  Group Number  [id number]  Line of Business  Commerical  Date of Service  2019-09-26  Procedure Code 1  01451    Reference Number  [id number]  Status  NO AUTH REQUIRED  Date: 09/12/2019    Insurance valid. 09/12/2019    Sent to  and was received. 09/12/2019

## 2019-09-16 DIAGNOSIS — F41.1 GAD (GENERALIZED ANXIETY DISORDER): ICD-10-CM

## 2019-09-16 NOTE — TELEPHONE ENCOUNTER
Controlled Substance Refill Request for xanax  Problem List Complete:  No      PROVIDER TO CONSIDER COMPLETION OF PROBLEM LIST AND OVERVIEW/CONTROLLED SUBSTANCE AGREEMENT           Last Written Prescription Dates:    8/12/19    Last office visit 9/5/19          Last Office Visit with [facility] primary care provider: 5/2/19     Future Office visit:         Next 5 appointments (look out 90 days)     August 15, 2019 [place]  Diabetic visit         Controlled substance agreement:   Encounter-Level CSA:    There are no encounter-level csa.      Patient-Level CSA:    There are no patient-level csa.            Last Urine Drug Screen: No results found for: CDAUT, No results found for: COMDAT, No results found for: THC13, PCP13, COC13, MAMP13, OPI13, AMP13, BZO13, TCA13, MTD13, BAR13, OXY13, PPX13, BUP13      [email or web address]      checked in past 3 months?  Yes 6/26/19

## 2019-09-17 RX ORDER — ALPRAZOLAM 0.25 MG
TABLET ORAL
Qty: 30 TABLET | Refills: 0 | Status: SHIPPED | OUTPATIENT
Start: 2019-09-17 | End: 2019-10-24

## 2019-09-17 NOTE — TELEPHONE ENCOUNTER
The requested prescription(s) has/have been approved and has/have been sent to the patient's pharmacy electronically. This note was forwarded to the patient care pool to contact the patient and let them know that this has been taken care of.   Eligio Quevedo MD

## 2019-09-18 NOTE — PROGRESS NOTES
[facility]  [address] [address] [phone]  Dept: [phone]    PRE-OP EVALUATION:  Today's date: 2019    Lan Potts Jr. (: 1965) presents for pre-operative evaluation assessment as requested by Dr. Bhat.  He requires evaluation and anesthesia risk assessment prior to undergoing surgery/procedure for treatment of hernia .    Fax number for surgical facility:   Primary Physician: Eligio Quevedo  Type of Anesthesia Anticipated: General    Patient has a Health Care Directive or Living Will:  NO    Preop Questions 2019   Who is doing your surgery? nicolás   What are you having done? hernia repair   Date of Surgery/Procedure:    Facility or Hospital where procedure/surgery will be performed: [facility]   1.  Do you have a history of Heart attack, stroke, stent, coronary bypass surgery, or other heart surgery? No   2.  Do you ever have any pain or discomfort in your chest? No   3.  Do you have a history of  Heart Failure? No   4.   Are you troubled by shortness of breath when:  walking on a level surface, or up a slight hill, or at night? No   5.  Do you currently have a cold, bronchitis or other respiratory infection? No   6.  Do you have a cough, shortness of breath, or wheezing? No   7.  Do you sometimes get pains in the calves of your legs when you walk? No   8. Do you or anyone in your family have previous history of blood clots? No   9.  Do you or does anyone in your family have a serious bleeding problem such as prolonged bleeding following surgeries or cuts? No   10. Have you ever had problems with anemia or been told to take iron pills? No   11. Have you had any abnormal blood loss such as black, tarry or bloody stools? No   12. Have you ever had a blood transfusion? No   13. Have you or any of your relatives ever had problems with anesthesia? No   14. Do you have sleep apnea, excessive snoring or daytime drowsiness? YES -  history of TINA- tx with surgery.    15. Do you have any prosthetic heart valves? No   16. Do you have prosthetic joints? No         HPI:     HPI related to upcoming procedure: noticed about 1 yr and increase pain in the last couple weeks.   No nausea, vomiting, diarrhea.       See problem list for active medical problems.  Problems all longstanding and stable, except as noted/documented.  See ROS for pertinent symptoms related to these conditions.      MEDICAL HISTORY:     Patient Active Problem List    Diagnosis Date Noted     JUAN (generalized anxiety disorder) 08/19/2019     Priority: Medium     Morbid obesity (H) 07/12/2018     Priority: Medium     Umbilical hernia without obstruction and without gangrene 07/12/2018     Priority: Medium     Varicose veins of left lower extremity 07/12/2018     Priority: Medium     Tobacco abuse 04/05/2018     Priority: Medium     Erectile dysfunction, unspecified erectile dysfunction type 04/05/2018     Priority: Medium     10 year risk of MI or stroke 7.5% or greater, 12.6% in April 2017 04/06/2017     Priority: Medium     Type 2 diabetes mellitus without complication, without long-term current use of insulin (H) 01/05/2017     Priority: Medium     Migraine without status migrainosus, not intractable, unspecified migraine type 01/21/2016     Priority: Medium     High triglycerides 12/11/2014     Priority: Medium     HDL deficiency 12/11/2014     Priority: Medium     Hyperlipidemia with target LDL less than 100 02/17/2014     Priority: Medium     Diagnosis updated by automated process. Provider to review and confirm.       Chronic back pain      Priority: Medium     Seasonal allergies      Priority: Medium      Past Medical History:   Diagnosis Date     Chronic back pain      Diabetes (H)      Migraine headaches      TINA (obstructive sleep apnea)     mild does not use CPAP at present     Seasonal allergies      Tobacco use disorder      Past Surgical History:   Procedure  Laterality Date     ENT SURGERY       GENITOURINARY SURGERY       Current Outpatient Medications   Medication Sig Dispense Refill     ACE/ARB/ARNI NOT PRESCRIBED, INTENTIONAL, Please choose reason not prescribed, below       ALPRAZolam (XANAX) 0.25 MG tablet TAKE 1 TAB BY MOUTH 3 TIMES A DAY AS NEEDED 30 tablet 0     ASPIRIN NOT PRESCRIBED, INTENTIONAL, Antiplatelet medication not prescribed intentionally due to not indicated at this age 1 each 0     blood glucose (ACCU-CHEK SMARTVIEW) test strip 1 strip by In Vitro route 3 times daily 3 Box 3     blood glucose calibration (ACCU-CHEK SMARTVIEW CONTROL) solution 1 drop by In Vitro route as needed 1 each 3     blood glucose monitoring (ACCU-CHEK FASTCLIX) lancets 1 each 3 times daily 3 Box 3     eletriptan (RELPAX) 40 MG tablet Take 1 tablet (40 mg) by mouth at onset of headache May repeat in 2 hours as needed but no more than 2 pills in 24 hours. 12 tablet 5     escitalopram (LEXAPRO) 20 MG tablet Take 0.5 tablets (10 mg) by mouth every morning 90 tablet 1     ezetimibe (ZETIA) 10 MG tablet Take 1 tablet (10 mg) by mouth daily 90 tablet 3     metFORMIN (GLUCOPHAGE-XR) 500 MG 24 hr tablet TAKE 2 TABLETS BY MOUTH TWICE A  tablet 0     metFORMIN (GLUCOPHAGE-XR) 750 MG 24 hr tablet Take 1 tablet (750 mg) by mouth 2 times daily (with meals) 180 tablet 3     nicotine (NICODERM CQ) 14 MG/24HR 24 hr patch Place 1 patch onto the skin every 24 hours 30 patch 0     nicotine (NICODERM CQ) 21 MG/24HR 24 hr patch Place 1 patch onto the skin every 24 hours 30 patch 0     nicotine (NICODERM CQ) 7 MG/24HR 24 hr patch Place 1 patch onto the skin every 24 hours 30 patch 0     omeprazole (PRILOSEC) 20 MG DR capsule Take 1 capsule (20 mg) by mouth daily 30 capsule 3     sildenafil (REVATIO) 20 MG tablet Take 1 to 5 tabs 30-60 minutes before intercourse.  Never use with nitroglycerin, terazosin or doxazosin. 30 tablet 11     STATIN NOT PRESCRIBED, INTENTIONAL, continuous prn for  "other Reported on 4/6/2017  0     triamcinolone (KENALOG) 0.1 % cream Apply topically 2 times daily Apply topically twice a day to the affected area Avoid contact with skin of face, armpits and groin 100 g 1     fluticasone (FLONASE) 50 MCG/ACT nasal spray SPRAY 2 SPRAYS INTO BOTH NOSTRILS DAILY (Patient not taking: Reported on 9/19/2019) 16 mL 3     OTC products: None, except as noted above    Allergies   Allergen Reactions     Amoxicillin      rash     Pravastatin      Bilateral(ly) calf pain      Latex Allergy: NO    Social History     Tobacco Use     Smoking status: Current Every Day Smoker     Types: Cigarettes     Smokeless tobacco: Never Used     Tobacco comment: thinking about quitting   Substance Use Topics     Alcohol use: Yes     Comment: occ     History   Drug Use No       REVIEW OF SYSTEMS:   CONSTITUTIONAL: NEGATIVE for fever, chills, change in weight  INTEGUMENTARY/SKIN: NEGATIVE for worrisome rashes, moles or lesions  EYES: NEGATIVE for vision changes or irritation  ENT/MOUTH: NEGATIVE for ear, mouth and throat problems  RESP: NEGATIVE for significant cough or SOB  CV: NEGATIVE for chest pain, palpitations or peripheral edema  GI: NEGATIVE for nausea, abdominal pain, heartburn, or change in bowel habits  : NEGATIVE for frequency, dysuria, or hematuria  MUSCULOSKELETAL: NEGATIVE for significant arthralgias or myalgia  NEURO: NEGATIVE for weakness, dizziness or paresthesias  ENDOCRINE: NEGATIVE for temperature intolerance, skin/hair changes  HEME: NEGATIVE for bleeding problems  PSYCHIATRIC: NEGATIVE for changes in mood or affect    EXAM:   /79   Pulse 87   Temp 98.9  F (37.2  C) (Oral)   Resp 18   Ht 1.803 m (5' 11\")   Wt 122.5 kg (270 lb)   SpO2 96%   BMI 37.66 kg/m      GENERAL APPEARANCE: healthy, alert and no distress     EYES: EOMI,  PERRL     HENT: ear canals and TM's normal and nose and mouth without ulcers or lesions     NECK: no adenopathy, no asymmetry, masses, or scars and " thyroid normal to palpation     RESP: lungs clear to auscultation - no rales, rhonchi or wheezes     CV: regular rates and rhythm, normal S1 S2, no S3 or S4 and no murmur, click or rub     ABDOMEN:  soft, nontender, no HSM or masses and bowel sounds normal     MS: extremities normal- no gross deformities noted, no evidence of inflammation in joints, FROM in all extremities.     SKIN: no suspicious lesions or rashes     NEURO: Normal strength and tone, sensory exam grossly normal, mentation intact and speech normal     PSYCH: mentation appears normal. and affect normal/bright     LYMPHATICS: No cervical adenopathy    DIAGNOSTICS:   EKG: appears normal, NSR, normal axis, normal intervals, no acute ST/T changes c/w ischemia, no LVH by voltage criteria, Right Bundle Branch Block, unchanged from previous tracings    Recent Labs   Lab Test 08/15/19  1757 05/02/19  1751  02/14/13  1730   HGB  --   --   --  15.1    140   < >  --    POTASSIUM 4.3 4.8   < >  --    CR 0.76 0.71   < >  --    A1C 6.6* 6.5*   < >  --     < > = values in this interval not displayed.      Unresulted Labs Ordered in the Past 30 Days of this Admission     Date and Time Order Name Status Description    9/19/2019 1803 BASIC METABOLIC PANEL In process          Results for orders placed or performed in visit on 09/19/19   Hemoglobin   Result Value Ref Range    Hemoglobin 15.9 13.3 - 17.7 g/dL   Hemoglobin A1c   Result Value Ref Range    Hemoglobin A1C 6.4 (H) 0 - 5.6 %       IMPRESSION:   Reason for surgery/procedure: treatment of hernia .  Diabetes:   Hold metformin morning of due to fasting.     The proposed surgical procedure is considered INTERMEDIATE risk.    REVISED CARDIAC RISK INDEX  The patient has the following serious cardiovascular risks for perioperative complications such as (MI, PE, VFib and 3  AV Block):  No serious cardiac risks  INTERPRETATION: 0 risks: Class I (very low risk - 0.4% complication rate)    The patient has the  following additional risks for perioperative complications:  No identified additional risks      ICD-10-CM    1. Preop general physical exam Z01.818 EKG 12-lead complete w/read - Clinics     Basic metabolic panel     Hemoglobin     Hemoglobin A1c     CANCELED: Albumin Random Urine Quantitative with Creat Ratio   2. Umbilical hernia without obstruction and without gangrene K42.9    3. Type 2 diabetes mellitus without complication, without long-term current use of insulin (H) E11.9    4. Tobacco abuse Z72.0 nicotine (NICODERM CQ) 21 MG/24HR 24 hr patch     nicotine (NICODERM CQ) 14 MG/24HR 24 hr patch     nicotine (NICODERM CQ) 7 MG/24HR 24 hr patch       RECOMMENDATIONS:   Diabetes meds to hold the morning of surgery    If the pending labs are normal then APPROVAL IS GIVEN to proceed with proposed procedure, without further diagnostic evaluation       Signed Electronically by: Sahil Dunbar PA-C  Discussed this patient with Sahil Dunbar and agree with above assessment and plan. The patient is an acceptable candidate for the proposed anasthesia.  Eligio Quevedo MD        Copy of this evaluation report is provided to requesting physician.    [facility] Preop Guidelines    Revised Cardiac Risk Index

## 2019-09-19 ENCOUNTER — OFFICE VISIT (OUTPATIENT)
Dept: FAMILY MEDICINE | Facility: CLINIC | Age: 54
End: 2019-09-19
Payer: COMMERCIAL

## 2019-09-19 ENCOUNTER — DOCUMENTATION ONLY (OUTPATIENT)
Dept: LAB | Facility: CLINIC | Age: 54
End: 2019-09-19

## 2019-09-19 VITALS
WEIGHT: 270 LBS | BODY MASS INDEX: 37.8 KG/M2 | RESPIRATION RATE: 18 BRPM | OXYGEN SATURATION: 96 % | HEIGHT: 71 IN | HEART RATE: 87 BPM | TEMPERATURE: 98.9 F | SYSTOLIC BLOOD PRESSURE: 129 MMHG | DIASTOLIC BLOOD PRESSURE: 79 MMHG

## 2019-09-19 DIAGNOSIS — Z01.818 PREOP GENERAL PHYSICAL EXAM: Primary | ICD-10-CM

## 2019-09-19 DIAGNOSIS — E11.9 TYPE 2 DIABETES MELLITUS WITHOUT COMPLICATION, WITHOUT LONG-TERM CURRENT USE OF INSULIN (H): ICD-10-CM

## 2019-09-19 DIAGNOSIS — Z72.0 TOBACCO ABUSE: ICD-10-CM

## 2019-09-19 DIAGNOSIS — K42.9 UMBILICAL HERNIA WITHOUT OBSTRUCTION AND WITHOUT GANGRENE: ICD-10-CM

## 2019-09-19 LAB
HBA1C MFR BLD: 6.4 % (ref 0–5.6)
HGB BLD-MCNC: 15.9 G/DL (ref 13.3–17.7)

## 2019-09-19 PROCEDURE — 99214 OFFICE O/P EST MOD 30 MIN: CPT | Performed by: PHYSICIAN ASSISTANT

## 2019-09-19 PROCEDURE — 80048 BASIC METABOLIC PNL TOTAL CA: CPT | Performed by: PHYSICIAN ASSISTANT

## 2019-09-19 PROCEDURE — 93000 ELECTROCARDIOGRAM COMPLETE: CPT | Performed by: PHYSICIAN ASSISTANT

## 2019-09-19 PROCEDURE — 85018 HEMOGLOBIN: CPT | Performed by: PHYSICIAN ASSISTANT

## 2019-09-19 PROCEDURE — 36415 COLL VENOUS BLD VENIPUNCTURE: CPT | Performed by: PHYSICIAN ASSISTANT

## 2019-09-19 PROCEDURE — 83036 HEMOGLOBIN GLYCOSYLATED A1C: CPT | Performed by: PHYSICIAN ASSISTANT

## 2019-09-19 RX ORDER — NICOTINE 21 MG/24HR
1 PATCH, TRANSDERMAL 24 HOURS TRANSDERMAL EVERY 24 HOURS
Qty: 30 PATCH | Refills: 0 | Status: SHIPPED | OUTPATIENT
Start: 2019-09-19 | End: 2019-10-19

## 2019-09-19 RX ORDER — NICOTINE 21 MG/24HR
1 PATCH, TRANSDERMAL 24 HOURS TRANSDERMAL EVERY 24 HOURS
Qty: 30 PATCH | Refills: 0 | Status: SHIPPED | OUTPATIENT
Start: 2019-09-19 | End: 2020-06-19

## 2019-09-19 ASSESSMENT — MIFFLIN-ST. JEOR: SCORE: 2096.84

## 2019-09-19 NOTE — LETTER
September 20, 2019    Lan Potts Jr.  [address] [facility] [address]            Dear Lan,    All of your labs were normal for you.     Below is a copy of the results.  It was a pleasure to see you at your last appointment.    If you have any questions or concerns, please call myself or my nurse at [phone].    Sincerely,    Sahil Dunbar PA-C / sara    Results for orders placed or performed in visit on 09/19/19   Hemoglobin   Result Value Ref Range    Hemoglobin 15.9 13.3 - 17.7 g/dL   Hemoglobin A1c   Result Value Ref Range    Hemoglobin A1C 6.4 (H) 0 - 5.6 %

## 2019-09-19 NOTE — PROGRESS NOTES
Your patient was not able to void for the urine protein test you had ordered at today's visit with you.  I canceled today's order and pended future orders for this patient IN TODAY'S ENCOUNTER.  Patient will need to be contacted by your team to return to lab for it if needed.  Yesica Ian [facility]

## 2019-09-20 ENCOUNTER — MYC MEDICAL ADVICE (OUTPATIENT)
Dept: FAMILY MEDICINE | Facility: CLINIC | Age: 54
End: 2019-09-20

## 2019-09-20 ENCOUNTER — TELEPHONE (OUTPATIENT)
Dept: FAMILY MEDICINE | Facility: CLINIC | Age: 54
End: 2019-09-20

## 2019-09-20 LAB
ANION GAP SERPL CALCULATED.3IONS-SCNC: 2 MMOL/L (ref 3–14)
BUN SERPL-MCNC: 10 MG/DL (ref 7–30)
CALCIUM SERPL-MCNC: 8.6 MG/DL (ref 8.5–10.1)
CHLORIDE SERPL-SCNC: 108 MMOL/L (ref 94–109)
CO2 SERPL-SCNC: 30 MMOL/L (ref 20–32)
CREAT SERPL-MCNC: 0.71 MG/DL (ref 0.66–1.25)
GFR SERPL CREATININE-BSD FRML MDRD: >90 ML/MIN/{1.73_M2}
GLUCOSE SERPL-MCNC: 169 MG/DL (ref 70–99)
POTASSIUM SERPL-SCNC: 5.1 MMOL/L (ref 3.4–5.3)
SODIUM SERPL-SCNC: 140 MMOL/L (ref 133–144)

## 2019-09-20 NOTE — RESULT ENCOUNTER NOTE
Craolina Potts,    All of your labs were normal for you.    Please contact the clinic if you have additional questions.  Thank you.    Sincerely,    Sahil Dunbar PA-C

## 2019-09-20 NOTE — TELEPHONE ENCOUNTER
Yes stop all the morning of surgery. Hold nsaids/ asa for 7 days prior to surgery.    Please clarify what does of metformin he is taking.     Sahil Dunbar PA-C

## 2019-09-20 NOTE — RESULT ENCOUNTER NOTE
Carolina Potts,    All of your labs were normal for you.    Please contact the clinic if you have additional questions.  Thank you.    Sincerely,    Sahil Dunbar PA-C

## 2019-10-02 ENCOUNTER — HEALTH MAINTENANCE LETTER (OUTPATIENT)
Age: 54
End: 2019-10-02

## 2019-10-12 DIAGNOSIS — E11.9 TYPE 2 DIABETES MELLITUS WITHOUT COMPLICATION, WITHOUT LONG-TERM CURRENT USE OF INSULIN (H): ICD-10-CM

## 2019-10-14 ENCOUNTER — OFFICE VISIT (OUTPATIENT)
Dept: SURGERY | Facility: CLINIC | Age: 54
End: 2019-10-14
Payer: COMMERCIAL

## 2019-10-14 VITALS
HEIGHT: 71 IN | SYSTOLIC BLOOD PRESSURE: 153 MMHG | BODY MASS INDEX: 37.8 KG/M2 | DIASTOLIC BLOOD PRESSURE: 83 MMHG | HEART RATE: 96 BPM | WEIGHT: 270 LBS

## 2019-10-14 DIAGNOSIS — Z09 S/P UMBILICAL HERNIA REPAIR, FOLLOW-UP EXAM: Primary | ICD-10-CM

## 2019-10-14 PROCEDURE — 99024 POSTOP FOLLOW-UP VISIT: CPT | Performed by: SURGERY

## 2019-10-14 RX ORDER — METFORMIN HCL 500 MG
TABLET, EXTENDED RELEASE 24 HR ORAL
Qty: 120 TABLET | Refills: 0 | Status: SHIPPED | OUTPATIENT
Start: 2019-10-14 | End: 2019-10-28

## 2019-10-14 ASSESSMENT — MIFFLIN-ST. JEOR: SCORE: 2096.84

## 2019-10-14 NOTE — PROGRESS NOTES
"General Surgery Post Op    Pt returns for follow up visit s/p open umbilical hernia on 9/26/19.    Patient has been doing well, tolerating diet. Bowels moving well. Pain controlled. No issues with wound healing/redness/drainage. No fevers.  Felt fairly woozy during first week possibly from pain meds and anesthesia. Pain was there but tolerable. 2nd week felt much better  Able to do full days at work with the restrictions    Physical exam: Vitals: BP (!) 153/83   Pulse 96   Ht 1.803 m (5' 11\")   Wt 122.5 kg (270 lb)   BMI 37.66 kg/m    BMI= Body mass index is 37.66 kg/m .    Exam:  Constitutional: healthy, alert and no distress  Cardiovascular: negative, PMI normal. No lifts, heaves, or thrills. RRR. No murmurs, clicks gallops or rub  Respiratory: negative, Percussion normal. Good diaphragmatic excursion. Lungs clear  Gastrointestinal: Abdomen soft, non-tender. BS normal. No masses, organomegaly  Rash in upper abdomen, localized      Assessment:     ICD-10-CM    1. S/P umbilical hernia repair, follow-up exam Z09      Plan: Doing well, though was tough that first week. Was bale to return to work with 20lb lifting restrictions. Will keep these another 2 weeks. Likely able to come off restrictions at that point vs increased limit depending on how pain is then. He plans to keep using the nicotine patch to quit smoking    Tyrell Nuñez MD      "

## 2019-10-14 NOTE — LETTER
"    10/14/2019         RE: Lan Potts Jr.  [address] [facility] [address]        Dear Colleague,    Thank you for referring your patient, Lan Potts Jr., to the [facility]. Please see a copy of my visit note below.    General Surgery Post Op    Pt returns for follow up visit s/p open umbilical hernia on 9/26/19.    Patient has been doing well, tolerating diet. Bowels moving well. Pain controlled. No issues with wound healing/redness/drainage. No fevers.  Felt fairly woozy during first week possibly from pain meds and anesthesia. Pain was there but tolerable. 2nd week felt much better  Able to do full days at work with the restrictions    Physical exam: Vitals: BP (!) 153/83   Pulse 96   Ht 1.803 m (5' 11\")   Wt 122.5 kg (270 lb)   BMI 37.66 kg/m     BMI= Body mass index is 37.66 kg/m .    Exam:  Constitutional: healthy, alert and no distress  Cardiovascular: negative, PMI normal. No lifts, heaves, or thrills. RRR. No murmurs, clicks gallops or rub  Respiratory: negative, Percussion normal. Good diaphragmatic excursion. Lungs clear  Gastrointestinal: Abdomen soft, non-tender. BS normal. No masses, organomegaly  Rash in upper abdomen, localized      Assessment:     ICD-10-CM    1. S/P umbilical hernia repair, follow-up exam Z09      Plan: Doing well, though was tough that first week. Was bale to return to work with 20lb lifting restrictions. Will keep these another 2 weeks. Likely able to come off restrictions at that point vs increased limit depending on how pain is then. He plans to keep using the nicotine patch to quit smoking    Tyrell Nuñez MD        Again, thank you for allowing me to participate in the care of your patient.        Sincerely,        Tyrell Nuñez MD    "

## 2019-10-14 NOTE — TELEPHONE ENCOUNTER
Next 5 appointments (look out 90 days)    Nov 14, 2019  5:30 PM CST  SHORT with Eligio Quevedo MD  [facility] ([facility] [address] [facility] [address] [phone]

## 2019-10-19 DIAGNOSIS — Z72.0 TOBACCO ABUSE: ICD-10-CM

## 2019-10-21 RX ORDER — NICOTINE 21 MG/24HR
1 PATCH, TRANSDERMAL 24 HOURS TRANSDERMAL EVERY 24 HOURS
Qty: 28 PATCH | Refills: 1 | Status: SHIPPED | OUTPATIENT
Start: 2019-10-21 | End: 2019-11-14

## 2019-10-21 NOTE — TELEPHONE ENCOUNTER
nicoderm CQ 21mg patch refill request  Last fill: 9/19/19;  #30  Pharmacy is needing new prescription sent over with qty of 28 and the dx code.  I have done this.  Yaz Moran RN

## 2019-10-24 DIAGNOSIS — F41.1 GAD (GENERALIZED ANXIETY DISORDER): ICD-10-CM

## 2019-10-25 RX ORDER — ALPRAZOLAM 0.25 MG
TABLET ORAL
Qty: 30 TABLET | Refills: 0 | Status: SHIPPED | OUTPATIENT
Start: 2019-10-25 | End: 2019-11-23

## 2019-10-25 NOTE — TELEPHONE ENCOUNTER
Controlled Substance Refill Request for xanax  Problem List Complete:  No     Last Written Prescription Date:  9/17/19  Last Fill Quantity: 30,   # refills: 0    Last Office Visit with [facility] primary care provider: 9/5/19    Future Office visit:   Next 5 appointments (look out 90 days)    Nov 14, 2019  5:30 PM CST  [facility] [address] [facility] [address] [phone]          Controlled substance agreement:   Encounter-Level CSA:    There are no encounter-level csa.     Patient-Level CSA:    There are no patient-level csa.         Last Urine Drug Screen: No results found for: CDAUT, No results found for: COMDAT, No results found for: THC13, PCP13, COC13, MAMP13, OPI13, AMP13, BZO13, TCA13, MTD13, BAR13, OXY13, PPX13, BUP13     Processing:  Fax Rx to [email or web address]     [email or web address]       checked in past 3 months?  Yes 10/25/19   Ruth HERBERTN, RN

## 2019-10-26 ENCOUNTER — TELEPHONE (OUTPATIENT)
Dept: FAMILY MEDICINE | Facility: CLINIC | Age: 54
End: 2019-10-26

## 2019-10-26 DIAGNOSIS — E11.9 TYPE 2 DIABETES MELLITUS WITHOUT COMPLICATION, WITHOUT LONG-TERM CURRENT USE OF INSULIN (H): ICD-10-CM

## 2019-10-26 NOTE — TELEPHONE ENCOUNTER
Alternative requested.  Drug: Metformin hcl 500 mg tab  Pharmacy notes: Please send 90 days for insurance reasons.

## 2019-10-28 RX ORDER — METFORMIN HCL 500 MG
TABLET, EXTENDED RELEASE 24 HR ORAL
Qty: 360 TABLET | Refills: 0 | Status: SHIPPED | OUTPATIENT
Start: 2019-10-28 | End: 2020-10-30

## 2019-11-03 DIAGNOSIS — F41.1 GAD (GENERALIZED ANXIETY DISORDER): ICD-10-CM

## 2019-11-04 RX ORDER — ESCITALOPRAM OXALATE 10 MG/1
10 TABLET ORAL EVERY MORNING
Qty: 90 TABLET | Refills: 1 | OUTPATIENT
Start: 2019-11-04

## 2019-11-14 ENCOUNTER — OFFICE VISIT (OUTPATIENT)
Dept: FAMILY MEDICINE | Facility: CLINIC | Age: 54
End: 2019-11-14
Payer: COMMERCIAL

## 2019-11-14 VITALS
TEMPERATURE: 98.7 F | SYSTOLIC BLOOD PRESSURE: 122 MMHG | HEART RATE: 87 BPM | BODY MASS INDEX: 40.17 KG/M2 | OXYGEN SATURATION: 95 % | WEIGHT: 288 LBS | DIASTOLIC BLOOD PRESSURE: 71 MMHG

## 2019-11-14 DIAGNOSIS — Z12.11 SPECIAL SCREENING FOR MALIGNANT NEOPLASMS, COLON: ICD-10-CM

## 2019-11-14 DIAGNOSIS — E11.9 TYPE 2 DIABETES MELLITUS WITHOUT COMPLICATION, WITHOUT LONG-TERM CURRENT USE OF INSULIN (H): Primary | ICD-10-CM

## 2019-11-14 LAB — HBA1C MFR BLD: 6.9 % (ref 0–5.6)

## 2019-11-14 PROCEDURE — 83036 HEMOGLOBIN GLYCOSYLATED A1C: CPT | Performed by: FAMILY MEDICINE

## 2019-11-14 PROCEDURE — 99214 OFFICE O/P EST MOD 30 MIN: CPT | Performed by: FAMILY MEDICINE

## 2019-11-14 PROCEDURE — 36415 COLL VENOUS BLD VENIPUNCTURE: CPT | Performed by: FAMILY MEDICINE

## 2019-11-14 ASSESSMENT — PAIN SCALES - GENERAL: PAINLEVEL: NO PAIN (0)

## 2019-11-14 NOTE — NURSING NOTE
"Chief Complaint   Patient presents with     Diabetes     Health Maintenance     order pended, Physical, Last eye exam       Initial /71   Pulse 87   Temp 98.7  F (37.1  C) (Oral)   Wt 130.6 kg (288 lb)   SpO2 95%   BMI 40.17 kg/m   Estimated body mass index is 40.17 kg/m  as calculated from the following:    Height as of 10/14/19: 1.803 m (5' 11\").    Weight as of this encounter: 130.6 kg (288 lb).  Medication Reconciliation: complete    KATHY Collins MA    "

## 2019-11-14 NOTE — PROGRESS NOTES
[place] diabetes resourses:  [email or web address]        To access diabetes educational materials with in EPIC use <dot>DALE      Put this in AFTER VISIT SUMMARY if needed for the patient to access information online [email or web address]      SUBJECTIVE:  Lan Potts Jr. is a 54 year old male who presents today for a follow up appointment for management of DIABETES MELLITUS.    Patient Active Problem List    Diagnosis Date Noted     JUAN (generalized anxiety disorder) 08/19/2019     Priority: Medium     Morbid obesity (H) 07/12/2018     Priority: Medium     Umbilical hernia without obstruction and without gangrene 07/12/2018     Priority: Medium     Varicose veins of left lower extremity 07/12/2018     Priority: Medium     Tobacco abuse 04/05/2018     Priority: Medium     Erectile dysfunction, unspecified erectile dysfunction type 04/05/2018     Priority: Medium     10 year risk of MI or stroke 7.5% or greater, 12.6% in April 2017 04/06/2017     Priority: Medium     Type 2 diabetes mellitus without complication, without long-term current use of insulin (H) 01/05/2017     Priority: Medium     Migraine without status migrainosus, not intractable, unspecified migraine type 01/21/2016     Priority: Medium     High triglycerides 12/11/2014     Priority: Medium     HDL deficiency 12/11/2014     Priority: Medium     Hyperlipidemia with target LDL less than 100 02/17/2014     Priority: Medium     Diagnosis updated by automated process. Provider to review and confirm.       Chronic back pain      Priority: Medium     Seasonal allergies      Priority: Medium       The patient reports that he IS taking the medication as prescribed. He denies side effects of medication.    ----------------------------------------------------------------------------------------------------------------------------------------    BP Readings from Last  3 Encounters:   11/14/19 122/71   10/14/19 (!) 153/83   09/19/19 129/79     The patient reports that he IS NOT currently smoking.  History   Smoking Status     Former Smoker     Types: Cigarettes   Smokeless Tobacco     Never Used     Comment: thinking about quitting           The 10-year ASCVD risk score (Maribel OH Jr., et al., 2013) is: 12.5%    Values used to calculate the score:      Age: 54 years      Sex: Male      Is Non- : No      Diabetic: Yes      Tobacco smoker: No      Systolic Blood Pressure: 122 mmHg      Is BP treated: No      HDL Cholesterol: 35 mg/dL      Total Cholesterol: 233 mg/dL        Current Outpatient Medications   Medication Sig Dispense Refill     ACE/ARB/ARNI NOT PRESCRIBED, INTENTIONAL, Please choose reason not prescribed, below       ALPRAZolam (XANAX) 0.25 MG tablet TAKE 1 TABLET BY MOUTH THREE TIMES A DAY AS NEEDED 30 tablet 0     ASPIRIN NOT PRESCRIBED, INTENTIONAL, Antiplatelet medication not prescribed intentionally due to not indicated at this age 1 each 0     blood glucose (ACCU-CHEK SMARTVIEW) test strip 1 strip by In Vitro route 3 times daily 3 Box 3     blood glucose calibration (ACCU-CHEK SMARTVIEW CONTROL) solution 1 drop by In Vitro route as needed 1 each 3     blood glucose monitoring (ACCU-CHEK FASTCLIX) lancets 1 each 3 times daily 3 Box 3     eletriptan (RELPAX) 40 MG tablet Take 1 tablet (40 mg) by mouth at onset of headache May repeat in 2 hours as needed but no more than 2 pills in 24 hours. 12 tablet 5     escitalopram (LEXAPRO) 20 MG tablet Take 0.5 tablets (10 mg) by mouth every morning 90 tablet 1     ezetimibe (ZETIA) 10 MG tablet Take 1 tablet (10 mg) by mouth daily 90 tablet 3     fluticasone (FLONASE) 50 MCG/ACT nasal spray SPRAY 2 SPRAYS INTO BOTH NOSTRILS DAILY 16 mL 3     metFORMIN (GLUCOPHAGE-XR) 500 MG 24 hr tablet TAKE 2 TABLETS BY MOUTH TWICE A  tablet 0     metFORMIN (GLUCOPHAGE-XR) 750 MG 24 hr tablet Take 1 tablet (750  mg) by mouth 2 times daily (with meals) 180 tablet 3     nicotine (NICODERM CQ) 14 MG/24HR 24 hr patch Place 1 patch onto the skin every 24 hours 30 patch 0     omeprazole (PRILOSEC) 20 MG DR capsule Take 1 capsule (20 mg) by mouth daily 30 capsule 3     sildenafil (REVATIO) 20 MG tablet Take 1 to 5 tabs 30-60 minutes before intercourse.  Never use with nitroglycerin, terazosin or doxazosin. 30 tablet 11     STATIN NOT PRESCRIBED, INTENTIONAL, continuous prn for other Reported on 4/6/2017  0     triamcinolone (KENALOG) 0.1 % cream Apply topically 2 times daily Apply topically twice a day to the affected area Avoid contact with skin of face, armpits and groin 100 g 1       The patient reports that he IS NOT taking a statin.   (Reminder all diabetics with a ASCVD risk greater or equal to 7.5% should be on a high intensity statin, otherwise on a moderate intensity statin)    The patient reports that he IS NOT taking  aspirin daily.  (Reminder all diabetic patients with a cardiovascular risk factor and > 50 should take a daily aspirin)     The patient reports that he IS doing a self foot exam daily.  The patient reports that he does exercise in the form of walking. He just had hernia surgery so he is trying to become more active   The patient reports that he IS following the recommended diabetic diet.   The patient reports that his last eye exam was 17 months ago.     Immunization History   Administered Date(s) Administered     HepB 04/06/2017     HepB-Adult 04/05/2018, 05/02/2019     Influenza (IIV3) PF 12/21/2012, 09/27/2013     Influenza Quad, Recombinant, p-free (RIV4) 09/26/2018, 09/05/2019     Influenza Vaccine IM > 6 months Valent IIV4 10/26/2017     Pneumococcal 23 valent 09/04/2014     TDAP Vaccine (Adacel) 09/20/2011     Zoster vaccine recombinant adjuvanted (SHINGRIX) 07/12/2018, 09/26/2018     The patient reports that he has had the hepatitis B vaccine series in the past. (recommended for age 19-59 and can  be given to age 60 or older)  The patient reports that he has had a pnuemovax in the past.  The patient reports that he has had a flu shot for the current influenza season.  The patient would like to have a no vaccinations today    Patient concerns: has no specific complaints and has been feeling well.      EXAM:  /71   Pulse 87   Temp 98.7  F (37.1  C) (Oral)   Wt 130.6 kg (288 lb)   SpO2 95%   BMI 40.17 kg/m    Wt Readings from Last 4 Encounters:   11/14/19 130.6 kg (288 lb)   10/14/19 122.5 kg (270 lb)   09/19/19 122.5 kg (270 lb)   09/11/19 122 kg (269 lb)     Body mass index is 40.17 kg/m .    General:  Lan is awake, alert, and cooperative.  NAD.    Diabetic Foot Screen: was declined by the patient                 Previsit labs drawn include:  Office Visit on 09/19/2019   Component Date Value Ref Range Status     Sodium 09/19/2019 140  133 - 144 mmol/L Final     Potassium 09/19/2019 5.1  3.4 - 5.3 mmol/L Final     Chloride 09/19/2019 108  94 - 109 mmol/L Final     Carbon Dioxide 09/19/2019 30  20 - 32 mmol/L Final     Anion Gap 09/19/2019 2* 3 - 14 mmol/L Final     Glucose 09/19/2019 169* 70 - 99 mg/dL Final    Non Fasting     Urea Nitrogen 09/19/2019 10  7 - 30 mg/dL Final     Creatinine 09/19/2019 0.71  0.66 - 1.25 mg/dL Final     GFR Estimate 09/19/2019 >90  >60 mL/min/[1.73_m2] Final    Comment: Non  GFR Calc  Starting 12/18/2018, serum creatinine based estimated GFR (eGFR) will be   calculated using the Chronic Kidney Disease Epidemiology Collaboration   (CKD-EPI) equation.       GFR Estimate If Black 09/19/2019 >90  >60 mL/min/[1.73_m2] Final    Comment:  GFR Calc  Starting 12/18/2018, serum creatinine based estimated GFR (eGFR) will be   calculated using the Chronic Kidney Disease Epidemiology Collaboration   (CKD-EPI) equation.       Calcium 09/19/2019 8.6  8.5 - 10.1 mg/dL Final     Hemoglobin 09/19/2019 15.9  13.3 - 17.7 g/dL Final     Hemoglobin A1C  "09/19/2019 6.4* 0 - 5.6 % Final    Comment: Normal <5.7% Prediabetes 5.7-6.4%  Diabetes 6.5% or higher - adopted from ADA   consensus guidelines.           ASSESSMENT and PLAN:    Type II Diabetes.    DIABETES Type II:                       Lab Results   Component Value Date    A1C 6.4 09/19/2019       Control   unable to assess  Lab Results   Component Value Date    A1C 6.4 09/19/2019    A1C 6.6 08/15/2019    A1C 6.5 05/02/2019     Lab Results   Component Value Date    MICROL 6 03/26/2018     No results found for: MICROALBUMIN Control   good    Check a HGBA1C , Recommended to take ASA  mg daily for appropriate patient, Compliance with the recommended diabetic diet was stressed, Regular aerobic exercise was encouraged, Home glucose monitoring encouraged, Annual eye exam recommended, Self foot exam demonstrated and recommended to be done nightly, Apply moisturizer to feet with in 3 minutes of showering or bathing, Follow up in clinic in 3-6 months for a diabetes check and Future labs ordered and the patient was instructed to make a lab appt 1 week prior to next diabetes visit      BP/HTN:   BP Readings from Last 3 Encounters:   11/14/19 122/71   10/14/19 (!) 153/83   09/19/19 129/79     Control   good    - Medication:continue the current doses of medication.  (make sure to order \"Ace not prescribed intentionally if not on an ACE/ARB)  The patient was advised to do the following therapuetic life style changes  - Dietary sodium restriction and increase potassium and Calcium intake  - Regular aerobic exercise  - Weight loss  - Discontinue smoking if applicable  - Avoid regular NSAID use if applicable  - Avoid regular decongestant use if applicable  - Follow up in clinic in 6 months for a recheck  - Check a basic metabolic panel today if needed    Patient Education: Reviewed risks of hypertension and principles of   Treatment.    HYPERCHOLESTEROLEMIA:     The 10-year ASCVD risk score (Maribel ADRIANO Jr., et al., 2013) is: " 12.5%    Values used to calculate the score:      Age: 54 years      Sex: Male      Is Non- : No      Diabetic: Yes      Tobacco smoker: No      Systolic Blood Pressure: 122 mmHg      Is BP treated: No      HDL Cholesterol: 35 mg/dL      Total Cholesterol: 233 mg/dL    Lab Results   Component Value Date     03/26/2018      Control   unable to assess  Cholesterol   Date Value Ref Range Status   03/26/2018 233 (H) <200 mg/dL Final     Comment:     Desirable:       <200 mg/dl   01/13/2017 252 (H) <200 mg/dL Final     Comment:     Desirable:       <200 mg/dl     HDL Cholesterol   Date Value Ref Range Status   03/26/2018 35 (L) >39 mg/dL Final   01/13/2017 33 (L) >39 mg/dL Final     LDL Cholesterol Calculated   Date Value Ref Range Status   03/26/2018 146 (H) <100 mg/dL Final     Comment:     Above desirable:  100-129 mg/dl  Borderline High:  130-159 mg/dL  High:             160-189 mg/dL  Very high:       >189 mg/dl     01/13/2017 172 (H) <100 mg/dL Final     Comment:     Above desirable:  100-129 mg/dl   Borderline High:  130-159 mg/dL   High:             160-189 mg/dL   Very high:       >189 mg/dl       Triglycerides   Date Value Ref Range Status   03/26/2018 262 (H) <150 mg/dL Final     Comment:     Borderline high:  150-199 mg/dl  High:             200-499 mg/dl  Very high:       >499 mg/dl     01/13/2017 234 (H) <150 mg/dL Final     Comment:     Borderline high:  150-199 mg/dl   High:             200-499 mg/dl   Very high:       >499 mg/dl   Fasting specimen       Cholesterol/HDL Ratio   Date Value Ref Range Status   09/03/2015 7.7 (H) 0.0 - 5.0 Final   12/11/2014 5.8 (H) 0.0 - 5.0 Final         The patient is not  fasting today and he  will recheck the fasting lipid panel at a future laboratory appointment.

## 2019-11-15 NOTE — PATIENT INSTRUCTIONS
Please schedule and eye exam with you eye care provider and continue to do so every 1 year(s). You are due for that now.

## 2019-11-15 NOTE — RESULT ENCOUNTER NOTE
Lan,  I have reviewed the results of the laboratory tests that we recently ordered. All of the laboratory that are back so far are normal or considered normal for you.  Hemoglobin A1C went up a little bit but that should improve when you recover from surgery. There are still some labs pending and I will let you know those results when they   are available.  Sincerely,   Eligio Quevedo MD

## 2019-11-23 DIAGNOSIS — F41.1 GAD (GENERALIZED ANXIETY DISORDER): ICD-10-CM

## 2019-11-25 RX ORDER — ALPRAZOLAM 0.25 MG
TABLET ORAL
Qty: 30 TABLET | Refills: 0 | Status: SHIPPED | OUTPATIENT
Start: 2019-11-25 | End: 2020-01-20

## 2019-11-25 NOTE — TELEPHONE ENCOUNTER
Controlled Substance Refill Request for xanax  Problem List Complete:  No      Last Written Prescription Date:  10/25/19  Last Fill Quantity: 30,   # refills: 0     Last Office Visit with [facility] primary care provider: 11/14/19         Controlled substance agreement:   Encounter-Level CSA:    There are no encounter-level csa.      Patient-Level CSA:    There are no patient-level csa.            Last Urine Drug Screen: No results found for: CDAUT, No results found for: COMDAT, No results found for: THC13, PCP13, COC13, MAMP13, OPI13, AMP13, BZO13, TCA13, MTD13, BAR13, OXY13, PPX13, BUP13     Processing:  Fax Rx to [facility] pharmacy      [email or web address]         checked in past 3 months?  Yes 10/25/19   Yanelis Singletary RN

## 2020-01-19 DIAGNOSIS — F41.1 GAD (GENERALIZED ANXIETY DISORDER): ICD-10-CM

## 2020-01-20 RX ORDER — ALPRAZOLAM 0.25 MG
TABLET ORAL
Qty: 30 TABLET | Refills: 0 | Status: SHIPPED | OUTPATIENT
Start: 2020-01-20 | End: 2020-03-02

## 2020-02-07 ENCOUNTER — OFFICE VISIT (OUTPATIENT)
Dept: FAMILY MEDICINE | Facility: CLINIC | Age: 55
End: 2020-02-07
Payer: COMMERCIAL

## 2020-02-07 VITALS
RESPIRATION RATE: 18 BRPM | HEART RATE: 86 BPM | OXYGEN SATURATION: 97 % | DIASTOLIC BLOOD PRESSURE: 87 MMHG | WEIGHT: 297 LBS | SYSTOLIC BLOOD PRESSURE: 131 MMHG | BODY MASS INDEX: 41.42 KG/M2 | TEMPERATURE: 97.6 F

## 2020-02-07 DIAGNOSIS — G43.909 MIGRAINE WITHOUT STATUS MIGRAINOSUS, NOT INTRACTABLE, UNSPECIFIED MIGRAINE TYPE: Primary | ICD-10-CM

## 2020-02-07 DIAGNOSIS — G44.209 TENSION HEADACHE: ICD-10-CM

## 2020-02-07 PROCEDURE — 99213 OFFICE O/P EST LOW 20 MIN: CPT | Performed by: PHYSICIAN ASSISTANT

## 2020-02-07 NOTE — PROGRESS NOTES
Subjective     Lan Potts Jr. is a 54 year old male who presents to clinic today for the following health issues:    HPI   Migraine      Duration: 5 days    Description  Headache, dizziness, sinus pressure     Severity: moderate - better today     Accompanying signs and symptoms: None    History (predisposing factors):  migraines    Precipitating or alleviating factors: None    Therapies tried and outcome:  Relpax and sleeping   Starts in the back of his neck and radiates to top of head and behind eyes. Started after vomiting and diarrhea and dizzinessat the beginning of the week. Similar Migraine symptoms in the past with his history of migraines. Thinks this virus as triggered his migraine. normally will get 2-4 times in a year.   This is the 2 one this year. Overall feeling better.     Of note. Patient has quit smoking in the last 6 months and is feeling good about that.     Patient Active Problem List   Diagnosis     Chronic back pain     Seasonal allergies     Hyperlipidemia with target LDL less than 100     High triglycerides     HDL deficiency     Migraine without status migrainosus, not intractable, unspecified migraine type     Type 2 diabetes mellitus without complication, without long-term current use of insulin (H)     10 year risk of MI or stroke 7.5% or greater, 12.6% in April 2017     Tobacco abuse     Erectile dysfunction, unspecified erectile dysfunction type     Morbid obesity (H)     Umbilical hernia without obstruction and without gangrene     Varicose veins of left lower extremity     JUAN (generalized anxiety disorder)     Past Surgical History:   Procedure Laterality Date     ENT SURGERY       GENITOURINARY SURGERY         Social History     Tobacco Use     Smoking status: Former Smoker     Types: Cigarettes     Smokeless tobacco: Never Used     Tobacco comment: thinking about quitting   Substance Use Topics     Alcohol use: Yes     Comment: occ     Family History   Problem Relation Age  of Onset     Arthritis Mother      Alzheimer Disease Mother      Arthritis Father      Circulatory Father         abdominal artery issue     Hypertension Father      Diabetes Other      Blood Disease No family hx of      Anesthesia Reaction No family hx of      Glaucoma No family hx of      Macular Degeneration No family hx of          Current Outpatient Medications   Medication Sig Dispense Refill     ACE/ARB/ARNI NOT PRESCRIBED, INTENTIONAL, Please choose reason not prescribed, below       ALPRAZolam (XANAX) 0.25 MG tablet TAKE 1 TABLET BY MOUTH THREE TIMES A DAY AS NEEDED 30 tablet 0     aspirin (ASA) 81 MG tablet Take 1 tablet (81 mg) by mouth daily       ASPIRIN NOT PRESCRIBED, INTENTIONAL, Antiplatelet medication not prescribed intentionally due to not indicated at this age 1 each 0     blood glucose (ACCU-CHEK SMARTVIEW) test strip 1 strip by In Vitro route 3 times daily 3 Box 3     blood glucose calibration (ACCU-CHEK SMARTVIEW CONTROL) solution 1 drop by In Vitro route as needed 1 each 3     blood glucose monitoring (ACCU-CHEK FASTCLIX) lancets 1 each 3 times daily 3 Box 3     eletriptan (RELPAX) 40 MG tablet Take 1 tablet (40 mg) by mouth at onset of headache May repeat in 2 hours as needed but no more than 2 pills in 24 hours. 12 tablet 5     escitalopram (LEXAPRO) 20 MG tablet Take 0.5 tablets (10 mg) by mouth every morning 90 tablet 1     ezetimibe (ZETIA) 10 MG tablet Take 1 tablet (10 mg) by mouth daily 90 tablet 3     fluticasone (FLONASE) 50 MCG/ACT nasal spray SPRAY 2 SPRAYS INTO BOTH NOSTRILS DAILY 16 mL 3     metFORMIN (GLUCOPHAGE-XR) 500 MG 24 hr tablet TAKE 2 TABLETS BY MOUTH TWICE A  tablet 0     metFORMIN (GLUCOPHAGE-XR) 750 MG 24 hr tablet Take 1 tablet (750 mg) by mouth 2 times daily (with meals) 180 tablet 3     nicotine (NICODERM CQ) 14 MG/24HR 24 hr patch Place 1 patch onto the skin every 24 hours 30 patch 0     omeprazole (PRILOSEC) 20 MG DR capsule Take 1 capsule (20 mg) by  mouth daily 30 capsule 3     sildenafil (REVATIO) 20 MG tablet Take 1 to 5 tabs 30-60 minutes before intercourse.  Never use with nitroglycerin, terazosin or doxazosin. 30 tablet 11     STATIN NOT PRESCRIBED, INTENTIONAL, continuous prn for other Reported on 4/6/2017  0     triamcinolone (KENALOG) 0.1 % cream Apply topically 2 times daily Apply topically twice a day to the affected area Avoid contact with skin of face, armpits and groin 100 g 1     Allergies   Allergen Reactions     Amoxicillin      rash     Pravastatin      Bilateral(ly) calf pain     BP Readings from Last 3 Encounters:   02/07/20 131/87   11/14/19 122/71   10/14/19 (!) 153/83    Wt Readings from Last 3 Encounters:   02/07/20 134.7 kg (297 lb)   11/14/19 130.6 kg (288 lb)   10/14/19 122.5 kg (270 lb)         Reviewed and updated as needed this visit by Provider  Tobacco  Allergies  Meds  Problems  Med Hx  Surg Hx  Fam Hx         Review of Systems   ROS COMP: Constitutional, HEENT, cardiovascular, pulmonary, gi and gu systems are negative, except as otherwise noted.      Objective    /87   Pulse 86   Temp 97.6  F (36.4  C) (Oral)   Resp 18   Wt 134.7 kg (297 lb)   SpO2 97%   BMI 41.42 kg/m    Body mass index is 41.42 kg/m .  Physical Exam   GENERAL: healthy, alert and no distress  EYES: Eyes grossly normal to inspection, PERRL and conjunctivae and sclerae normal  HENT: ear canals and TM's normal, nose and mouth without ulcers or lesions  NECK: no adenopathy, no asymmetry, masses, or scars and thyroid normal to palpation  RESP: lungs clear to auscultation - no rales, rhonchi or wheezes  CV: regular rate and rhythm, normal S1 S2, no S3 or S4, no murmur, click or rub, no peripheral edema and peripheral pulses strong  ABDOMEN: soft, nontender, no hepatosplenomegaly, no masses and bowel sounds normal  MS: no gross musculoskeletal defects noted, no edema  SKIN: no suspicious lesions or rashes  NEURO: Normal strength and tone, sensory  "exam grossly normal, mentation intact and cranial nerves 2-12 intact  PSYCH: mentation appears normal, affect normal/bright  C-spine with no tenderness full range of motion.  He states it feels little stiff with range of motion.    Diagnostic Test Results:  none         Assessment & Plan       ICD-10-CM    1. Migraine without status migrainosus, not intractable, unspecified migraine type G43.909 [facility]   2. Tension headache G44.209 [facility]   Talk to patient about his concerns.  At this point we talked about utilizing physical therapy.  We talked about using naproxen activity modification working on good ergonomics.  We discussed good hydration and nutrition.  It sounds like he had a viral gastroenteritis that may be initially triggered this migraine.  His symptoms are more consistent with tension headache.  We discussed using ice and heat as needed.   Note for work given     BMI:   Estimated body mass index is 41.42 kg/m  as calculated from the following:    Height as of 10/14/19: 1.803 m (5' 11\").    Weight as of this encounter: 134.7 kg (297 lb).   Weight management plan: Discussed healthy diet and exercise guidelines    Return in about 2 weeks (around 2/21/2020) for As Needed.    Sahil Dunbar PA-C  [facility]      "

## 2020-02-07 NOTE — LETTER
[facility]  [address] [facility] [address]  Phone: [phone]    February 7, 2020        Lan Potts Jr.  [address] [facility] [address]          To whom it may concern:    RE: Lan Potts Jr.    Patient was seen and treated today at our clinic and missed work 2/3-2/72020 due to illness.    Please contact me for questions or concerns.      Sincerely,        Sahil Dunbar PA-C

## 2020-02-28 DIAGNOSIS — F41.1 GAD (GENERALIZED ANXIETY DISORDER): ICD-10-CM

## 2020-03-02 RX ORDER — ALPRAZOLAM 0.25 MG
TABLET ORAL
Qty: 30 TABLET | Refills: 0 | Status: SHIPPED | OUTPATIENT
Start: 2020-03-02 | End: 2020-03-24

## 2020-03-02 NOTE — TELEPHONE ENCOUNTER
Controlled Substance Refill Request for xanax  Problem List Complete:  No ; JUAN noted on problem list but not criteria.     Last Written Prescription Date:  1/20/20  Last Fill Quantity: 30,   # refills: 0     Last Office Visit with [facility] [place] care provider: 11/14/19     Controlled substance agreement:   Encounter-Level CSA:    There are no encounter-level csa.      Patient-Level CSA:    There are no patient-level csa.         Last Urine Drug Screen: No results found for: CDAUT, No results found for: COMDAT, No results found for: THC13, PCP13, COC13, MAMP13, OPI13, AMP13, BZO13, TCA13, MTD13, BAR13, OXY13, PPX13, BUP13     [email or web address]      checked in past 3 months?  Yes 3/2/20 and no concerns noted.

## 2020-03-24 DIAGNOSIS — F41.1 GAD (GENERALIZED ANXIETY DISORDER): ICD-10-CM

## 2020-03-24 RX ORDER — ALPRAZOLAM 0.25 MG
TABLET ORAL
Qty: 30 TABLET | Refills: 0 | Status: SHIPPED | OUTPATIENT
Start: 2020-03-24 | End: 2020-05-04

## 2020-03-24 NOTE — TELEPHONE ENCOUNTER
Controlled Substance Refill Request for xanax  Problem List Complete:  No      Last Written Prescription Date:  3/2/20  Last Fill Quantity: 30,   # refills: 0     Last Office Visit with [facility] primary care provider: 11/14/19     Controlled substance agreement:   Encounter-Level CSA:    There are no encounter-level csa.      Patient-Level CSA:    There are no patient-level csa.         Last Urine Drug Screen: No results found for: CDAUT, No results found for: COMDAT, No results found for: THC13, PCP13, COC13, MAMP13, OPI13, AMP13, BZO13, TCA13, MTD13, BAR13, OXY13, PPX13, BUP13     [email or web address]      checked in past 3 months?  Yes 3/2/20 and no concerns     Allie HERBERTN, RN

## 2020-05-03 DIAGNOSIS — F41.1 GAD (GENERALIZED ANXIETY DISORDER): ICD-10-CM

## 2020-05-04 RX ORDER — ALPRAZOLAM 0.25 MG
TABLET ORAL
Qty: 30 TABLET | Refills: 0 | Status: SHIPPED | OUTPATIENT
Start: 2020-05-04 | End: 2020-06-19

## 2020-05-04 NOTE — TELEPHONE ENCOUNTER
Controlled Substance Refill Request for xanax  Problem List Complete:  No      Last Written Prescription Date:  3/24/20  Last Fill Quantity: 30,   # refills: 0     Last Office Visit with [facility] primary care provider: 11/14/19     Controlled substance agreement:   Encounter-Level CSA:    There are no encounter-level csa.      Patient-Level CSA:    There are no patient-level csa.         Last Urine Drug Screen: No results found for: CDAUT, No results found for: COMDAT, No results found for: THC13, PCP13, COC13, MAMP13, OPI13, AMP13, BZO13, TCA13, MTD13, BAR13, OXY13, PPX13, BUP13     [email or web address]      checked in past 3 months?  Yes 3/2/20 and no concerns      Allie HERBERTN, RN

## 2020-05-09 DIAGNOSIS — E11.9 TYPE 2 DIABETES MELLITUS WITHOUT COMPLICATION, WITHOUT LONG-TERM CURRENT USE OF INSULIN (H): ICD-10-CM

## 2020-05-11 RX ORDER — METFORMIN HYDROCHLORIDE 750 MG/1
750 TABLET, EXTENDED RELEASE ORAL 2 TIMES DAILY WITH MEALS
Qty: 180 TABLET | Refills: 0 | Status: SHIPPED | OUTPATIENT
Start: 2020-05-11 | End: 2020-10-19

## 2020-05-21 DIAGNOSIS — R10.13 EPIGASTRIC PAIN: Primary | ICD-10-CM

## 2020-05-21 NOTE — PROGRESS NOTES
Patient had umbilical hernia repaired already.  Saw new script for me to do for prilosec.   So called him to see how he was doing.   Still has the epigastric pain but is not all the time.  May be there for 2 weeks and then gone for 2 weeks.   Has been wearing compression stockings. Some times and it helps with his varicose vein pain.   Discussed need to do an EGD and will put orders in.   Juan Carlos Mesa MD

## 2020-06-19 ENCOUNTER — VIRTUAL VISIT (OUTPATIENT)
Dept: FAMILY MEDICINE | Facility: CLINIC | Age: 55
End: 2020-06-19
Payer: COMMERCIAL

## 2020-06-19 DIAGNOSIS — G43.909 MIGRAINE WITHOUT STATUS MIGRAINOSUS, NOT INTRACTABLE, UNSPECIFIED MIGRAINE TYPE: ICD-10-CM

## 2020-06-19 DIAGNOSIS — F41.1 GAD (GENERALIZED ANXIETY DISORDER): ICD-10-CM

## 2020-06-19 DIAGNOSIS — F51.04 PSYCHOPHYSIOLOGICAL INSOMNIA: Primary | ICD-10-CM

## 2020-06-19 PROCEDURE — 99214 OFFICE O/P EST MOD 30 MIN: CPT | Mod: 95 | Performed by: FAMILY MEDICINE

## 2020-06-19 RX ORDER — AMITRIPTYLINE HYDROCHLORIDE 10 MG/1
TABLET ORAL
Qty: 150 TABLET | Refills: 1 | Status: SHIPPED | OUTPATIENT
Start: 2020-06-19 | End: 2020-06-22

## 2020-06-19 RX ORDER — ESCITALOPRAM OXALATE 20 MG/1
20 TABLET ORAL EVERY MORNING
Qty: 30 TABLET | Refills: 1 | Status: SHIPPED | OUTPATIENT
Start: 2020-06-19 | End: 2020-06-22

## 2020-06-19 RX ORDER — ALPRAZOLAM 0.5 MG
TABLET ORAL
Qty: 90 TABLET | Refills: 0 | Status: SHIPPED | OUTPATIENT
Start: 2020-06-19 | End: 2020-08-24

## 2020-06-19 ASSESSMENT — ANXIETY QUESTIONNAIRES
IF YOU CHECKED OFF ANY PROBLEMS ON THIS QUESTIONNAIRE, HOW DIFFICULT HAVE THESE PROBLEMS MADE IT FOR YOU TO DO YOUR WORK, TAKE CARE OF THINGS AT HOME, OR GET ALONG WITH OTHER PEOPLE: NOT DIFFICULT AT ALL
2. NOT BEING ABLE TO STOP OR CONTROL WORRYING: NOT AT ALL
GAD7 TOTAL SCORE: 4
6. BECOMING EASILY ANNOYED OR IRRITABLE: SEVERAL DAYS
5. BEING SO RESTLESS THAT IT IS HARD TO SIT STILL: SEVERAL DAYS
3. WORRYING TOO MUCH ABOUT DIFFERENT THINGS: NOT AT ALL
1. FEELING NERVOUS, ANXIOUS, OR ON EDGE: MORE THAN HALF THE DAYS
7. FEELING AFRAID AS IF SOMETHING AWFUL MIGHT HAPPEN: NOT AT ALL

## 2020-06-19 ASSESSMENT — PATIENT HEALTH QUESTIONNAIRE - PHQ9
5. POOR APPETITE OR OVEREATING: NOT AT ALL
SUM OF ALL RESPONSES TO PHQ QUESTIONS 1-9: 7

## 2020-06-19 NOTE — PROGRESS NOTES
"Lan Potts Jr. is a 55 year old male who is being evaluated via a billable telephone visit.      The patient has been notified of following:     \"This telephone visit will be conducted via a call between you and your physician/provider. We have found that certain health care needs can be provided without the need for a physical exam.  This service lets us provide the care you need with a short phone conversation.  If a prescription is necessary we can send it directly to your pharmacy.  If lab work is needed we can place an order for that and you can then stop by our lab to have the test done at a later time.    Telephone visits are billed at different rates depending on your insurance coverage. During this emergency period, for some insurers they may be billed the same as an in-person visit.  Please reach out to your insurance provider with any questions.    If during the course of the call the physician/provider feels a telephone visit is not appropriate, you will not be charged for this service.\"    Patient has given verbal consent for Telephone visit?  Yes    What phone number would you like to be contacted at? [phone]     How would you like to obtain your AVS? MyChart    Subjective     Lan Potts Jr. is a 55 year old male who presents via phone visit today for the following health issues:    HPI                 Reviewed and updated as needed this visit by Provider         Review of Systems          Objective   Reported vitals:  There were no vitals taken for this visit.   healthy, alert and no distress  PSYCH: Alert and oriented times 3; coherent speech, normal   rate and volume, able to articulate logical thoughts, able   to abstract reason, no tangential thoughts, no hallucinations   or delusions  His affect is normal  RESP: No cough, no audible wheezing, able to talk in full sentences  Remainder of exam unable to be completed due to telephone visits    Diagnostic Test Results:  Labs reviewed " in Epic        Assessment/Plan:  1. JUAN (generalized anxiety disorder)    - escitalopram (LEXAPRO) 20 MG tablet; Take 1 tablet (20 mg) by mouth every morning  Dispense: 30 tablet; Refill: 1  - amitriptyline (ELAVIL) 10 MG tablet; Start taking 1 tab at bedtime for 5 day(s). If this dose is effective then continue at the same dose.  If this dose is not effective increase by 10 mg every 5 day(s)  until you reach an effective dose or reach 50 mg  Dispense: 150 tablet; Refill: 1  - ALPRAZolam (XANAX) 0.5 MG tablet; TAKE 1 TABLET BY MOUTH THREE TIMES A DAY AS NEEDED  Dispense: 90 tablet; Refill: 0    2. Psychophysiological insomnia    - amitriptyline (ELAVIL) 10 MG tablet; Start taking 1 tab at bedtime for 5 day(s). If this dose is effective then continue at the same dose.  If this dose is not effective increase by 10 mg every 5 day(s)  until you reach an effective dose or reach 50 mg  Dispense: 150 tablet; Refill: 1    3. Migraine without status migrainosus, not intractable, unspecified migraine type    - amitriptyline (ELAVIL) 10 MG tablet; Start taking 1 tab at bedtime for 5 day(s). If this dose is effective then continue at the same dose.  If this dose is not effective increase by 10 mg every 5 day(s)  until you reach an effective dose or reach 50 mg  Dispense: 150 tablet; Refill: 1    No follow-ups on file.      Phone call duration:  12 minutes    Eligio Quevedo MD  --------------------------------------------------------------------------------------------------------------------------------------  SUBJECTIVE:  Lan Potts Jr. is a 55 year old male who presents for a follow up evaluation of anxiety. The patient was started on Lexapro (escitalorpram) 10 mg daily and has been on that for at least 6 month(s).  The patient reports that his persistant symptoms of anxiety include Excessive worry, Insomnia, disturbed sleep and Difficulty concentrating.   The patient reports that his symptoms have improved since  "starting this medication. However things have worsened as of late.    The patient denies that he has experienced side effects from this medication.    He reports that he has difficulty getting going and  feel slow like a \"mental fog\"   His anxiety level is up  He had a cut in pay 20% and he worries about paying the bills.  He has difficulty falling asleep most nights. He goes to bed between  9 and 10   I takes a good 2-3 hours to fall asleep.  His brain will not shut off. He is waking up several times a night after that as well.   He reports that he is alejandro  if he gets 3-4 hours of sleep      He is taking the xanax   Usually once in the am during work  He occasionally takes one in the afternoon   He works Monday to Thursday now    He has not tried sleep medication for insomnia other than tylenol pm and that was not helpful  His migraines have been significant(ly) worse. He reports that the pressure behind his eyes gets pretty bad at times and at that point he needs to lay down in a dark room and stop working.          JUAN-7    Over the last 2 weeks, how often have you been bothered by the following problems?  (Use an \"x\" to indicate your answer) Not at all                (0) Several days                (1) More than half the days        (2) Nearly every day          (3)   1. Feeling nervous, anxious or on edge       2. Not being able to stop or control worrying       3. Worrying too much about different things       4. Trouble relaxing       5. Being so restless that it is hard to sit still       6. Becoming easily annoyed or irritable       7. Feeling afraid as if something awful might happen         Total_______    Cut points for:   Mild Anxiety =  5  Moderate= 10  Severe=  15    JUAN-7 SCORE 4/5/2018 5/3/2019 6/19/2020   Total Score 0 0 4           Last PHQ-9 score on record=         OBJECTIVE:  General: the patient had a concerned affect during the visit today.    ASSESSMENT: Generalized Anxiety Disorder (JUAN) " which has worsened.        PLAN:    We will increase the dose of his medication to 20 mg daily. I instructed the patient return to clinic for appointment in 1 month(s).       The patient was advised of the potential side effects of the medication and was asked to call if any of them persist past 7 days of starting it or starting on a new dose.    He was given a refill on the xanax and we will increase the dose as the 0.25 mg helps but does not eliminate the anxiety when he is working.   His insurance also is asking for a 90 day(s) supply as he has been on it for an extended period of time.       Due to the patient's significant issue with insomnia and worsening of his migraines at this point a prescription for 10 mg of amitriptyline was written for the patient. He will titrate up the dose to effect as tolerated.

## 2020-06-20 ASSESSMENT — ANXIETY QUESTIONNAIRES: GAD7 TOTAL SCORE: 4

## 2020-06-21 ENCOUNTER — MYC MEDICAL ADVICE (OUTPATIENT)
Dept: FAMILY MEDICINE | Facility: CLINIC | Age: 55
End: 2020-06-21

## 2020-06-21 DIAGNOSIS — F41.1 GAD (GENERALIZED ANXIETY DISORDER): ICD-10-CM

## 2020-06-21 DIAGNOSIS — G43.909 MIGRAINE WITHOUT STATUS MIGRAINOSUS, NOT INTRACTABLE, UNSPECIFIED MIGRAINE TYPE: ICD-10-CM

## 2020-06-22 ENCOUNTER — TELEPHONE (OUTPATIENT)
Dept: FAMILY MEDICINE | Facility: CLINIC | Age: 55
End: 2020-06-22

## 2020-06-22 DIAGNOSIS — F41.1 GAD (GENERALIZED ANXIETY DISORDER): ICD-10-CM

## 2020-06-22 RX ORDER — ELETRIPTAN HYDROBROMIDE 40 MG/1
40 TABLET, FILM COATED ORAL
Qty: 12 TABLET | Refills: 5 | Status: SHIPPED | OUTPATIENT
Start: 2020-06-22

## 2020-06-22 RX ORDER — ESCITALOPRAM OXALATE 20 MG/1
20 TABLET ORAL EVERY MORNING
Qty: 90 TABLET | Refills: 0 | Status: SHIPPED | OUTPATIENT
Start: 2020-06-22 | End: 2020-09-16

## 2020-06-22 RX ORDER — ESCITALOPRAM OXALATE 20 MG/1
20 TABLET ORAL EVERY MORNING
Qty: 30 TABLET | Refills: 1 | Status: SHIPPED | OUTPATIENT
Start: 2020-06-22 | End: 2020-06-22

## 2020-06-22 NOTE — TELEPHONE ENCOUNTER
Dr. Eligio Quevedo:  Pharmacy/insurance needing the escitalopram prescription to be for 90 days in order to have insurance coverage.  I have pended prescription for MD to advise.  Yaz Moran RN

## 2020-06-22 NOTE — TELEPHONE ENCOUNTER
Reason for Call:  Other prescription    Detailed comments: pharmacy calling to inform pcp that the script for escitalopram needs to be for 90 day instead of 30 day for insurance purposes    Phone Number Patient can be reached at: Other phone number:      Best Time: any    Can we leave a detailed message on this number? YES    Call taken on 6/22/2020 at 10:53 AM by Bceka Osborn

## 2020-06-22 NOTE — PROGRESS NOTES
Amitriptyline ( ELAVIL) prescription faxed to the [facility] pharmacy # [phone].  Juana Perez TC

## 2020-06-22 NOTE — TELEPHONE ENCOUNTER
Per pharmacy, they did not receive the prescription for Lexapro 20mg, RN did resend this prescription to the pharmacy at this time    To provider to advise on refill of Relpax per patient's request    Karishma HERBERTN, RN, CPN

## 2020-07-12 DIAGNOSIS — J30.2 SEASONAL ALLERGIC RHINITIS, UNSPECIFIED TRIGGER: ICD-10-CM

## 2020-07-14 RX ORDER — FLUTICASONE PROPIONATE 50 MCG
SPRAY, SUSPENSION (ML) NASAL
Qty: 16 ML | Refills: 3 | Status: SHIPPED | OUTPATIENT
Start: 2020-07-14 | End: 2021-06-30

## 2020-07-14 NOTE — TELEPHONE ENCOUNTER
Prescription approved per [facility] Refill Protocol.  Yanelis Singletary RN

## 2020-07-16 ENCOUNTER — TELEPHONE (OUTPATIENT)
Dept: FAMILY MEDICINE | Facility: CLINIC | Age: 55
End: 2020-07-16

## 2020-07-16 DIAGNOSIS — F41.1 GAD (GENERALIZED ANXIETY DISORDER): ICD-10-CM

## 2020-07-16 DIAGNOSIS — G43.909 MIGRAINE WITHOUT STATUS MIGRAINOSUS, NOT INTRACTABLE, UNSPECIFIED MIGRAINE TYPE: ICD-10-CM

## 2020-07-16 DIAGNOSIS — F51.04 PSYCHOPHYSIOLOGICAL INSOMNIA: ICD-10-CM

## 2020-07-16 NOTE — TELEPHONE ENCOUNTER
Routing refill request to provider for review/approval because:  Drug interaction warning        Allie Foley BSN, RN

## 2020-07-20 NOTE — TELEPHONE ENCOUNTER
I am aware of the potential interaction. Please call the patient to ask what dose he found to be the best for him and send the chart back to me.   Eligio Quevedo MD s

## 2020-07-21 NOTE — TELEPHONE ENCOUNTER
Left message on cell phone voicemail for patient to call back.    Needing to know how many amitriptyline tablets he is taking at bedtime now so we can give him enough on his refill.  Then route to provider.      Allie HERBERTN, RN

## 2020-07-22 RX ORDER — AMITRIPTYLINE HYDROCHLORIDE 10 MG/1
TABLET ORAL
Qty: 150 TABLET | Refills: 0 | OUTPATIENT
Start: 2020-07-22

## 2020-07-22 RX ORDER — AMITRIPTYLINE HYDROCHLORIDE 10 MG/1
20 TABLET ORAL AT BEDTIME
Qty: 180 TABLET | Refills: 0 | COMMUNITY
Start: 2020-07-22 | End: 2020-11-23

## 2020-07-22 RX ORDER — AMITRIPTYLINE HYDROCHLORIDE 10 MG/1
20 TABLET ORAL AT BEDTIME
Qty: 150 TABLET | Refills: 0
Start: 2020-07-22 | End: 2020-07-22

## 2020-07-22 NOTE — TELEPHONE ENCOUNTER
1)  Pt states he is taking two amitriptyline a night and they are working fantastic, helping a lot.  He did not request the refill and does not need it yet.  Medication list updated,  He will let us know when he needs next refill.    2)  Pt states he took his metformin  mg pills back to pharmacy and they only replaced them with a one week supply.  He has been using 1.5 pills of the metformin  mg pills he has from previously.  Pt asking if this is ok?  Ruth HERBERTN, RN

## 2020-07-22 NOTE — TELEPHONE ENCOUNTER
Left message on answering machine for patient/parent to call back.   [phone].  Yanelis Singletary RN

## 2020-07-23 NOTE — TELEPHONE ENCOUNTER
No, he should not be breaking the XR in half.   He should get the 750 mg tablets. Why did he take them back to his pharmacy?  Eligio Quevedo MD

## 2020-07-24 NOTE — TELEPHONE ENCOUNTER
Left message on voicemail for patient to call back.     Direct number given: [phone].  Allie Foley BSN, RN

## 2020-07-27 NOTE — TELEPHONE ENCOUNTER
Left message on voicemail for patient to call back.     Direct number given: [phone].  Allie Foley BSN, RN

## 2020-08-24 DIAGNOSIS — F41.1 GAD (GENERALIZED ANXIETY DISORDER): ICD-10-CM

## 2020-08-24 RX ORDER — ALPRAZOLAM 0.5 MG
TABLET ORAL
Qty: 90 TABLET | Refills: 0 | Status: SHIPPED | OUTPATIENT
Start: 2020-08-24 | End: 2021-01-06

## 2020-08-24 NOTE — TELEPHONE ENCOUNTER
Controlled Substance Refill Request for xanax  Problem List Complete:  No      Last Written Prescription Date: 6/19/20  Last Fill Quantity: 30,   # refills: 0     Last Office Visit with [facility] primary care provider: Virtual 6/19/20     Controlled substance agreement:   Encounter-Level CSA:    There are no encounter-level csa.      Patient-Level CSA:    There are no patient-level csa.         Last Urine Drug Screen: No results found for: CDAUT, No results found for: COMDAT, No results found for: THC13, PCP13, COC13, MAMP13, OPI13, AMP13, BZO13, TCA13, MTD13, BAR13, OXY13, PPX13, BUP13     [email or web address]      checked in past 3 months?  Pharmacy Monitoring Program, [place] is not functioning at this time.  Yanelis Singletary RN

## 2020-08-26 DIAGNOSIS — Z91.89 10 YEAR RISK OF MI OR STROKE 7.5% OR GREATER: ICD-10-CM

## 2020-08-26 DIAGNOSIS — E11.9 TYPE 2 DIABETES MELLITUS WITHOUT COMPLICATION, WITHOUT LONG-TERM CURRENT USE OF INSULIN (H): ICD-10-CM

## 2020-08-26 DIAGNOSIS — E78.5 HYPERLIPIDEMIA WITH TARGET LDL LESS THAN 100: ICD-10-CM

## 2020-08-26 RX ORDER — EZETIMIBE 10 MG/1
TABLET ORAL
Qty: 90 TABLET | Refills: 1 | Status: SHIPPED | OUTPATIENT
Start: 2020-08-26 | End: 2021-03-01

## 2020-09-11 DIAGNOSIS — E11.9 TYPE 2 DIABETES MELLITUS WITHOUT COMPLICATION, WITHOUT LONG-TERM CURRENT USE OF INSULIN (H): ICD-10-CM

## 2020-09-11 NOTE — LETTER
September 14, 2020      Lan Potts Jr.  [address] [address]      Dear Lan,    We have recently received a medication request from your pharmacy for METFORMIN HCL  MG TABLET . Unfortunately this was denied by your provider because you are due for:    Diabetes office/video visit         Please call our clinic at your earliest convenience so there are no future delays for your medication.    [phone]            Thank you,    Your [facility] Care Team/sp

## 2020-09-14 RX ORDER — METFORMIN HCL 500 MG
TABLET, EXTENDED RELEASE 24 HR ORAL
Qty: 360 TABLET | Refills: 0 | OUTPATIENT
Start: 2020-09-14

## 2020-09-14 NOTE — TELEPHONE ENCOUNTER
Routing refill request to provider for review/approval because:  Labs not current:  A1C    Allie HERBERTN, RN

## 2020-09-14 NOTE — TELEPHONE ENCOUNTER
Please call the patient and make a virtual  appointment(s) for diabetes  and then you can refill the medication one time. If the patient will have enough medication until this appointment(s) then please do not refill it and I can refill it/them at the time of his appointment. He will need previsit lab(s)   Eligio Quevedo MD

## 2020-09-15 DIAGNOSIS — F41.1 GAD (GENERALIZED ANXIETY DISORDER): ICD-10-CM

## 2020-09-16 RX ORDER — ESCITALOPRAM OXALATE 20 MG/1
TABLET ORAL
Qty: 90 TABLET | Refills: 0 | Status: SHIPPED | OUTPATIENT
Start: 2020-09-16 | End: 2021-01-06

## 2020-09-25 DIAGNOSIS — F41.1 GAD (GENERALIZED ANXIETY DISORDER): ICD-10-CM

## 2020-09-25 RX ORDER — ESCITALOPRAM OXALATE 20 MG/1
TABLET ORAL
Qty: 90 TABLET | Refills: 0 | OUTPATIENT
Start: 2020-09-25

## 2020-10-18 DIAGNOSIS — E11.9 TYPE 2 DIABETES MELLITUS WITHOUT COMPLICATION, WITHOUT LONG-TERM CURRENT USE OF INSULIN (H): ICD-10-CM

## 2020-10-18 DIAGNOSIS — J30.2 SEASONAL ALLERGIC RHINITIS, UNSPECIFIED TRIGGER: ICD-10-CM

## 2020-10-19 RX ORDER — FLUTICASONE PROPIONATE 50 MCG
SPRAY, SUSPENSION (ML) NASAL
Qty: 48 ML | Refills: 1 | OUTPATIENT
Start: 2020-10-19

## 2020-10-19 RX ORDER — METFORMIN HYDROCHLORIDE 750 MG/1
750 TABLET, EXTENDED RELEASE ORAL 2 TIMES DAILY WITH MEALS
Qty: 180 TABLET | Refills: 0 | Status: SHIPPED | OUTPATIENT
Start: 2020-10-19 | End: 2020-10-30

## 2020-10-19 NOTE — TELEPHONE ENCOUNTER
Routing refill request to provider for review/approval because:  Labs not current:  hgb a1c and cr  Ruth Mcelroy BSN, RN

## 2020-10-27 DIAGNOSIS — E11.9 TYPE 2 DIABETES MELLITUS WITHOUT COMPLICATION, WITHOUT LONG-TERM CURRENT USE OF INSULIN (H): ICD-10-CM

## 2020-10-27 NOTE — TELEPHONE ENCOUNTER
Message: Pt states he is supposed to be on both Meformin 750mg and 500mg. Requesting 500mg Metformin.

## 2020-10-28 RX ORDER — METFORMIN HCL 500 MG
TABLET, EXTENDED RELEASE 24 HR ORAL
Qty: 360 TABLET | Refills: 0 | OUTPATIENT
Start: 2020-10-28

## 2020-10-28 NOTE — TELEPHONE ENCOUNTER
Two orders for metformin are on patient's medication list.   Routing to PCP to clarify if this is correct.     Allie Foley BSN, RN

## 2020-10-28 NOTE — TELEPHONE ENCOUNTER
Please call the patient and make a  face to face appointment(s) for diabetes  and then you can refill the medication one time. If the patient will have enough medication until this appointment(s) then please do not refill it and I can refill it/them at the time of his appointment.  Eligio Quevedo MD

## 2020-10-29 NOTE — TELEPHONE ENCOUNTER
Called pt back, he states due to COVID he is not currently working and does not have health insurance. He doesn't want to come in as it is costly for him. He has the 250mg Metformin but needs the 500mg refill.     Corin Harry CMA

## 2020-10-30 ENCOUNTER — MYC MEDICAL ADVICE (OUTPATIENT)
Dept: FAMILY MEDICINE | Facility: CLINIC | Age: 55
End: 2020-10-30

## 2020-10-30 DIAGNOSIS — E11.9 TYPE 2 DIABETES MELLITUS WITHOUT COMPLICATION, WITHOUT LONG-TERM CURRENT USE OF INSULIN (H): ICD-10-CM

## 2020-10-30 RX ORDER — METFORMIN HCL 500 MG
500 TABLET, EXTENDED RELEASE 24 HR ORAL 2 TIMES DAILY WITH MEALS
Qty: 180 TABLET | Refills: 1 | Status: SHIPPED | OUTPATIENT
Start: 2020-10-30 | End: 2021-03-02

## 2020-10-30 RX ORDER — METFORMIN HYDROCHLORIDE 750 MG/1
750 TABLET, EXTENDED RELEASE ORAL
Qty: 90 TABLET | Refills: 1 | Status: SHIPPED | OUTPATIENT
Start: 2020-10-30 | End: 2021-04-14

## 2020-10-30 NOTE — TELEPHONE ENCOUNTER
RN pended both metformin Rx's as patient reported.   Routing to PCP to sign refills.     Allie HERBERTN, RN

## 2020-10-30 NOTE — TELEPHONE ENCOUNTER
Please clarify what he is taking:    Is it  Metformin  mg once a day(s)   And Metformin  once a day(s)     Please send back to me.  Eligio Quevedo MD

## 2020-11-22 DIAGNOSIS — G43.909 MIGRAINE WITHOUT STATUS MIGRAINOSUS, NOT INTRACTABLE, UNSPECIFIED MIGRAINE TYPE: ICD-10-CM

## 2020-11-22 DIAGNOSIS — F51.04 PSYCHOPHYSIOLOGICAL INSOMNIA: ICD-10-CM

## 2020-11-22 DIAGNOSIS — F41.1 GAD (GENERALIZED ANXIETY DISORDER): ICD-10-CM

## 2020-11-23 RX ORDER — AMITRIPTYLINE HYDROCHLORIDE 10 MG/1
20 TABLET ORAL AT BEDTIME
Qty: 180 TABLET | Refills: 0 | Status: SHIPPED | OUTPATIENT
Start: 2020-11-23 | End: 2021-03-01

## 2021-01-06 DIAGNOSIS — F41.1 GAD (GENERALIZED ANXIETY DISORDER): ICD-10-CM

## 2021-01-06 RX ORDER — ALPRAZOLAM 0.5 MG
TABLET ORAL
Qty: 90 TABLET | Refills: 0 | Status: SHIPPED | OUTPATIENT
Start: 2021-01-06 | End: 2021-06-04

## 2021-01-06 RX ORDER — ESCITALOPRAM OXALATE 20 MG/1
TABLET ORAL
Qty: 90 TABLET | Refills: 1 | Status: SHIPPED | OUTPATIENT
Start: 2021-01-06 | End: 2021-04-14

## 2021-01-06 NOTE — TELEPHONE ENCOUNTER
Routing refill request to provider for review/approval because:  Drug not on the [facility] refill protocol   Ruth Mcelroy BSN, RN

## 2021-01-15 ENCOUNTER — HEALTH MAINTENANCE LETTER (OUTPATIENT)
Age: 56
End: 2021-01-15

## 2021-02-28 DIAGNOSIS — F51.04 PSYCHOPHYSIOLOGICAL INSOMNIA: ICD-10-CM

## 2021-02-28 DIAGNOSIS — G43.909 MIGRAINE WITHOUT STATUS MIGRAINOSUS, NOT INTRACTABLE, UNSPECIFIED MIGRAINE TYPE: ICD-10-CM

## 2021-02-28 DIAGNOSIS — E78.5 HYPERLIPIDEMIA WITH TARGET LDL LESS THAN 100: ICD-10-CM

## 2021-02-28 DIAGNOSIS — Z91.89 10 YEAR RISK OF MI OR STROKE 7.5% OR GREATER: ICD-10-CM

## 2021-02-28 DIAGNOSIS — F41.1 GAD (GENERALIZED ANXIETY DISORDER): ICD-10-CM

## 2021-02-28 DIAGNOSIS — E11.9 TYPE 2 DIABETES MELLITUS WITHOUT COMPLICATION, WITHOUT LONG-TERM CURRENT USE OF INSULIN (H): ICD-10-CM

## 2021-03-01 DIAGNOSIS — E11.9 TYPE 2 DIABETES MELLITUS WITHOUT COMPLICATION, WITHOUT LONG-TERM CURRENT USE OF INSULIN (H): ICD-10-CM

## 2021-03-01 RX ORDER — AMITRIPTYLINE HYDROCHLORIDE 10 MG/1
20 TABLET ORAL AT BEDTIME
Qty: 180 TABLET | Refills: 0 | Status: SHIPPED | OUTPATIENT
Start: 2021-03-01 | End: 2021-10-27

## 2021-03-01 RX ORDER — EZETIMIBE 10 MG/1
TABLET ORAL
Qty: 90 TABLET | Refills: 0 | Status: SHIPPED | OUTPATIENT
Start: 2021-03-01 | End: 2021-06-06

## 2021-03-01 NOTE — TELEPHONE ENCOUNTER
"Requested Prescriptions   Pending Prescriptions Disp Refills    amitriptyline (ELAVIL) 10 MG tablet [Pharmacy Med Name: AMITRIPTYLINE HCL 10 MG TAB] 180 tablet 0     Sig: Take 2 tablets (20 mg) by mouth At Bedtime       Tricyclic Agents ( Annual appt and no PHQ9) Failed - 2/28/2021  8:25 AM        Failed - Blood Pressure under 140/90 in past 12 mos     BP Readings from Last 3 Encounters:   02/07/20 131/87   11/14/19 122/71   10/14/19 (!) 153/83                 Passed - Recent (12 mo) or future (30 days) visit within authorizing provider's specialty     Patient has had an office visit with the authorizing provider or a provider within the authorizing providers department within the previous 12 mos or has a future within next 30 days. See \"Patient Info\" tab in inbasket, or \"Choose Columns\" in Meds & Orders section of the refill encounter.              Passed - Medication is active on med list        Passed - Patient is age 18 or older          ezetimibe (ZETIA) 10 MG tablet [Pharmacy Med Name: EZETIMIBE 10 MG TABLET] 90 tablet 1     Sig: TAKE 1 TABLET BY MOUTH EVERY DAY       Antihyperlipidemic agents Failed - 2/28/2021  8:25 AM        Failed - Lipid panel on file in past 12 mos     Recent Labs   Lab Test 03/26/18  1656 09/03/15  1354 09/03/15  1354   CHOL 233*   < > 223*   TRIG 262*   < > 184*   HDL 35*   < > 29*   *   < > 157*   NHDL 198*   < >  --    VLDL  --   --  37*   CHOLHDLRATIO  --   --  7.7*    < > = values in this interval not displayed.               Failed - Normal serum ALT on record in past 12 mos     Recent Labs   Lab Test 08/15/19  1757   ALT 26             Passed - Recent (12 mo) or future (30 days) visit within the authorizing provider's specialty     Patient has had an office visit with the authorizing provider or a provider within the authorizing providers department within the previous 12 mos or has a future within next 30 days. See \"Patient Info\" tab in inbasket, or \"Choose Columns\" in " Meds & Orders section of the refill encounter.              Passed - Medication is active on med list        Passed - Patient is age 18 years or older

## 2021-03-02 RX ORDER — METFORMIN HCL 500 MG
TABLET, EXTENDED RELEASE 24 HR ORAL
Qty: 180 TABLET | Refills: 1 | Status: SHIPPED | OUTPATIENT
Start: 2021-03-02 | End: 2021-06-04

## 2021-03-02 NOTE — TELEPHONE ENCOUNTER
Routing refill request to provider for review/approval because:  Labs not current:  A1C, creatinine    Allie HERBERTN, RN

## 2021-03-26 ENCOUNTER — IMMUNIZATION (OUTPATIENT)
Dept: NURSING | Facility: CLINIC | Age: 56
End: 2021-03-26
Payer: COMMERCIAL

## 2021-03-26 PROCEDURE — 91300 PR COVID VAC PFIZER DIL RECON 30 MCG/0.3 ML IM: CPT

## 2021-03-26 PROCEDURE — 0001A PR COVID VAC PFIZER DIL RECON 30 MCG/0.3 ML IM: CPT

## 2021-04-14 ENCOUNTER — OFFICE VISIT (OUTPATIENT)
Dept: FAMILY MEDICINE | Facility: CLINIC | Age: 56
End: 2021-04-14
Payer: COMMERCIAL

## 2021-04-14 ENCOUNTER — TELEPHONE (OUTPATIENT)
Dept: FAMILY MEDICINE | Facility: CLINIC | Age: 56
End: 2021-04-14

## 2021-04-14 VITALS
HEART RATE: 81 BPM | BODY MASS INDEX: 37.52 KG/M2 | SYSTOLIC BLOOD PRESSURE: 114 MMHG | WEIGHT: 269 LBS | TEMPERATURE: 97.5 F | DIASTOLIC BLOOD PRESSURE: 65 MMHG | OXYGEN SATURATION: 97 %

## 2021-04-14 DIAGNOSIS — Z13.220 SCREENING FOR HYPERLIPIDEMIA: Primary | ICD-10-CM

## 2021-04-14 DIAGNOSIS — Z11.59 ENCOUNTER FOR HEPATITIS C SCREENING TEST FOR LOW RISK PATIENT: ICD-10-CM

## 2021-04-14 DIAGNOSIS — F41.1 GAD (GENERALIZED ANXIETY DISORDER): ICD-10-CM

## 2021-04-14 DIAGNOSIS — R10.9 ABDOMINAL DISCOMFORT: ICD-10-CM

## 2021-04-14 DIAGNOSIS — K21.9 GASTROESOPHAGEAL REFLUX DISEASE, UNSPECIFIED WHETHER ESOPHAGITIS PRESENT: ICD-10-CM

## 2021-04-14 DIAGNOSIS — E11.9 TYPE 2 DIABETES MELLITUS WITHOUT COMPLICATION, WITHOUT LONG-TERM CURRENT USE OF INSULIN (H): ICD-10-CM

## 2021-04-14 LAB — HBA1C MFR BLD: 10.4 % (ref 0–5.6)

## 2021-04-14 PROCEDURE — 36415 COLL VENOUS BLD VENIPUNCTURE: CPT | Performed by: FAMILY MEDICINE

## 2021-04-14 PROCEDURE — 99207 PR FOOT EXAM NO CHARGE: CPT | Performed by: FAMILY MEDICINE

## 2021-04-14 PROCEDURE — 82043 UR ALBUMIN QUANTITATIVE: CPT | Performed by: FAMILY MEDICINE

## 2021-04-14 PROCEDURE — 80061 LIPID PANEL: CPT | Performed by: FAMILY MEDICINE

## 2021-04-14 PROCEDURE — 84443 ASSAY THYROID STIM HORMONE: CPT | Performed by: FAMILY MEDICINE

## 2021-04-14 PROCEDURE — 83036 HEMOGLOBIN GLYCOSYLATED A1C: CPT | Performed by: FAMILY MEDICINE

## 2021-04-14 PROCEDURE — 86803 HEPATITIS C AB TEST: CPT | Performed by: FAMILY MEDICINE

## 2021-04-14 PROCEDURE — 80053 COMPREHEN METABOLIC PANEL: CPT | Performed by: FAMILY MEDICINE

## 2021-04-14 PROCEDURE — 99214 OFFICE O/P EST MOD 30 MIN: CPT | Performed by: FAMILY MEDICINE

## 2021-04-14 RX ORDER — RABEPRAZOLE SODIUM 20 MG/1
20 TABLET, DELAYED RELEASE ORAL DAILY
Qty: 90 TABLET | Refills: 1 | Status: SHIPPED | OUTPATIENT
Start: 2021-04-14 | End: 2021-06-04

## 2021-04-14 RX ORDER — LANCETS
EACH MISCELLANEOUS
Qty: 100 EACH | Refills: 3 | Status: SHIPPED | OUTPATIENT
Start: 2021-04-14 | End: 2021-08-20

## 2021-04-14 RX ORDER — GLUCOSAMINE HCL/CHONDROITIN SU 500-400 MG
CAPSULE ORAL
Qty: 100 EACH | Refills: 3 | Status: SHIPPED | OUTPATIENT
Start: 2021-04-14 | End: 2022-01-03

## 2021-04-14 RX ORDER — METFORMIN HYDROCHLORIDE 750 MG/1
750 TABLET, EXTENDED RELEASE ORAL 2 TIMES DAILY WITH MEALS
COMMUNITY
Start: 2021-04-14 | End: 2021-06-04

## 2021-04-14 RX ORDER — ESCITALOPRAM OXALATE 10 MG/1
10 TABLET ORAL EVERY MORNING
Qty: 90 TABLET | Refills: 1 | Status: SHIPPED | OUTPATIENT
Start: 2021-04-14 | End: 2021-06-04

## 2021-04-14 NOTE — PROGRESS NOTES
[place] diabetes resourses:  [email or web address]        To access diabetes educational materials with in EPIC use <dot>DALE      Put this in AFTER VISIT SUMMARY if needed for the patient to access information online [email or web address]      SUBJECTIVE:  Lan Potts Jr. is a 55 year old male who presents today for a follow up appointment for management of DIABETES MELLITUS.    Patient Active Problem List    Diagnosis Date Noted     JUAN (generalized anxiety disorder) 08/19/2019     Priority: Medium     Morbid obesity (H) 07/12/2018     Priority: Medium     Umbilical hernia without obstruction and without gangrene 07/12/2018     Priority: Medium     Varicose veins of left lower extremity 07/12/2018     Priority: Medium     Tobacco abuse 04/05/2018     Priority: Medium     Erectile dysfunction, unspecified erectile dysfunction type 04/05/2018     Priority: Medium     10 year risk of MI or stroke 7.5% or greater, 12.6% in April 2017 04/06/2017     Priority: Medium     Type 2 diabetes mellitus without complication, without long-term current use of insulin (H) 01/05/2017     Priority: Medium     Migraine without status migrainosus, not intractable, unspecified migraine type 01/21/2016     Priority: Medium     High triglycerides 12/11/2014     Priority: Medium     HDL deficiency 12/11/2014     Priority: Medium     Hyperlipidemia with target LDL less than 100 02/17/2014     Priority: Medium     Diagnosis updated by automated process. Provider to review and confirm.       Chronic back pain      Priority: Medium     Seasonal allergies      Priority: Medium          The patient checks his blood sugar as follows: one time daily, once daily.   Results as follows: fasting glucose-  and post-lunch glucose-   His glucometer is old     The patient reports that he IS taking the medication as prescribed. He denies side  effects of medication.      He is taking one 750 mg metformin in the  am and one 500 mg metformin  in the am 1250 total   The in the pm does the same dose      ----------------------------------------------------------------------------------------------------------------------------------------    BP Readings from Last 3 Encounters:   04/14/21 114/65   02/07/20 131/87   11/14/19 122/71     The patient reports that he IS NOT currently smoking.  History   Smoking Status     Former Smoker     Types: Cigarettes   Smokeless Tobacco     Never Used     Comment: thinking about quitting           The ASCVD Risk score ([place] Jr., et al., 2013) failed to calculate for the following reasons:    Cannot find a previous HDL lab    Cannot find a previous total cholesterol lab        Current Outpatient Medications   Medication Sig Dispense Refill     alcohol swab prep pads Use to swab area of injection/laura as directed 100 each 3     blood glucose (NO BRAND SPECIFIED) test strip Use to test blood sugar 1 times daily or as directed. To accompany: Blood Glucose Monitor Brands: per insurance. 100 strip 3     blood glucose calibration (NO BRAND SPECIFIED) solution Use to calibrate blood glucose monitor as needed as directed. To accompany: Blood Glucose Monitor Brands: per insurance. 1 each 0     blood glucose monitoring (NO BRAND SPECIFIED) meter device kit Use to test blood sugar 1 times daily or as directed. 1 kit 0     escitalopram (LEXAPRO) 10 MG tablet Take 1 tablet (10 mg) by mouth every morning 90 tablet 1     metFORMIN (GLUCOPHAGE-XR) 750 MG 24 hr tablet Take 1 tablet (750 mg) by mouth 2 times daily (with meals)       RABEprazole (ACIPHEX) 20 MG EC tablet Take 1 tablet (20 mg) by mouth daily 90 tablet 1     thin (NO BRAND SPECIFIED) lancets Use to test blood sugar 1 times daily or as directed. To accompany: Blood Glucose Monitor Brands: per insurance. 100 each 3     ACE/ARB/ARNI NOT PRESCRIBED, INTENTIONAL, Please choose  reason not prescribed, below       ALPRAZolam (XANAX) 0.5 MG tablet TAKE 1 TABLET BY MOUTH THREE TIMES A DAY AS NEEDED 90 tablet 0     amitriptyline (ELAVIL) 10 MG tablet TAKE 2 TABLETS (20 MG) BY MOUTH AT BEDTIME 180 tablet 0     aspirin (ASA) 81 MG tablet Take 1 tablet (81 mg) by mouth daily       blood glucose (ACCU-CHEK SMARTVIEW) test strip 1 strip by In Vitro route 3 times daily 3 Box 3     blood glucose calibration (ACCU-CHEK SMARTVIEW CONTROL) solution 1 drop by In Vitro route as needed 1 each 3     blood glucose monitoring (ACCU-CHEK FASTCLIX) lancets 1 each 3 times daily 3 Box 3     eletriptan (RELPAX) 40 MG tablet Take 1 tablet (40 mg) by mouth at onset of headache May repeat in 2 hours as needed but no more than 2 pills in 24 hours. 12 tablet 5     ezetimibe (ZETIA) 10 MG tablet TAKE 1 TABLET BY MOUTH EVERY DAY 90 tablet 0     fluticasone (FLONASE) 50 MCG/ACT nasal spray INSTILL 2 SPRAYS INTO BOTH NOSTRILS DAILY 16 mL 3     metFORMIN (GLUCOPHAGE-XR) 500 MG 24 hr tablet TAKE 1 TABLET BY MOUTH TWICE A DAY WITH MEALS 180 tablet 1     omeprazole (PRILOSEC) 20 MG DR capsule Take 1 capsule (20 mg) by mouth daily 30 capsule 3     sildenafil (REVATIO) 20 MG tablet Take 1 to 5 tabs 30-60 minutes before intercourse.  Never use with nitroglycerin, terazosin or doxazosin. 30 tablet 11     STATIN NOT PRESCRIBED, INTENTIONAL, continuous prn for other Reported on 4/6/2017  0       The patient reports that he IS NOT taking a statin.   (Reminder all diabetics with a ASCVD risk greater or equal to 7.5% should be on a high intensity statin, otherwise on a moderate intensity statin)    The patient reports that he IS taking 81mg of  aspirin daily.  (Reminder all diabetic patients with a cardiovascular risk factor and > 50 should take a daily aspirin)       The patient reports that he IS doing a self foot exam weekly.      The patient reports that his last eye exam was over 1 years ago.         Immunization History    Administered Date(s) Administered     COVID-19,PF,Pfizer 03/26/2021     HepB 04/06/2017     HepB-Adult 04/05/2018, 05/02/2019     Influenza (IIV3) PF 12/21/2012, 09/27/2013     Influenza Quad, Recombinant, p-free (RIV4) 09/26/2018, 09/05/2019     Influenza Vaccine IM > 6 months Valent IIV4 10/26/2017     Pneumococcal 23 valent 09/04/2014     TDAP Vaccine (Adacel) 09/20/2011     Zoster vaccine recombinant adjuvanted (SHINGRIX) 07/12/2018, 09/26/2018     The patient reports that he has had the hepatitis B vaccine series in the past. (recommended for age 19-59 and can be given to age 60 or older)  The patient reports that he has had a pnuemovax in the past.    The patient would like to have a no vaccinations today    Patient concerns: is concerned about :  He is also having significant(ly) gerd and abdominal symptom(s) and his parents have recently been diagnosed with H pylori.       ROS:      EXAM:  /65   Pulse 81   Temp 97.5  F (36.4  C) (Tympanic)   Wt 122 kg (269 lb)   SpO2 97%   BMI 37.52 kg/m    Wt Readings from Last 4 Encounters:   04/14/21 122 kg (269 lb)   02/07/20 134.7 kg (297 lb)   11/14/19 130.6 kg (288 lb)   10/14/19 122.5 kg (270 lb)     Body mass index is 37.52 kg/m .    General:  Lan is awake, alert, and cooperative.  NAD.      Diabetic Foot Screen:  Any complaints of increased pain or numbness ? No  Is there a foot ulcer now or a history of foot ulcer? No  Does the foot have an abnormal shape? No  Are the nails thick, too long or ingrown? No  Are there any redness or open areas? No         Sensation Testing done at all points on the diagram with monofilament     Right Foot: Sensation Normal at all points  Left Foot: Sensation Normal at all points     Risk Category: 0- No loss of protective sensation  Performed by Eligio Quevedo MD                  Previsit labs drawn include:  Office Visit on 11/14/2019   Component Date Value Ref Range Status     Hemoglobin A1C 11/14/2019 6.9* 0 -  "5.6 % Final    Comment: Normal <5.7% Prediabetes 5.7-6.4%  Diabetes 6.5% or higher - adopted from ADA   consensus guidelines.           ASSESSMENT and PLAN:    Type II Diabetes.    DIABETES Type II:                       Lab Results   Component Value Date    A1C 6.9 11/14/2019       Control   unable to assess  Lab Results   Component Value Date    A1C 6.9 11/14/2019    A1C 6.4 09/19/2019    A1C 6.6 08/15/2019     Lab Results   Component Value Date    MICROL 6 03/26/2018     No results found for: MICROALBUMIN Control   unable to assess    Check a HGBA1C , Check a microalbumin, Check a Creatinine/GFR, Recommended to take ASA  mg daily for appropriate patient, Compliance with the recommended diabetic diet was stressed, Regular aerobic exercise was encouraged, Home glucose monitoring encouraged, Annual eye exam recommended, Self foot exam demonstrated and recommended to be done nightly, Apply moisturizer to feet with in 3 minutes of showering or bathing, Follow up in clinic in 3 months for a diabetes check and Future labs ordered and the patient was instructed to make a lab appt 1 week prior to next diabetes visit      BP/HTN:   BP Readings from Last 3 Encounters:   04/14/21 114/65   02/07/20 131/87   11/14/19 122/71     Control   good    - Medication:N/A  (make sure to order \"Ace not prescribed intentionally if not on an ACE/ARB)  The patient was advised to do the following therapuetic life style changes  - Dietary sodium restriction and increase potassium and Calcium intake  - Regular aerobic exercise  - Weight loss  - Discontinue smoking if applicable  - Avoid regular NSAID use if applicable  - Avoid regular decongestant use if applicable  - Follow up in clinic in 3 months for a recheck  - Check a basic metabolic panel today if needed    Patient Education: Reviewed risks of hypertension and principles of   Treatment.    HYPERCHOLESTEROLEMIA:     The ASCVD Risk score ([place] ADRIANO Jr., et al., 2013) failed to " calculate for the following reasons:    Cannot find a previous HDL lab    Cannot find a previous total cholesterol lab    Lab Results   Component Value Date     03/26/2018      Control   unable to assess  Cholesterol   Date Value Ref Range Status   03/26/2018 233 (H) <200 mg/dL Final     Comment:     Desirable:       <200 mg/dl   01/13/2017 252 (H) <200 mg/dL Final     Comment:     Desirable:       <200 mg/dl     HDL Cholesterol   Date Value Ref Range Status   03/26/2018 35 (L) >39 mg/dL Final   01/13/2017 33 (L) >39 mg/dL Final     LDL Cholesterol Calculated   Date Value Ref Range Status   03/26/2018 146 (H) <100 mg/dL Final     Comment:     Above desirable:  100-129 mg/dl  Borderline High:  130-159 mg/dL  High:             160-189 mg/dL  Very high:       >189 mg/dl     01/13/2017 172 (H) <100 mg/dL Final     Comment:     Above desirable:  100-129 mg/dl   Borderline High:  130-159 mg/dL   High:             160-189 mg/dL   Very high:       >189 mg/dl       Triglycerides   Date Value Ref Range Status   03/26/2018 262 (H) <150 mg/dL Final     Comment:     Borderline high:  150-199 mg/dl  High:             200-499 mg/dl  Very high:       >499 mg/dl     01/13/2017 234 (H) <150 mg/dL Final     Comment:     Borderline high:  150-199 mg/dl   High:             200-499 mg/dl   Very high:       >499 mg/dl   Fasting specimen       Cholesterol/HDL Ratio   Date Value Ref Range Status   09/03/2015 7.7 (H) 0.0 - 5.0 Final   12/11/2014 5.8 (H) 0.0 - 5.0 Final         The patient is fasting and we will recheck the fasting lipid panel .    (Z11.59) Encounter for hepatitis C screening test for low risk patient  Comment:   Plan: Hepatitis C Screen Reflex to HCV RNA Quant and         Genotype            (R10.9) Abdominal discomfort  Comment:   Plan: Helicobacter pylori Antigen Stool, CANCELED:         Helicobacter pylori Antigen Stool            (K21.9) Gastroesophageal reflux disease, unspecified whether esophagitis  present  Comment:   Plan: RABEprazole (ACIPHEX) 20 MG EC tablet,         Helicobacter pylori Antigen Stool, CANCELED:         Helicobacter pylori Antigen Stool              He is also having significant(ly) gerd and abdominal symptom(s) and his parents have recently been diagnosed with H pylori.   We will get the stool test for that and start aciphex.

## 2021-04-15 DIAGNOSIS — E11.9 TYPE 2 DIABETES MELLITUS WITHOUT COMPLICATION, WITHOUT LONG-TERM CURRENT USE OF INSULIN (H): Primary | ICD-10-CM

## 2021-04-15 DIAGNOSIS — E78.00 ELEVATED LDL CHOLESTEROL LEVEL: ICD-10-CM

## 2021-04-15 LAB
ALBUMIN SERPL-MCNC: 3.7 G/DL (ref 3.4–5)
ALP SERPL-CCNC: 114 U/L (ref 40–150)
ALT SERPL W P-5'-P-CCNC: 124 U/L (ref 0–70)
ANION GAP SERPL CALCULATED.3IONS-SCNC: 6 MMOL/L (ref 3–14)
AST SERPL W P-5'-P-CCNC: 76 U/L (ref 0–45)
BILIRUB SERPL-MCNC: 0.5 MG/DL (ref 0.2–1.3)
BUN SERPL-MCNC: 11 MG/DL (ref 7–30)
CALCIUM SERPL-MCNC: 8.5 MG/DL (ref 8.5–10.1)
CHLORIDE SERPL-SCNC: 102 MMOL/L (ref 94–109)
CHOLEST SERPL-MCNC: 280 MG/DL
CO2 SERPL-SCNC: 24 MMOL/L (ref 20–32)
CREAT SERPL-MCNC: 0.59 MG/DL (ref 0.66–1.25)
CREAT UR-MCNC: 281 MG/DL
GFR SERPL CREATININE-BSD FRML MDRD: >90 ML/MIN/{1.73_M2}
GLUCOSE SERPL-MCNC: 292 MG/DL (ref 70–99)
HCV AB SERPL QL IA: NONREACTIVE
HDLC SERPL-MCNC: 31 MG/DL
LDLC SERPL CALC-MCNC: 198 MG/DL
MICROALBUMIN UR-MCNC: 51 MG/L
MICROALBUMIN/CREAT UR: 18.01 MG/G CR (ref 0–17)
NONHDLC SERPL-MCNC: 249 MG/DL
POTASSIUM SERPL-SCNC: 4.3 MMOL/L (ref 3.4–5.3)
PROT SERPL-MCNC: 7.4 G/DL (ref 6.8–8.8)
SODIUM SERPL-SCNC: 132 MMOL/L (ref 133–144)
TRIGL SERPL-MCNC: 256 MG/DL
TSH SERPL DL<=0.005 MIU/L-ACNC: 1.25 MU/L (ref 0.4–4)

## 2021-04-15 RX ORDER — ATORVASTATIN CALCIUM 10 MG/1
10 TABLET, FILM COATED ORAL AT BEDTIME
Qty: 90 TABLET | Refills: 3 | Status: SHIPPED | OUTPATIENT
Start: 2021-04-15 | End: 2021-06-04

## 2021-04-15 RX ORDER — DAPAGLIFLOZIN 5 MG/1
5 TABLET, FILM COATED ORAL DAILY
Qty: 90 TABLET | Refills: 1 | Status: SHIPPED | OUTPATIENT
Start: 2021-04-15 | End: 2021-10-06

## 2021-04-15 NOTE — RESULT ENCOUNTER NOTE
Lan,  I have reviewed the results of the laboratory tests that we recently ordered. Your Hemoglobin A1C was significant(ly) elevated.  There are still some labs pending and I will let you know those results when they   are available.  Sincerely,   Eligio Quevedo MD

## 2021-04-15 NOTE — TELEPHONE ENCOUNTER
Called pharmacy and they said that patient's insurance, [employer] doesn't cover control solutions.  They have a discount process and will help patient get this. Otherwise patient can pay out of pocket.     Allie Foley BSN, RN

## 2021-04-15 NOTE — TELEPHONE ENCOUNTER
Message: Accu-Chek Guide L1-L2 CTRL SOL blood glucose calibration (NO BRAND SPECIFIED) solution product not covered. Pls send alternative.

## 2021-04-16 ENCOUNTER — IMMUNIZATION (OUTPATIENT)
Dept: NURSING | Facility: CLINIC | Age: 56
End: 2021-04-16
Attending: FAMILY MEDICINE
Payer: COMMERCIAL

## 2021-04-16 PROCEDURE — 91300 PR COVID VAC PFIZER DIL RECON 30 MCG/0.3 ML IM: CPT

## 2021-04-16 PROCEDURE — 0002A PR COVID VAC PFIZER DIL RECON 30 MCG/0.3 ML IM: CPT

## 2021-04-18 PROCEDURE — 87338 HPYLORI STOOL AG IA: CPT | Performed by: FAMILY MEDICINE

## 2021-04-19 DIAGNOSIS — K21.9 GASTROESOPHAGEAL REFLUX DISEASE, UNSPECIFIED WHETHER ESOPHAGITIS PRESENT: ICD-10-CM

## 2021-04-19 DIAGNOSIS — R10.9 ABDOMINAL DISCOMFORT: ICD-10-CM

## 2021-04-20 LAB — H PYLORI AG STL QL IA: NEGATIVE

## 2021-04-20 NOTE — RESULT ENCOUNTER NOTE
Lan,  I have reviewed the results of the laboratory tests that we recently ordered. All of the lab work performed was normal or considered normal for you.  Sincerely,   Eligio Quevedo MD

## 2021-05-20 ENCOUNTER — TRANSFERRED RECORDS (OUTPATIENT)
Dept: HEALTH INFORMATION MANAGEMENT | Facility: CLINIC | Age: 56
End: 2021-05-20

## 2021-05-21 LAB
CHOLEST SERPL-MCNC: 213 MG/DL (ref 100–199)
HBA1C MFR BLD: 9.9 % (ref 0–5.7)
HDLC SERPL-MCNC: 33 MG/DL
LDLC SERPL CALC-MCNC: 149 MG/DL
NONHDLC SERPL-MCNC: 180 MG/DL
TRIGL SERPL-MCNC: 155 MG/DL

## 2021-05-22 LAB — EJECTION FRACTION: 25 %

## 2021-05-23 LAB — INR (EXTERNAL): 1.2

## 2021-06-04 ENCOUNTER — OFFICE VISIT (OUTPATIENT)
Dept: FAMILY MEDICINE | Facility: CLINIC | Age: 56
End: 2021-06-04
Payer: COMMERCIAL

## 2021-06-04 VITALS
WEIGHT: 270 LBS | OXYGEN SATURATION: 98 % | BODY MASS INDEX: 37.8 KG/M2 | HEART RATE: 89 BPM | HEIGHT: 71 IN | DIASTOLIC BLOOD PRESSURE: 60 MMHG | SYSTOLIC BLOOD PRESSURE: 94 MMHG

## 2021-06-04 DIAGNOSIS — Z12.11 SCREEN FOR COLON CANCER: Primary | ICD-10-CM

## 2021-06-04 DIAGNOSIS — F41.1 GAD (GENERALIZED ANXIETY DISORDER): ICD-10-CM

## 2021-06-04 PROCEDURE — 99214 OFFICE O/P EST MOD 30 MIN: CPT | Performed by: FAMILY MEDICINE

## 2021-06-04 RX ORDER — LOSARTAN POTASSIUM 25 MG/1
12.5 TABLET ORAL 2 TIMES DAILY
COMMUNITY
Start: 2021-05-26 | End: 2021-07-21

## 2021-06-04 RX ORDER — NITROGLYCERIN 0.4 MG/1
0.4 TABLET SUBLINGUAL
COMMUNITY
Start: 2021-06-03

## 2021-06-04 RX ORDER — FEXOFENADINE HCL 180 MG/1
180 TABLET ORAL DAILY
COMMUNITY
End: 2021-11-15

## 2021-06-04 RX ORDER — METOPROLOL SUCCINATE 25 MG/1
12.5 TABLET, EXTENDED RELEASE ORAL 2 TIMES DAILY
COMMUNITY
Start: 2021-05-26 | End: 2021-11-15

## 2021-06-04 RX ORDER — ESCITALOPRAM OXALATE 20 MG/1
20 TABLET ORAL EVERY MORNING
Qty: 90 TABLET | Refills: 1 | Status: SHIPPED | OUTPATIENT
Start: 2021-06-04 | End: 2022-07-18

## 2021-06-04 RX ORDER — ROSUVASTATIN CALCIUM 40 MG/1
40 TABLET, COATED ORAL
COMMUNITY
Start: 2021-05-26 | End: 2023-01-19

## 2021-06-04 RX ORDER — RABEPRAZOLE SODIUM 20 MG/1
20 TABLET, DELAYED RELEASE ORAL
COMMUNITY
End: 2021-08-01

## 2021-06-04 RX ORDER — ALPRAZOLAM 1 MG
TABLET ORAL
Qty: 60 TABLET | Refills: 1 | Status: SHIPPED | OUTPATIENT
Start: 2021-06-04 | End: 2021-10-04

## 2021-06-04 RX ORDER — CLOPIDOGREL BISULFATE 75 MG/1
75 TABLET ORAL
COMMUNITY
Start: 2021-05-26

## 2021-06-04 ASSESSMENT — MIFFLIN-ST. JEOR: SCORE: 2091.84

## 2021-06-04 ASSESSMENT — PATIENT HEALTH QUESTIONNAIRE - PHQ9: SUM OF ALL RESPONSES TO PHQ QUESTIONS 1-9: 14

## 2021-06-04 NOTE — NURSING NOTE
"Chief Complaint   Patient presents with     Hospital F/U       Initial BP 94/60   Pulse 89   Ht 1.803 m (5' 11\")   Wt 122.5 kg (270 lb)   SpO2 98%   BMI 37.66 kg/m   Estimated body mass index is 37.66 kg/m  as calculated from the following:    Height as of this encounter: 1.803 m (5' 11\").    Weight as of this encounter: 122.5 kg (270 lb).  Medication Reconciliation: complete  Cait Mejias CMA  "

## 2021-06-05 DIAGNOSIS — Z91.89 10 YEAR RISK OF MI OR STROKE 7.5% OR GREATER: ICD-10-CM

## 2021-06-05 DIAGNOSIS — E11.9 TYPE 2 DIABETES MELLITUS WITHOUT COMPLICATION, WITHOUT LONG-TERM CURRENT USE OF INSULIN (H): ICD-10-CM

## 2021-06-05 DIAGNOSIS — E78.5 HYPERLIPIDEMIA WITH TARGET LDL LESS THAN 100: ICD-10-CM

## 2021-06-06 RX ORDER — EZETIMIBE 10 MG/1
TABLET ORAL
Qty: 90 TABLET | Refills: 0 | Status: SHIPPED | OUTPATIENT
Start: 2021-06-06 | End: 2022-01-03

## 2021-06-08 ENCOUNTER — TRANSFERRED RECORDS (OUTPATIENT)
Dept: HEALTH INFORMATION MANAGEMENT | Facility: CLINIC | Age: 56
End: 2021-06-08

## 2021-06-08 LAB
CREATININE (EXTERNAL): 0.82 MG/DL (ref 0.72–1.25)
GFR ESTIMATED (EXTERNAL): >60 ML/MIN/1.73M2
GFR ESTIMATED (IF AFRICAN AMERICAN) (EXTERNAL): >60 ML/MIN/1.73M2
GLUCOSE (EXTERNAL): 194 MG/DL (ref 65–100)
POTASSIUM (EXTERNAL): 4.3 MMOL/L (ref 3.5–5)

## 2021-06-11 ENCOUNTER — TRANSFERRED RECORDS (OUTPATIENT)
Dept: HEALTH INFORMATION MANAGEMENT | Facility: CLINIC | Age: 56
End: 2021-06-11

## 2021-06-11 LAB — PHQ9 SCORE: 3

## 2021-06-28 DIAGNOSIS — E11.9 TYPE 2 DIABETES MELLITUS WITHOUT COMPLICATION, WITHOUT LONG-TERM CURRENT USE OF INSULIN (H): ICD-10-CM

## 2021-06-29 ENCOUNTER — TRANSFERRED RECORDS (OUTPATIENT)
Dept: HEALTH INFORMATION MANAGEMENT | Facility: CLINIC | Age: 56
End: 2021-06-29

## 2021-06-29 RX ORDER — METFORMIN HYDROCHLORIDE 750 MG/1
750 TABLET, EXTENDED RELEASE ORAL
Qty: 90 TABLET | Refills: 1 | Status: SHIPPED | OUTPATIENT
Start: 2021-06-29 | End: 2021-07-21

## 2021-06-29 NOTE — TELEPHONE ENCOUNTER
Per Electronic Health Record Metformin discontinued 4/14/21, however can not find supporting documentation at office visit.  Please advise on refill.     Routing refill request to provider for review/approval because:  Drug not active on patient's medication list  Yanelis Singletary RN

## 2021-07-08 ENCOUNTER — TRANSFERRED RECORDS (OUTPATIENT)
Dept: HEALTH INFORMATION MANAGEMENT | Facility: CLINIC | Age: 56
End: 2021-07-08

## 2021-07-13 ENCOUNTER — ALLIED HEALTH/NURSE VISIT (OUTPATIENT)
Dept: EDUCATION SERVICES | Facility: CLINIC | Age: 56
End: 2021-07-13
Payer: COMMERCIAL

## 2021-07-13 DIAGNOSIS — E11.9 TYPE 2 DIABETES MELLITUS WITHOUT COMPLICATION, WITHOUT LONG-TERM CURRENT USE OF INSULIN (H): Primary | ICD-10-CM

## 2021-07-13 DIAGNOSIS — E11.9 DIABETES MELLITUS WITHOUT COMPLICATION (H): ICD-10-CM

## 2021-07-13 PROCEDURE — 99207 PR DROP WITH A PROCEDURE: CPT

## 2021-07-13 PROCEDURE — G0108 DIAB MANAGE TRN  PER INDIV: HCPCS

## 2021-07-13 RX ORDER — LANCETS
EACH MISCELLANEOUS
Qty: 102 EACH | Refills: 6 | Status: SHIPPED | OUTPATIENT
Start: 2021-07-13 | End: 2022-01-03

## 2021-07-13 RX ORDER — LANCING DEVICE/LANCETS
KIT MISCELLANEOUS
Qty: 1 EACH | Refills: 0 | Status: SHIPPED | OUTPATIENT
Start: 2021-07-13 | End: 2021-10-20

## 2021-07-13 NOTE — PROGRESS NOTES
"Diabetes Self-Management Education & Support    Presents for: Individual review    SUBJECTIVE/OBJECTIVE:  Presents for: Individual review  Accompanied by: Self  Diabetes education in the past 24mo: No  Focus of Visit: Taking Medication, Healthy Eating, Assistance w/ making life changes, Diabetes Pathophysiology  Diabetes type: Type 2  Diabetes management related comments/concerns: has had for about 9 yrs, all of a sudden A1C went up high from 6%  Transportation concerns: No  Other concerns:: None  Cultural Influences/Ethnic Background:  American      Diabetes Symptoms & Complications:  Fatigue: Yes  Neuropathy: Yes  Polydipsia: Yes  Polyphagia: Sometimes  Polyuria: Yes  Visual change: Yes  Symptom course: Worsening  Weight trend: Decreasing       Patient Problem List and Family Medical History reviewed for relevant medical history, current medical status, and diabetes risk factors.    Vitals:  There were no vitals taken for this visit.  Estimated body mass index is 37.66 kg/m  as calculated from the following:    Height as of 6/4/21: 1.803 m (5' 11\").    Weight as of 6/4/21: 122.5 kg (270 lb).   Last 3 BP:   BP Readings from Last 3 Encounters:   06/04/21 94/60   04/14/21 114/65   02/07/20 131/87       History   Smoking Status     Former Smoker     Types: Cigarettes   Smokeless Tobacco     Never Used     Comment: thinking about quitting       Labs:  Lab Results   Component Value Date    A1C 9.9 05/21/2021     Lab Results   Component Value Date     04/14/2021     Lab Results   Component Value Date     05/21/2021     HDL Cholesterol   Date Value Ref Range Status   05/21/2021 33 (L) >40 mg/dL Final   ]  GFR Estimate   Date Value Ref Range Status   04/14/2021 >90 >60 mL/min/[1.73_m2] Final     Comment:     Non  GFR Calc  Starting 12/18/2018, serum creatinine based estimated GFR (eGFR) will be   calculated using the Chronic Kidney Disease Epidemiology Collaboration   (CKD-EPI) equation.   "     GFR Estimate If Black   Date Value Ref Range Status   04/14/2021 >90 >60 mL/min/[1.73_m2] Final     Comment:      GFR Calc  Starting 12/18/2018, serum creatinine based estimated GFR (eGFR) will be   calculated using the Chronic Kidney Disease Epidemiology Collaboration   (CKD-EPI) equation.       Lab Results   Component Value Date    CR 0.59 04/14/2021     No results found for: MICROALBUMIN    Healthy Eating:  Healthy Eating Assessed Today: Yes  Cultural/[employer] diet restrictions?: No  Meal planning/habits: Carb counting  How many times a week on average do you eat food made away from home (restaurant/take-out)?: 0  Breakfast: Ensure,  Lunch: BBQ steak and potato with corn and sauce.  Dinner: skipped  Snacks: dates and raisons, almonds and walnuts  Beverages: Coffee, Water, Soda  Has patient met with a dietitian in the past?: No    Being Active:  Being Active Assessed Today: No  Exercise::  (going to cardiac rehab)    Monitoring:  Monitoring Assessed Today: Yes  Did patient bring glucose meter to appointment? : No  Blood Glucose Meter: Accu-chek  Times checking blood sugar at home (number): 3  Times checking blood sugar at home (per): Day  Blood glucose trend: Decreasing    Did not bring in a meter, says he is about 150's    Taking Medications:  Diabetes Medication(s)     Biguanides       metFORMIN (GLUCOPHAGE-XR) 750 MG 24 hr tablet    TAKE 1 TABLET (750 MG) BY MOUTH DAILY (WITH DINNER)    Insulin       insulin glargine U-300 (TOUJEO) 300 UNIT/ML (1 units dial) pen    Inject 22 Units Subcutaneous every morning Take 22 units daily + use 2 unit prime with each dose for a total of 24 units/day, TDD of 40 units, titration per MD orders.    Sodium-Glucose Co-Transporter 2 (SGLT2) Inhibitors       dapagliflozin (FARXIGA) 5 MG TABS tablet    Take 1 tablet (5 mg) by mouth daily          Taking Medication Assessed Today: Yes  Current Treatments: Insulin Injections  Dose schedule: Pre-breakfast  Given  by: Patient  Injection/Infusion sites: Abdomen  Problems taking diabetes medications regularly?: No  Diabetes medication side effects?: No    Problem Solving:                 Reducing Risks:  Diabetes Risks: Age over 45 years  CAD Risks: Diabetes Mellitus, Male sex, Family history  Has dilated eye exam at least once a year?: Yes    Healthy Coping:  Stage of change: PREPARATION (Decided to change - considering how)  Patient Activation Measure Survey Score:  JACQUELINE Score (Last Two) 12/21/2012   JACQUELINE Raw Score 49   Activation Score 82.8   JACQUELINE Level 4       Diabetes knowledge and skills assessment:   Patient is knowledgeable in diabetes management concepts related to: Taking Medication    Patient needs further education on the following diabetes management concepts: Taking Medication    Based on learning assessment above, most appropriate setting for further diabetes education would be: Individual setting.      INTERVENTIONS:    Education provided today on:  AADE Self-Care Behaviors:  Diabetes Pathophysiology  Healthy Eating: carbohydrate counting, consistency in amount, composition, and timing of food intake, heart healthy diet, portion control, plate planning method and label reading  Being Active: relationship to blood glucose and describe appropriate activity program  Monitoring: purpose, proper technique, log and interpret results, individual blood glucose targets and frequency of monitoring  Taking Medication: action of prescribed medication, drawing up, administering and storing injectable diabetes medications, proper site selection and rotation for injections, side effects of prescribed medications and when to take medications  Problem Solving: high blood glucose - causes, signs/symptoms, treatment and prevention, low blood glucose - causes, signs/symptoms, treatment and prevention, carrying a carbohydrate source at all times, safe travel and when to call health care provider  Insulin administration technique taught  today. Patient verbalized understanding and was able to perform an accurate return demonstration of administration technique.    Opportunities for ongoing education and support in diabetes-self management were discussed.    Pt verbalized understanding of concepts discussed and recommendations provided today.       Education Materials Provided:  [facility] Understanding Diabetes Booklet, Carbohydrate Counting and My Plate Planner      ASSESSMENT:  Met with Lan today, he has been through so much with his heart attack in the recent past.  Feels he knows about diabetes and when we went through teaching and education, he admitted, there was some things he did not know.  He refuses to go back on Metformin for various reasons.  States he is ok with insulin.  I am increasing his insulin based on his fasting BG readings.  He did not bring in his meter, but am following up with him in a couple of weeks.  He may benefit from using a GLP-1 or SGLT-2 with cardiac protection as well.  He is very anxious coming in, and rightly so, he has gone through a lot with the recent heart attack and pacer that was placed        Patient's most recent   Lab Results   Component Value Date    A1C 9.9 05/21/2021    is not meeting goal of <7.0    PLAN  See Patient Instructions for co-developed, patient-stated behavior change goals.  AVS printed and provided to patient today. See Follow-Up section for recommended follow-up.    Freya Fontenot RN/NALDO [facility] Diabetes Educator    Time Spent: 60 minutes  Encounter Type: Individual    Any diabetes medication dose changes were made via the CDE Protocol and Collaborative Practice Agreement with the patient's primary care provider. A copy of this encounter was shared with the provider.

## 2021-07-13 NOTE — PATIENT INSTRUCTIONS
"Diabetes Support Resources:  1. Taking Insulin:    1. Take Toujeo (U-300 Glargine) - 22 units at breakfast.     - Rotate injection sites, keeping at least 1 inch apart from last injection site and 2 inches away from belly button or surgical scars.    -If you have a cloudy insulin, mix the insulin gently by rolling between your hands 10 times and turning upside down and right side up 10 times. Do not shake.     2. Pen - Use a new pen needle for each injection. Always remove pen needle from the insulin pen after use and do not store insulin pens with the needle on the pen. Do a 2 unit \"prime\" before each injection, be sure a drop or stream of insulin comes out of the needle before you give your injection. After you inject, hold the needle under the skin to the count of 10 to be sure all of the insulin goes in.      3. Store insulin you are not using in the refrigerator (do not freeze). Take new insulin out of the refrigerator a few hours prior to use to bring to room temperature.     4. Once opened Toujeo can be kept at room temperature for 42 days.. Do not use the opened insulin after this time has passed, even if there is still medicine inside.     5. Always carry your blood sugar meter and a sugar source like glucose tablets with you in case of a low blood sugar. Treat a low blood sugar (less than 70) with 15 grams of carbohydrate (1 carb choice). Wait 15 minutes, recheck blood sugar. If blood sugar is still below 70, repeat the treatment.        7. Call your doctor or diabetes educator if you begin having low blood sugars or if you have questions or concerns.       Take 22 units of insulin daily for 4 days then if BG in the am is > 150 then increase by 2 units.    Then after 4 more day increase by 2 units if BG > 150    Next anderson will place Dexcom for 10 days          [place] [facility] for the [facility]:  For Your Diabetes Education or Nutrition Appointments " Call:  [phone]   For Diabetes or Nutrition Related Questions:   Phone: [phone]  Send MyChart Message   If you need a medication refill please contact your pharmacy. Please allow 3 business days for your refills to be completed.        Bring blood glucose meter and logbook with you to all doctor and follow-up appointments.    Diabetes Education Telephone Visit Follow-up:    We realize your time is valuable and your health is important! We offer a convenient Telephone Visit follow up! It s a quick way to check in for a medication dose adjustment without having to come back to clinic as soon.    Telephone Visits are often covered by insurance. Please check with your insurance plan to see if this type of visit is covered. If not, the cost is less expensive than an office visit:      Up to 10 minutes (Code 28566): $30    11-20 minutes (Code 20336): $59    More than 20 minutes (Code 18654): $85    Talk with your Diabetes Educator if you want to learn more.      [facility]:  For Your Diabetes Education and Nutrition Appointments Call:  [phone]   For Diabetes Education or Nutrition Related Questions:   Phone: [phone]  Send MyChart Message   If you need a medication refill please contact your pharmacy. Please allow 3 business days for your refills to be completed.

## 2021-07-14 ENCOUNTER — PATIENT OUTREACH (OUTPATIENT)
Dept: CARE COORDINATION | Facility: CLINIC | Age: 56
End: 2021-07-14

## 2021-07-14 NOTE — LETTER
[facility] CARE COORDINATION  [address] [facility] [address]    July 15, 2021    Lan DAVIS Katlyn .  [address] [facility] [address]      Dear Lan,    I am a clinic community health worker who works with Eligio Quevedo MD at [facility]. I have been trying to reach you recently to introduce Clinic Care Coordination and to see if there was anything I could assist you with.  Below is a description of clinic care coordination and how I can further assist you.      The clinic care coordination team is made up of a registered nurse,  and community health worker who understand the health care system. The goal of clinic care coordination is to help you manage your health and improve access to the health care system in the most efficient manner. The team can assist you in meeting your health care goals by providing education, coordinating services, strengthening the communication among your providers and supporting you with any resource needs.    Please feel free to contact me at [phone] with any questions or concerns. We are focused on providing you with the highest-quality healthcare experience possible and that all starts with you.     Sincerely,   Meg KRAUS, Community Health Worker  Clinic Care Coordination  [facility] : [place], [place] & [place]  Phone: [phone]

## 2021-07-14 NOTE — PROGRESS NOTES
Clinic Care Coordination Contact  [facility]/Voicemail       Clinical Data: CHW Outreach  Outreach attempted x 1. Left message on patient's voicemail with call back information and requested return call.    Plan: CHW will try to reach patient again in 1-2 business days.    Meg KRAUS Community Health Worker  Clinic Care Coordination  [facility] : [place], [place] & [place]  Phone: [phone]    Financial Support    Notes:  Referred by PCP  Schedule with CC SW  Retired - needs insurance

## 2021-07-15 NOTE — PROGRESS NOTES
Clinic Care Coordination Contact  [facility]/Voicemail       Clinical Data: CHW Outreach  Outreach attempted x 2.  Left message on patient's voicemail with call back information and requested return call.    Plan: CHW will send care coordination introduction letter with care coordinator contact information and explanation of care coordination services via [email or web address].     CHW will do no further outreaches at this time.    Meg KRAUS Community Health Worker  Clinic Care Coordination  [facility] : [place], [place] & [place]  Phone: [phone]    Financial Support     Notes:  Referred by PCP  Schedule with CC SW  Retired - needs insurance

## 2021-07-21 ENCOUNTER — OFFICE VISIT (OUTPATIENT)
Dept: FAMILY MEDICINE | Facility: CLINIC | Age: 56
End: 2021-07-21
Payer: COMMERCIAL

## 2021-07-21 ENCOUNTER — ALLIED HEALTH/NURSE VISIT (OUTPATIENT)
Dept: EDUCATION SERVICES | Facility: CLINIC | Age: 56
End: 2021-07-21
Payer: COMMERCIAL

## 2021-07-21 VITALS
BODY MASS INDEX: 35.57 KG/M2 | OXYGEN SATURATION: 96 % | WEIGHT: 255 LBS | SYSTOLIC BLOOD PRESSURE: 96 MMHG | HEART RATE: 88 BPM | DIASTOLIC BLOOD PRESSURE: 66 MMHG

## 2021-07-21 DIAGNOSIS — E11.9 TYPE 2 DIABETES MELLITUS WITHOUT COMPLICATION, WITHOUT LONG-TERM CURRENT USE OF INSULIN (H): Primary | ICD-10-CM

## 2021-07-21 DIAGNOSIS — E11.9 TYPE 2 DIABETES MELLITUS WITHOUT COMPLICATION, WITHOUT LONG-TERM CURRENT USE OF INSULIN (H): ICD-10-CM

## 2021-07-21 DIAGNOSIS — E11.9 DIABETES MELLITUS WITHOUT COMPLICATION (H): ICD-10-CM

## 2021-07-21 DIAGNOSIS — Z23 NEED FOR VACCINATION: ICD-10-CM

## 2021-07-21 DIAGNOSIS — Z12.11 SCREEN FOR COLON CANCER: Primary | ICD-10-CM

## 2021-07-21 DIAGNOSIS — I10 HYPERTENSION GOAL BP (BLOOD PRESSURE) < 140/90: ICD-10-CM

## 2021-07-21 PROCEDURE — G0108 DIAB MANAGE TRN  PER INDIV: HCPCS

## 2021-07-21 PROCEDURE — 36415 COLL VENOUS BLD VENIPUNCTURE: CPT | Performed by: FAMILY MEDICINE

## 2021-07-21 PROCEDURE — 90471 IMMUNIZATION ADMIN: CPT | Performed by: FAMILY MEDICINE

## 2021-07-21 PROCEDURE — 82985 ASSAY OF GLYCATED PROTEIN: CPT | Mod: 90 | Performed by: FAMILY MEDICINE

## 2021-07-21 PROCEDURE — 90714 TD VACC NO PRESV 7 YRS+ IM: CPT | Performed by: FAMILY MEDICINE

## 2021-07-21 PROCEDURE — 99207 PR DROP WITH A PROCEDURE: CPT

## 2021-07-21 PROCEDURE — 99214 OFFICE O/P EST MOD 30 MIN: CPT | Mod: 25 | Performed by: FAMILY MEDICINE

## 2021-07-21 PROCEDURE — 99000 SPECIMEN HANDLING OFFICE-LAB: CPT | Performed by: FAMILY MEDICINE

## 2021-07-21 RX ORDER — BUMETANIDE 1 MG/1
1 TABLET ORAL DAILY
COMMUNITY
End: 2023-09-29

## 2021-07-21 RX ORDER — PROCHLORPERAZINE 25 MG/1
1 SUPPOSITORY RECTAL CONTINUOUS
Qty: 1 EACH | Refills: 3 | Status: SHIPPED | OUTPATIENT
Start: 2021-07-21 | End: 2022-12-19

## 2021-07-21 RX ORDER — PROCHLORPERAZINE 25 MG/1
1 SUPPOSITORY RECTAL CONTINUOUS
Qty: 9 EACH | Refills: 11 | Status: SHIPPED | OUTPATIENT
Start: 2021-07-21 | End: 2022-12-19

## 2021-07-21 ASSESSMENT — PAIN SCALES - GENERAL: PAINLEVEL: NO PAIN (0)

## 2021-07-21 NOTE — PROGRESS NOTES
[facility] diabetes resourses:  [email or web address]        To access diabetes educational materials with in EPIC use <dot>D[facility]      Put this in AFTER VISIT SUMMARY if needed for the patient to access information online [email or web address]      SUBJECTIVE:  Lan Potts Jr. is a 56 year old male who presents today for a follow up appointment for management of DIABETES MELLITUS.    Patient Active Problem List    Diagnosis Date Noted     JUAN (generalized anxiety disorder) 08/19/2019     Priority: Medium     Morbid obesity (H) 07/12/2018     Priority: Medium     Umbilical hernia without obstruction and without gangrene 07/12/2018     Priority: Medium     Varicose veins of left lower extremity 07/12/2018     Priority: Medium     Tobacco abuse 04/05/2018     Priority: Medium     Erectile dysfunction, unspecified erectile dysfunction type 04/05/2018     Priority: Medium     10 year risk of MI or stroke 7.5% or greater, 12.6% in April 2017 04/06/2017     Priority: Medium     Type 2 diabetes mellitus without complication, without long-term current use of insulin (H) 01/05/2017     Priority: Medium     Migraine without status migrainosus, not intractable, unspecified migraine type 01/21/2016     Priority: Medium     High triglycerides 12/11/2014     Priority: Medium     HDL deficiency 12/11/2014     Priority: Medium     Hyperlipidemia with target LDL less than 100 02/17/2014     Priority: Medium     Diagnosis updated by automated process. Provider to review and confirm.       Chronic back pain      Priority: Medium     Seasonal allergies      Priority: Medium        The patient checks his blood sugar as follows: two times daily, once daily.   Results as follows: fasting glucose- 140-160 and bedtime- 130-160    The patient reports that he IS taking the medication as prescribed. He reports side effects of medication.  These side  effects include: lightheadedness when he is standing up     ----------------------------------------------------------------------------------------------------------------------------------------    BP Readings from Last 3 Encounters:   07/21/21 96/66   06/04/21 94/60   04/14/21 114/65     The patient reports that he IS NOT currently smoking.  He quit 2 years ago   History   Smoking Status     Former Smoker     Types: Cigarettes   Smokeless Tobacco     Never Used     Comment: thinking about quitting           The 10-year ASCVD risk score (Maribel OH Jr., et al., 2013) is: 9.2%    Values used to calculate the score:      Age: 56 years      Sex: Male      Is Non- : No      Diabetic: Yes      Tobacco smoker: No      Systolic Blood Pressure: 96 mmHg      Is BP treated: No      HDL Cholesterol: 33 mg/dL      Total Cholesterol: 213 mg/dL        Current Outpatient Medications   Medication Sig Dispense Refill     ACE/ARB/ARNI NOT PRESCRIBED, INTENTIONAL, Please choose reason not prescribed, below       alcohol swab prep pads Use to swab area of injection/laura as directed 100 each 3     ALPRAZolam (XANAX) 1 MG tablet TAKE 1 TABLET BY MOUTH THREE TIMES A DAY AS NEEDED 60 tablet 1     amitriptyline (ELAVIL) 10 MG tablet TAKE 2 TABLETS (20 MG) BY MOUTH AT BEDTIME 180 tablet 0     aspirin (ASA) 81 MG tablet Take 1 tablet (81 mg) by mouth daily       blood glucose (ACCU-CHEK FASTCLIX) lancing device Lancing device to be used with lancets. 1 each 0     blood glucose (ACCU-CHEK SMARTVIEW) test strip 1 strip by In Vitro route 3 times daily 3 Box 3     blood glucose (NO BRAND SPECIFIED) test strip Use to test blood sugar 1 times daily or as directed. To accompany: Blood Glucose Monitor Brands: per insurance. 100 strip 3     blood glucose calibration (ACCU-CHEK SMARTVIEW CONTROL) solution 1 drop by In Vitro route as needed 1 each 3     blood glucose calibration (NO BRAND SPECIFIED) solution Use to calibrate  blood glucose monitor as needed as directed. To accompany: Blood Glucose Monitor Brands: per insurance. 1 each 0     blood glucose monitoring (ACCU-CHEK FASTCLIX) lancets Use to test blood sugar 3 times daily. 102 each 6     blood glucose monitoring (ACCU-CHEK FASTCLIX) lancets 1 each 3 times daily 3 Box 3     blood glucose monitoring (NO BRAND SPECIFIED) meter device kit Use to test blood sugar 1 times daily or as directed. 1 kit 0     bumetanide (BUMEX) 1 MG tablet Take 1 mg by mouth daily       clopidogrel (PLAVIX) 75 MG tablet Take 75 mg by mouth       dapagliflozin (FARXIGA) 5 MG TABS tablet Take 1 tablet (5 mg) by mouth daily 90 tablet 1     eletriptan (RELPAX) 40 MG tablet Take 1 tablet (40 mg) by mouth at onset of headache May repeat in 2 hours as needed but no more than 2 pills in 24 hours. 12 tablet 5     escitalopram (LEXAPRO) 20 MG tablet Take 1 tablet (20 mg) by mouth every morning 90 tablet 1     fexofenadine (ALLEGRA) 180 MG tablet Take 180 mg by mouth daily       fluticasone (FLONASE) 50 MCG/ACT nasal spray INSTILL 2 SPRAYS INTO BOTH NOSTRILS DAILY 48 mL 2     insulin glargine U-300 (TOUJEO) 300 UNIT/ML (1 units dial) pen Inject 22 Units Subcutaneous every morning Take 22 units daily + use 2 unit prime with each dose for a total of 24 units/day, TDD of 40 units, titration per MD orders. 4.5 mL 1     metoprolol succinate ER (TOPROL-XL) 25 MG 24 hr tablet Take 12.5 mg by mouth 2 times daily       nitroGLYcerin (NITROSTAT) 0.4 MG sublingual tablet Place 0.4 mg under the tongue       RABEprazole (ACIPHEX) 20 MG EC tablet Take 20 mg by mouth       rosuvastatin (CRESTOR) 40 MG tablet Take 40 mg by mouth       sacubitril-valsartan (ENTRESTO) 24-26 MG per tablet 1/2 tablet twice a day(s)       sildenafil (REVATIO) 20 MG tablet Take 1 to 5 tabs 30-60 minutes before intercourse.  Never use with nitroglycerin, terazosin or doxazosin. 30 tablet 11     thin (NO BRAND SPECIFIED) lancets Use to test blood sugar 1  times daily or as directed. To accompany: Blood Glucose Monitor Brands: per insurance. 100 each 3     Continuous Blood Gluc Sensor (DEXCOM G6 SENSOR) MISC 1 Box continuous To change every 10 days. 9 each 11     Continuous Blood Gluc Transmit (DEXCOM G6 TRANSMITTER) MISC 1 Device continuous To change out every 3 months 1 each 3     ezetimibe (ZETIA) 10 MG tablet TAKE 1 TABLET BY MOUTH EVERY DAY 90 tablet 0       He has a cardiology appointment(s) in 3 weeks with Dr Sims      *call Levi about blood pressure     The patient reports that he IS taking a statin.   (Reminder all diabetics with a ASCVD risk greater or equal to 7.5% should be on a high intensity statin, otherwise on a moderate intensity statin)      The patient reports that he IS taking 81mg of  aspirin daily.  (Reminder all diabetic patients with a cardiovascular risk factor and > 50 should take a daily aspirin)           The last time he had an appointment with a diabetic educator was  1 week ago.    Immunization History   Administered Date(s) Administered     COVID-19,PF,Pfizer 03/26/2021, 04/16/2021     HepB 04/06/2017     HepB, Unspecified 04/06/2017     HepB-Adult 04/05/2018, 05/02/2019     Influenza (IIV3) PF 12/21/2012, 09/27/2013     Influenza Quad, Recombinant, p-free (RIV4) 09/26/2018, 09/05/2019     Influenza Vaccine IM > 6 months Valent IIV4 10/26/2017     Pneumococcal 23 valent 09/04/2014     TDAP Vaccine (Adacel) 09/20/2011     Zoster vaccine recombinant adjuvanted (SHINGRIX) 07/12/2018, 09/26/2018     The patient reports that he has had the hepatitis B vaccine series in the past. (recommended for age 19-59 and can be given to age 60 or older)  The patient reports that he has had a pnuemovax in the past.    The patient would like to have a Td    Patient concerns: is concerned about his blood pressure       EXAM:  BP 96/66   Pulse 88   Wt 115.7 kg (255 lb)   SpO2 96%   BMI 35.57 kg/m    Wt Readings from Last 4 Encounters:   07/21/21  115.7 kg (255 lb)   06/04/21 122.5 kg (270 lb)   04/14/21 122 kg (269 lb)   02/07/20 134.7 kg (297 lb)     Body mass index is 35.57 kg/m .    General:  Lan is awake, alert, and cooperative.  NAD.      Diabetic Foot Screen:  Any complaints of increased pain or numbness ? No  Is there a foot ulcer now or a history of foot ulcer? No  Does the foot have an abnormal shape? No  Are the nails thick, too long or ingrown? No  Are there any redness or open areas? No         Sensation Testing done at all points on the diagram with monofilament     Right Foot: Sensation Normal at all points  Left Foot: Sensation Normal at all points     Risk Category: 0- No loss of protective sensation  Performed by Eligio Quevedo MD                  Previsit labs drawn include:  Transferred Records on 06/11/2021   Component Date Value Ref Range Status     Cholesterol 05/21/2021 213* 100 - 199 mg/dL Final     Triglycerides 05/21/2021 155* <150 mg/dL Final     HDL Cholesterol 05/21/2021 33* >40 mg/dL Final     LDL Cholesterol Calculated 05/21/2021 149* <=130 mg/dL Final     Non HDL Cholesterol 05/21/2021 180* <145 mg/dL Final     PHQ9 SCORE 06/11/2021 3   Final     Hemoglobin A1C 05/21/2021 9.9* <=6.4 % Final         ASSESSMENT and PLAN:    Type II Diabetes,    DIABETES Type II:                       Lab Results   Component Value Date    A1C 9.9 05/21/2021       Control   unable to assess  Lab Results   Component Value Date    A1C 9.9 05/21/2021    A1C 10.4 04/14/2021    A1C 6.9 11/14/2019       Albumin Urine mg/g Cr   Date Value Ref Range Status   04/14/2021 18.01 (H) 0 - 17 mg/g Cr Final         Check a fructosamine today, Recommended to take ASA  mg daily for appropriate patient, Compliance with the recommended diabetic diet was stressed, Regular aerobic exercise was encouraged, Home glucose monitoring encouraged, Annual eye exam recommended, Self foot exam demonstrated and recommended to be done nightly, Apply moisturizer to feet  "with in 3 minutes of showering or bathing, Follow up in clinic in 3 months for a diabetes check and Future labs ordered and the patient was instructed to make a lab appt 1 week prior to next diabetes visit      BP/HTN:   BP Readings from Last 3 Encounters:   07/21/21 96/66   06/04/21 94/60   04/14/21 114/65     Control   he is iatrogenically hypotensive    I called Dr Sims office to discuss which medication can be lowered, discontinued or changed    - Medication: plan per my future discussion with Dr Sims  (make sure to order \"Ace not prescribed intentionally if not on an ACE/ARB)  The patient was advised to do the following therapuetic life style changes  - Dietary sodium restriction and increase potassium and Calcium intake  - Regular aerobic exercise  - Weight loss  - Discontinue smoking if applicable  - Avoid regular NSAID use if applicable  - Avoid regular decongestant use if applicable  - Follow up in clinic in 3 month for a recheck  - Check a basic metabolic panel today if needed    Patient Education: Reviewed risks of hypertension and principles of   Treatment.    HYPERCHOLESTEROLEMIA:     The 10-year ASCVD risk score (Maribel ADRIANO Jr., et al., 2013) is: 9.2%    Values used to calculate the score:      Age: 56 years      Sex: Male      Is Non- : No      Diabetic: Yes      Tobacco smoker: No      Systolic Blood Pressure: 96 mmHg      Is BP treated: No      HDL Cholesterol: 33 mg/dL      Total Cholesterol: 213 mg/dL    Lab Results   Component Value Date     05/21/2021      Control   unable to assess  Cholesterol   Date Value Ref Range Status   05/21/2021 213 (H) 100 - 199 mg/dL Final   04/14/2021 280 (H) <200 mg/dL Final     Comment:     Desirable:       <200 mg/dl     HDL Cholesterol   Date Value Ref Range Status   05/21/2021 33 (L) >40 mg/dL Final   04/14/2021 31 (L) >39 mg/dL Final     LDL Cholesterol Calculated   Date Value Ref Range Status   05/21/2021 149 (H) <=130 mg/dL " Final   04/14/2021 198 (H) <100 mg/dL Final     Comment:     Above desirable:  100-129 mg/dl  Borderline High:  130-159 mg/dL  High:             160-189 mg/dL  Very high:       >189 mg/dl       Triglycerides   Date Value Ref Range Status   05/21/2021 155 (H) <150 mg/dL Final   04/14/2021 256 (H) <150 mg/dL Final     Comment:     Borderline high:  150-199 mg/dl  High:             200-499 mg/dl  Very high:       >499 mg/dl  Fasting specimen       Cholesterol/HDL Ratio   Date Value Ref Range Status   09/03/2015 7.7 (H) 0.0 - 5.0 Final   12/11/2014 5.8 (H) 0.0 - 5.0 Final         The patient is on a high intensity statin and this is the appropriate treatment based on the ASCVD risk.    The patient is not  fasting today and he  will recheck the fasting lipid panel at a future laboratory appointment

## 2021-07-21 NOTE — NURSING NOTE
Prior to immunization administration, verified patients identity using patient s name and date of birth. Please see Immunization Activity for additional information.     Screening Questionnaire for Adult Immunization    Are you sick today?   No   Do you have allergies to medications, food, a vaccine component or latex?   yes   Have you ever had a serious reaction after receiving a vaccination?   No   Do you have a long-term health problem with heart, lung, kidney, or metabolic disease (e.g., diabetes), asthma, a blood disorder, no spleen, complement component deficiency, a cochlear implant, or a spinal fluid leak?  Are you on long-term aspirin therapy?   No   Do you have cancer, leukemia, HIV/AIDS, or any other immune system problem?   No   Do you have a parent, brother, or sister with an immune system problem?   No   In the past 3 months, have you taken medications that affect  your immune system, such as prednisone, other steroids, or anticancer drugs; drugs for the treatment of rheumatoid arthritis, Crohn s disease, or psoriasis; or have you had radiation treatments?   No   Have you had a seizure, or a brain or other nervous system problem?   No   During the past year, have you received a transfusion of blood or blood    products, or been given immune (gamma) globulin or antiviral drug?   No   For women: Are you pregnant or is there a chance you could become       pregnant during the next month?   No   Have you received any vaccinations in the past 4 weeks?   No     Immunization questionnaire was positive for at least one answer.  Notified Emy.        Per orders of Dr. Quevedo, injection of  TD given by Cait Mejias CMA. Patient instructed to remain in clinic for 15 minutes afterwards, and to report any adverse reaction to me immediately.       Screening performed by Cait Mejias CMA on 7/21/2021 at 3:46 PM.

## 2021-07-21 NOTE — NURSING NOTE
"Chief Complaint   Patient presents with     Diabetes     RECHECK     from heart surgery       Initial /71   Pulse 88   Wt 115.7 kg (255 lb)   SpO2 96%   BMI 35.57 kg/m   Estimated body mass index is 35.57 kg/m  as calculated from the following:    Height as of 6/4/21: 1.803 m (5' 11\").    Weight as of this encounter: 115.7 kg (255 lb).  Medication Reconciliation: complete  Cait Mejias CMA  "

## 2021-07-21 NOTE — PROGRESS NOTES
Based on the data that I have downloaded, will increase his insulin to 24 units daily of Toujeo, he still will not take Metformin at any time, this is off of his med list.    I did attempt to start Dexcom sensor on him, his phone was giving us issues, he will try to place this on himself at home and I did order Dexcom for him to use.    Freya Fontenot RN/NALDO  [facility] Diabetes Educator      Time spent with patient:  30 minutes

## 2021-07-23 LAB — FRUCTOSAMINE SERPL-SCNC: 308 UMOL/L

## 2021-07-26 NOTE — RESULT ENCOUNTER NOTE
Lan,  I have reviewed the results of the laboratory tests that we recently ordered. The fructosamine tells us for 2 weeks how good your blood sugar has been. Your fructosamine was almost normal so I would not recommend(ed) a change on medication right now. Keep working with Freya in diabetes education on getting your blood sugar(s) under good control.  Sincerely,   Eligio Quevedo MD

## 2021-07-27 ENCOUNTER — PATIENT OUTREACH (OUTPATIENT)
Dept: NURSING | Facility: CLINIC | Age: 56
End: 2021-07-27
Payer: COMMERCIAL

## 2021-07-27 NOTE — PROGRESS NOTES
Clinic Care Coordination Contact  Community Health Worker Initial Outreach       CHW Additional Questions  If ED/Hospital discharge, follow-up appointment scheduled as recommended?: N/A  Medication changes made following ED/Hospital discharge?: N/A  MyChart active?: Yes  Patient sent Social Determinants of Health questionnaire?: Yes    Patient accepts CC: Yes. Patient scheduled for assessment with CC SW on 07/30/21 at 10:30 am. Patient noted desire to discuss Patient has to retire due to he can no longer do his job. He would like to know about insurance options wondering if he will can qualify for SSI disability. Patient is also a  wants to know if he can get VA benefits..     Meg KRAUS, Community Health Worker  Clinic Care Coordination  [facility] : [place], [place] & [place]  Phone: [phone]

## 2021-07-30 ENCOUNTER — PATIENT OUTREACH (OUTPATIENT)
Dept: NURSING | Facility: CLINIC | Age: 56
End: 2021-07-30
Payer: COMMERCIAL

## 2021-07-30 DIAGNOSIS — E11.9 TYPE 2 DIABETES MELLITUS WITHOUT COMPLICATION, WITHOUT LONG-TERM CURRENT USE OF INSULIN (H): ICD-10-CM

## 2021-07-30 SDOH — ECONOMIC STABILITY: FOOD INSECURITY: WITHIN THE PAST 12 MONTHS, THE FOOD YOU BOUGHT JUST DIDN'T LAST AND YOU DIDN'T HAVE MONEY TO GET MORE.: NEVER TRUE

## 2021-07-30 SDOH — ECONOMIC STABILITY: FOOD INSECURITY: WITHIN THE PAST 12 MONTHS, YOU WORRIED THAT YOUR FOOD WOULD RUN OUT BEFORE YOU GOT MONEY TO BUY MORE.: NEVER TRUE

## 2021-07-30 ASSESSMENT — SOCIAL DETERMINANTS OF HEALTH (SDOH): HOW HARD IS IT FOR YOU TO PAY FOR THE VERY BASICS LIKE FOOD, HOUSING, MEDICAL CARE, AND HEATING?: SOMEWHAT HARD

## 2021-07-30 ASSESSMENT — ACTIVITIES OF DAILY LIVING (ADL): DEPENDENT_IADLS:: INDEPENDENT

## 2021-07-30 NOTE — LETTER
[facility]  Complex Care Plan  About Me:    Patient Name:  Lan Potts Jr.    YOB: 1965  Age:         56 year old   [place] MRN:    [id number] Telephone Information:  Home Phone [phone]   Mobile [phone]   Home Phone Not on file.       Address:  [address] [place] [address]  [place] [address] Email address:  [email or web address]      Emergency Contact(s)    Name Relationship Lgl Grd Work Phone Home Phone Mobile Phone   1DAVID GARCIA Brother    [phone]           Primary language:  English     needed? No   [employer]:  [phone] op. 1  Other communication barriers: None  Preferred Method of Communication:  Mail  Current living arrangement: I live alone  Mobility Status/ Medical Equipment: Independent    Health Maintenance  Health Maintenance Reviewed: Due/Overdue   Health Maintenance Due   Topic Date Due     URINE DRUG SCREEN  Never done     COLORECTAL CANCER SCREENING  Never done     PREVENTIVE CARE VISIT  02/06/2014     EYE EXAM  02/07/2021         My Access Plan  Medical Emergency 911   Primary Clinic Line [facility] - [phone]   24 Hour Appointment Line [phone] or  [phone]) (toll-free)   24 Hour Nurse Line [phone] (toll-free)   Preferred Urgent Care [facility], [phone]   Preferred Hospital     Preferred Pharmacy [id number] - [place], [place] - [address] [facility]     Behavioral Health Crisis Line The National Suicide Prevention Lifeline at 1-607.177.7471 or 911             My Care Team Members  Patient Care Team       Relationship Specialty Notifications Start End    Eligio Quevedo MD PCP - General Family Practice  10/15/13     Phone: [phone] Fax: [phone] [address] [facility] [address]    Eligio Quevedo MD Assigned PCP   12/30/12     Phone: [phone] Fax: [phone] [address] [facility]  [place] [address]    Korin Fontenot, RN Diabetes Educator Diabetes Education  7/13/21     Phone: [phone]         Yuriy Zarate BSW Lead Care Coordinator Primary Care - CC Admissions 7/30/21     Meg Campbell MA Community Health Worker Primary Care - CC Admissions 7/30/21             My Care Plans  Self Management and Treatment Plan  Goals and (Comments)  Goals        General     Financial Wellbeing (pt-stated)      Notes - Note created  7/30/2021 11:10 AM by Yuriy Zarate BSW     Goal Statement: I will apply for health insurance within the next 1-2 weeks if I am eligible.   Date Goal Set:  7/30/2021  Strengths:  Patient is a great self advocate; Patient will work with FRW; Patient is enrolled in Care Coordination  Barriers: Unsure of eligibility   Date to Achieve By: 10/2021  Patient expressed understanding of goal: Yes  Action steps to achieve this goal:  1. I understand a referral was placed to the Financial Resource Worker, I will receive a call within the next 3 business days.    2. I understand the financial worker will make two attempts to call me. If I still need help with this goal, I will connect with my Community Health Worker Meg at [phone].              Financial Wellbeing (pt-stated)      Notes - Note created  7/30/2021 11:15 AM by Yuriy Zarate BSW     Goal Statement: I will apply for [place] Benefits within the next 1-2 weeks if I am eligible.   Date Goal Set:  7/30/2021  Strengths: Patient is a great self advocate; Patient is willing to work with FRW; Patient is enrolled in Care Coordination  Barriers:  Unsure if eligible.   Date to Achieve By: 10/2021  Patient expressed understanding of goal: Yes  Action steps to achieve this goal:  1. I understand a referral was placed to the Financial Resource Worker, I will receive a call within the next 3 business days.    2. I understand the financial worker will make two attempts to call me. If I still need help with this goal, I will connect with my  Community Health Worker Meg at [phone].                 Action Plans on File:                       Advance Care Plans/Directives Type:        My Medical and Care Information  Problem List   Patient Active Problem List   Diagnosis     Chronic back pain     Seasonal allergies     Hyperlipidemia with target LDL less than 100     High triglycerides     HDL deficiency     Migraine without status migrainosus, not intractable, unspecified migraine type     Type 2 diabetes mellitus without complication, without long-term current use of insulin (H)     10 year risk of MI or stroke 7.5% or greater, 12.6% in April 2017     Tobacco abuse     Erectile dysfunction, unspecified erectile dysfunction type     Morbid obesity (H)     Umbilical hernia without obstruction and without gangrene     Varicose veins of left lower extremity     JUAN (generalized anxiety disorder)      Current Medications and Allergies:  See printed Medication Report.    Care Coordination Start Date: 7/30/2021   Frequency of Care Coordination: monthly   Form Last Updated: 07/30/2021

## 2021-07-30 NOTE — LETTER
[facility] CARE COORDINATION  [address] [facility] [address]    July 30, 2021    Lan Potts Jr.  [address] [facility] [address]      Dear Lan,    I am a clinic care coordinator who works with Eligio Quevedo MD at [facility]. I wanted to thank you for spending the time to talk with me.    Here are the resources for disability that we discussed:    1. Social Security Administration   -Application website: https://www.ssa.gov/benefits/disability/   -Your Right to Representation: https://www.ssa.gov/pubs/EN-05-28338.pdf     2. Disability Hub: 8-590-936-1260   - Hub website: https://disabilityhuHeart Buddyn.org/   -Chat with an expert online or call the hub Monday through Friday 8:30am to 5pm     3.Disability Benefits Help   -https://www.disability-benefits-help.org/list-of-social-security-disability-resources     4.Disability Benefits 101   -Main website: https://[place].db101.org/        The clinic care coordination team is made up of a registered nurse,  and community health worker who understand the health care system. The goal of clinic care coordination is to help you manage your health and improve access to the health care system in the most efficient manner. The team can assist you in meeting your health care goals by providing education, coordinating services, strengthening the communication among your providers and supporting you with any resource needs.    Please feel free to contact the Community Health WorkerMeg at [phone] with any questions or concerns. We are focused on providing you with the highest-quality healthcare experience possible and that all starts with you.     Sincerely,     Yuriy Zarate, SUSAN  Primary Care Clinic- Social Work Care Coordinator  [facility]  Ph: [phone]    Enclosed: I have enclosed a copy of the Complex Care Plan. This has helpful information and goals that we  have talked about. Please keep this in an easy to access place to use as needed.

## 2021-07-30 NOTE — PROGRESS NOTES
Clinic Care Coordination Contact    Clinic Care Coordination Contact  OUTREACH    Referral Information:  Referral Source: Care Team    Reason for Referral: Other - Use Comments help with insurance    Financial Support    Financial Support: Other - Use Comments insurance    Clinical Staff have discussed the Care Coordination Referral with the patient and/or caregiver: Yes    Additional Information: Patient needs help to get insurance , he is retired          Primary Diagnosis: Psychosocial    Chief Complaint   Patient presents with     Clinic Care Coordination - Initial     Yuriy Zarate-         [place] Utilization: [facility] Utilization  Difficulty keeping appointments:: No  Compliance Concerns: No  No-Show Concerns: No  No PCP office visit in Past Year: No  Utilization    Hospital Admissions  0             ED Visits  0             No Show Count (past year)  1                Current as of: 7/30/2021  3:34 AM              Clinical Concerns:  Current Medical Concerns:    Patient Active Problem List   Diagnosis     Chronic back pain     Seasonal allergies     Hyperlipidemia with target LDL less than 100     High triglycerides     HDL deficiency     Migraine without status migrainosus, not intractable, unspecified migraine type     Type 2 diabetes mellitus without complication, without long-term current use of insulin (H)     10 year risk of MI or stroke 7.5% or greater, 12.6% in April 2017     Tobacco abuse     Erectile dysfunction, unspecified erectile dysfunction type     Morbid obesity (H)     Umbilical hernia without obstruction and without gangrene     Varicose veins of left lower extremity     JUAN (generalized anxiety disorder)         Current Behavioral Concerns: Patient has dx of Generalized anxiety disorder    Education Provided to patient: Introduced self and role. Discussed FRW role  Pain  Pain (GOAL):: No  Health Maintenance Reviewed: Due/Overdue   Health Maintenance Due   Topic  Date Due     URINE DRUG SCREEN  Never done     COLORECTAL CANCER SCREENING  Never done     PREVENTIVE CARE VISIT  02/06/2014     EYE EXAM  02/07/2021       Clinical Pathway: None    Medication Management:  Did not review medications during this conversation    Functional Status:  Dependent ADLs:: Independent  Dependent IADLs:: Independent  Bed or wheelchair confined:: No  Mobility Status: Independent  Fallen 2 or more times in the past year?: No  Any fall with injury in the past year?: No    Living Situation:  Current living arrangement:: I live alone  Type of residence:: Private home - stairs    Lifestyle & Psychosocial Needs: [facility] contacted patient. Introduced self and role. Discussed consult reason. Patient stated that he is losing his insurance from his previous employer in October. He has been out of work since October 2020. He is not receiving any sort of income besides dividend payout yearly that only amounts to $9,000. He has been utilizing that money to pay for his mortgage and other expenses, but financially, it has been tight. Discussed MA, [place] and reviewing other insurance options via [email or web address] [email or web address]. Discussed FRW role and offered to have referral placed. Patient is agreeable. He also stated that he may be interested in applying for social security disability. Provided website to review guidelines and application. Will also send this website along with other resources to patient's mychart.     Social Determinants of Health     Tobacco Use: Medium Risk     Smoking Tobacco Use: Former Smoker     Smokeless Tobacco Use: Never Used   Alcohol Use:      Frequency of Alcohol Consumption:      Average Number of Drinks:      Frequency of Binge Drinking:    Financial Resource Strain: Medium Risk     Difficulty of Paying Living Expenses: Somewhat hard   Food Insecurity: No Food Insecurity     Worried About Running Out of Food in the Last Year: Never true     Ran Out of Food in the Last Year: Never true    Transportation Needs:      Lack of Transportation (Medical):      Lack of Transportation (Non-Medical):    Physical Activity:      Days of Exercise per Week:      Minutes of Exercise per Session:    Stress:      Feeling of Stress :    Social Connections:      Frequency of Communication with Friends and Family:      Frequency of Social Gatherings with Friends and Family:      Attends Yarsanism Services:      Active Member of Clubs or Organizations:      Attends Club or Organization Meetings:      Marital Status:    Intimate Partner Violence:      Fear of Current or Ex-Partner:      Emotionally Abused:      Physically Abused:      Sexually Abused:    Depression: At risk     PHQ-2 Score: 3   Housing Stability:      Unable to Pay for Housing in the Last Year:      Number of Places Lived in the Last Year:      Unstable Housing in the Last Year:      Inadequate nutrition (GOAL):: No  Tube Feeding: No  Inadequate activity/exercise (GOAL):: No  Significant changes in sleep pattern (GOAL): No  Transportation means:: Regular car     Yarsanism or spiritual beliefs that impact treatment:: No  Mental health DX:: No  Mental health management concern (GOAL):: No  Chemical Dependency Status: No Current Concerns  Informal Support system:: Family          Resources and Interventions:  Current Resources:      Community Resources: None  Supplies used at home:: None  Equipment Currently Used at Home: none  Employment Status: unemployed         Advance Care Plan/Directive  Advanced Care Plans/Directives on file:: No    Referrals Placed: Financial Services     Goals:   Goals        General     Financial Wellbeing (pt-stated)      Notes - Note created  7/30/2021 11:10 AM by Yuriy Zarate BSW     Goal Statement: I will apply for health insurance within the next 1-2 weeks if I am eligible.   Date Goal Set:  7/30/2021  Strengths:  Patient is a great self advocate; Patient will work with FRW; Patient is enrolled in Care  Coordination  Barriers: Unsure of eligibility   Date to Achieve By: 10/2021  Patient expressed understanding of goal: Yes  Action steps to achieve this goal:  1. I understand a referral was placed to the Financial Resource Worker, I will receive a call within the next 3 business days.    2. I understand the financial worker will make two attempts to call me. If I still need help with this goal, I will connect with my Community Health Worker Meg at [phone].              Financial Wellbeing (pt-stated)      Notes - Note created  7/30/2021 11:15 AM by Yuriy Zarate BSW     Goal Statement: I will apply for County Benefits within the next 1-2 weeks if I am eligible.   Date Goal Set:  7/30/2021  Strengths: Patient is a great self advocate; Patient is willing to work with FRW; Patient is enrolled in Care Coordination  Barriers:  Unsure if eligible.   Date to Achieve By: 10/2021  Patient expressed understanding of goal: Yes  Action steps to achieve this goal:  1. I understand a referral was placed to the Financial Resource Worker, I will receive a call within the next 3 business days.    2. I understand the financial worker will make two attempts to call me. If I still need help with this goal, I will connect with my Community Health Worker Meg at [phone].                Patient/Caregiver understanding: Yes    Outreach Frequency: monthly  Future Appointments              In 3 weeks AN DIABETIC ED RESOURCE [facility]    In 3 weeks Danni Johnson OD [facility]          Plan: Patient is enrolled in Care Coordination. Patient will work with FRW on applying for insurance and other county benefits. Will review SSDI resources and decide if he wants to apply. [facility] will send intro letter and resources, as well as complex care plan to patient's Roberts Chapelt. CHW will contact patient in one month to get an update. CC will review chart in 6 weeks,  per standard workflow.    ANTONIA Ramos  Primary Care Clinic- Social Work Care Coordinator  [facility]  Ph: [phone]  7/30/2021 11:33 AM

## 2021-08-01 DIAGNOSIS — K21.9 GASTROESOPHAGEAL REFLUX DISEASE, UNSPECIFIED WHETHER ESOPHAGITIS PRESENT: Primary | ICD-10-CM

## 2021-08-01 NOTE — TELEPHONE ENCOUNTER
Message: Pt req new RX. Consider refilling this medication, but unsure if he still needs to take this medication? Please renew if he still needs to take it and deny with a reason if he does not.

## 2021-08-02 ENCOUNTER — PATIENT OUTREACH (OUTPATIENT)
Dept: CARE COORDINATION | Facility: CLINIC | Age: 56
End: 2021-08-02

## 2021-08-02 RX ORDER — RABEPRAZOLE SODIUM 20 MG/1
20 TABLET, DELAYED RELEASE ORAL DAILY
Qty: 90 TABLET | Refills: 1 | Status: SHIPPED | OUTPATIENT
Start: 2021-08-02 | End: 2021-12-09

## 2021-08-02 NOTE — PROGRESS NOTES
Clinic Care Coordination Contact  Program: Health Insurance and County Benefits  County: [facility] Case #:  [facility] Worker:   Christiano #:   Subscriber #:   Renewal:  Date Applied:     JOSE CRUZ Outreach:  8/2/2021: FRW called patient and he states he's currently paying for COBRA, however, he doesn't have any income except dividends from stocks. FRW had the capacity to complete the MNsure now so we completed the application over the phone. FRW went over the next steps in the application process and will follow up in 2-3 weeks.     Medical Assistance hide details  Lan appears to qualify for Medical Assistance. This is an initial eligibility result based on the information you entered on your application. You will get a health care notice showing your final eligibility results. We may need proof to verify your answers to some of the application questions before your coverage can begin. The notice will tell you if you need to send in proof. .    Health Insurance Screening: MNSURE   1. Do you currently have health insurance, did you receive a renewal? [employer] CO[employer]  2. If you applied through Mnsure, do you know your username/password? No  3. How many people in the household? 1  4. Do you file taxes, who do you file with?   5. What is your monthly income (include all tax members)?  6. Do you have access to insurance through an employer (if yes, need EIN)?  7. Do you have social security cards and/or green cards?      Health Insurance:    [employer] ending in Oct     Referral/Screening:    County Benefits   Is patient requesting help applying for county benefits? Yes   Have you recently applied for any county benefits? No   How many people in your household? 1   What is the monthly gross income for the household (wages, social security, workers comp, and pension)? 0  Dividends from stocks. Estimated at $9,000/year     Insurance:   Was [place] for active insurance? No   Is this an insurance renewal? No   Is this a new  insurance application request? Yes   Have you recently applied for insurance? No   How many people in your household? 1   Do you file taxes? No   What is the monthly gross income for the household (wages, social security, workers comp, and pension)? 0  Dividends from stocks. $9,000/year     Financial Resource Worker Follow Up    Goals:   Goals Addressed as of 8/2/2021 at 11:55 AM                    Today       Financial Wellbeing (pt-stated)   60%    Added 7/30/21 by Yuriy Zarate BSW      Goal Statement: I will apply for health insurance within the next 1-2 weeks if I am eligible.   Date Goal Set:  7/30/2021  Strengths:  Patient is a great self advocate; Patient will work with FRW; Patient is enrolled in Care Coordination  Barriers: Unsure of eligibility   Date to Achieve By: 10/2021  Patient expressed understanding of goal: Yes  Action steps to achieve this goal:  1. I understand a referral was placed to the Financial Resource Worker, I will receive a call within the next 3 business days.    2. I understand the financial worker will make two attempts to call me. If I still need help with this goal, I will connect with my Community Health Worker Meg at [phone].                   Intervention and Education during outreach: n/a    FRW Plan: FRW will follow up in 2-3 weeks

## 2021-08-02 NOTE — TELEPHONE ENCOUNTER
"Requested Prescriptions   Pending Prescriptions Disp Refills     RABEprazole (ACIPHEX) 20 MG EC tablet       Sig: Take 1 tablet (20 mg) by mouth       PPI Protocol Failed - 8/1/2021  9:27 AM        Failed - Not on Clopidogrel (unless Pantoprazole ordered)        Passed - No diagnosis of osteoporosis on record        Passed - Recent (12 mo) or future (30 days) visit within the authorizing provider's specialty     Patient has had an office visit with the authorizing provider or a provider within the authorizing providers department within the previous 12 mos or has a future within next 30 days. See \"Patient Info\" tab in inbasket, or \"Choose Columns\" in Meds & Orders section of the refill encounter.              Passed - Medication is active on med list        Passed - Patient is age 18 or older             Allie EDUARDO, RN    "

## 2021-08-06 ENCOUNTER — TELEPHONE (OUTPATIENT)
Dept: FAMILY MEDICINE | Facility: CLINIC | Age: 56
End: 2021-08-06

## 2021-08-06 NOTE — TELEPHONE ENCOUNTER
Patient Quality Outreach      Summary:    Patient has the following on his problem list/HM:     Diabetes    Last A1C:  Lab Results   Component Value Date    A1C 9.9 05/21/2021    A1C 10.4 04/14/2021       Last LDL:    Lab Results   Component Value Date     05/21/2021       Is the patient on a Statin? Yes          Is the patient on Aspirin? Yes    Medications     HMG CoA Reductase Inhibitors     rosuvastatin (CRESTOR) 40 MG tablet       Salicylates     aspirin (ASA) 81 MG tablet             Last three blood pressure readings:  BP Readings from Last 3 Encounters:   07/21/21 96/66   06/04/21 94/60   04/14/21 114/65            Tobacco Use      Smoking status: Former Smoker        Types: Cigarettes      Smokeless tobacco: Never Used      Tobacco comment: thinking about quitting          Patient is due/failing the following:   Patient was just seen, tp follow up per last office note    Type of outreach:    none    Questions for provider review:    None                                                                                                                                     Cait Mejias cma       Chart routed to closed.

## 2021-08-10 ENCOUNTER — TRANSFERRED RECORDS (OUTPATIENT)
Dept: HEALTH INFORMATION MANAGEMENT | Facility: CLINIC | Age: 56
End: 2021-08-10

## 2021-08-13 ENCOUNTER — TRANSFERRED RECORDS (OUTPATIENT)
Dept: HEALTH INFORMATION MANAGEMENT | Facility: CLINIC | Age: 56
End: 2021-08-13

## 2021-08-13 LAB
CHOLESTEROL (EXTERNAL): 162 MG/DL (ref 100–199)
HDLC SERPL-MCNC: 32 MG/DL
LDL CHOLESTEROL (EXTERNAL): 97 MG/DL
NON HDL CHOLESTEROL (EXTERNAL): 130 MG/DL
TRIGLYCERIDES (EXTERNAL): 167 MG/DL

## 2021-08-16 ENCOUNTER — PATIENT OUTREACH (OUTPATIENT)
Dept: CARE COORDINATION | Facility: CLINIC | Age: 56
End: 2021-08-16

## 2021-08-16 NOTE — PROGRESS NOTES
Clinic Care Coordination Contact  Program: Health Insurance and County Benefits  County: [place] [phone]  [facility] Case #:  [facility] Worker:   Christiano #:   Subscriber #: [id number]  Renewal:  Date Applied: 8/2/2021    FRW Outreach:  8/17/2021: FRW called patient for our scheduled phone appointment. We completed the combined application together over the phone. FRW went over the next steps in the application process and will follow up in 2 weeks.     Lan Potts YOB: 1965 SSN: [id number] Your request will be sent to [facility] [place], [facility]. Click here to find the office address and phone number. Your confirmation number is: [id number] Your application was submitted on: 08/17/2021 at 02:16 PM If additional information is needed, you will be contacted.     August 17, 2021 (02:16 PM) - Submitted (Confirmation # - [id number])    8/16/2021: FRW [place] and [place] as of 5[place]1/21. FRW added coverage to registration and called patient. Patient states he received his card in the mail already. FRW and patient discussed [facility] benefits and we scheduled a phone appointment for 8/17/21 at 1:30 am to apply. FRW will make outreach at that time.   8/2/2021: FRW called patient and he states he's currently paying for COBRA, however, he doesn't have any income except dividends from stocks. FRW had the capacity to complete the [place] now so we completed the application over the phone. FRW went over the next steps in the application process and will follow up in 2-3 weeks.     Medical Assistance hide details  Lan appears to qualify for Medical Assistance. This is an initial eligibility result based on the information you entered on your application. You will get a health care notice showing your final eligibility results. We may need proof to verify your answers to some of the application questions before your coverage can begin. The notice will tell you if you need to send in proof. .    Health Insurance Screening: MNSLENI    1. Do you currently have health insurance, did you receive a renewal? [employer] COBRA  2. If you applied through [facility], do you know your username/password? No  3. How many people in the household? 1  4. Do you file taxes, who do you file with?   5. What is your monthly income (include all tax members)?  6. Do you have access to insurance through an employer (if yes, need EIN)?  7. Do you have social security cards and/or green cards?      Health Insurance:    [employer] ending in Oct    Major Programs  This subscriber has eligibility for MA: Medical Assistance.  Elig Type AX: MA Early Expansion  Eligibility Begin Date: 05/01/2021  Eligibility End Date: --/--/----  This subscriber is eligible for the following service types: Medical Care ,  Chiropractic ,  Dental Care ,  Hospital ,  Hospital - Inpatient ,  Hospital - Outpatient ,  Emergency Services ,  Pharmacy ,  Professional (Physician) Visit - Office ,  Vision (Optometry) ,  Mental Health ,  Urgent Care  Prepaid Health Plan  None     Referral/Screening:    [facility] Benefits   Is patient requesting help applying for [facility] benefits? Yes   Have you recently applied for any county benefits? No   How many people in your household? 1   What is the monthly gross income for the household (wages, social security, workers comp, and pension)? 0  Dividends from stocks. Estimated at $9,000/year     Insurance:   [place] No   Is this an insurance renewal? No   Is this a new insurance application request? Yes   Have you recently applied for insurance? No   How many people in your household? 1   Do you file taxes? No   What is the monthly gross income for the household (wages, social security, workers comp, and pension)? 0  Dividends from stocks. $9,000/year     Financial Resource Worker Follow Up    Goals:   Goals Addressed as of 8/17/2021 at 2:22 PM                    8/16/21       Financial Wellbeing (pt-stated)   50%    Added 7/30/21 by Tessa  MALENA Yan      Goal Statement: I will apply for [employer] Benefits within the next 1-2 weeks if I am eligible.   Date Goal Set:  7/30/2021  Strengths: Patient is a great self advocate; Patient is willing to work with FRW; Patient is enrolled in Care Coordination  Barriers:  Unsure if eligible.   Date to Achieve By: 10/2021  Patient expressed understanding of goal: Yes  Action steps to achieve this goal:  1. I understand a referral was placed to the Financial Resource Worker, I will receive a call within the next 3 business days.    2. I understand the financial worker will make two attempts to call me. If I still need help with this goal, I will connect with my Community Health Worker Meg at [phone].                Intervention and Education during outreach: n/a    FRW Plan: FRW will follow up in 2 weeks

## 2021-08-17 ENCOUNTER — APPOINTMENT (OUTPATIENT)
Dept: CARE COORDINATION | Facility: CLINIC | Age: 56
End: 2021-08-17
Payer: COMMERCIAL

## 2021-08-20 ENCOUNTER — ALLIED HEALTH/NURSE VISIT (OUTPATIENT)
Dept: EDUCATION SERVICES | Facility: CLINIC | Age: 56
End: 2021-08-20
Payer: COMMERCIAL

## 2021-08-20 DIAGNOSIS — E11.9 TYPE 2 DIABETES MELLITUS WITHOUT COMPLICATION, WITHOUT LONG-TERM CURRENT USE OF INSULIN (H): ICD-10-CM

## 2021-08-20 DIAGNOSIS — E11.9 TYPE 2 DIABETES, HBA1C GOAL < 7% (H): ICD-10-CM

## 2021-08-20 DIAGNOSIS — E11.9 DIABETES MELLITUS WITHOUT COMPLICATION (H): Primary | ICD-10-CM

## 2021-08-20 PROCEDURE — A4245 ALCOHOL WIPES PER BOX: HCPCS

## 2021-08-20 PROCEDURE — G0108 DIAB MANAGE TRN  PER INDIV: HCPCS

## 2021-08-20 PROCEDURE — 99207 PR DROP WITH A PROCEDURE: CPT

## 2021-08-20 RX ORDER — BLOOD-GLUCOSE CONTROL, NORMAL
1 EACH MISCELLANEOUS CONTINUOUS PRN
Qty: 1 EACH | Refills: 3 | Status: SHIPPED | OUTPATIENT
Start: 2021-08-20 | End: 2022-01-03

## 2021-08-20 RX ORDER — PEN NEEDLE, DIABETIC 32GX 5/32"
NEEDLE, DISPOSABLE MISCELLANEOUS
Qty: 100 EACH | Refills: 11 | Status: SHIPPED | OUTPATIENT
Start: 2021-08-20 | End: 2021-11-15

## 2021-08-20 NOTE — PATIENT INSTRUCTIONS
Diabetes Support Resources:  1. Continue with Toujeo at 30 units.  Call if lows or very highs    2. Farxiga 5 mg daily    3. Let us know every few wks to check your Dexcom data     Bring blood glucose meter and logbook with you to all doctor and follow-up appointments.    Diabetes Education Telephone Visit Follow-up:    We realize your time is valuable and your health is important! We offer a convenient Telephone Visit follow up! It s a quick way to check in for a medication dose adjustment without having to come back to clinic as soon.    Telephone Visits are often covered by insurance. Please check with your insurance plan to see if this type of visit is covered. If not, the cost is less expensive than an office visit:      Up to 10 minutes (Code 19104): $30    11-20 minutes (Code 12131): $59    More than 20 minutes (Code 18214): $85    Talk with your Diabetes Educator if you want to learn more.      [facility]:  For Your Diabetes Education and Nutrition Appointments Call:  [phone]   For Diabetes Education or Nutrition Related Questions:   Phone: [phone]  Send [employer] Message   If you need a medication refill please contact your pharmacy. Please allow 3 business days for your refills to be completed.

## 2021-08-20 NOTE — PROGRESS NOTES
"Diabetes Self-Management Education & Support    Presents for: Follow-up    SUBJECTIVE/OBJECTIVE:  Presents for: Follow-up  Accompanied by: Self  Diabetes education in the past 24mo: Yes  Focus of Visit: Healthy Eating, CGM  Diabetes type: Type 2  Disease course: Improving  How confident are you filling out medical forms by yourself:: Extremely  Other concerns:: None  Cultural Influences/Ethnic Background:  American      Diabetes Symptoms & Complications:  Fatigue: No  Neuropathy: Sometimes  Polydipsia: No  Polyphagia: No  Polyuria: No  Visual change: No  Symptom course: Improving  Weight trend: Decreasing       Patient Problem List and Family Medical History reviewed for relevant medical history, current medical status, and diabetes risk factors.    Vitals:  There were no vitals taken for this visit.  Estimated body mass index is 35.57 kg/m  as calculated from the following:    Height as of 6/4/21: 1.803 m (5' 11\").    Weight as of 7/21/21: 115.7 kg (255 lb).   Last 3 BP:   BP Readings from Last 3 Encounters:   07/21/21 96/66   06/04/21 94/60   04/14/21 114/65       History   Smoking Status     Former Smoker     Types: Cigarettes   Smokeless Tobacco     Never Used     Comment: thinking about quitting       Labs:  Lab Results   Component Value Date    A1C 9.9 05/21/2021     Lab Results   Component Value Date     04/14/2021     Lab Results   Component Value Date     05/21/2021     HDL Cholesterol   Date Value Ref Range Status   05/21/2021 33 (L) >40 mg/dL Final   ]  GFR Estimate   Date Value Ref Range Status   04/14/2021 >90 >60 mL/min/[1.73_m2] Final     Comment:     Non  GFR Calc  Starting 12/18/2018, serum creatinine based estimated GFR (eGFR) will be   calculated using the Chronic Kidney Disease Epidemiology Collaboration   (CKD-EPI) equation.       GFR Estimate If Black   Date Value Ref Range Status   04/14/2021 >90 >60 mL/min/[1.73_m2] Final     Comment:      GFR " Calc  Starting 12/18/2018, serum creatinine based estimated GFR (eGFR) will be   calculated using the Chronic Kidney Disease Epidemiology Collaboration   (CKD-EPI) equation.       Lab Results   Component Value Date    CR 0.59 04/14/2021     No results found for: MICROALBUMIN    Healthy Eating:  Healthy Eating Assessed Today: Yes  Cultural/Temple diet restrictions?: No  Meal planning/habits: Carb counting, Low salt  Breakfast: Glucerna,  Lunch: frozen meals healthy choice  Dinner: same  Other: glucerna  Beverages: Water, Coffee, Energy drinks, Juice  Has patient met with a dietitian in the past?: No    Being Active:  Being Active Assessed Today: No    Monitoring:  Monitoring Assessed Today: Yes  Did patient bring glucose meter to appointment? : Yes  Blood Glucose Meter: Accu-chek, CGM  Times checking blood sugar at home (number): 5+  Times checking blood sugar at home (per): Day                            Taking Medications:  Diabetes Medication(s)     Insulin       insulin glargine U-300 (TOUJEO) 300 UNIT/ML (1 units dial) pen    Inject 30 Units Subcutaneous every morning Take 22 units daily + use 2 unit prime with each dose for a total of 24 units/day, TDD of 40 units, titration per MD orders.    Sodium-Glucose Co-Transporter 2 (SGLT2) Inhibitors       dapagliflozin (FARXIGA) 5 MG TABS tablet    Take 1 tablet (5 mg) by mouth daily          Taking Medication Assessed Today: Yes  Current Treatments: Diet, Insulin Injections, Oral Medication (taken by mouth)  Dose schedule: Pre-breakfast  Given by: Patient  Injection/Infusion sites: Abdomen  Problems taking diabetes medications regularly?: No  Diabetes medication side effects?: No    Problem Solving:  Problem Solving Assessed Today: Yes  Is the patient at risk for hypoglycemia?: Yes  Hypoglycemia Frequency: Never  Hypoglycemia Treatment: Glucose (tablets or gel)  Medical ID: No  Does patient have glucagon emergency kit?: Not Needed  Is the patient at risk for DKA?:  No  Does patient have severe weather/disaster plan for diabetes management?: Not Needed  Does patient have sick day plan for diabetes management?: Yes              Reducing Risks:  Has dilated eye exam at least once a year?: Yes    Healthy Coping:  Informal Support system:: Family  Stage of change: ACTION (Actively working towards change)  Patient Activation Measure Survey Score:  JACQUELINE Score (Last Two) 12/21/2012   JACQUELINE Raw Score 49   Activation Score 82.8   JACQUELINE Level 4       Diabetes knowledge and skills assessment:   Patient is knowledgeable in diabetes management concepts related to: Healthy Eating, Being Active, Monitoring, Taking Medication and Problem Solving,coping    Patient needs further education on the following diabetes management concepts: Taking Medication    Based on learning assessment above, most appropriate setting for further diabetes education would be: Individual setting.      INTERVENTIONS:    Education provided today on:  AADE Self-Care Behaviors:  Diabetes Pathophysiology  Healthy Eating: carbohydrate counting, consistency in amount, composition, and timing of food intake, heart healthy diet, eating out, portion control, plate planning method and label reading  Monitoring: purpose, proper technique, log and interpret results, individual blood glucose targets and frequency of monitoring  Taking Medication: action of prescribed medication, drawing up, administering and storing injectable diabetes medications, proper site selection and rotation for injections, side effects of prescribed medications and when to take medications  Problem Solving: high blood glucose - causes, signs/symptoms, treatment and prevention, low blood glucose - causes, signs/symptoms, treatment and prevention, carrying a carbohydrate source at all times, safe travel, when to call health care provider, medical identification and sick day arrangements    Opportunities for ongoing education and support in diabetes-self  management were discussed.    Pt verbalized understanding of concepts discussed and recommendations provided today.       Education Materials Provided:  sick days      ASSESSMENT:  Lan is here today for follow-up he has made great progress tolerating Toujeo to stay at 30 units daily he will be due for an A1c at any time he is tolerating Farxiga he will be going on the Dexcom G6 this Thursday we did make a call to [facility] and he is covered at 0 co-pay he is working very hard to keep his diabetes under control knowing what would happen if he did not give me from great lifestyle changes that will benefit him for a long time we will follow-up with him next month he is to let us know when to look at his Dexcom readings so we can adjust his insulin up or down depending on lows or highs I did strongly suggest he get an ID bracelet for the anticoagulants that he is on and also for diabetes        Patient's most recent   Lab Results   Component Value Date    A1C 9.9 05/21/2021    is not meeting goal of <7.0 very improved    PLAN  See Patient Instructions for co-developed, patient-stated behavior change goals.  AVS printed and provided to patient today. See Follow-Up section for recommended follow-up.    Freya Fontenot RN/NALDO  [place] Diabetes Educator    Time Spent: 60 minutes  Encounter Type: Individual    Any diabetes medication dose changes were made via the CDE Protocol and Collaborative Practice Agreement with the patient's primary care provider. A copy of this encounter was shared with the provider.

## 2021-08-23 DIAGNOSIS — F41.1 GAD (GENERALIZED ANXIETY DISORDER): ICD-10-CM

## 2021-08-24 RX ORDER — ESCITALOPRAM OXALATE 10 MG/1
TABLET ORAL
Qty: 90 TABLET | Refills: 0 | Status: SHIPPED | OUTPATIENT
Start: 2021-08-24 | End: 2021-10-19

## 2021-08-25 ENCOUNTER — OFFICE VISIT (OUTPATIENT)
Dept: OPTOMETRY | Facility: CLINIC | Age: 56
End: 2021-08-25
Payer: COMMERCIAL

## 2021-08-25 DIAGNOSIS — H52.223 REGULAR ASTIGMATISM OF BOTH EYES: ICD-10-CM

## 2021-08-25 DIAGNOSIS — E11.9 TYPE 2 DIABETES MELLITUS WITHOUT COMPLICATION, UNSPECIFIED WHETHER LONG TERM INSULIN USE (H): Primary | ICD-10-CM

## 2021-08-25 DIAGNOSIS — H52.4 PRESBYOPIA: ICD-10-CM

## 2021-08-25 PROCEDURE — 92015 DETERMINE REFRACTIVE STATE: CPT | Performed by: OPTOMETRIST

## 2021-08-25 PROCEDURE — 92004 COMPRE OPH EXAM NEW PT 1/>: CPT | Performed by: OPTOMETRIST

## 2021-08-25 ASSESSMENT — REFRACTION_MANIFEST
OD_CYLINDER: +0.50
OD_SPHERE: PLANO
OS_CYLINDER: +0.50
OS_AXIS: 015
OD_SPHERE: +0.25
OD_AXIS: 171
OS_ADD: +1.50
OD_AXIS: 180
OS_SPHERE: +0.75
OS_AXIS: 030
OS_CYLINDER: +0.75
OD_ADD: +1.50
METHOD_AUTOREFRACTION: 1
OS_SPHERE: +0.50
OD_CYLINDER: +0.75

## 2021-08-25 ASSESSMENT — KERATOMETRY
OD_K1POWER_DIOPTERS: 43.50
OD_AXISANGLE2_DEGREES: 34
OS_K2POWER_DIOPTERS: 44.25
OS_K1POWER_DIOPTERS: 44.00
OS_AXISANGLE2_DEGREES: 166
OD_K2POWER_DIOPTERS: 43.75

## 2021-08-25 ASSESSMENT — VISUAL ACUITY
METHOD: SNELLEN - LINEAR
OD_SC: 20/70
OD_SC: 20/20
OS_SC: 20/70
OS_SC: 20/30
OS_SC+: -1

## 2021-08-25 ASSESSMENT — SLIT LAMP EXAM - LIDS
COMMENTS: NORMAL
COMMENTS: NORMAL

## 2021-08-25 ASSESSMENT — CUP TO DISC RATIO
OD_RATIO: 0.6
OS_RATIO: 0.6

## 2021-08-25 ASSESSMENT — TONOMETRY
OS_IOP_MMHG: 12
OD_IOP_MMHG: 12
IOP_METHOD: APPLANATION

## 2021-08-25 ASSESSMENT — CONF VISUAL FIELD
OD_NORMAL: 1
OS_NORMAL: 1
METHOD: COUNTING FINGERS

## 2021-08-25 ASSESSMENT — EXTERNAL EXAM - RIGHT EYE: OD_EXAM: NORMAL

## 2021-08-25 ASSESSMENT — EXTERNAL EXAM - LEFT EYE: OS_EXAM: NORMAL

## 2021-08-25 NOTE — PATIENT INSTRUCTIONS
Optional to fill new glasses prescription, mild glasses prescription   Keep blood sugar under good control  Return in 1 year for diabetic eye exam      Blood sugar and blood pressure control are very important in the prevention of ocular complications from diabetes.   Danni Johnson, OD  [phone]

## 2021-08-25 NOTE — LETTER
8/25/2021         RE: Lan Potts Jr.  [address] [address] [place] [address]        Dear Colleague,    Thank you for referring your patient, Lan Potts Jr., to the [facility]. No diabetic retinopathy was found at eye exam.     Again, thank you for allowing me to participate in the care of your patient.        Sincerely,        Danni Johnson, OD

## 2021-08-25 NOTE — PROGRESS NOTES
Chief Complaint   Patient presents with     Diabetic Eye Exam     Diabetic Eye Exam    reports no visual fluctuations,  Takes insulin     Lab Results   Component Value Date    A1C 9.9 05/21/2021    A1C 10.4 04/14/2021    A1C 6.9 11/14/2019    A1C 6.4 09/19/2019    A1C 6.6 08/15/2019            Last Eye Exam: 5/2018  Dilated Previously: Yes    What are you currently using to see?  Readers, uses +1.50-+2.00's. Did not bring them today        Distance Vision Acuity: Satisfied with vision    Near Vision Acuity: Satisfied with vision while reading and using computer with readers    Eye Comfort: good  Do you use eye drops? : No  Occupation or Hobbies: Retired, Recently had a heart attack ( April 2021)     Kym Apple Optometric Assistant           Medical, surgical and family histories reviewed and updated 8/25/2021.       OBJECTIVE: See Ophthalmology exam    ASSESSMENT:    ICD-10-CM    1. Type 2 diabetes mellitus without complication, unspecified whether long term insulin use (H)  E11.9    2. Regular astigmatism of both eyes  H52.223    3. Presbyopia  H52.4       PLAN:     Patient Instructions   Optional to fill new glasses prescription, mild glasses prescription   Keep blood sugar under good control  Return in 1 year for diabetic eye exam      Blood sugar and blood pressure control are very important in the prevention of ocular complications from diabetes.   Danni Johnson, OD  [phone]

## 2021-08-31 ENCOUNTER — PATIENT OUTREACH (OUTPATIENT)
Dept: CARE COORDINATION | Facility: CLINIC | Age: 56
End: 2021-08-31

## 2021-08-31 NOTE — PROGRESS NOTES
Clinic Care Coordination Contact  Program: Health Insurance and County Benefits  County: [place] [phone]  [facility] Case #:  [facility] Worker:   Mnsangelo #:   Subscriber #: [id number]  Renewal:  Date Applied: 8/2/2021    FRW Outreach:  8/31/2021: FRW called patient and left a vm with call back information. FRW will make outreach in 1 week.   8/17/2021: FRW called patient for our scheduled phone appointment. We completed the combined application together over the phone. FRW went over the next steps in the application process and will follow up in 2 weeks.     Lan Potts YOB: 1965 SSN: [id number] Your request will be sent to [facility]. Click here to find the office address and phone number. Your confirmation number is: [id number] Your application was submitted on: 08/17/2021 at 02:16 PM If additional information is needed, you will be contacted.     August 17, 2021 (02:16 PM) - Submitted (Confirmation # - [id number])    8/16/2021: FRW [place] and [place] as of 5[place]1/21. FRW added coverage to registration and called patient. Patient states he received his card in the mail already. FRW and patient discussed [facility] benefits and we scheduled a phone appointment for 8/17/21 at 1:30 am to apply. FRW will make outreach at that time.   8/2/2021: FRW called patient and he states he's currently paying for COBRA, however, he doesn't have any income except dividends from stocks. FRW had the capacity to complete the MNsure now so we completed the application over the phone. FRW went over the next steps in the application process and will follow up in 2-3 weeks.     Medical Assistance hide details  Lan appears to qualify for Medical Assistance. This is an initial eligibility result based on the information you entered on your application. You will get a health care notice showing your final eligibility results. We may need proof to verify your answers to some of the application questions before your coverage  can begin. The notice will tell you if you need to send in proof. .    Health Insurance Screening: [place]   1. Do you currently have health insurance, did you receive a renewal? [employer] CO[employer]RA  2. If you applied through [place], do you know your username/password? No  3. How many people in the household? 1  4. Do you file taxes, who do you file with?   5. What is your monthly income (include all tax members)?  6. Do you have access to insurance through an employer (if yes, need EIN)?  7. Do you have social security cards and/or green cards?      Health Insurance:    [employer] ending in Oct    Major Programs  This subscriber has eligibility for MA: Medical Assistance.  Elig Type AX: MA Early Expansion  Eligibility Begin Date: 05/01/2021  Eligibility End Date: --/--/----  This subscriber is eligible for the following service types: Medical Care ,  Chiropractic ,  Dental Care ,  Hospital ,  Hospital - Inpatient ,  Hospital - Outpatient ,  Emergency Services ,  Pharmacy ,  Professional (Physician) Visit - Office ,  Vision (Optometry) ,  Mental Health ,  Urgent Care  Prepaid Health Plan  None     Referral/Screening:    County Benefits   Is patient requesting help applying for county benefits? Yes   Have you recently applied for any county benefits? No   How many people in your household? 1   What is the monthly gross income for the household (wages, social security, workers comp, and pension)? 0  Dividends from stocks. Estimated at $9,000/year     Insurance:   [place] No   Is this an insurance renewal? No   Is this a new insurance application request? Yes   Have you recently applied for insurance? No   How many people in your household? 1   Do you file taxes? No   What is the monthly gross income for the household (wages, social security, workers comp, and pension)? 0  Dividends from stocks. $9,000/year     Financial Resource Worker Follow Up    Goals:   Goals Addressed as of 8/17/2021 at  2:22 PM                    8/16/21       Financial Wellbeing (pt-stated)   50%    Added 7/30/21 by Yuriy Zarate BSW      Goal Statement: I will apply for County Benefits within the next 1-2 weeks if I am eligible.   Date Goal Set:  7/30/2021  Strengths: Patient is a great self advocate; Patient is willing to work with FRW; Patient is enrolled in Care Coordination  Barriers:  Unsure if eligible.   Date to Achieve By: 10/2021  Patient expressed understanding of goal: Yes  Action steps to achieve this goal:  1. I understand a referral was placed to the Financial Resource Worker, I will receive a call within the next 3 business days.    2. I understand the financial worker will make two attempts to call me. If I still need help with this goal, I will connect with my Community Health Worker Meg at [phone].                Intervention and Education during outreach: n/a    FRW Plan: FRW will follow up in 1 week

## 2021-09-03 ENCOUNTER — TRANSFERRED RECORDS (OUTPATIENT)
Dept: HEALTH INFORMATION MANAGEMENT | Facility: CLINIC | Age: 56
End: 2021-09-03

## 2021-09-05 ENCOUNTER — HEALTH MAINTENANCE LETTER (OUTPATIENT)
Age: 56
End: 2021-09-05

## 2021-09-07 ENCOUNTER — TRANSFERRED RECORDS (OUTPATIENT)
Dept: HEALTH INFORMATION MANAGEMENT | Facility: CLINIC | Age: 56
End: 2021-09-07

## 2021-09-07 ENCOUNTER — PATIENT OUTREACH (OUTPATIENT)
Dept: CARE COORDINATION | Facility: CLINIC | Age: 56
End: 2021-09-07

## 2021-09-07 NOTE — PROGRESS NOTES
Clinic Care Coordination Contact  Program: Health Insurance and County Benefits 9-11 on Tuesday and Thursday at clinic  County: [place] [phone]  [facility] Case #:  [facility] Worker:   Christiano #:   Subscriber #: [id number]  Renewal:  Date Applied: 8/2/2021    FRW Outreach:  9/7/2021: FRW called patient and he states he sent in his verification documents late last week. FRW advised patient I will follow up with him next week and if he hasn't heard back by then we can call to check on the status.  8/31/2021: FRW called patient and left a vm with call back information. FRW will make outreach in 1 week.   8/17/2021: FRW called patient for our scheduled phone appointment. We completed the combined application together over the phone. FRW went over the next steps in the application process and will follow up in 2 weeks.     Lan Potts YOB: 1965 SSN: [id number] Your request will be sent to [facility]. Click here to find the office address and phone number. Your confirmation number is: [id number] Your application was submitted on: 08/17/2021 at 02:16 PM If additional information is needed, you will be contacted.     August 17, 2021 (02:16 PM) - Submitted (Confirmation # - [id number])    8/16/2021: FRW [place] of 5[place]1/21. FRW added coverage to registration and called patient. Patient states he received his card in the mail already. FRW and patient discussed [facility] benefits and we scheduled a phone appointment for 8/17/21 at 1:30 am to apply. FRW will make outreach at that time.   8/2/2021: FRW called patient and he states he's currently paying for COBRA, however, he doesn't have any income except dividends from stocks. FRW had the capacity to complete the [place] now so we completed the application over the phone. FRW went over the next steps in the application process and will follow up in 2-3 weeks.     Medical Assistance hide details  Lan appears to qualify for Medical Assistance.  This is an initial eligibility result based on the information you entered on your application. You will get a health care notice showing your final eligibility results. We may need proof to verify your answers to some of the application questions before your coverage can begin. The notice will tell you if you need to send in proof. .    Health Insurance Screening: [place]   1. Do you currently have health insurance, did you receive a renewal? [employer]  2. If you applied through [place], do you know your username/password? No  3. How many people in the household? 1  4. Do you file taxes, who do you file with?   5. What is your monthly income (include all tax members)?  6. Do you have access to insurance through an employer (if yes, need EIN)?  7. Do you have social security cards and/or green cards?      Health Insurance:    [employer] ending in Oct    Major Programs  This subscriber has eligibility for MA: Medical Assistance.  Elig Type AX: MA Early Expansion  Eligibility Begin Date: 05/01/2021  Eligibility End Date: --/--/----  This subscriber is eligible for the following service types: Medical Care ,  Chiropractic ,  Dental Care ,  Hospital ,  Hospital - Inpatient ,  Hospital - Outpatient ,  Emergency Services ,  Pharmacy ,  Professional (Physician) Visit - Office ,  Vision (Optometry) ,  Mental Health ,  Urgent Care  Prepaid Health Plan  None     Referral/Screening:    [facility] Benefits   Is patient requesting help applying for county benefits? Yes   Have you recently applied for any county benefits? No   How many people in your household? 1   What is the monthly gross income for the household (wages, social security, workers comp, and pension)? 0  Dividends from stocks. Estimated at $9,000/year     [place]:   [place]   Is this an insurance renewal? No   Is this a new insurance application request? Yes   Have you recently applied for insurance? No   How many people in your  household? 1   Do you file taxes? No   What is the monthly gross income for the household (wages, social security, workers comp, and pension)? 0  Dividends from stocks. $9,000/year     Financial Resource Worker Follow Up    Goals:   Goals Addressed as of 9/7/2021 at 1:21 PM                    Today    8/16/21       Financial Wellbeing (pt-stated)   70%  50%    Added 7/30/21 by Yuriy Zarate BSW      Goal Statement: I will apply for County Benefits within the next 1-2 weeks if I am eligible.   Date Goal Set:  7/30/2021  Strengths: Patient is a great self advocate; Patient is willing to work with FRW; Patient is enrolled in Care Coordination  Barriers:  Unsure if eligible.   Date to Achieve By: 10/2021  Patient expressed understanding of goal: Yes  Action steps to achieve this goal:  1. I understand a referral was placed to the Financial Resource Worker, I will receive a call within the next 3 business days.    2. I understand the financial worker will make two attempts to call me. If I still need help with this goal, I will connect with my Community Health Worker Meg at [phone].                Intervention and Education during outreach: n/a    FRW Plan: FRW will follow up next week

## 2021-09-13 ENCOUNTER — PATIENT OUTREACH (OUTPATIENT)
Dept: CARE COORDINATION | Facility: CLINIC | Age: 56
End: 2021-09-13

## 2021-09-13 NOTE — PROGRESS NOTES
Clinic Care Coordination Contact  Program: Health Insurance and County Benefits 9-11 on Tuesday and Thursday at clinic  County: [place] [phone]  [facility] Case #:  [place] Worker:   Christiano #:   Subscriber #: [id number]  Renewal:  Date Applied: 8/2/2021    FRW Outreach:  9/13/2021: FRW called patient and he states he talked to the county worker on Friday (9/10) and she wanted some additional information, which he e-mailed on Saturday, 9/11. FRW advised patient I will follow up in 1-2 weeks and if he hasn't heard back we can check on the status.   9/7/2021: FRW called patient and he states he sent in his verification documents late last week. FRW advised patient I will follow up with him next week and if he hasn't heard back by then we can call to check on the status.  8/31/2021: FRW called patient and left a vm with call back information. FRW will make outreach in 1 week.   8/17/2021: FRW called patient for our scheduled phone appointment. We completed the combined application together over the phone. FRW went over the next steps in the application process and will follow up in 2 weeks.     Lan Potts YOB: 1965 SSN: [id number] Your request will be sent to [facility]. Click here to find the office address and phone number. Your confirmation number is: [id number] Your application was submitted on: 08/17/2021 at 02:16 PM If additional information is needed, you will be contacted.     August 17, 2021 (02:16 PM) - Submitted (Confirmation # - [id number])    8/16/2021: FRW [place] and [place] as of 5[place]1/21. FRW added coverage to registration and called patient. Patient states he received his card in the mail already. FRW and patient discussed [facility] benefits and we scheduled a phone appointment for 8/17/21 at 1:30 am to apply. FRW will make outreach at that time.   8/2/2021: FRW called patient and he states he's currently paying for COBRA, however, he doesn't have any income except dividends from  [employer]. FRW had the capacity to complete the MNsure now so we completed the application over the phone. FRW went over the next steps in the application process and will follow up in 2-3 weeks.     Medical Assistance hide details  Lan appears to qualify for Medical Assistance. This is an initial eligibility result based on the information you entered on your application. You will get a health care notice showing your final eligibility results. We may need proof to verify your answers to some of the application questions before your coverage can begin. The notice will tell you if you need to send in proof. .    Health Insurance Screening: [place]   1. Do you currently have health insurance, did you receive a renewal? [employer] COBRA  2. If you applied through [place]nsure, do you know your username/password? No  3. How many people in the household? 1  4. Do you file taxes, who do you file with?   5. What is your monthly income (include all tax members)?  6. Do you have access to insurance through an employer (if yes, need EIN)?  7. Do you have social security cards and/or green cards?      Health Insurance:    [employer] ending in Oct    Major Programs  This subscriber has eligibility for MA: Medical Assistance.  Elig Type AX: MA Early Expansion  Eligibility Begin Date: 05/01/2021  Eligibility End Date: --/--/----  This subscriber is eligible for the following service types: Medical Care ,  Chiropractic ,  Dental Care ,  Hospital ,  Hospital - Inpatient ,  Hospital - Outpatient ,  Emergency Services ,  Pharmacy ,  Professional (Physician) Visit - Office ,  Vision (Optometry) ,  Mental Health ,  Urgent Care  Prepaid Health Plan  None     Referral/Screening:    County Benefits   Is patient requesting help applying for county benefits? Yes   Have you recently applied for any county benefits? No   How many people in your household? 1   What is the monthly gross income for the household (wages, social security, workers comp, and  pension)? 0  Dividends from stocks. Estimated at $9,000/year     Insurance:   [place]   Is this an insurance renewal? No   Is this a new insurance application request? Yes   Have you recently applied for insurance? No   How many people in your household? 1   Do you file taxes? No   What is the monthly gross income for the household (wages, social security, workers comp, and pension)? 0  Dividends from stocks. $9,000/year     Financial Resource Worker Follow Up    Goals:   Goals Addressed as of 9/13/2021 at 2:28 PM                    Today    9/7/21       Financial Wellbeing (pt-stated)   80%  70%    Added 7/30/21 by Yuriy Zarate BSW      Goal Statement: I will apply for County Benefits within the next 1-2 weeks if I am eligible.   Date Goal Set:  7/30/2021  Strengths: Patient is a great self advocate; Patient is willing to work with FRW; Patient is enrolled in Care Coordination  Barriers:  Unsure if eligible.   Date to Achieve By: 10/2021  Patient expressed understanding of goal: Yes  Action steps to achieve this goal:  1. I understand a referral was placed to the Financial Resource Worker, I will receive a call within the next 3 business days.    2. I understand the financial worker will make two attempts to call me. If I still need help with this goal, I will connect with my Community Health Worker Meg at [phone].                Intervention and Education during outreach: n/a    FRW Plan: FRW will follow up in 1-2 weeks

## 2021-09-14 ENCOUNTER — PATIENT OUTREACH (OUTPATIENT)
Dept: CARE COORDINATION | Facility: CLINIC | Age: 56
End: 2021-09-14

## 2021-09-14 NOTE — PROGRESS NOTES
Clinic Care Coordination Contact    Situation: Patient chart reviewed by care coordinator.    Background: Patient is enrolled in Care Coordination. CHW, SWCC and FRW are involved.     Assessment: SWCC made initial outreach on 7/30. CHW is involved and will be doing outreach on 9/30. FRW has been working with patient on applying for [facility] benefits and insurance. FRW spoke with patient on 9/13. Patient stated that he talked to the [facility] on Friday (9/10) and they requested additional information. Patient reports sending this additional information on 9/11.     Plan/Recommendations: CHW will be reaching out to patient on 9/30. FRW will be reaching out to patient two weeks from yesterdays outreach. SWCC will plan to review chart in 6 weeks, per standard workflow, unless involvement is requested sooner    ANTONIA Ramos  Primary Care Clinic- Social Work Care Coordinator  [facility] and [facility]  Ph: [phone]  9/14/2021 10:40 AM

## 2021-09-16 ENCOUNTER — TRANSFERRED RECORDS (OUTPATIENT)
Dept: HEALTH INFORMATION MANAGEMENT | Facility: CLINIC | Age: 56
End: 2021-09-16

## 2021-09-20 ENCOUNTER — PATIENT OUTREACH (OUTPATIENT)
Dept: CARE COORDINATION | Facility: CLINIC | Age: 56
End: 2021-09-20

## 2021-09-20 NOTE — PROGRESS NOTES
Clinic Care Coordination Contact  Program: Health Insurance and County Benefits 9-11 on Tuesday and Thursday at [facility]  County: [place] [phone]  [facility] Case #:  [place] Worker:   Christiano #:   Subscriber #: [id number]  Renewal:  Date Applied: 8/2/2021    FRW Outreach:  9/20/2021: FRW called patient and he states his SNAP was approved and he received his EBT card in the mail. Patient states his amount was pro-rated but he believes his amount is around $200 per month. FRW will resolve the FRW episode and remove patient from panel. Please refer to FRW for future needs.   9/13/2021: FRW called patient and he states he talked to the county worker on Friday (9/10) and she wanted some additional information, which he e-mailed on Saturday, 9/11. FRW advised patient I will follow up in 1-2 weeks and if he hasn't heard back we can check on the status.   9/7/2021: FRW called patient and he states he sent in his verification documents late last week. FRW advised patient I will follow up with him next week and if he hasn't heard back by then we can call to check on the status.  8/31/2021: FRW called patient and left a vm with call back information. FRW will make outreach in 1 week.   8/17/2021: FRW called patient for our scheduled phone appointment. We completed the combined application together over the phone. FRW went over the next steps in the application process and will follow up in 2 weeks.     Lan Potts YOB: 1965 SSN: [id number] Your request will be sent to [facility]. Click here to find the office address and phone number. Your confirmation number is: [id number] Your application was submitted on: 08/17/2021 at 02:16 PM If additional information is needed, you will be contacted.     August 17, 2021 (02:16 PM) - Submitted (Confirmation # - [id number])    8/16/2021: FRW [place] and [place] of 5[place]1/21. FRW added coverage to registration and called patient. Patient states he received his card  in the mail already. FRW and patient discussed county benefits and we scheduled a phone appointment for 8/17/21 at 1:30 am to apply. FRW will make outreach at that time.   8/2/2021: FRW called patient and he states he's currently paying for COBRA, however, he doesn't have any income except dividends from stocks. FRW had the capacity to complete the MNsure now so we completed the application over the phone. FRW went over the next steps in the application process and will follow up in 2-3 weeks.     Medical Assistance hide details  Lan appears to qualify for Medical Assistance. This is an initial eligibility result based on the information you entered on your application. You will get a health care notice showing your final eligibility results. We may need proof to verify your answers to some of the application questions before your coverage can begin. The notice will tell you if you need to send in proof. .    Health Insurance Screening: [place]   1. Do you currently have health insurance, did you receive a renewal? [employer] CO[employer]RA  2. If you applied through [place]ure, do you know your username/password? No  3. How many people in the household? 1  4. Do you file taxes, who do you file with?   5. What is your monthly income (include all tax members)?  6. Do you have access to insurance through an employer (if yes, need EIN)?  7. Do you have social security cards and/or green cards?      Health Insurance:    [employer] ending in Oct    Major Programs  This subscriber has eligibility for MA: Medical Assistance.  Elig Type AX: MA Early Expansion  Eligibility Begin Date: 05/01/2021  Eligibility End Date: --/--/----  This subscriber is eligible for the following service types: Medical Care ,  Chiropractic ,  Dental Care ,  Hospital ,  Hospital - Inpatient ,  Hospital - Outpatient ,  Emergency Services ,  Pharmacy ,  Professional (Physician) Visit - Office ,  Vision (Optometry) ,  Mental Health ,  Urgent Care  Prepaid Health  Plan  None     Referral/Screening:    [facility] Benefits   Is patient requesting help applying for county benefits? Yes   Have you recently applied for any Community Health benefits? No   How many people in your household? 1   What is the monthly gross income for the household (wages, social security, workers comp, and pension)? 0  Dividends from stocks. Estimated at $9,000/year     Insurance:   [place]   Is this an insurance renewal? No   Is this a new insurance application request? Yes   Have you recently applied for insurance? No   How many people in your household? 1   Do you file taxes? No   What is the monthly gross income for the household (wages, social security, workers comp, and pension)? 0  Dividends from stocks. $9,000/year     Financial Resource Worker Follow Up    Goals:   Goals Addressed as of 9/20/2021 at 10:45 AM                    Today    9/13/21       Financial Wellbeing (pt-stated)   100%  80%    Added 7/30/21 by Yuriy Zarate BSW      Goal Statement: I will apply for County Benefits within the next 1-2 weeks if I am eligible.   Date Goal Set:  7/30/2021  Strengths: Patient is a great self advocate; Patient is willing to work with FRW; Patient is enrolled in Care Coordination  Barriers:  Unsure if eligible.   Date to Achieve By: 10/2021  Patient expressed understanding of goal: Yes  Action steps to achieve this goal:  1. I understand a referral was placed to the Financial Resource Worker, I will receive a call within the next 3 business days.    2. I understand the financial worker will make two attempts to call me. If I still need help with this goal, I will connect with my Community Health Worker Meg at [phone].                Intervention and Education during outreach: n/a    FRW Plan: n/a

## 2021-09-22 ENCOUNTER — ALLIED HEALTH/NURSE VISIT (OUTPATIENT)
Dept: EDUCATION SERVICES | Facility: CLINIC | Age: 56
End: 2021-09-22
Payer: COMMERCIAL

## 2021-09-22 DIAGNOSIS — E11.9 DIABETES MELLITUS WITHOUT COMPLICATION (H): ICD-10-CM

## 2021-09-22 DIAGNOSIS — E11.9 TYPE 2 DIABETES MELLITUS WITHOUT COMPLICATION, WITHOUT LONG-TERM CURRENT USE OF INSULIN (H): Primary | ICD-10-CM

## 2021-09-22 LAB — HBA1C MFR BLD: 7.2 % (ref 0–5.6)

## 2021-09-22 PROCEDURE — 36415 COLL VENOUS BLD VENIPUNCTURE: CPT

## 2021-09-22 PROCEDURE — A4245 ALCOHOL WIPES PER BOX: HCPCS

## 2021-09-22 PROCEDURE — 99207 PR DROP WITH A PROCEDURE: CPT

## 2021-09-22 PROCEDURE — 83036 HEMOGLOBIN GLYCOSYLATED A1C: CPT

## 2021-09-22 PROCEDURE — G0108 DIAB MANAGE TRN  PER INDIV: HCPCS

## 2021-09-22 NOTE — PROGRESS NOTES
"Diabetes Self-Management Education & Support    Presents for: Follow-up    SUBJECTIVE/OBJECTIVE:  Presents for: Follow-up  Accompanied by: Self  Diabetes education in the past 24mo: Yes  Focus of Visit: Taking Medication  Diabetes type: Type 2  Disease course: Improving  How confident are you filling out medical forms by yourself:: Extremely  Transportation concerns: No  Difficulty affording diabetes medication?: No  Difficulty affording diabetes testing supplies?: No  Other concerns:: None  Cultural Influences/Ethnic Background:  American      Diabetes Symptoms & Complications:  Fatigue: Sometimes  Neuropathy: No  Polydipsia: No  Polyphagia: No  Polyuria: No  Visual change: No  Slow healing wounds: No  Symptom course: Stable  Weight trend: Decreasing       Patient Problem List and Family Medical History reviewed for relevant medical history, current medical status, and diabetes risk factors.    Vitals:  There were no vitals taken for this visit.  Estimated body mass index is 35.57 kg/m  as calculated from the following:    Height as of 6/4/21: 1.803 m (5' 11\").    Weight as of 7/21/21: 115.7 kg (255 lb).   Last 3 BP:   BP Readings from Last 3 Encounters:   07/21/21 96/66   06/04/21 94/60   04/14/21 114/65       History   Smoking Status     Former Smoker     Types: Cigarettes   Smokeless Tobacco     Never Used     Comment: thinking about quitting       Labs:  Lab Results   Component Value Date    A1C 9.9 05/21/2021     Lab Results   Component Value Date     04/14/2021     Lab Results   Component Value Date     05/21/2021     HDL Cholesterol   Date Value Ref Range Status   05/21/2021 33 (L) >40 mg/dL Final   ]  GFR Estimate   Date Value Ref Range Status   04/14/2021 >90 >60 mL/min/[1.73_m2] Final     Comment:     Non  GFR Calc  Starting 12/18/2018, serum creatinine based estimated GFR (eGFR) will be   calculated using the Chronic Kidney Disease Epidemiology Collaboration   (CKD-EPI) " equation.       GFR Estimate If Black   Date Value Ref Range Status   04/14/2021 >90 >60 mL/min/[1.73_m2] Final     Comment:      GFR Calc  Starting 12/18/2018, serum creatinine based estimated GFR (eGFR) will be   calculated using the Chronic Kidney Disease Epidemiology Collaboration   (CKD-EPI) equation.       Lab Results   Component Value Date    CR 0.59 04/14/2021     No results found for: MICROALBUMIN    Healthy Eating:  Healthy Eating Assessed Today: Yes  Cultural/Rastafarian diet restrictions?: No  Meal planning/habits: Carb counting, Low salt  How many times a week on average do you eat food made away from home (restaurant/take-out)?: 0  Meals include: Breakfast, Lunch, Dinner  Breakfast: Glucerna, smoothy or breakfast burrito or yogurt  Lunch: frozen meals healthy choice, mixed veggies, chix ,  Dinner: soup, or a sandwich,  Snacks: ice cream or yogurt and tapioca pudding  Other: glucerna  Beverages: Water, Coffee, Energy drinks, Juice  Has patient met with a dietitian in the past?: No    Being Active:  Being Active Assessed Today: No  Exercise:: Currently not exercising  Barrier to exercise:  (going to cardiac rehab)    Monitoring:  Monitoring Assessed Today: Yes  Did patient bring glucose meter to appointment? : Yes  Blood Glucose Meter: Accu-chek, CGM  Times checking blood sugar at home (number): 5+  Times checking blood sugar at home (per): Day  Blood glucose trend: Decreasing                                                          Taking Medications:  Diabetes Medication(s)     Insulin       insulin glargine U-300 (TOUJEO) 300 UNIT/ML (1 units dial) pen    Inject 30 Units Subcutaneous every morning Take 22 units daily + use 2 unit prime with each dose for a total of 24 units/day, TDD of 40 units, titration per MD orders.    Sodium-Glucose Co-Transporter 2 (SGLT2) Inhibitors       dapagliflozin (FARXIGA) 5 MG TABS tablet    Take 1 tablet (5 mg) by mouth daily    Incretin Mimetic Agents  (GLP-1 Receptor Agonists)       dulaglutide (TRULICITY) 0.75 MG/0.5ML pen    Inject 0.75 mg Subcutaneous every 7 days          Taking Medication Assessed Today: Yes  Current Treatments: Diet, Insulin Injections, Oral Medication (taken by mouth)  Dose schedule: Pre-breakfast  Given by: Patient  Injection/Infusion sites: Abdomen  Problems taking diabetes medications regularly?: No  Diabetes medication side effects?: No    Problem Solving:  Problem Solving Assessed Today: Yes  Is the patient at risk for hypoglycemia?: Yes  Hypoglycemia Frequency: Never  Hypoglycemia Treatment: Glucose (tablets or gel)  Medical ID: No  Does patient have glucagon emergency kit?: Not Needed  Is the patient at risk for DKA?: No  Does patient have severe weather/disaster plan for diabetes management?: Not Needed  Does patient have sick day plan for diabetes management?: Yes              Reducing Risks:  Diabetes Risks: Age over 45 years  CAD Risks: Diabetes Mellitus, Male sex  Has dilated eye exam at least once a year?: Yes  Sees dentist every 6 months?: No  Feet checked by healthcare provider in the last year?: Yes    Healthy Coping:  Healthy Coping Assessed Today: Yes  Emotional response to diabetes: Ready to learn  Informal Support system:: Family  Stage of change: ACTION (Actively working towards change)  Patient Activation Measure Survey Score:  JACQUELINE Score (Last Two) 12/21/2012   JACQUELINE Raw Score 49   Activation Score 82.8   JACQUELINE Level 4       Diabetes knowledge and skills assessment:   Patient is knowledgeable in diabetes management concepts related to: Healthy Eating, Being Active, Monitoring, Taking Medication and Problem Solving    Patient needs further education on the following diabetes management concepts: Healthy Eating, Monitoring, Taking Medication and Problem Solving    Based on learning assessment above, most appropriate setting for further diabetes education would be: Individual setting.      INTERVENTIONS:    Education  provided today on:  YESSI Self-Care Behaviors:  Diabetes Pathophysiology  Healthy Eating: carbohydrate counting, consistency in amount, composition, and timing of food intake, heart healthy diet, portion control, plate planning method and label reading  Monitoring: purpose, proper technique, log and interpret results, individual blood glucose targets and frequency of monitoring  Taking Medication: action of prescribed medication, drawing up, administering and storing injectable diabetes medications, proper site selection and rotation for injections, side effects of prescribed medications and when to take medications  Problem Solving: high blood glucose - causes, signs/symptoms, treatment and prevention, low blood glucose - causes, signs/symptoms, treatment and prevention, carrying a carbohydrate source at all times, safe travel and when to call health care provider  GLP-1 administration technique taught today. Patient verbalized understanding and was able to perform an accurate return demonstration of administration technique. Side effects were discussed, if patient has any abdominal pain, with or without nausea and/or vomiting, stop medication, call provider, clinic or go to the emergency room.    Opportunities for ongoing education and support in diabetes-self management were discussed.    Pt verbalized understanding of concepts discussed and recommendations provided today.       Education Materials Provided:  No new materials provided today      ASSESSMENT:  Lan is here for follow-up to diabetes he has been having some lows he was at 30 units of Toujeo I will be decreasing his insulin and eventually discontinuing in its place he is willing to start Trulicity at 0.75 mg for 4 weeks we will follow-up with him for evaluation he is using the Dexcom G6 he is to call if he has any lows in the meantime so that we will just discontinue his insulin right away he is still on Farxiga 5 mg daily not taken Metformin that did  not sit well with him is on a low sodium diet and he is adhering to that and he is not going to eat any fast foods        Patient's most recent   Lab Results   Component Value Date    A1C 9.9 05/21/2021    is not meeting goal of <7.0 A1c to be done today      PLAN  See Patient Instructions for co-developed, patient-stated behavior change goals.  AVS printed and provided to patient today. See Follow-Up section for recommended follow-up.    Freya Fontenot RN/NALDO Monteiro Diabetes Educator    Time Spent: 30 minutes  Encounter Type: Individual    Any diabetes medication dose changes were made via the CDE Protocol and Collaborative Practice Agreement with the patient's primary care provider. A copy of this encounter was shared with the provider.

## 2021-09-22 NOTE — PATIENT INSTRUCTIONS
Diabetes Support Resources:  1. Toujeo, take 20 units for 3 days, then cut back to 15 units for 3 days then 10 units for 3 days and stop after that.    2. Trulicity 0.75 mg  Weekly    3.Farxiga 5 mg daily    4. Notify me if you are having any more low BG.       Bring blood glucose meter and logbook with you to all doctor and follow-up appointments.    Diabetes Education Telephone Visit Follow-up:    We realize your time is valuable and your health is important! We offer a convenient Telephone Visit follow up! It s a quick way to check in for a medication dose adjustment without having to come back to clinic as soon.    Telephone Visits are often covered by insurance. Please check with your insurance plan to see if this type of visit is covered. If not, the cost is less expensive than an office visit:      Up to 10 minutes (Code 38300): $30    11-20 minutes (Code 49207): $59    More than 20 minutes (Code 80281): $85    Talk with your Diabetes Educator if you want to learn more.      [facility]:  For Your Diabetes Education and Nutrition Appointments Call:  [phone]   For Diabetes Education or Nutrition Related Questions:   Phone: [phone]  Send PayMins Message   If you need a medication refill please contact your pharmacy. Please allow 3 business days for your refills to be completed.

## 2021-09-22 NOTE — RESULT ENCOUNTER NOTE
I have reviewed the schedule of future appointments and this patient has an appointment scheduled for 10-.    Visit date not found

## 2021-09-24 ENCOUNTER — TRANSFERRED RECORDS (OUTPATIENT)
Dept: HEALTH INFORMATION MANAGEMENT | Facility: CLINIC | Age: 56
End: 2021-09-24

## 2021-10-02 DIAGNOSIS — F41.1 GAD (GENERALIZED ANXIETY DISORDER): ICD-10-CM

## 2021-10-04 RX ORDER — ALPRAZOLAM 1 MG
TABLET ORAL
Qty: 60 TABLET | Refills: 1 | Status: SHIPPED | OUTPATIENT
Start: 2021-10-04 | End: 2021-12-13

## 2021-10-06 DIAGNOSIS — E11.9 TYPE 2 DIABETES MELLITUS WITHOUT COMPLICATION, WITHOUT LONG-TERM CURRENT USE OF INSULIN (H): ICD-10-CM

## 2021-10-06 RX ORDER — DAPAGLIFLOZIN 5 MG/1
TABLET, FILM COATED ORAL
Qty: 90 TABLET | Refills: 0 | Status: SHIPPED | OUTPATIENT
Start: 2021-10-06 | End: 2021-10-20

## 2021-10-06 NOTE — TELEPHONE ENCOUNTER
Prescription approved per [facility] Refill Protocol.  Need to keep upcoming appointment for further refills  Next 5 appointments (look out 90 days)    Oct 19, 2021 10:30 AM  SHORT with Eligio Quevedo MD  [facility] ([facility] ) [address] [facility] [id number]  [phone]        Yanelis Singletary RN

## 2021-10-14 ENCOUNTER — PATIENT OUTREACH (OUTPATIENT)
Dept: CARE COORDINATION | Facility: CLINIC | Age: 56
End: 2021-10-14

## 2021-10-14 ASSESSMENT — ACTIVITIES OF DAILY LIVING (ADL): DEPENDENT_IADLS:: INDEPENDENT

## 2021-10-14 NOTE — PROGRESS NOTES
Clinic Care Coordination Contact    Situation: Patient chart reviewed by care coordinator.    Background: Patient is enrolled in Care Coordination. CHW, FRW and [facility] are following.     Assessment: FRW spoke with patient on 9/20. Patient was approved for SNAP and confirmed that he received his EBT card in the mail. He believes his monthly amount will be around $200/month. FRW is no longer following     Plan/Recommendations: CHW will be reaching out to patient in one month. [facility] will review chart in 6 weeks, per standard workflow, unless involvement is requested sooner    ANTONIA Ramos  Primary Care Clinic- Social Work Care Coordinator  [facility]  Ph: [phone]  10/14/2021 2:32 PM

## 2021-10-19 ENCOUNTER — OFFICE VISIT (OUTPATIENT)
Dept: FAMILY MEDICINE | Facility: CLINIC | Age: 56
End: 2021-10-19
Payer: COMMERCIAL

## 2021-10-19 ENCOUNTER — MYC MEDICAL ADVICE (OUTPATIENT)
Dept: FAMILY MEDICINE | Facility: CLINIC | Age: 56
End: 2021-10-19

## 2021-10-19 VITALS
OXYGEN SATURATION: 98 % | SYSTOLIC BLOOD PRESSURE: 99 MMHG | WEIGHT: 259 LBS | DIASTOLIC BLOOD PRESSURE: 58 MMHG | HEART RATE: 73 BPM | HEIGHT: 71 IN | BODY MASS INDEX: 36.26 KG/M2 | TEMPERATURE: 98.1 F | RESPIRATION RATE: 17 BRPM

## 2021-10-19 DIAGNOSIS — Z23 NEED FOR PROPHYLACTIC VACCINATION AND INOCULATION AGAINST INFLUENZA: ICD-10-CM

## 2021-10-19 DIAGNOSIS — E11.9 TYPE 2 DIABETES MELLITUS WITHOUT COMPLICATION, WITHOUT LONG-TERM CURRENT USE OF INSULIN (H): ICD-10-CM

## 2021-10-19 DIAGNOSIS — F41.1 GAD (GENERALIZED ANXIETY DISORDER): ICD-10-CM

## 2021-10-19 DIAGNOSIS — Z12.11 SCREEN FOR COLON CANCER: Primary | ICD-10-CM

## 2021-10-19 PROCEDURE — 90682 RIV4 VACC RECOMBINANT DNA IM: CPT | Performed by: FAMILY MEDICINE

## 2021-10-19 PROCEDURE — 99214 OFFICE O/P EST MOD 30 MIN: CPT | Mod: 25 | Performed by: FAMILY MEDICINE

## 2021-10-19 PROCEDURE — 90471 IMMUNIZATION ADMIN: CPT | Performed by: FAMILY MEDICINE

## 2021-10-19 RX ORDER — ESCITALOPRAM OXALATE 20 MG/1
20 TABLET ORAL EVERY MORNING
Qty: 90 TABLET | Refills: 3 | Status: SHIPPED | OUTPATIENT
Start: 2021-10-19 | End: 2021-11-15

## 2021-10-19 RX ORDER — CARVEDILOL 6.25 MG/1
6.25 TABLET ORAL 2 TIMES DAILY
COMMUNITY
Start: 2021-09-24

## 2021-10-19 ASSESSMENT — PAIN SCALES - GENERAL: PAINLEVEL: MILD PAIN (3)

## 2021-10-19 ASSESSMENT — MIFFLIN-ST. JEOR: SCORE: 2036.95

## 2021-10-19 NOTE — PROGRESS NOTES
[place] diabetes resourses:  [email or web address]        To access diabetes educational materials with in EPIC use <dot>DALE      Put this in AFTER VISIT SUMMARY if needed for the patient to access information online [email or web address]      SUBJECTIVE:  Lan Potts Jr. is a 56 year old male who presents today for a follow up appointment for management of DIABETES MELLITUS.    Patient Active Problem List    Diagnosis Date Noted     JUAN (generalized anxiety disorder) 08/19/2019     Priority: Medium     Morbid obesity (H) 07/12/2018     Priority: Medium     Umbilical hernia without obstruction and without gangrene 07/12/2018     Priority: Medium     Varicose veins of left lower extremity 07/12/2018     Priority: Medium     Tobacco abuse 04/05/2018     Priority: Medium     Erectile dysfunction, unspecified erectile dysfunction type 04/05/2018     Priority: Medium     10 year risk of MI or stroke 7.5% or greater, 12.6% in April 2017 04/06/2017     Priority: Medium     Type 2 diabetes mellitus without complication, without long-term current use of insulin (H) 01/05/2017     Priority: Medium     Migraine without status migrainosus, not intractable, unspecified migraine type 01/21/2016     Priority: Medium     High triglycerides 12/11/2014     Priority: Medium     HDL deficiency 12/11/2014     Priority: Medium     Hyperlipidemia with target LDL less than 100 02/17/2014     Priority: Medium     Diagnosis updated by automated process. Provider to review and confirm.       Chronic back pain      Priority: Medium     Seasonal allergies      Priority: Medium          The patient checks his blood sugar as follows: He has a CGM.    The patient reports that he IS taking the medication as prescribed. He reports side effects of medication.  These side effects include: lightheadedness when standing. He has worked with his cardiologist  regarding the blood pressure and he was told that they want his blood pressure low.      He has been off the insulin for a month(s) and is taking the Trulicity.       ----------------------------------------------------------------------------------------------------------------------------------------    BP Readings from Last 3 Encounters:   10/19/21 99/58   07/21/21 96/66   06/04/21 94/60     The patient reports that he IS NOT currently smoking.  History   Smoking Status     Former Smoker     Types: Cigarettes   Smokeless Tobacco     Never Used     Comment: thinking about quitting           The 10-year ASCVD risk score (Maribel ADRIANO Jr., et al., 2013) is: 11.2%    Values used to calculate the score:      Age: 56 years      Sex: Male      Is Non- : No      Diabetic: Yes      Tobacco smoker: No      Systolic Blood Pressure: 99 mmHg      Is BP treated: Yes      HDL Cholesterol: 33 mg/dL      Total Cholesterol: 213 mg/dL        Current Outpatient Medications   Medication Sig Dispense Refill     ACE/ARB/ARNI NOT PRESCRIBED, INTENTIONAL, Please choose reason not prescribed, below       alcohol swab prep pads Use to swab area of injection/laura as directed 100 each 3     ALPRAZolam (XANAX) 1 MG tablet TAKE 1 TABLET BY MOUTH THREE TIMES A DAY AS NEEDED 60 tablet 1     aspirin (ASA) 81 MG tablet Take 1 tablet (81 mg) by mouth daily       blood glucose (ACCU-CHEK FASTCLIX) lancing device Lancing device to be used with lancets. 1 each 0     blood glucose (ACCU-CHEK SMARTVIEW) test strip 1 strip by In Vitro route 3 times daily 3 Box 3     blood glucose calibration (ACCU-CHEK SMARTVIEW CONTROL) solution 1 drop by In Vitro route continuous prn (as needed) 1 each 3     blood glucose calibration (NO BRAND SPECIFIED) solution Use to calibrate blood glucose monitor as needed as directed. To accompany: Blood Glucose Monitor Brands: per insurance. 1 each 0     blood glucose monitoring (ACCU-CHEK FASTCLIX) lancets  Use to test blood sugar 3 times daily. 102 each 6     blood glucose monitoring (ACCU-CHEK FASTCLIX) lancets 1 each 3 times daily 3 Box 3     bumetanide (BUMEX) 1 MG tablet Take 1 mg by mouth daily       carvedilol (COREG) 6.25 MG tablet Take 6.25 mg by mouth 2 times daily       clopidogrel (PLAVIX) 75 MG tablet Take 75 mg by mouth       Continuous Blood Gluc Sensor (DEXCOM G6 SENSOR) MISC 1 Box continuous To change every 10 days. 9 each 11     Continuous Blood Gluc Transmit (DEXCOM G6 TRANSMITTER) MISC 1 Device continuous To change out every 3 months 1 each 3     dulaglutide (TRULICITY) 0.75 MG/0.5ML pen Inject 0.75 mg Subcutaneous every 7 days 2 mL 0     eletriptan (RELPAX) 40 MG tablet Take 1 tablet (40 mg) by mouth at onset of headache May repeat in 2 hours as needed but no more than 2 pills in 24 hours. 12 tablet 5     escitalopram (LEXAPRO) 10 MG tablet TAKE 1 TABLET BY MOUTH EVERY MORNING 90 tablet 0     ezetimibe (ZETIA) 10 MG tablet TAKE 1 TABLET BY MOUTH EVERY DAY 90 tablet 0     FARXIGA 5 MG TABS tablet TAKE 1 TABLET BY MOUTH EVERY DAY 90 tablet 0     insulin pen needle (BD RAKEL U/F) 32G X 4 MM miscellaneous Use 1 daily as directed. 100 each 11     metoprolol succinate ER (TOPROL-XL) 25 MG 24 hr tablet Take 12.5 mg by mouth 2 times daily       nitroGLYcerin (NITROSTAT) 0.4 MG sublingual tablet Place 0.4 mg under the tongue       RABEprazole (ACIPHEX) 20 MG EC tablet Take 1 tablet (20 mg) by mouth daily 90 tablet 1     rosuvastatin (CRESTOR) 40 MG tablet Take 40 mg by mouth       sacubitril-valsartan (ENTRESTO) 24-26 MG per tablet 1/2 tablet twice a day(s)       amitriptyline (ELAVIL) 10 MG tablet TAKE 2 TABLETS (20 MG) BY MOUTH AT BEDTIME (Patient not taking: Reported on 10/19/2021) 180 tablet 0     escitalopram (LEXAPRO) 20 MG tablet Take 1 tablet (20 mg) by mouth every morning (Patient not taking: Reported on 10/19/2021) 90 tablet 1     fexofenadine (ALLEGRA) 180 MG tablet Take 180 mg by mouth daily  (Patient not taking: Reported on 10/19/2021)       fluticasone (FLONASE) 50 MCG/ACT nasal spray INSTILL 2 SPRAYS INTO BOTH NOSTRILS DAILY (Patient not taking: Reported on 10/19/2021) 48 mL 2     insulin glargine U-300 (TOUJEO) 300 UNIT/ML (1 units dial) pen Inject 30 Units Subcutaneous every morning Take 22 units daily + use 2 unit prime with each dose for a total of 24 units/day, TDD of 40 units, titration per MD orders. (Patient not taking: Reported on 10/19/2021) 4.5 mL 3     sildenafil (REVATIO) 20 MG tablet Take 1 to 5 tabs 30-60 minutes before intercourse.  Never use with nitroglycerin, terazosin or doxazosin. (Patient not taking: Reported on 10/19/2021) 30 tablet 11       The patient reports that he IS taking a statin.   (Reminder all diabetics with a ASCVD risk greater or equal to 7.5% should be on a high intensity statin, otherwise on a moderate intensity statin)      The patient reports that he IS taking 81mg of  aspirin daily.  (Reminder all diabetic patients with a cardiovascular risk factor and > 50 should take a daily aspirin)     The patient reports that he IS doing a self foot exam daily.  The patient reports that he does exercise in the form of walking 7 times a week.  The patient reports that he IS following the recommended diabetic diet. The patient reports that his last eye exam was 2 months ago.     The last time he had an appointment with a diabetic educator was  1 months ago.    Immunization History   Administered Date(s) Administered     COVID-19,PF,Pfizer 03/26/2021, 04/16/2021     HepB 04/06/2017     HepB, Unspecified 04/06/2017     HepB-Adult 04/05/2018, 05/02/2019     Influenza (IIV3) PF 12/21/2012, 09/27/2013     Influenza Quad, Recombinant, pf(RIV4) (Flublok) 09/26/2018, 09/05/2019     Influenza Vaccine IM > 6 months Valent IIV4 (Alfuria,Fluzone) 10/26/2017     Pneumococcal 23 valent 09/04/2014     TD (ADULT, 7+) 07/21/2021     TDAP Vaccine (Adacel) 09/20/2011     Zoster vaccine  "recombinant adjuvanted (SHINGRIX) 07/12/2018, 09/26/2018     The patient reports that he has had the hepatitis B vaccine series in the past. (recommended for age 19-59 and can be given to age 60 or older)  The patient reports that he has had a pnuemovax in the past.  The patient reports that he has not had a flu shot for the current influenza season.  The patient would like to have a Influenza        EXAM:  BP 99/58   Pulse 73   Temp 98.1  F (36.7  C) (Oral)   Resp 17   Ht 5' 11\" (1.803 m)   Wt 259 lb (117.5 kg)   SpO2 98%   BMI 36.12 kg/m    Wt Readings from Last 4 Encounters:   10/19/21 259 lb (117.5 kg)   07/21/21 255 lb (115.7 kg)   06/04/21 270 lb (122.5 kg)   04/14/21 269 lb (122 kg)     Body mass index is 36.12 kg/m .    General:  Lan is awake, alert, and cooperative.  NAD.    GENERAL APPEARANCE: healthy, alert and no distress    Diabetic Foot Screen:  Any complaints of increased pain or numbness ? No  Is there a foot ulcer now or a history of foot ulcer? No  Does the foot have an abnormal shape? No  Are the nails thick, too long or ingrown? No  Are there any redness or open areas? No         Sensation Testing done at all points on the diagram with monofilament     Right Foot: Sensation Normal at all points  Left Foot: Sensation Normal at all points     Risk Category: 0- No loss of protective sensation  Performed by Eligio Quevedo MD        Diabetic foot exam: normal DP and PT pulses, no trophic changes or ulcerative lesions and normal sensory exam            Previsit labs drawn include:  [facility]/Nurse Visit on 09/22/2021   Component Date Value Ref Range Status     Hemoglobin A1C 09/22/2021 7.2* 0.0 - 5.6 % Final    Normal <5.7%   Prediabetes 5.7-6.4%    Diabetes 6.5% or higher     Note: Adopted from ADA consensus guidelines.         ASSESSMENT and PLAN:    From his last diabetes education visit:  ASSESSMENT:  Lan is here for follow-up to diabetes he has been having some lows he was at 30 " "units of Toujeo I will be decreasing his insulin and eventually discontinuing in its place he is willing to start Trulicity at 0.75 mg for 4 weeks we will follow-up with him for evaluation he is using the Dexcom G6 he is to call if he has any lows in the meantime so that we will just discontinue his insulin right away he is still on Farxiga 5 mg daily not taken Metformin that did not sit well with him is on a low sodium diet and he is adhering to that and he is not going to eat any fast foods      Type II Diabetes, Unchanged  Markedly improving.    DIABETES Type II:                       Lab Results   Component Value Date    A1C 7.2 09/22/2021    A1C 9.9 05/21/2021       Control   good  Lab Results   Component Value Date    A1C 7.2 09/22/2021    A1C 9.9 05/21/2021    A1C 10.4 04/14/2021    A1C 6.9 11/14/2019       Albumin Urine mg/g Cr   Date Value Ref Range Status   04/14/2021 18.01 (H) 0 - 17 mg/g Cr Final         Recommended to take ASA  mg daily for appropriate patient, Compliance with the recommended diabetic diet was stressed, Regular aerobic exercise was encouraged, Home glucose monitoring encouraged, Annual eye exam recommended, Self foot exam demonstrated and recommended to be done nightly, Apply moisturizer to feet with in 3 minutes of showering or bathing, Follow up in clinic in 3 months for a diabetes check and Future labs ordered and the patient was instructed to make a lab appt 1 week prior to next diabetes visit      BP/HTN:   BP Readings from Last 3 Encounters:   10/19/21 99/58   07/21/21 96/66   06/04/21 94/60     Control   good he is purposefully or hypotensive per the cardiology plan.  I recommended that when he stand up from laying that he first sit on the edge of the bed and then slowly stand.    - Medication:continue the current doses of medication.  (make sure to order \"Ace not prescribed intentionally if not on an ACE/ARB)  The patient was advised to do the following therapuetic life " style changes  - Dietary sodium restriction and increase potassium and Calcium intake  - Regular aerobic exercise  - Weight loss  - Discontinue smoking if applicable  - Avoid regular NSAID use if applicable  - Avoid regular decongestant use if applicable  - Follow up in clinic in 3 months for a recheck  - Check a basic metabolic panel today if needed    Patient Education: Reviewed risks of hypertension and principles of   Treatment.    HYPERCHOLESTEROLEMIA:     The 10-year ASCVD risk score (Maribel OH Jr., et al., 2013) is: 11.2%    Values used to calculate the score:      Age: 56 years      Sex: Male      Is Non- : No      Diabetic: Yes      Tobacco smoker: No      Systolic Blood Pressure: 99 mmHg      Is BP treated: Yes      HDL Cholesterol: 33 mg/dL      Total Cholesterol: 213 mg/dL    Lab Results   Component Value Date     05/21/2021      Control   unable to assess  Cholesterol   Date Value Ref Range Status   05/21/2021 213 (H) 100 - 199 mg/dL Final   04/14/2021 280 (H) <200 mg/dL Final     Comment:     Desirable:       <200 mg/dl     HDL Cholesterol   Date Value Ref Range Status   05/21/2021 33 (L) >40 mg/dL Final   04/14/2021 31 (L) >39 mg/dL Final     LDL Cholesterol Calculated   Date Value Ref Range Status   05/21/2021 149 (H) <=130 mg/dL Final   04/14/2021 198 (H) <100 mg/dL Final     Comment:     Above desirable:  100-129 mg/dl  Borderline High:  130-159 mg/dL  High:             160-189 mg/dL  Very high:       >189 mg/dl       Triglycerides   Date Value Ref Range Status   05/21/2021 155 (H) <150 mg/dL Final   04/14/2021 256 (H) <150 mg/dL Final     Comment:     Borderline high:  150-199 mg/dl  High:             200-499 mg/dl  Very high:       >499 mg/dl  Fasting specimen       Cholesterol/HDL Ratio   Date Value Ref Range Status   09/03/2015 7.7 (H) 0.0 - 5.0 Final   12/11/2014 5.8 (H) 0.0 - 5.0 Final         The patient is on a high intensity statin and this is the appropriate  treatment based on the ASCVD risk.    (If LDL>69 on maximum dose statin and patient has CAD/ASCVD add additional LDL lowering therapy)    The patient is not  fasting today and he  will recheck the fasting lipid panel at a future laboratory appointment.

## 2021-10-19 NOTE — PATIENT INSTRUCTIONS
Follow up appointment(s) in 3-4 weeks regarding anxiety and lexapro dose increase    Follow up in 3 month(s) for diabetes recheck with labs one week before

## 2021-10-20 ENCOUNTER — ALLIED HEALTH/NURSE VISIT (OUTPATIENT)
Dept: EDUCATION SERVICES | Facility: CLINIC | Age: 56
End: 2021-10-20
Payer: COMMERCIAL

## 2021-10-20 DIAGNOSIS — E11.9 DIABETES MELLITUS WITHOUT COMPLICATION (H): Primary | ICD-10-CM

## 2021-10-20 DIAGNOSIS — E11.9 TYPE 2 DIABETES MELLITUS WITHOUT COMPLICATION, WITHOUT LONG-TERM CURRENT USE OF INSULIN (H): ICD-10-CM

## 2021-10-20 PROCEDURE — 99207 PR DROP WITH A PROCEDURE: CPT

## 2021-10-20 PROCEDURE — G0108 DIAB MANAGE TRN  PER INDIV: HCPCS

## 2021-10-20 RX ORDER — DAPAGLIFLOZIN 5 MG/1
5 TABLET, FILM COATED ORAL DAILY
Qty: 30 TABLET | Refills: 3 | Status: SHIPPED | OUTPATIENT
Start: 2021-10-20 | End: 2022-01-11

## 2021-10-20 RX ORDER — DAPAGLIFLOZIN 5 MG/1
5 TABLET, FILM COATED ORAL DAILY
Qty: 30 TABLET | Refills: 3 | Status: SHIPPED | OUTPATIENT
Start: 2021-10-20 | End: 2021-10-20

## 2021-10-20 NOTE — PROGRESS NOTES
"Diabetes Self-Management Education & Support    Presents for: Follow-up    SUBJECTIVE/OBJECTIVE:  Presents for: Follow-up  Accompanied by: Self  Diabetes education in the past 24mo: Yes  Focus of Visit: Monitoring, Taking Medication  Diabetes type: Type 2  Disease course: Improving  Transportation concerns: No  Difficulty affording diabetes medication?: No  Difficulty affording diabetes testing supplies?: No  Other concerns:: None  Cultural Influences/Ethnic Background:  American      Diabetes Symptoms & Complications:  Fatigue: Yes  Neuropathy: No  Polydipsia: Sometimes  Polyphagia: Sometimes  Polyuria: Sometimes  Visual change: No  Slow healing wounds: No  Symptom course: Improving  Weight trend: Stable  Autonomic neuropathy: No  CVA: No  Heart disease: Yes  Nephropathy: No  Peripheral neuropathy: Yes  Peripheral Vascular Disease: No  Retinopathy: No  Sexual dysfunction: No    Patient Problem List and Family Medical History reviewed for relevant medical history, current medical status, and diabetes risk factors.    Vitals:  There were no vitals taken for this visit.  Estimated body mass index is 36.12 kg/m  as calculated from the following:    Height as of 10/19/21: 1.803 m (5' 11\").    Weight as of 10/19/21: 117.5 kg (259 lb).   Last 3 BP:   BP Readings from Last 3 Encounters:   10/19/21 99/58   07/21/21 96/66   06/04/21 94/60       History   Smoking Status     Former Smoker     Types: Cigarettes   Smokeless Tobacco     Never Used     Comment: thinking about quitting       Labs:  Lab Results   Component Value Date    A1C 7.2 09/22/2021    A1C 9.9 05/21/2021     Lab Results   Component Value Date     04/14/2021     Lab Results   Component Value Date     05/21/2021     HDL Cholesterol   Date Value Ref Range Status   05/21/2021 33 (L) >40 mg/dL Final   ]  GFR Estimate   Date Value Ref Range Status   04/14/2021 >90 >60 mL/min/[1.73_m2] Final     Comment:     Non  GFR Calc  Starting " 12/18/2018, serum creatinine based estimated GFR (eGFR) will be   calculated using the Chronic Kidney Disease Epidemiology Collaboration   (CKD-EPI) equation.       GFR Estimate If Black   Date Value Ref Range Status   04/14/2021 >90 >60 mL/min/[1.73_m2] Final     Comment:      GFR Calc  Starting 12/18/2018, serum creatinine based estimated GFR (eGFR) will be   calculated using the Chronic Kidney Disease Epidemiology Collaboration   (CKD-EPI) equation.       Lab Results   Component Value Date    CR 0.59 04/14/2021     No results found for: MICROALBUMIN    Healthy Eating:  Healthy Eating Assessed Today: Yes  Cultural/Yazdanism diet restrictions?: No  Meal planning/habits: Avoiding sweets, Low carb, Heart healthy, Low salt  How many times a week on average do you eat food made away from home (restaurant/take-out)?: 0  Meals include: Breakfast, Lunch, Dinner, Afternoon Snack, Evening Snack  Breakfast: avacado toast, or oatmeal, or pancake and eggs  Lunch: sandwich, cheese and turkey and bologna.  Dinner: soup and bagel and PB  Snacks: smoothies  Beverages: Water, Coffee, Milk, Other    Being Active:  Being Active Assessed Today: Yes  Exercise:: Yes  Days per week of moderate to strenuous exercise (like a brisk walk): 7  On average, minutes per day of exercise at this level: 150 or greater  How intense was your typical exercise? : Moderate (like brisk walking)  Exercise Minutes per Week: 1050  Barrier to exercise: Physical limitation    Monitoring:  Monitoring Assessed Today: Yes  Did patient bring glucose meter to appointment? : Yes  Blood Glucose Meter: CGM  Times checking blood sugar at home (number): 5+  Times checking blood sugar at home (per): Day  Blood glucose trend: Decreasing                              Taking Medications:  Diabetes Medication(s)     Sodium-Glucose Co-Transporter 2 (SGLT2) Inhibitors       dapagliflozin (FARXIGA) 5 MG TABS tablet    Take 1 tablet (5 mg) by mouth daily     Incretin Mimetic Agents (GLP-1 Receptor Agonists)       dulaglutide (TRULICITY) 0.75 MG/0.5ML pen    Inject 0.75 mg Subcutaneous every 7 days          Taking Medication Assessed Today: Yes  Current Treatments: Non-insulin Injectables  Dose schedule: Other  Given by: Patient  Injection/Infusion sites: Abdomen  Problems taking diabetes medications regularly?: No  Diabetes medication side effects?: No    Problem Solving:  Problem Solving Assessed Today: No  Is the patient at risk for hypoglycemia?: No  Hypoglycemia Frequency: Never              Reducing Risks:  CAD Risks: Diabetes Mellitus, Dyslipidemia, Stress  Has dilated eye exam at least once a year?: Yes  Sees dentist every 6 months?: No  Feet checked by healthcare provider in the last year?: Yes    Healthy Coping:  Emotional response to diabetes: Ready to learn  Informal Support system:: Family, Neighbors  Stage of change: ACTION (Actively working towards change)  Patient Activation Measure Survey Score:  JACQUELINE Score (Last Two) 12/21/2012   JACQUELINE Raw Score 49   Activation Score 82.8   JACQUELINE Level 4       Diabetes knowledge and skills assessment:   Patient is knowledgeable in diabetes management concepts related to: Healthy Eating, Being Active, Monitoring, Taking Medication and Problem Solving    Patient needs further education on the following diabetes management concepts: Taking Medication    Based on learning assessment above, most appropriate setting for further diabetes education would be: Individual setting.      INTERVENTIONS:    Education provided today on:  AADE Self-Care Behaviors:  Diabetes Pathophysiology  Taking Medication: action of prescribed medication, drawing up, administering and storing injectable diabetes medications, proper site selection and rotation for injections, side effects of prescribed medications and when to take medications  Healthy Coping: recognize feelings about diagnosis, benefits of making appropriate lifestyle changes, utilize  support systems, methods for coping with stress and when to seek professional counseling    Opportunities for ongoing education and support in diabetes-self management were discussed.    Pt verbalized understanding of concepts discussed and recommendations provided today.       Education Materials Provided:  No new materials provided today      ASSESSMENT:  Lan comes today very pleased with his blood sugars he is off insulin on Trulicity only and Farxiga daily blood sugars are perfect he has made many many lifestyle changes and is doing extremely well he does suffer from  PTSD, he was in the [employer] and suffers from that plus the fact that he had this major heart attack and condition is hoping at some point if he can get a medical dog trained to alert him when he is trying to sleep I am sure there is programs out there and I will try and see if I can find 1 otherwise Lan does not need to follow-up for about 3 months and follow-up with Dr. Valles and he is doing a wonderful job        Patient's most recent   Lab Results   Component Value Date    A1C 7.2 09/22/2021    A1C 9.9 05/21/2021    is not meeting goal of <7.0    PLAN  See Patient Instructions for co-developed, patient-stated behavior change goals.  AVS printed and provided to patient today. See Follow-Up section for recommended follow-up.    Freya Fontenot RN/NALDO  [facility] Educator    Time Spent: 60 minutes  Encounter Type: Individual    Any diabetes medication dose changes were made via the CDE Protocol and Collaborative Practice Agreement with the patient's primary care provider. A copy of this encounter was shared with the provider.

## 2021-10-20 NOTE — PATIENT INSTRUCTIONS
Diabetes Support Resources:  1. Trulicity 0.75 mg weekly    2. Farxiga 5 mg daily    3. Let me know if you have any issues .     4. I will check out about health dog     Bring blood glucose meter and logbook with you to all doctor and follow-up appointments.    Diabetes Education Telephone Visit Follow-up:    We realize your time is valuable and your health is important! We offer a convenient Telephone Visit follow up! It s a quick way to check in for a medication dose adjustment without having to come back to clinic as soon.    Telephone Visits are often covered by insurance. Please check with your insurance plan to see if this type of visit is covered. If not, the cost is less expensive than an office visit:      Up to 10 minutes (Code 23638): $30    11-20 minutes (Code 95524): $59    More than 20 minutes (Code 98782): $85    Talk with your Diabetes Educator if you want to learn more.      [facility]:  For Your Diabetes Education and Nutrition Appointments Call:  [phone]   For Diabetes Education or Nutrition Related Questions:   Phone: [phone]  Send [employer] Message   If you need a medication refill please contact your pharmacy. Please allow 3 business days for your refills to be completed.

## 2021-10-26 DIAGNOSIS — F51.04 PSYCHOPHYSIOLOGICAL INSOMNIA: ICD-10-CM

## 2021-10-26 DIAGNOSIS — G43.909 MIGRAINE WITHOUT STATUS MIGRAINOSUS, NOT INTRACTABLE, UNSPECIFIED MIGRAINE TYPE: ICD-10-CM

## 2021-10-26 DIAGNOSIS — F41.1 GAD (GENERALIZED ANXIETY DISORDER): ICD-10-CM

## 2021-10-27 RX ORDER — AMITRIPTYLINE HYDROCHLORIDE 10 MG/1
20 TABLET ORAL AT BEDTIME
Qty: 180 TABLET | Refills: 0 | Status: SHIPPED | OUTPATIENT
Start: 2021-10-27 | End: 2021-11-15

## 2021-10-27 NOTE — TELEPHONE ENCOUNTER
Prescription approved per [facility] Refill Protocol.      Candida Alvarez RN  [facility]

## 2021-11-15 ENCOUNTER — OFFICE VISIT (OUTPATIENT)
Dept: FAMILY MEDICINE | Facility: CLINIC | Age: 56
End: 2021-11-15
Payer: COMMERCIAL

## 2021-11-15 VITALS
HEART RATE: 76 BPM | DIASTOLIC BLOOD PRESSURE: 54 MMHG | RESPIRATION RATE: 14 BRPM | SYSTOLIC BLOOD PRESSURE: 86 MMHG | WEIGHT: 257 LBS | BODY MASS INDEX: 35.84 KG/M2 | TEMPERATURE: 97.6 F | OXYGEN SATURATION: 96 %

## 2021-11-15 DIAGNOSIS — Z12.11 SCREEN FOR COLON CANCER: Primary | ICD-10-CM

## 2021-11-15 DIAGNOSIS — F51.04 PSYCHOPHYSIOLOGIC INSOMNIA: ICD-10-CM

## 2021-11-15 DIAGNOSIS — K21.9 GASTROESOPHAGEAL REFLUX DISEASE, UNSPECIFIED WHETHER ESOPHAGITIS PRESENT: ICD-10-CM

## 2021-11-15 DIAGNOSIS — Z23 HIGH PRIORITY FOR 2019-NCOV VACCINE: ICD-10-CM

## 2021-11-15 PROCEDURE — 99213 OFFICE O/P EST LOW 20 MIN: CPT | Performed by: FAMILY MEDICINE

## 2021-11-15 PROCEDURE — 0004A COVID-19,PF,PFIZER (12+ YRS): CPT | Performed by: FAMILY MEDICINE

## 2021-11-15 PROCEDURE — 91300 COVID-19,PF,PFIZER (12+ YRS): CPT | Performed by: FAMILY MEDICINE

## 2021-11-15 RX ORDER — TRAZODONE HYDROCHLORIDE 50 MG/1
TABLET, FILM COATED ORAL
Qty: 30 TABLET | Refills: 1 | Status: SHIPPED | OUTPATIENT
Start: 2021-11-15 | End: 2021-12-08

## 2021-11-15 ASSESSMENT — ANXIETY QUESTIONNAIRES
GAD7 TOTAL SCORE: 2
IF YOU CHECKED OFF ANY PROBLEMS ON THIS QUESTIONNAIRE, HOW DIFFICULT HAVE THESE PROBLEMS MADE IT FOR YOU TO DO YOUR WORK, TAKE CARE OF THINGS AT HOME, OR GET ALONG WITH OTHER PEOPLE: NOT DIFFICULT AT ALL
2. NOT BEING ABLE TO STOP OR CONTROL WORRYING: NOT AT ALL
5. BEING SO RESTLESS THAT IT IS HARD TO SIT STILL: NOT AT ALL
3. WORRYING TOO MUCH ABOUT DIFFERENT THINGS: NOT AT ALL
1. FEELING NERVOUS, ANXIOUS, OR ON EDGE: NOT AT ALL
6. BECOMING EASILY ANNOYED OR IRRITABLE: NOT AT ALL
7. FEELING AFRAID AS IF SOMETHING AWFUL MIGHT HAPPEN: NOT AT ALL

## 2021-11-15 ASSESSMENT — PATIENT HEALTH QUESTIONNAIRE - PHQ9: 5. POOR APPETITE OR OVEREATING: MORE THAN HALF THE DAYS

## 2021-11-15 ASSESSMENT — PAIN SCALES - GENERAL: PAINLEVEL: NO PAIN (0)

## 2021-11-15 NOTE — NURSING NOTE
"Chief Complaint   Patient presents with     Anxiety       Initial BP (!) 86/54   Pulse 76   Temp 97.6  F (36.4  C) (Tympanic)   Resp 14   Wt 116.6 kg (257 lb)   SpO2 96%   BMI 35.84 kg/m   Estimated body mass index is 35.84 kg/m  as calculated from the following:    Height as of 10/19/21: 1.803 m (5' 11\").    Weight as of this encounter: 116.6 kg (257 lb).  Medication Reconciliation: complete  Cait Mejias CMA  "

## 2021-11-15 NOTE — PROGRESS NOTES
"SUBJECTIVE:  Lan Potts Jr. is a 56 year old male who presents for a follow up evaluation of anxiety. The patient had his dose of Lexapro (escitalorpram) raised to 20 mg daily about a month(s) ago.     He reports that things are better.   He is still having difficulty falling asleep.   After going to bed he lays there and he has a lot of thought going through his head.   He has a little fear of having a heart attack in his sleep.     Occasionally duirng the day he gets \"amped up\" and he takes the xanax    He took amitripyline in the past. He had constipation with that .     He took ambien in the past and it was helpful       The patient denies that he has experienced side effects from this medication.      JUAN-7    Over the last 2 weeks, how often have you been bothered by the following problems?  (Use an \"x\" to indicate your answer) Not at all                (0) Several days                (1) More than half the days        (2) Nearly every day          (3)   1. Feeling nervous, anxious or on edge x      2. Not being able to stop or control worrying x      3. Worrying too much about different things x      4. Trouble relaxing  x     5. Being so restless that it is hard to sit still  x     6. Becoming easily annoyed or irritable x      7. Feeling afraid as if something awful might happen x        Total_______    Cut points for:   Mild Anxiety =  5  Moderate= 10  Severe=  15    JUAN-7 SCORE 4/5/2018 5/3/2019 6/19/2020   Total Score 0 0 4           Last PHQ-9 score on record= 3    The patient reports that he has not attended mental health counseling for the current anxiety disorder.      OBJECTIVE:  General: the patient had a calm and pleasant affect during the visit today.    ASSESSMENT: Generalized Anxiety Disorder (JUAN) which has improved.         PLAN:  We will continue the patient on the same dose of his antidepressant and have the patient return to clinic for appointment in 1 month(s). He was instructed to " return earlier if the anxiety symptoms return.    Due to the patient's significant issue with insomnia at this point a prescription for 25-50 mg of trazadone  was written for the patient.    The patient was recommended to seek individual counseling through his insurance company as an adjunct treatment to the prescribed medications.  He declined a this point

## 2021-11-16 ENCOUNTER — PATIENT OUTREACH (OUTPATIENT)
Dept: NURSING | Facility: CLINIC | Age: 56
End: 2021-11-16
Payer: COMMERCIAL

## 2021-11-16 ENCOUNTER — PATIENT OUTREACH (OUTPATIENT)
Dept: CARE COORDINATION | Facility: CLINIC | Age: 56
End: 2021-11-16
Payer: COMMERCIAL

## 2021-11-16 RX ORDER — RABEPRAZOLE SODIUM 20 MG/1
TABLET, DELAYED RELEASE ORAL
Qty: 90 TABLET | Refills: 1 | OUTPATIENT
Start: 2021-11-16

## 2021-11-16 ASSESSMENT — ANXIETY QUESTIONNAIRES: GAD7 TOTAL SCORE: 2

## 2021-11-16 ASSESSMENT — PATIENT HEALTH QUESTIONNAIRE - PHQ9: SUM OF ALL RESPONSES TO PHQ QUESTIONS 1-9: 3

## 2021-11-16 NOTE — PROGRESS NOTES
Clinic Care Coordination Contact    Community Health Worker Follow Up    Care Gaps:     Health Maintenance Due   Topic Date Due     URINE DRUG SCREEN  Never done     COLORECTAL CANCER SCREENING  Never done     PREVENTIVE CARE VISIT  02/06/2014     LUNG CANCER SCREENING  Never done       CHW will address care gaps at next outreach.    Goals:     Patient said he was doing good. He was approved for MA and snap benefits no issues. CHW asked did he have any other questions or concerns at this time? Hr said he is having trouble finding a dentist office that accepts Medical assistance.     CHW advised patient to call the number on the back of insurance card and they should be able to give you a list of in network dentis offices . He said he will do that.       Intervention and Education during outreach: CHW advised patient to call clinic with non-emergent concerns.    CHW Plan: CHW will call patient in 1 month.    UDAY French  Clinic Care Coordination  [facility] Clinics: [place], [place] & [place]  Phone: [phone]

## 2021-11-16 NOTE — PROGRESS NOTES
Clinic Care Coordination Contact  [facility]/Voicemail       Clinical Data: CHW Outreach  Outreach attempted x 1. Left message on patient's voicemail with call back information and requested return call.    Plan: CHW will try to reach patient again in 5-7 business days.    Meg KRAUS, CHW  Clinic Care Coordination  [facility] Clinics: [place], [place] & [place]  Phone: [phone]    Notes:  - How are you?  - Snap and EBT no issues?  - Any new questions or concerns?  - Due for a physical

## 2021-11-24 ENCOUNTER — TELEPHONE (OUTPATIENT)
Dept: FAMILY MEDICINE | Facility: CLINIC | Age: 56
End: 2021-11-24
Payer: COMMERCIAL

## 2021-11-24 LAB — COLOGUARD-ABSTRACT: POSITIVE

## 2021-11-24 NOTE — TELEPHONE ENCOUNTER
Prior Authorization Retail Medication Request    Medication/Dose: Dexcom G6 sensor and Dexcom G6 transmitter  ICD code (if different than what is on RX):  Type 2 diabetes mellitus without complication, without long-term current use of insulin (H) [E11.9]  Previously Tried and Failed:    Rationale:      Insurance Name:  [employer]  Insurance ID:  [id number]      Pharmacy Information (if different than what is on RX)  Name:  [facility]  Phone:  [phone]    Message faxed from [employer] [facility] that a PA is needed for Dexcom G6 sensor and Dexcom G6 transmitter-looks like this would be effective 1/1/22.

## 2021-11-24 NOTE — TELEPHONE ENCOUNTER
Central Prior Authorization Team   Phone: [phone]    PA Initiation    Medication: Dexcom G6 sensor and Dexcom G6 transmitter  Insurance Company: [employer] ([facility]) - Phone [phone] Fax [phone]  Pharmacy Filling the Rx: [facility] - [place], [place] - [address]  Filling Pharmacy Phone: [phone]  Filling Pharmacy Fax:    Start Date: 11/24/2021

## 2021-11-24 NOTE — TELEPHONE ENCOUNTER
Prior Authorization Not Needed per Insurance    PA's for 2022 will have to be done closer to the end of the year and possibly not until 01/01/2022.     Medication: Dexcom G6 sensor and Dexcom G6 transmitter  Insurance Company: [employer] ([facility]) - Phone [phone] Fax [phone]  Expected CoPay:      [facility] [address]  Pharmacy Notified: Yes  Patient Notified: Yes

## 2021-11-29 ENCOUNTER — PATIENT OUTREACH (OUTPATIENT)
Dept: CARE COORDINATION | Facility: CLINIC | Age: 56
End: 2021-11-29
Payer: COMMERCIAL

## 2021-11-29 NOTE — PROGRESS NOTES
Clinic Care Coordination Contact    Situation: Patient chart reviewed by care coordinator.    Background: Patient is enrolled in Care Coordination. CHW and SWCC are following    Assessment: CHW spoke with patient on 11/16. Patient stated that things are going well. He is wanting to see a dentist. CHW advised patient to contact his insurance to ask for in network dental providers. SWCC created a goal surrounding this.     Plan/Recommendations: CHW will contact patient in one month from most recent outreach. SWCC will review chart in 6 weeks, per standard workflow, unless involvement is requested sooner    ANTONIA Ramos  Primary Care Clinic- Social Work Care Coordinator  [facility] [place]  Ph: [phone]  11/29/2021 2:50 PM

## 2021-12-07 DIAGNOSIS — F51.04 PSYCHOPHYSIOLOGIC INSOMNIA: ICD-10-CM

## 2021-12-07 DIAGNOSIS — R19.5 POSITIVE COLORECTAL CANCER SCREENING USING COLOGUARD TEST: Primary | ICD-10-CM

## 2021-12-08 RX ORDER — TRAZODONE HYDROCHLORIDE 50 MG/1
TABLET, FILM COATED ORAL
Qty: 90 TABLET | Refills: 0 | Status: SHIPPED | OUTPATIENT
Start: 2021-12-08 | End: 2022-01-17

## 2021-12-09 DIAGNOSIS — K21.9 GASTROESOPHAGEAL REFLUX DISEASE, UNSPECIFIED WHETHER ESOPHAGITIS PRESENT: ICD-10-CM

## 2021-12-09 RX ORDER — RABEPRAZOLE SODIUM 20 MG/1
TABLET, DELAYED RELEASE ORAL
Qty: 90 TABLET | Refills: 1 | Status: SHIPPED | OUTPATIENT
Start: 2021-12-09 | End: 2023-01-19

## 2021-12-09 NOTE — TELEPHONE ENCOUNTER
"Requested Prescriptions   Pending Prescriptions Disp Refills     RABEprazole (ACIPHEX) 20 MG EC tablet [Pharmacy Med Name: RABEPRAZOLE SOD DR 20 MG TAB] 90 tablet 1     Sig: TAKE 1 TABLET BY MOUTH EVERY DAY       PPI Protocol Failed - 12/9/2021  4:35 PM        Failed - Not on Clopidogrel (unless Pantoprazole ordered)        Passed - No diagnosis of osteoporosis on record        Passed - Recent (12 mo) or future (30 days) visit within the authorizing provider's specialty     Patient has had an office visit with the authorizing provider or a provider within the authorizing providers department within the previous 12 mos or has a future within next 30 days. See \"Patient Info\" tab in inbasket, or \"Choose Columns\" in Meds & Orders section of the refill encounter.              Passed - Medication is active on med list        Passed - Patient is age 18 or older             Allie EDUARDO, RN    "

## 2021-12-11 DIAGNOSIS — F41.1 GAD (GENERALIZED ANXIETY DISORDER): ICD-10-CM

## 2021-12-13 RX ORDER — ALPRAZOLAM 1 MG
TABLET ORAL
Qty: 60 TABLET | Refills: 1 | Status: SHIPPED | OUTPATIENT
Start: 2021-12-13 | End: 2022-07-18

## 2021-12-13 NOTE — TELEPHONE ENCOUNTER
Routing refill request to provider for review/approval because:  Drug not on the [facility] refill protocol.      Tana León RN, BSN  [facility]

## 2021-12-16 ENCOUNTER — PATIENT OUTREACH (OUTPATIENT)
Dept: CARE COORDINATION | Facility: CLINIC | Age: 56
End: 2021-12-16
Payer: COMMERCIAL

## 2021-12-16 NOTE — PROGRESS NOTES
Clinic Care Coordination Contact  [facility]/Voicemail     Clinical Data: CHW Outreach  Outreach attempted x 1. Left message on patient's voicemail with call back information and requested return call.    Plan: CHW will try to reach patient again in 5-7  business days.    Meg KRAUS, CHW  Clinic Care Coordination  [facility] Clinics: [place], [place] & [place]  Phone: [phone]    Notes:  - How are you doing?  - Did you find a dental clinic?  - schedule a appointment?  - Any new questions or concerns?  - Due for a physical    Website for dental clinics  - [email or web address]

## 2021-12-21 ENCOUNTER — VIRTUAL VISIT (OUTPATIENT)
Dept: FAMILY MEDICINE | Facility: CLINIC | Age: 56
End: 2021-12-21
Payer: COMMERCIAL

## 2021-12-21 DIAGNOSIS — F51.04 PSYCHOPHYSIOLOGIC INSOMNIA: ICD-10-CM

## 2021-12-21 DIAGNOSIS — F41.1 GAD (GENERALIZED ANXIETY DISORDER): ICD-10-CM

## 2021-12-21 PROCEDURE — 99213 OFFICE O/P EST LOW 20 MIN: CPT | Mod: GT | Performed by: FAMILY MEDICINE

## 2021-12-21 NOTE — PROGRESS NOTES
SUBJECTIVE:  Lan Potts Jr. is a 56 year old male who presents for a follow up evaluation of anxiety and insomnia. The patient was started on Trazadone (desyrel) 25-50 mg  at the last visit which was 5 weeks ago. The patient reports that his persistant symptoms of anxiety include Insomnia, disturbed sleep.   The patient reports that his symptoms have improved since starting this medication. The patient reports that he has experienced side effects from this medication.  The patient reports the side effects include: He feels groggy in the morning for about an hour after awakening until he is drink his coffee    He is taking the trazodone 50 mg at bedtime   It helps if he takes the alprazolam along with it.   He is taking both medications most nights    He is alsop trying some relaxation techniques and those help.     He is not worrying about dying in his sleep any longer   He still has a lot of random thoughts and worries going through his head at bed time     It is still  taking about 2 hours to fall asleep    He is still taking 20 mg of lexapro.     He had a cardiology  appointment(s) yesterday and the cardiologist is  worried about PTSD from his heart attack and from the [place].       Amitriptyline caused constipation      JUAN-7 SCORE 5/3/2019 6/19/2020 11/15/2021   Total Score 0 4 2           The patient reports that he has not attended mental health counseling for the current anxiety disorder.      OBJECTIVE:  General: the patient had a calm  affect during the visit today.    ASSESSMENT: Generalized Anxiety Disorder (JUAN)  Psychophysiological insomnia  Possible(sayra) PTSD      PLAN:  We discussed options for treatment which included sleep hygiene and specifically treating the psychophysiological insomnia. .  I recommended the patient get the self-help book entitled secondary to insomnia by Dr. Kenya Gonzalez  I recommended trying to reduce the use of the alprazolam to avoid tolerance and addiction.  After  he finishes the book we will have a follow-up appointment    We will continue the patient on the same dose of his antidepressant.    The patient was recommended to seek individual counseling through his insurance company as an adjunct treatment to the prescribed medications.    He declined a psychiatry consult.

## 2021-12-31 ENCOUNTER — PATIENT OUTREACH (OUTPATIENT)
Dept: NURSING | Facility: CLINIC | Age: 56
End: 2021-12-31
Payer: COMMERCIAL

## 2021-12-31 NOTE — PROGRESS NOTES
Clinic Care Coordination Contact    Community Health Worker Follow Up    Care Gaps:     Health Maintenance Due   Topic Date Due     URINE DRUG SCREEN  Never done     PREVENTIVE CARE VISIT  02/06/2014     LUNG CANCER SCREENING  Never done       Care gaps were not discussed at outreach.    Goals:   Goals Addressed as of 12/31/2021 at 3:32 PM                    Today       Psychosocial (pt-stated)   100%    Added 11/29/21 by Yuriy Zarate BSW      Goal Statement: I need to find a dentist  Date Goal set: 11/16/2021  Barriers: None  Strengths: Patient is a great self advocate; Patient is enrolled in Care Coordination  Date to Achieve By: 1/2022  Patient expressed understanding of goal: yes  Action steps to achieve this goal:  1. I will contact my insurance to get in network dentists  2. I will make an appointment with a dentist of my choice  3. I will visit a dentist            CHW spoke with patient he said she was doing good. He did find a dental office close to his house that accepts MA. He said he has had several appointments since finding the dental clinic. Patient said he had to renew his snap benefits and instead of mailing application back he emailed it.     He is not sure if application was received. he hasn't heard anything since he emailed application back. CHW suggested patient give them a call after the holidays and check in with the [facility]. He said will do that.     He said he also suffers from PTSD and was speaking with diabetic educator about this and she was going to look into seeing if he could get a emotional support dog so if he is shaking thru the night the dog will wake him up. CHW advised CHW could ask [facility] if she has any resources for this. He said if she does can I send the nformation via [employer].    Intervention and Education during outreach: CHW advised patient to call the [facility] about snap benefits application.    CHW Plan: CHW will call patient in 1 month.    UDAY French  Clinic Care  Coordination  [facility] Clinics: [place], [place] & [place]  Phone: [phone]

## 2022-01-02 ENCOUNTER — NURSE TRIAGE (OUTPATIENT)
Dept: NURSING | Facility: CLINIC | Age: 57
End: 2022-01-02
Payer: COMMERCIAL

## 2022-01-02 NOTE — TELEPHONE ENCOUNTER
"12/24  Sustained a injury to right side of chest.      Lan was laying on the floor and his nephew was horsing around and fell on top of Lan.Nephew's shoulder hit the right side of his chest.    Current symptoms are:    Bottom two ribs hurt and another rib in middle    Rates pain a moderate (Motrin & ice)    Deep breath \"really hurts\"    Transfer Lan to make an appointment for Monday.     Merry Fuller RN, [facility] Triage Nurse Advisor    Reason for Disposition    [1] Can't take a deep breath BUT [2] no respiratory distress    Additional Information    Negative: Major injury from dangerous force or speed (e.g., MVA, fall > 10 feet or 3 meters)    Negative: Bullet wound, knife wound, or other penetrating object    Negative: Puncture wound that sounds life-threatening to the triager    Negative: Severe difficulty breathing (e.g., struggling for each breath, speaks in single words)    Negative: [1] Major bleeding (e.g., actively dripping or spurting) AND [2] can't be stopped    Negative: Open wound of the chest with sound of moving air (sucking wound) or visible air bubbles    Negative: Shock suspected (e.g., cold/pale/clammy skin, too weak to stand, low BP, rapid pulse)    Negative: Coughing or spitting up blood    Negative: Bluish (or gray) lips or face now    Negative: Unconscious or was unconscious    Negative: Sounds like a life-threatening emergency to the triager    Negative: SEVERE chest pain    Negative: [1] Difficulty breathing AND [2] not severe    Negative: Skin split open or gaping    Negative: [1] Bleeding AND [2] won't stop after 10 minutes of direct pressure (using correct technique)    Negative: Sounds like a serious injury to the triager    Protocols used: CHEST INJURY-A-AH      "

## 2022-01-03 ENCOUNTER — ANCILLARY PROCEDURE (OUTPATIENT)
Dept: GENERAL RADIOLOGY | Facility: CLINIC | Age: 57
End: 2022-01-03
Attending: FAMILY MEDICINE
Payer: COMMERCIAL

## 2022-01-03 ENCOUNTER — OFFICE VISIT (OUTPATIENT)
Dept: FAMILY MEDICINE | Facility: CLINIC | Age: 57
End: 2022-01-03
Payer: COMMERCIAL

## 2022-01-03 VITALS
TEMPERATURE: 95.8 F | HEIGHT: 71 IN | SYSTOLIC BLOOD PRESSURE: 93 MMHG | OXYGEN SATURATION: 98 % | DIASTOLIC BLOOD PRESSURE: 62 MMHG | WEIGHT: 243 LBS | BODY MASS INDEX: 34.02 KG/M2 | HEART RATE: 76 BPM

## 2022-01-03 DIAGNOSIS — I44.2 THIRD DEGREE AV BLOCK (H): ICD-10-CM

## 2022-01-03 DIAGNOSIS — E11.9 TYPE 2 DIABETES MELLITUS WITHOUT COMPLICATION, WITHOUT LONG-TERM CURRENT USE OF INSULIN (H): ICD-10-CM

## 2022-01-03 DIAGNOSIS — R07.81 RIB PAIN ON RIGHT SIDE: Primary | ICD-10-CM

## 2022-01-03 DIAGNOSIS — R57.0 CARDIOGENIC SHOCK (H): ICD-10-CM

## 2022-01-03 DIAGNOSIS — I50.22 CHF (CONGESTIVE HEART FAILURE), NYHA CLASS II, CHRONIC, SYSTOLIC (H): ICD-10-CM

## 2022-01-03 DIAGNOSIS — R07.81 RIB PAIN ON RIGHT SIDE: ICD-10-CM

## 2022-01-03 PROCEDURE — 71101 X-RAY EXAM UNILAT RIBS/CHEST: CPT | Mod: RT | Performed by: RADIOLOGY

## 2022-01-03 PROCEDURE — 99213 OFFICE O/P EST LOW 20 MIN: CPT | Performed by: FAMILY MEDICINE

## 2022-01-03 RX ORDER — OXYCODONE AND ACETAMINOPHEN 5; 325 MG/1; MG/1
1 TABLET ORAL EVERY 6 HOURS PRN
Qty: 30 TABLET | Refills: 0 | Status: SHIPPED | OUTPATIENT
Start: 2022-01-03 | End: 2022-01-06

## 2022-01-03 ASSESSMENT — PAIN SCALES - GENERAL: PAINLEVEL: SEVERE PAIN (6)

## 2022-01-03 ASSESSMENT — MIFFLIN-ST. JEOR: SCORE: 1964.37

## 2022-01-03 NOTE — PROGRESS NOTES
SUBJECTIVE:  Lan Potts Jr. is a 56 year old male who scheduled an appointment to discuss his right sided chest pain   He reports that on [place] his 14 year old nephew came down on his right posterior thorax  He was laying on the ground when his nephew jumped on top of him    It hurts to take a deep breath  Coughing is painful   Sneezing is the worst.     He denies any shortness of breath       He rolls over and wakes up.            Past Medical, social, family histories, medications, and allergies reviewed and updated   ROS: other than that noted above all other review of systems was negative    ROS:       Current Outpatient Medications:      ACE/ARB/ARNI NOT PRESCRIBED, INTENTIONAL,, Please choose reason not prescribed, below, Disp: , Rfl:      ALPRAZolam (XANAX) 1 MG tablet, TAKE 1 TABLET BY MOUTH THREE TIMES A DAY AS NEEDED, Disp: 60 tablet, Rfl: 1     aspirin (ASA) 81 MG tablet, Take 1 tablet (81 mg) by mouth daily, Disp: , Rfl:      bumetanide (BUMEX) 1 MG tablet, Take 1 mg by mouth daily, Disp: , Rfl:      carvedilol (COREG) 6.25 MG tablet, Take 6.25 mg by mouth 2 times daily, Disp: , Rfl:      clopidogrel (PLAVIX) 75 MG tablet, Take 75 mg by mouth, Disp: , Rfl:      Continuous Blood Gluc Sensor (DEXCOM G6 SENSOR) MISC, 1 Box continuous To change every 10 days., Disp: 9 each, Rfl: 11     Continuous Blood Gluc Transmit (DEXCOM G6 TRANSMITTER) MISC, 1 Device continuous To change out every 3 months, Disp: 1 each, Rfl: 3     dapagliflozin (FARXIGA) 5 MG TABS tablet, Take 1 tablet (5 mg) by mouth daily, Disp: 30 tablet, Rfl: 3     dulaglutide (TRULICITY) 0.75 MG/0.5ML pen, Inject 0.75 mg Subcutaneous every 7 days, Disp: 7 mL, Rfl: 3     eletriptan (RELPAX) 40 MG tablet, Take 1 tablet (40 mg) by mouth at onset of headache May repeat in 2 hours as needed but no more than 2 pills in 24 hours., Disp: 12 tablet, Rfl: 5     escitalopram (LEXAPRO) 20 MG tablet, Take 1 tablet (20 mg) by mouth every morning,  "Disp: 90 tablet, Rfl: 1     nitroGLYcerin (NITROSTAT) 0.4 MG sublingual tablet, Place 0.4 mg under the tongue, Disp: , Rfl:      RABEprazole (ACIPHEX) 20 MG EC tablet, TAKE 1 TABLET BY MOUTH EVERY DAY, Disp: 90 tablet, Rfl: 1     rosuvastatin (CRESTOR) 40 MG tablet, Take 40 mg by mouth, Disp: , Rfl:      sacubitril-valsartan (ENTRESTO) 24-26 MG per tablet, 1/2 tablet twice a day(s), Disp: , Rfl:      traZODone (DESYREL) 50 MG tablet, TAKE 0.5 OF A TABLET 30-60 MINUTES BEFORE BEDTIME. INCREASE TO A FULL TABLET AT BEDTIME IF NEEDED, Disp: 90 tablet, Rfl: 0    OBJECTIVE:  BP 93/62   Pulse 76   Temp (!) 95.8  F (35.4  C) (Tympanic)   Ht 1.803 m (5' 11\")   Wt 110.2 kg (243 lb)   SpO2 98%   BMI 33.89 kg/m      EXAM:  GENERAL APPEARANCE: healthy, alert and no distress  EYES: EOMI,  PERRL  HENT: ear canals and TM's normal and nose and mouth without ulcers or lesions  RESP: lungs clear to auscultation - no rales, rhonchi or wheezes no subcutaneous emphysema  CV: regular rates and rhythm, normal S1 S2, no S3 or S4 and no murmur, click or rub -  ABDOMEN:  soft, nontender, no HSM or masses and bowel sounds normal    Results for orders placed or performed in visit on 01/03/22   XR Ribs & Chest Right G/E 3 Views     Status: None    Narrative    XR RIBS & CHEST RT 3VW 1/3/2022 11:23 AM     HISTORY: Rib pain on right side    COMPARISON: None.      Impression    IMPRESSION: No rib fractures or rib lesions. Implantable cardiac  defibrillator with coronary sinus lead. Normal heart size and  pulmonary vascularity. The lungs are clear. No pleural effusions are  evident.    NELLY COLEMAN MD         SYSTEM ID:  [id number]          ASSESSMENT/PLAN:    (R07.81) Rib pain on right side  (primary encounter diagnosis)  Comment: no sign of rib fracture   Plan: XR Ribs & Chest Right G/E 3 Views,         oxyCODONE-acetaminophen (PERCOCET) 5-325 MG         tablet        We discussed the possible side effect of sedation and respiratory " suppression when taking oxycodone and alprazolam.  I advised a 6-hour window on either side of taking the oxycodone and the alprazolam    He should take the oxycodone as needed during the day the pain is manageable and at bedtime to aid in sleeping                  (I50.22) CHF (congestive heart failure), NYHA class II, chronic, systolic (H)  Comment:   Plan: continue present management with cardiology     (R57.0) Cardiogenic shock (H)  Comment:   Plan: continue present management with cardiology     (I44.2) Third degree AV block (H)  Comment:   Plan: continue present management with cardiology     (E11.9) Type 2 diabetes mellitus without complication, without long-term current use of insulin (H)  Comment:   Plan: Patient has follow-up appointment in 3 weeks and he should get previsit labs

## 2022-01-11 ENCOUNTER — ALLIED HEALTH/NURSE VISIT (OUTPATIENT)
Dept: EDUCATION SERVICES | Facility: CLINIC | Age: 57
End: 2022-01-11
Payer: COMMERCIAL

## 2022-01-11 ENCOUNTER — TELEPHONE (OUTPATIENT)
Dept: FAMILY MEDICINE | Facility: CLINIC | Age: 57
End: 2022-01-11

## 2022-01-11 DIAGNOSIS — E11.9 TYPE 2 DIABETES MELLITUS WITHOUT COMPLICATION, WITHOUT LONG-TERM CURRENT USE OF INSULIN (H): ICD-10-CM

## 2022-01-11 DIAGNOSIS — E11.9 DIABETES MELLITUS WITHOUT COMPLICATION (H): Primary | ICD-10-CM

## 2022-01-11 PROCEDURE — G0108 DIAB MANAGE TRN  PER INDIV: HCPCS

## 2022-01-11 PROCEDURE — 99207 PR DROP WITH A PROCEDURE: CPT

## 2022-01-11 RX ORDER — DAPAGLIFLOZIN 5 MG/1
5 TABLET, FILM COATED ORAL DAILY
Qty: 90 TABLET | Refills: 3 | Status: SHIPPED | OUTPATIENT
Start: 2022-01-11 | End: 2023-02-08

## 2022-01-11 NOTE — TELEPHONE ENCOUNTER
Yes, he should get a laboratory appointment(s) before every diabetes recheck appointment(s)   Labs have been ordered in October   I was told to route this to the  ruy Quevedo MD

## 2022-01-11 NOTE — PATIENT INSTRUCTIONS
Diabetes Support Resources:  1. Continue with Farxiga 5 mg daily     2. Trulicity 0.75 mg weekly     3. Continue with the meal planning, call if any concerns.      4. Look into a dog      Bring blood glucose meter and logbook with you to all doctor and follow-up appointments.    Diabetes Education Telephone Visit Follow-up:    We realize your time is valuable and your health is important! We offer a convenient Telephone Visit follow up! It s a quick way to check in for a medication dose adjustment without having to come back to clinic as soon.    Telephone Visits are often covered by insurance. Please check with your insurance plan to see if this type of visit is covered. If not, the cost is less expensive than an office visit:      Up to 10 minutes (Code 67669): $30    11-20 minutes (Code 77695): $59    More than 20 minutes (Code 03728): $85    Talk with your Diabetes Educator if you want to learn more.      [facility]:  For Your Diabetes Education and Nutrition Appointments Call:  [phone]   For Diabetes Education or Nutrition Related Questions:   Phone: [phone]  Send [employer] Message   If you need a medication refill please contact your pharmacy. Please allow 3 business days for your refills to be completed.

## 2022-01-11 NOTE — TELEPHONE ENCOUNTER
Patient has an upcoming appointment with you. Wondering if he needs lab work done before. Please route to TC if he needs a lab appointment before appointment with you.  Cait Mejias, [facility]

## 2022-01-11 NOTE — PROGRESS NOTES
"Diabetes Self-Management Education & Support    Presents for: Follow-up    SUBJECTIVE/OBJECTIVE:  Presents for: Follow-up  Accompanied by: Self  Diabetes education in the past 24mo: Yes  Focus of Visit: Monitoring,Taking Medication  Diabetes type: Type 2  Disease course: Improving  Transportation concerns: No  Difficulty affording diabetes medication?: No  Difficulty affording diabetes testing supplies?: No  Other concerns:: None  Cultural Influences/Ethnic Background:  Choose not to answer      Diabetes Symptoms & Complications:  Fatigue: Sometimes  Neuropathy: No  Polydipsia: Sometimes  Polyphagia: Sometimes  Polyuria: Sometimes  Visual change: No  Slow healing wounds: No  Symptom course: Improving  Weight trend: Decreasing  Autonomic neuropathy: No  CVA: No  Heart disease: Yes  Nephropathy: No  Peripheral neuropathy: Yes  Peripheral Vascular Disease: No  Retinopathy: No  Sexual dysfunction: No    Patient Problem List and Family Medical History reviewed for relevant medical history, current medical status, and diabetes risk factors.    Vitals:  There were no vitals taken for this visit.  Estimated body mass index is 33.89 kg/m  as calculated from the following:    Height as of 1/3/22: 1.803 m (5' 11\").    Weight as of 1/3/22: 110.2 kg (243 lb).   Last 3 BP:   BP Readings from Last 3 Encounters:   01/03/22 93/62   11/15/21 (!) 86/54   10/19/21 99/58       History   Smoking Status     Former Smoker     Packs/day: 0.00     Years: 0.00     Types: Cigarettes   Smokeless Tobacco     Never Used     Comment: thinking about quitting       Labs:  Lab Results   Component Value Date    A1C 7.2 09/22/2021    A1C 9.9 05/21/2021     Lab Results   Component Value Date     04/14/2021     Lab Results   Component Value Date     05/21/2021     HDL Cholesterol   Date Value Ref Range Status   05/21/2021 33 (L) >40 mg/dL Final   ]  GFR Estimate   Date Value Ref Range Status   04/14/2021 >90 >60 mL/min/[1.73_m2] Final     " Comment:     Non  GFR Calc  Starting 12/18/2018, serum creatinine based estimated GFR (eGFR) will be   calculated using the Chronic Kidney Disease Epidemiology Collaboration   (CKD-EPI) equation.       GFR Estimate If Black   Date Value Ref Range Status   04/14/2021 >90 >60 mL/min/[1.73_m2] Final     Comment:      GFR Calc  Starting 12/18/2018, serum creatinine based estimated GFR (eGFR) will be   calculated using the Chronic Kidney Disease Epidemiology Collaboration   (CKD-EPI) equation.       Lab Results   Component Value Date    CR 0.59 04/14/2021     No results found for: MICROALBUMIN    Healthy Eating:  Healthy Eating Assessed Today: Yes  Cultural/Zoroastrianism diet restrictions?: No  Meal planning/habits: Avoiding sweets,Low carb,Heart healthy,Low salt  How many times a week on average do you eat food made away from home (restaurant/take-out)?: 0  Meals include: Breakfast,Lunch,Dinner,Afternoon Snack,Evening Snack  Breakfast: avacado toast, or oatmeal, or pancake and eggs  Lunch: sandwich, cheese and turkey and bologna.  Dinner: soup and bagel and PB  Snacks: smoothies  Beverages: Water,Coffee,Milk,Other  Has patient met with a dietitian in the past?: Yes    Being Active:  Being Active Assessed Today: Yes  Exercise:: Yes  Days per week of moderate to strenuous exercise (like a brisk walk): 7  On average, minutes per day of exercise at this level: 30  How intense was your typical exercise? : Moderate (like brisk walking)  Exercise Minutes per Week: 210  Barrier to exercise: Physical limitation    Monitoring:  Monitoring Assessed Today: Yes  Did patient bring glucose meter to appointment? : Yes  Blood Glucose Meter: CGM  Times checking blood sugar at home (number): 5+  Times checking blood sugar at home (per): Day  Blood glucose trend: Decreasing                Taking Medications:  Diabetes Medication(s)     Sodium-Glucose Co-Transporter 2 (SGLT2) Inhibitors       dapagliflozin  (FARXIGA) 5 MG TABS tablet    Take 1 tablet (5 mg) by mouth daily    Incretin Mimetic Agents (GLP-1 Receptor Agonists)       dulaglutide (TRULICITY) 0.75 MG/0.5ML pen    Inject 0.75 mg Subcutaneous every 7 days          Taking Medication Assessed Today: Yes  Current Treatments: Non-insulin Injectables,Oral Medication (taken by mouth)  Dose schedule: Other  Given by: Patient  Injection/Infusion sites: Abdomen  Problems taking diabetes medications regularly?: No  Diabetes medication side effects?: No    Problem Solving:  Problem Solving Assessed Today: No  Is the patient at risk for hypoglycemia?: No  Hypoglycemia Frequency: Never              Reducing Risks:  CAD Risks: Diabetes Mellitus,Dyslipidemia,Stress,Male sex  Has dilated eye exam at least once a year?: Yes  Sees dentist every 6 months?: Yes  Feet checked by healthcare provider in the last year?: Yes    Healthy Coping:  Emotional response to diabetes: Ready to learn  Informal Support system:: Family,Neighbors  Stage of change: ACTION (Actively working towards change)  Patient Activation Measure Survey Score:  JACQUELINE Score (Last Two) 12/21/2012 11/9/2021   JACQUELINE Raw Score 49 29   Activation Score 82.8 52.9   JACQUELINE Level 4 2       Diabetes knowledge and skills assessment:   Patient is knowledgeable in diabetes management concepts related to: Healthy Eating, Being Active, Monitoring, Taking Medication, Problem Solving, Reducing Risks and Healthy Coping    Patient needs further education on the following diabetes management concepts: Taking Medication    Based on learning assessment above, most appropriate setting for further diabetes education would be: Individual setting.      INTERVENTIONS:    Education provided today on:  AADE Self-Care Behaviors:  Diabetes Pathophysiology  Taking Medication: action of prescribed medication, drawing up, administering and storing injectable diabetes medications, proper site selection and rotation for injections, side effects of  prescribed medications and when to take medications    Opportunities for ongoing education and support in diabetes-self management were discussed.    Pt verbalized understanding of concepts discussed and recommendations provided today.       Education Materials Provided:  No new materials provided today      ASSESSMENT:  Lan is here for follow-up on diabetes he is doing extremely well wearing the Dexcom which seems to be help regulate his blood sugars with knowledge of the foods that he is eating he is able to monitor his lifestyle.  He has suffered from major cardiac issues where he is on a transplant list for his heart he suffered major heart attack that caused a lot of damage to his heart and his he says about 30% of his heart is actually working he is got a good attitude feels great and will follow-up with Dr. Barth for labs and he will call if he has any concerns        Patient's most recent   Lab Results   Component Value Date    A1C 7.2 09/22/2021    A1C 9.9 05/21/2021    is not meeting goal of <7.0    PLAN  See Patient Instructions for co-developed, patient-stated behavior change goals.  AVS printed and provided to patient today. See Follow-Up section for recommended follow-up.    Freya Fontenot RN/NALDO Monteiro Diabetes Educator    Time Spent: 30 minutes  Encounter Type: Individual    Any diabetes medication dose changes were made via the CDE Protocol and Collaborative Practice Agreement with the patient's primary care provider. A copy of this encounter was shared with the provider.

## 2022-01-14 ENCOUNTER — LAB (OUTPATIENT)
Dept: LAB | Facility: CLINIC | Age: 57
End: 2022-01-14
Payer: COMMERCIAL

## 2022-01-14 ENCOUNTER — PATIENT OUTREACH (OUTPATIENT)
Dept: CARE COORDINATION | Facility: CLINIC | Age: 57
End: 2022-01-14

## 2022-01-14 DIAGNOSIS — E11.9 TYPE 2 DIABETES MELLITUS WITHOUT COMPLICATION, WITHOUT LONG-TERM CURRENT USE OF INSULIN (H): ICD-10-CM

## 2022-01-14 LAB
ALBUMIN SERPL-MCNC: 4 G/DL (ref 3.4–5)
ALP SERPL-CCNC: 88 U/L (ref 40–150)
ALT SERPL W P-5'-P-CCNC: 22 U/L (ref 0–70)
ANION GAP SERPL CALCULATED.3IONS-SCNC: 5 MMOL/L (ref 3–14)
AST SERPL W P-5'-P-CCNC: 9 U/L (ref 0–45)
BILIRUB SERPL-MCNC: 1.1 MG/DL (ref 0.2–1.3)
BUN SERPL-MCNC: 17 MG/DL (ref 7–30)
CALCIUM SERPL-MCNC: 8.9 MG/DL (ref 8.5–10.1)
CHLORIDE BLD-SCNC: 105 MMOL/L (ref 94–109)
CHOLEST SERPL-MCNC: 131 MG/DL
CO2 SERPL-SCNC: 29 MMOL/L (ref 20–32)
CREAT SERPL-MCNC: 0.9 MG/DL (ref 0.66–1.25)
CREAT UR-MCNC: 130 MG/DL
FASTING STATUS PATIENT QL REPORTED: YES
GFR SERPL CREATININE-BSD FRML MDRD: >90 ML/MIN/1.73M2
GLUCOSE BLD-MCNC: 143 MG/DL (ref 70–99)
HBA1C MFR BLD: 6 % (ref 0–5.6)
HDLC SERPL-MCNC: 36 MG/DL
LDLC SERPL CALC-MCNC: 73 MG/DL
MICROALBUMIN UR-MCNC: 16 MG/L
MICROALBUMIN/CREAT UR: 12.31 MG/G CR (ref 0–17)
NONHDLC SERPL-MCNC: 95 MG/DL
POTASSIUM BLD-SCNC: 4.5 MMOL/L (ref 3.4–5.3)
PROT SERPL-MCNC: 7.5 G/DL (ref 6.8–8.8)
SODIUM SERPL-SCNC: 139 MMOL/L (ref 133–144)
TRIGL SERPL-MCNC: 111 MG/DL
TSH SERPL DL<=0.005 MIU/L-ACNC: 2.7 MU/L (ref 0.4–4)

## 2022-01-14 PROCEDURE — 83036 HEMOGLOBIN GLYCOSYLATED A1C: CPT

## 2022-01-14 PROCEDURE — 80061 LIPID PANEL: CPT

## 2022-01-14 PROCEDURE — 80053 COMPREHEN METABOLIC PANEL: CPT

## 2022-01-14 PROCEDURE — 82043 UR ALBUMIN QUANTITATIVE: CPT

## 2022-01-14 PROCEDURE — 84443 ASSAY THYROID STIM HORMONE: CPT

## 2022-01-14 PROCEDURE — 36415 COLL VENOUS BLD VENIPUNCTURE: CPT

## 2022-01-14 NOTE — PROGRESS NOTES
Clinic Care Coordination Contact    Situation: Patient chart reviewed by care coordinator.    Background: Patient is enrolled in Care Coordination. CHW and CC are following.    Assessment: CHW spoke with patient on 12/31. He stated that he was able to find a dental office that accepts MA. He has had several appointments since. He renewed his SNAP benefits, but hasn't heard anything back yet. CHW encouraged patient to contact the county after the holidays. Patient reports that he suffers from PTSD and was speaking with the Diabetic Educator who suggested looking in an emotional support dog. CHW reached out to [facility] for resources. SWCC provided a few to pass along.    Plan/Recommendations: CHW plans to contact patient in one month. SWCC will review chart in 6 weeks, per standard workflow, unless involvement is requested sooner    ANTONIA Ramos  Primary Care Clinic- Social Work Care Coordinator  [facility]  Ph: [phone]  1/14/2022 10:34 AM

## 2022-01-16 DIAGNOSIS — F51.04 PSYCHOPHYSIOLOGIC INSOMNIA: ICD-10-CM

## 2022-01-17 ENCOUNTER — OFFICE VISIT (OUTPATIENT)
Dept: FAMILY MEDICINE | Facility: CLINIC | Age: 57
End: 2022-01-17
Payer: COMMERCIAL

## 2022-01-17 VITALS
TEMPERATURE: 98.1 F | SYSTOLIC BLOOD PRESSURE: 84 MMHG | DIASTOLIC BLOOD PRESSURE: 49 MMHG | HEART RATE: 76 BPM | HEIGHT: 70 IN | WEIGHT: 239 LBS | BODY MASS INDEX: 34.22 KG/M2 | OXYGEN SATURATION: 97 %

## 2022-01-17 DIAGNOSIS — E66.01 MORBID OBESITY (H): ICD-10-CM

## 2022-01-17 PROCEDURE — 99214 OFFICE O/P EST MOD 30 MIN: CPT | Performed by: FAMILY MEDICINE

## 2022-01-17 RX ORDER — TRAZODONE HYDROCHLORIDE 50 MG/1
TABLET, FILM COATED ORAL
Qty: 90 TABLET | Refills: 0 | Status: SHIPPED | OUTPATIENT
Start: 2022-01-17 | End: 2022-04-18

## 2022-01-17 ASSESSMENT — MIFFLIN-ST. JEOR: SCORE: 1934.32

## 2022-01-17 NOTE — PROGRESS NOTES
[facility] diabetes resourses:  [email or web address]        To access diabetes educational materials with in EPIC use <dot>DALE      Put this in AFTER VISIT SUMMARY if needed for the patient to access information online [email or web address]      SUBJECTIVE:  Lan Potts Jr. is a 56 year old male who presents today for a follow up appointment for management of DIABETES MELLITUS.    Patient Active Problem List    Diagnosis Date Noted     CHF (congestive heart failure), NYHA class II, chronic, systolic (H) 01/03/2022     Priority: Medium     Cardiogenic shock (H) 01/03/2022     Priority: Medium     Third degree AV block (H) 01/03/2022     Priority: Medium     JUAN (generalized anxiety disorder) 08/19/2019     Priority: Medium     Morbid obesity (H) 07/12/2018     Priority: Medium     Umbilical hernia without obstruction and without gangrene 07/12/2018     Priority: Medium     Varicose veins of left lower extremity 07/12/2018     Priority: Medium     Tobacco abuse 04/05/2018     Priority: Medium     Erectile dysfunction, unspecified erectile dysfunction type 04/05/2018     Priority: Medium     10 year risk of MI or stroke 7.5% or greater, 12.6% in April 2017 04/06/2017     Priority: Medium     Type 2 diabetes mellitus without complication, without long-term current use of insulin (H) 01/05/2017     Priority: Medium     Migraine without status migrainosus, not intractable, unspecified migraine type 01/21/2016     Priority: Medium     High triglycerides 12/11/2014     Priority: Medium     HDL deficiency 12/11/2014     Priority: Medium     Hyperlipidemia with target LDL less than 100 02/17/2014     Priority: Medium     Diagnosis updated by automated process. Provider to review and confirm.       Chronic back pain      Priority: Medium     Seasonal allergies      Priority: Medium          The patient checks his blood sugar as  follows: he has a CGM  Results as follows:   His 90 day(s) average is 107 with an estimated hemoglobin A1C of  5.9     He just had a meeting with diabetes education last week.   The patient reports that he IS taking the medication as prescribed.   ----------------------------------------------------------------------------------------------------------------------------------------    BP Readings from Last 3 Encounters:   01/17/22 (!) 84/49   01/03/22 93/62   11/15/21 (!) 86/54       The patient reports that he IS NOT currently smoking. He quit many years ago.     History   Smoking Status     Former Smoker     Packs/day: 0.00     Years: 0.00     Types: Cigarettes   Smokeless Tobacco     Never Used     Comment: thinking about quitting             Current Outpatient Medications   Medication Sig Dispense Refill     ACE/ARB/ARNI NOT PRESCRIBED, INTENTIONAL, Please choose reason not prescribed, below       ALPRAZolam (XANAX) 1 MG tablet TAKE 1 TABLET BY MOUTH THREE TIMES A DAY AS NEEDED 60 tablet 1     aspirin (ASA) 81 MG tablet Take 1 tablet (81 mg) by mouth daily       bumetanide (BUMEX) 1 MG tablet Take 1 mg by mouth daily       carvedilol (COREG) 6.25 MG tablet Take 6.25 mg by mouth 2 times daily       clopidogrel (PLAVIX) 75 MG tablet Take 75 mg by mouth       Continuous Blood Gluc Sensor (DEXCOM G6 SENSOR) MISC 1 Box continuous To change every 10 days. 9 each 11     Continuous Blood Gluc Transmit (DEXCOM G6 TRANSMITTER) MISC 1 Device continuous To change out every 3 months 1 each 3     dapagliflozin (FARXIGA) 5 MG TABS tablet Take 1 tablet (5 mg) by mouth daily 90 tablet 3     dulaglutide (TRULICITY) 0.75 MG/0.5ML pen Inject 0.75 mg Subcutaneous every 7 days 7 mL 3     eletriptan (RELPAX) 40 MG tablet Take 1 tablet (40 mg) by mouth at onset of headache May repeat in 2 hours as needed but no more than 2 pills in 24 hours. 12 tablet 5     escitalopram (LEXAPRO) 20 MG tablet Take 1 tablet (20 mg) by mouth every  morning 90 tablet 1     nitroGLYcerin (NITROSTAT) 0.4 MG sublingual tablet Place 0.4 mg under the tongue       RABEprazole (ACIPHEX) 20 MG EC tablet TAKE 1 TABLET BY MOUTH EVERY DAY 90 tablet 1     rosuvastatin (CRESTOR) 40 MG tablet Take 40 mg by mouth       sacubitril-valsartan (ENTRESTO) 24-26 MG per tablet 1/2 tablet twice a day(s)       traZODone (DESYREL) 50 MG tablet TAKE 0.5 OF A TABLET 30-60 MINUTES BEFORE BEDTIME. INCREASE TO A FULL TABLET AT BEDTIME IF NEEDED 90 tablet 0       The patient reports that he IS taking a statin.   (Reminder all diabetics with a ASCVD risk greater or equal to 7.5% should be on a high intensity statin, otherwise on a moderate intensity statin)    The patient reports that he IS taking 81mg of  aspirin daily.  (Reminder all diabetic patients with a cardiovascular risk factor and > 50 should take a daily aspirin)       The patient reports that he IS doing a self foot exam daily.  The patient reports that he does exercise in the form of walking  5 times a week or more.   The patient reports that he IS following the recommended diabetic diet.   The patient reports that his last eye exam was 5 months ago.     The last time he had an appointment with a diabetic educator was  1 weeks ago.    Immunization History   Administered Date(s) Administered     COVID-19GADIELPfizer (12+ Yrs) 03/26/2021, 04/16/2021, 11/15/2021     HepB 04/06/2017     HepB, Unspecified 04/06/2017     HepB-Adult 04/05/2018, 05/02/2019     Influenza (IIV3) PF 12/21/2012, 09/27/2013     Influenza Quad, Recombinant, pf(RIV4) (Flublok) 09/26/2018, 09/05/2019, 10/19/2021     Influenza Vaccine IM > 6 months Valent IIV4 (Alfuria,Fluzone) 10/26/2017     Pneumococcal 23 valent 09/04/2014     TD (ADULT, 7+) 07/21/2021     TDAP Vaccine (Adacel) 09/20/2011     Zoster vaccine recombinant adjuvanted (SHINGRIX) 07/12/2018, 09/26/2018     The patient reports that he has had the hepatitis B vaccine series in the past. (recommended for  "age 19-59 and can be given to age 60 or older)  The patient reports that he has had a pnuemovax in the past.  The patient reports that he has had a flu shot for the current influenza season.  The patient would like to have a no vaccinations today    Patient concerns: has no specific complaints and has been feeling well.        EXAM:  BP (!) 84/49   Pulse 76   Temp 98.1  F (36.7  C) (Oral)   Ht 1.784 m (5' 10.25\")   Wt 108.4 kg (239 lb)   SpO2 97%   BMI 34.05 kg/m    Wt Readings from Last 4 Encounters:   01/17/22 108.4 kg (239 lb)   01/03/22 110.2 kg (243 lb)   11/15/21 116.6 kg (257 lb)   10/19/21 117.5 kg (259 lb)     Body mass index is 34.05 kg/m .    General:  Lan is awake, alert, and cooperative.  NAD.      Diabetic Foot Screen:  Any complaints of increased pain or numbness ? No  Is there a foot ulcer now or a history of foot ulcer? No  Does the foot have an abnormal shape? No  Are the nails thick, too long or ingrown? No  Are there any redness or open areas? No         Sensation Testing done at all points on the diagram with monofilament     Right Foot: Sensation Normal at all points  Left Foot: Sensation Normal at all points     Risk Category: 0- No loss of protective sensation  Performed by Eligio Quevedo MD        Diabetic foot exam: normal DP and PT pulses, no trophic changes or ulcerative lesions and normal sensory exam            Previsit labs drawn include:  Lab on 01/14/2022   Component Date Value Ref Range Status     Sodium 01/14/2022 139  133 - 144 mmol/L Final     Potassium 01/14/2022 4.5  3.4 - 5.3 mmol/L Final     Chloride 01/14/2022 105  94 - 109 mmol/L Final     Carbon Dioxide (CO2) 01/14/2022 29  20 - 32 mmol/L Final     Anion Gap 01/14/2022 5  3 - 14 mmol/L Final     Urea Nitrogen 01/14/2022 17  7 - 30 mg/dL Final     Creatinine 01/14/2022 0.90  0.66 - 1.25 mg/dL Final     Calcium 01/14/2022 8.9  8.5 - 10.1 mg/dL Final     Glucose 01/14/2022 143* 70 - 99 mg/dL Final     Alkaline " Phosphatase 01/14/2022 88  40 - 150 U/L Final     AST 01/14/2022 9  0 - 45 U/L Final     ALT 01/14/2022 22  0 - 70 U/L Final     Protein Total 01/14/2022 7.5  6.8 - 8.8 g/dL Final     Albumin 01/14/2022 4.0  3.4 - 5.0 g/dL Final     Bilirubin Total 01/14/2022 1.1  0.2 - 1.3 mg/dL Final     GFR Estimate 01/14/2022 >90  >60 mL/min/1.73m2 Final    Effective December 21, 2021 eGFRcr in adults is calculated using the 2021 CKD-EPI creatinine equation which includes age and gender (Leigh Ann et al., NEJ, DOI: 10.1056/PAVFub1267299)     TSH 01/14/2022 2.70  0.40 - 4.00 mU/L Final     Cholesterol 01/14/2022 131  <200 mg/dL Final     Triglycerides 01/14/2022 111  <150 mg/dL Final     Direct Measure HDL 01/14/2022 36* >=40 mg/dL Final     LDL Cholesterol Calculated 01/14/2022 73  <=100 mg/dL Final     Non HDL Cholesterol 01/14/2022 95  <130 mg/dL Final     Patient Fasting > 8hrs? 01/14/2022 Yes   Final     Hemoglobin A1C 01/14/2022 6.0* 0.0 - 5.6 % Final    Normal <5.7%   Prediabetes 5.7-6.4%    Diabetes 6.5% or higher     Note: Adopted from ADA consensus guidelines.     Creatinine Urine mg/dL 01/14/2022 130  mg/dL Final     Albumin Urine mg/L 01/14/2022 16  mg/L Final     Albumin Urine mg/g Cr 01/14/2022 12.31  0.00 - 17.00 mg/g Cr Final         ASSESSMENT and PLAN:    Type II Diabetes, Unchanged  Markedly improving.    DIABETES Type II:                       Lab Results   Component Value Date    A1C 6.0 01/14/2022    A1C 9.9 05/21/2021       Control   good  Lab Results   Component Value Date    A1C 6.0 01/14/2022    A1C 7.2 09/22/2021    A1C 9.9 05/21/2021    A1C 10.4 04/14/2021    A1C 6.9 11/14/2019       Albumin Urine mg/g Cr   Date Value Ref Range Status   01/14/2022 12.31 0.00 - 17.00 mg/g Cr Final   04/14/2021 18.01 (H) 0 - 17 mg/g Cr Final         Recommended to take ASA  mg daily for appropriate patient, Compliance with the recommended diabetic diet was stressed, Regular aerobic exercise was encouraged, Home  "glucose monitoring encouraged, Annual eye exam recommended, Self foot exam demonstrated and recommended to be done nightly, Apply moisturizer to feet with in 3 minutes of showering or bathing, Follow up in clinic in 6 months for a diabetes check and Future labs ordered and the patient was instructed to make a lab appt 1 week prior to next diabetes visit      BP/HTN:   BP Readings from Last 3 Encounters:   01/17/22 (!) 84/49   01/03/22 93/62   11/15/21 (!) 86/54     Control   good    We discussed his low blood pressure and he reports that his cardiology wants his blood pressure to be low secondary to to his recent heart issues.     - Medication:continue the current doses of medication.  (make sure to order \"Ace not prescribed intentionally if not on an ACE/ARB)  The patient was advised to do the following therapuetic life style changes  - Dietary sodium restriction and increase potassium and Calcium intake  - Regular aerobic exercise  - Weight loss  - Discontinue smoking if applicable  - Avoid regular NSAID use if applicable  - Avoid regular decongestant use if applicable  - Follow up in clinic in 6 months for a recheck  - Check a basic metabolic panel today if needed    Patient Education: Reviewed risks of hypertension and principles of   Treatment.    HYPERCHOLESTEROLEMIA:     The ASCVD Risk score (Maribel ADRIANO Jr., et al., 2013) failed to calculate for the following reasons:    The valid systolic blood pressure range is 90 to 200 mmHg    Lab Results   Component Value Date    LDL 73 01/14/2022     05/21/2021      Control   good  Cholesterol   Date Value Ref Range Status   01/14/2022 131 <200 mg/dL Final   05/21/2021 213 (H) 100 - 199 mg/dL Final   04/14/2021 280 (H) <200 mg/dL Final     Comment:     Desirable:       <200 mg/dl     HDL Cholesterol   Date Value Ref Range Status   05/21/2021 33 (L) >40 mg/dL Final   04/14/2021 31 (L) >39 mg/dL Final     Direct Measure HDL   Date Value Ref Range Status   01/14/2022 " 36 (L) >=40 mg/dL Final     LDL Cholesterol Calculated   Date Value Ref Range Status   01/14/2022 73 <=100 mg/dL Final   05/21/2021 149 (H) <=130 mg/dL Final   04/14/2021 198 (H) <100 mg/dL Final     Comment:     Above desirable:  100-129 mg/dl  Borderline High:  130-159 mg/dL  High:             160-189 mg/dL  Very high:       >189 mg/dl       Triglycerides   Date Value Ref Range Status   01/14/2022 111 <150 mg/dL Final   05/21/2021 155 (H) <150 mg/dL Final   04/14/2021 256 (H) <150 mg/dL Final     Comment:     Borderline high:  150-199 mg/dl  High:             200-499 mg/dl  Very high:       >499 mg/dl  Fasting specimen       Cholesterol/HDL Ratio   Date Value Ref Range Status   09/03/2015 7.7 (H) 0.0 - 5.0 Final   12/11/2014 5.8 (H) 0.0 - 5.0 Final         The patient is on a high intensity statin and this is the appropriate treatment based on the ASCVD risk.    (If LDL>69 on maximum dose statin and patient has CAD/ASCVD add additional LDL lowering therapy)    The patient's LDL is less than 70 so no statin is indicated at this time.    The patient's HDL is abnormal  The patient's triglycerides are normal.    We have discussed the results and we will continue the current management.   We discussed exercise as the best way to raise his HDL and information was given to him    (E66.01) Morbid obesity (H)  Comment:   Plan: We discussed the role of extra weight and his diabetes and he will continue his weight loss plans

## 2022-01-17 NOTE — PATIENT INSTRUCTIONS
Lab on 01/14/2022   Component Date Value Ref Range Status     Sodium 01/14/2022 139  133 - 144 mmol/L Final     Potassium 01/14/2022 4.5  3.4 - 5.3 mmol/L Final     Chloride 01/14/2022 105  94 - 109 mmol/L Final     Carbon Dioxide (CO2) 01/14/2022 29  20 - 32 mmol/L Final     Anion Gap 01/14/2022 5  3 - 14 mmol/L Final     Urea Nitrogen 01/14/2022 17  7 - 30 mg/dL Final     Creatinine 01/14/2022 0.90  0.66 - 1.25 mg/dL Final     Calcium 01/14/2022 8.9  8.5 - 10.1 mg/dL Final     Glucose 01/14/2022 143* 70 - 99 mg/dL Final     Alkaline Phosphatase 01/14/2022 88  40 - 150 U/L Final     AST 01/14/2022 9  0 - 45 U/L Final     ALT 01/14/2022 22  0 - 70 U/L Final     Protein Total 01/14/2022 7.5  6.8 - 8.8 g/dL Final     Albumin 01/14/2022 4.0  3.4 - 5.0 g/dL Final     Bilirubin Total 01/14/2022 1.1  0.2 - 1.3 mg/dL Final     GFR Estimate 01/14/2022 >90  >60 mL/min/1.73m2 Final    Effective December 21, 2021 eGFRcr in adults is calculated using the 2021 CKD-EPI creatinine equation which includes age and gender (Leigh Ann et al., NE, DOI: 10.1056/VQVNux8953207)     TSH 01/14/2022 2.70  0.40 - 4.00 mU/L Final     Cholesterol 01/14/2022 131  <200 mg/dL Final     Triglycerides 01/14/2022 111  <150 mg/dL Final     Direct Measure HDL 01/14/2022 36* >=40 mg/dL Final     LDL Cholesterol Calculated 01/14/2022 73  <=100 mg/dL Final     Non HDL Cholesterol 01/14/2022 95  <130 mg/dL Final     Patient Fasting > 8hrs? 01/14/2022 Yes   Final     Hemoglobin A1C 01/14/2022 6.0* 0.0 - 5.6 % Final    Normal <5.7%   Prediabetes 5.7-6.4%    Diabetes 6.5% or higher     Note: Adopted from ADA consensus guidelines.     Creatinine Urine mg/dL 01/14/2022 130  mg/dL Final     Albumin Urine mg/L 01/14/2022 16  mg/L Final     Albumin Urine mg/g Cr 01/14/2022 12.31  0.00 - 17.00 mg/g Cr Final       Return to clinic for an appointment for your next diabetes recheck in 6 month(s) and sooner if there are problems.           Patient Education     HDL  "Cholesterol  Does this test have other names?  High-density lipoprotein cholesterol  What is this test?  An HDL cholesterol test measures the amount of high-density lipoprotein (\"good\") cholesterol in your blood. High HDL levels may lower your risk for heart disease.   Why do I need this test?  You may need this test as part of a routine screening to find out your risk for heart disease.   What other tests might I have along with this test?  An HDL test is often done as part of a comprehensive lipid panel to get a complete picture of your cholesterol and blood fat levels. Low-density lipoprotein (LDL) cholesterol, triglycerides, and very-low density lipoproteins are among the other lipids your healthcare provider may want to measure.    What do my test results mean?  Test results may vary depending on your age, gender, health history, the method used for the test, and other things. Your test results may not mean you have a problem. Ask your healthcare provider what your test results mean for you.    The normal ranges for HDL cholesterol are:    45 to 70 milligrams per deciliter (mg/dL) for men    50 to 90 mg/dL for women  If the test shows that your HDL levels are lower than normal, this may mean you have a higher risk of developing heart disease.   If your HDL level is higher than the normal range, this is good news: HDL helps rid your system of LDL. It helps protect against heart problems such as atherosclerosis, or hardening of the arteries. Researchers think that a level of 60 mg/dL or higher may protect against heart disease.   How is this test done?  The test is done with a blood sample. A needle is used to draw blood from a vein in your arm or hand.    Does this test pose any risks?  Having a blood test with a needle carries some risks. These include bleeding, infection, bruising, and feeling lightheaded. When the needle pricks your arm or hand, you may feel a slight sting or pain. Afterward, the site may be " sore.   What might affect my test results?  Having another health condition, such as diabetes or taking certain medicines, can affect the results of an HDL test.    How do I get ready for this test?  Ask your healthcare provider how you should prepare for this test. You don't usually need to do anything before an HDL test. But if you're having a complete lipid panel, you will probably have to fast for 9 to 12 hours before having the test.     Be sure your healthcare provider knows about all medicines, herbs, vitamins, and supplements you are taking. This includes medicines that don't need a prescription and any illegal drugs you may use.      [employer] last reviewed this educational content on 8/1/2020 2000-2021 [employer]. All rights reserved. This information is not intended as a substitute for professional medical care. Always follow your healthcare professional's instructions.

## 2022-01-17 NOTE — LETTER
"My Heart Failure Action Plan   Name: Lan Potts Jr.    YOB: 1965   Date: 1/17/2022    My doctor: [facility]     [address] [place] [address] [phone]  My Diagnosis: {HF STAGES:567592}   My Exercise Goal: 30 minutes daily  .     My Weight Plan:   Wt Readings from Last 2 Encounters:   01/03/22 110.2 kg (243 lb)   11/15/21 116.6 kg (257 lb)     Weigh yourself daily using the same scale. If you gain more than 2 pounds in 24 hours or 5 pounds in a week {HF WEIGHT GOAL:951871}    My Diet Goal: { :659568::\"No added salt\"}    Emergency Room Visits:    Our goal is to improve your quality of life and help you avoid a visit to the emergency room or hospital.  If we work together, we can achieve this goal. But, if you feel you need to call 911 or go to the emergency room, please do so.  If you go to the emergency room, please bring your list of medicines and your daily weight chart with you.       GREEN ZONE     Doing well today    Weight gained is no more than 2 pounds a day or 5 pounds a week.    No swelling in feet, ankles, legs or stomach.    No more swelling than usual.    No more trouble breathing than usual.    No change in my sleep.    No other problems. Actions:    I am doing fine.  I will take my medicine, follow my diet, see my doctor, exercise, and watch for symptoms.           YELLOW ZONE         Having a bad day or flare up    Weight gain of more than 2 pounds in one day or 5 pounds in one week.    New swelling in ankle, leg, knee or thigh.    Bloating in belly, pants feel tighter.    Swelling in hands or face.    Coughing or trouble breathing while walking or talking.    Harder to breathe last night.    Have trouble sleeping, wake up short of breath.    Much more tired than usual.    Not eating.    Pain in my chest or bad leg cramps.    Feel weak or dizzy. Actions:    I need to take action and call my doctor or nurse " today.                 RED ZONE         Need medical care now    Weight gain of 5 pounds overnight.    Chest pain or pressure that does not go away.    Feel less alert.    Wheezing or have trouble breathing when at rest.    Cannot sleep lying down.    Cannot take my water pill.    Pass out or faint. Actions:    I need to call my doctor or nurse now!    Call 911 if I have chest pain or cannot breathe.

## 2022-01-19 ENCOUNTER — TELEPHONE (OUTPATIENT)
Dept: FAMILY MEDICINE | Facility: CLINIC | Age: 57
End: 2022-01-19
Payer: COMMERCIAL

## 2022-01-19 NOTE — TELEPHONE ENCOUNTER
There is a letter in the medica tab from [email or web address] stating that a prior auth would be needed in 2022 for Dexcom    Please route determinations to the Pharm Diabetes pool ([id number]).      Thank you,  [facility] Team

## 2022-01-19 NOTE — TELEPHONE ENCOUNTER
Please route determinations to the Pharm Diabetes pool ([id number]).      Thank you,  [facility] Team

## 2022-01-21 ENCOUNTER — TELEPHONE (OUTPATIENT)
Dept: GASTROENTEROLOGY | Facility: CLINIC | Age: 57
End: 2022-01-21
Payer: COMMERCIAL

## 2022-01-21 ENCOUNTER — MYC MEDICAL ADVICE (OUTPATIENT)
Dept: FAMILY MEDICINE | Facility: CLINIC | Age: 57
End: 2022-01-21
Payer: COMMERCIAL

## 2022-01-21 NOTE — TELEPHONE ENCOUNTER
Screening Questions  Blue=prep questions Red=location Green=sedation   1. Are you active on mychart? y    2. What insurance is in the chart? UMR     3.  Ordering/Referring Provider: Emy    4. BMI 34.0, If greater than 40 review exclusion criteria also will need EXTENDED PREP    5.  Respiratory Screening (If yes to any of the following HOSPITAL setting only):     Do you use daily home oxygen? n  Do you have mod to severe Obstructive Sleep Apnea? n (can be seen at [facility] or hospital setting)    Do you have Pulmonary Hypertension? n   Do you have UNCONTROLLED asthma? n    6. Have you had a heart or lung transplant?  (If yes, please review exclusion criteria)    7. Are you currently on dialysis? (If yes, schedule in HOSPITAL setting only)(If yes, please send Golytely prep)    8. Do you have chronic kidney disease?  (If yes, please send Golytely prep)    9. Have you had a stroke or Transient ischemic attack (TIA) within 6 months?  (If yes, do not schedule at [facility])    10. In the past 6 months, have you had any heart related issues including cardiomyopathy or heart attack?  (If yes, please review exclusion criteria)           If yes, did it require cardiac stenting or other implantable device?  (If yes, please review exclusion criteria)      11. Do you have any implantable devices in your body (pacemaker, defib, LVAD)?  (If yes, schedule at [facility])    12. Do you take nitroglycerin? If yes, how often?  (if yes, schedule at HOSPITAL setting)    13. Are you currently taking any blood thinners? (If yes- inform patient to follow up with PCP or provider for follow up instructions)     14. Are you a diabetic?  (If yes, please send Golytely prep)    15. (Females) Are you currently pregnant?   If yes, how many weeks?      16. Are you taking any prescription pain medications on a routine schedule?  If yes, MAC sedation and patient will need EXTENDED PREP.    17. Do you have any chemical dependencies such as alcohol, street drugs, or  methadone?  If yes, MAC sedation     18. Do you have any history of post-traumatic stress syndrome, severe anxiety or history of psychosis?   If yes, MAC sedation.     19. Do you transfer independently?     20.  Do you have any issues with constipation?    If yes, pt will need EXTENDED PREP     21. Preferred Pharmacy for Pre Prescription     Scheduling Details    Which Colonoscopy Prep was Sent?:   Type of Procedure Scheduled:   Surgeon:  Date of Procedure:   Location:   Caller (Please ask for phone number if not scheduled by patient):       Sedation Type:   Conscious Sedation- Needs  for 6 hours after the procedure  MAC/General-Needs  for 24 hours after procedure    Pre-op Required at [facility], [place], [place] and OR for MAC sedation:   (if yes advise patient they will need a pre-op prior to procedure)      Informed patient they will need an adult    Cannot take any type of public or medical transportation alone    Pre-Procedure Covid test to be completed at [facility] or Externally:     Confirmed Nurse will call to complete assessment     Additional comments: Patient wasn't comfortable with either sedations and felt that he should not be scheduled at the moment.  ([facility] PATIENTS NEED EXTENDED PREP)

## 2022-01-21 NOTE — TELEPHONE ENCOUNTER
Patient called returning a call,After starting screening questions the patient indicated that he isnt comfortable with any sedation and felt like he should not be having the procedure at this time.

## 2022-01-22 NOTE — TELEPHONE ENCOUNTER
PA Initiation    Medication: Continuous Blood Gluc Transmit (DEXCOM G6 TRANSMITTER) MISC  Insurance Company: [employer] ([facility]) - Phone [phone] Fax [phone]  Pharmacy Filling the Rx: [facility] - [place], [place] - [address]  Filling Pharmacy Phone: [phone]  Filling Pharmacy Fax: [phone]  Start Date: 1/22/2022

## 2022-01-22 NOTE — TELEPHONE ENCOUNTER
PA Initiation    Medication: Continuous Blood Gluc Sensor (DEXCOM G6 SENSOR) MISC   Insurance Company: [employer] ([facility]) - Phone [phone] Fax [phone]  Pharmacy Filling the Rx: [facility] - [place], [place] - [facility] [address]  Filling Pharmacy Phone: [phone]  Filling Pharmacy Fax: [phone]  Start Date: 1/22/2022

## 2022-01-24 NOTE — TELEPHONE ENCOUNTER
Prior Authorization Approval    Authorization Effective Date: 1/22/2022  Authorization Expiration Date: 1/22/2023  Medication: Continuous Blood Gluc Transmit (DEXCOM G6 TRANSMITTER) MISC--APPROVED  Approved Dose/Quantity:   Reference #:     Insurance Company: [employer] ([facility]) - Phone [phone] Fax [phone]  Expected CoPay:       CoPay Card Available:      Foundation Assistance Needed:    Which Pharmacy is filling the prescription (Not needed for infusion/clinic administered): [facility] - [place], [place] - [address]  Pharmacy Notified: Yes  Patient Notified: Yes **Instructed pharmacy to notify patient when script is ready to /ship.**

## 2022-01-24 NOTE — TELEPHONE ENCOUNTER
Prior Authorization Approval    Authorization Effective Date: 1/22/2022  Authorization Expiration Date: 1/22/2023  Medication: Continuous Blood Gluc Sensor (DEXCOM G6 SENSOR) MISC--APPROVED  Approved Dose/Quantity:   Reference #:     Insurance Company: [employer] ([facility]) - Phone [phone] Fax [phone]  Expected CoPay:       CoPay Card Available:      Foundation Assistance Needed:    Which Pharmacy is filling the prescription (Not needed for infusion/clinic administered): [facility] - [place], [place] - [address]  Pharmacy Notified: Yes  Patient Notified: Yes **Instructed pharmacy to notify patient when script is ready to /ship.**

## 2022-02-03 ENCOUNTER — PATIENT OUTREACH (OUTPATIENT)
Dept: NURSING | Facility: CLINIC | Age: 57
End: 2022-02-03
Payer: COMMERCIAL

## 2022-02-03 NOTE — PROGRESS NOTES
Clinic Care Coordination Contact    Community Health Worker Follow Up    Care Gaps:     Health Maintenance Due   Topic Date Due     HF ACTION PLAN  Never done     URINE DRUG SCREEN  Never done     CBC  03/12/2011     PREVENTIVE CARE VISIT  02/06/2014     LUNG CANCER SCREENING  Never done       Care Gaps Last addressed on 02/03/22 CHW discussed care gaps    Goals:   Goals Addressed as of 2/3/2022 at 11:10 AM                    Today       Other (pt-stated)   100%    Added 1/14/22 by Yuriy Zarate BSW      Goal Statement: I would like an emotional support animal  Date Goal set: 1/4/2022  Barriers: Unsure of what the process is  Strengths: Patient is a great self advocate; Patient is enrolled in Care Coordination  Date to Achieve By: 4/2022  Patient expressed understanding of goal: Yes  Action steps to achieve this goal:  1. I will review resources sent from CHW  2. I will follow steps to obtaining an emotional support animal  3. I will obtain an emotional support animal, if desired            CHW spoke with patient he said he was doing good. He said he was able to look into the emotional support animals resources. He said he can not afford it at this time. CHW asked were his benefits from the [facility] able to continue without any interruption ?    He said yes he finally was able to talk to someone from the [facility] and the received his paperwork. Patient did not have any new questions or concerns at this time.    Intervention and Education during outreach: CHW advised patient to call clinic with non-emergent questions or concerns.    CHW Plan: CHW will call patient in 1 month.    UDAY French  Clinic Care Coordination  [facility]: [place], [place] & [place]  Phone: [phone]

## 2022-02-19 ENCOUNTER — HEALTH MAINTENANCE LETTER (OUTPATIENT)
Age: 57
End: 2022-02-19

## 2022-02-28 ENCOUNTER — PATIENT OUTREACH (OUTPATIENT)
Dept: CARE COORDINATION | Facility: CLINIC | Age: 57
End: 2022-02-28
Payer: COMMERCIAL

## 2022-02-28 NOTE — PROGRESS NOTES
Clinic Care Coordination Contact    Situation: Patient chart reviewed by care coordinator.    Background: Patient is enrolled in Care Coordination. CHW and SWCC are following.    Assessment: CHW spoke with patient on 2/3/2022. Patient looked in to support animals and is unable to afford one at this time. Patient was also able to speak with someone from the county and confirmed they received his paperwork. No new questions or concerns. No new goals were established    Plan/Recommendations: SWCC changed patient to maintenance status, as he has completed all his goals. CHW will reach out to patient in two months. SWCC will review chart around then to consider graduation    ANTONIA Ramos  Primary Care Clinic- Social Work Care Coordinator  [facility] and [facility]  Ph: [phone]  2/28/2022 1:18 PM

## 2022-04-13 ENCOUNTER — PATIENT OUTREACH (OUTPATIENT)
Dept: CARE COORDINATION | Facility: CLINIC | Age: 57
End: 2022-04-13
Payer: COMMERCIAL

## 2022-04-13 NOTE — PROGRESS NOTES
[facility]/Voicemail       Clinical Data: Care Coordinator Outreach  Outreach attempted x 1.  Left message on patient's voicemail with call back information and requested return call.  Plan:  Care Coordinator will try to reach patient again in 3-5 business days.  Next outreach due: 4/20/22

## 2022-04-29 ENCOUNTER — PATIENT OUTREACH (OUTPATIENT)
Dept: CARE COORDINATION | Facility: CLINIC | Age: 57
End: 2022-04-29
Payer: MEDICAID

## 2022-04-29 NOTE — LETTER
[facility] CARE COORDINATION  [address] [facility] [address]    April 29, 2022    Lan Potts Jr.  [address] [address]      Dear Lan,    I have been attempting to reach you since our last contact. I would like to continue to work with you and provide any additional support you may need on achieving your health care related goals. I would appreciate if you would give me a call at [phone] to let me know if you would like to continue working together. I know that there are many things that can affect our ability to communicate and I hope we can continue to work together.    All of us at the [facility] are invested in your health and are here to assist you in meeting your goals.     Sincerely,    Opal Glez

## 2022-04-29 NOTE — PROGRESS NOTES
[facility]/Voicemail       Clinical Data: Care Coordinator Outreach  Outreach attempted x 2.  Left message on patient's voicemail with call back information and requested return call.  Plan: Care Coordinator will send unable to contact letter with care coordinator contact information via [employer]. Care Coordinator will try to reach patient again in 10 business days.  Next outreach due: 5/13/22    
PROVIDER:[TOKEN:[[id number]]]

## 2022-05-03 ENCOUNTER — PATIENT OUTREACH (OUTPATIENT)
Dept: CARE COORDINATION | Facility: CLINIC | Age: 57
End: 2022-05-03
Payer: COMMERCIAL

## 2022-05-03 NOTE — PROGRESS NOTES
Clinic Care Coordination Contact    Situation: Patient chart reviewed by care coordinator.    Background: Patient is enrolled in Care Coordination. CHW and SWCC are following.     Assessment: CHW attempted to contact patient on 4/29. This is CHW's second attempt with no success. VM was left and unable to contact letter was sent.     Plan/Recommendations:     CHW will:  CHW Delegation:   1)  Due Date:  5/13/2022       Delegation: If patient does not answer on next outreach, please notify SWCC to consider for disenrollment.         ANTONIA Ramos  Primary Care Clinic- Social Work Care Coordinator  [facility] [place]  Ph: [phone]  5/3/2022 2:55 PM

## 2022-06-02 ENCOUNTER — PATIENT OUTREACH (OUTPATIENT)
Dept: CARE COORDINATION | Facility: CLINIC | Age: 57
End: 2022-06-02
Payer: COMMERCIAL

## 2022-06-02 NOTE — PROGRESS NOTES
[facility]/Voicemail       Clinical Data: Care Coordinator Outreach  Outreach attempted x 1.  Left message on patient's voicemail with call back information and requested return call.  Plan:  Care Coordinator will try to reach patient again in 3-5 business days.    Next outreach due: 6/3/22

## 2022-06-03 ENCOUNTER — TELEPHONE (OUTPATIENT)
Dept: FAMILY MEDICINE | Facility: CLINIC | Age: 57
End: 2022-06-03
Payer: COMMERCIAL

## 2022-06-03 ENCOUNTER — PATIENT OUTREACH (OUTPATIENT)
Dept: CARE COORDINATION | Facility: CLINIC | Age: 57
End: 2022-06-03
Payer: COMMERCIAL

## 2022-06-03 NOTE — TELEPHONE ENCOUNTER
Prior Authorization Retail Medication Request    Medication/Dose: dulaglutide (TRULICITY) 0.75 MG/0.5ML pen  ICD code (if different than what is on RX):  Type 2 diabetes mellitus without complication, without long-term current use of insulin   Previously Tried and Failed:    Rationale:      [facility]   Insurance ID:  [id number]      Pharmacy Information (if different than what is on RX)  Name:  [facility]  Phone:  [phone]

## 2022-06-03 NOTE — PROGRESS NOTES
Clinic Care Coordination Contact    Situation: Patient chart reviewed by care coordinator.    Background: Patient is enrolled in Care Coordination. CHW and SWCC are following.     Assessment: CHW attempted to contact patient on 6/2. No answer. CHW left a voicemail.    Plan/Recommendations: CHW attempts to contact patient again in 3-5 business days. SWCC will plan to review chart in 6 weeks, per standard workflow, unless involvement is requested sooner    ANTONIA Ramos  Primary Care Clinic- Social Work Care Coordinator  [facility] [place]  Ph: [phone]  6/3/2022 12:45 PM

## 2022-06-07 ENCOUNTER — TELEPHONE (OUTPATIENT)
Dept: FAMILY MEDICINE | Facility: CLINIC | Age: 57
End: 2022-06-07

## 2022-06-07 NOTE — TELEPHONE ENCOUNTER
Prior Authorization Retail Medication Request    Medication/Dose: dapagliflozin (FARXIGA) 5 MG TABS tablet  ICD code (if different than what is on RX):  Type 2 diabetes mellitus without complication, without long-term current use of insulin (H) [E11.9]   Previously Tried and Failed:    Rationale:      Insurance Name:    Insurance ID:        Pharmacy Information (if different than what is on RX)  Name:    Phone:

## 2022-06-08 NOTE — TELEPHONE ENCOUNTER
Central Prior Authorization Team  Phone: [phone]    PA Initiation    Medication: dulaglutide (TRULICITY) 0.75 MG/0.5ML pen  Insurance Company: [employer] - Phone [phone] Fax [phone]  Pharmacy Filling the Rx: [id number] [place] - [address] [address] [facility]  Filling Pharmacy Phone: [phone]  Filling Pharmacy Fax:    Start Date: 6/8/2022

## 2022-06-09 NOTE — TELEPHONE ENCOUNTER
Prior Authorization Approval    Authorization Effective Date: 3/10/2022  Authorization Expiration Date: 6/8/2023  Medication: dulaglutide (TRULICITY) 0.75 MG/0.5ML pen- APPROVED   Approved Dose/Quantity:   Reference #:     Insurance Company: [employer] - Phone [phone] Fax [phone]  Expected CoPay:       CoPay Card Available:      Foundation Assistance Needed:    Which Pharmacy is filling the prescription (Not needed for infusion/clinic administered): [id number] - [place], [place] - [address] [facility]  Pharmacy Notified: Yes  Patient Notified:  **Instructed pharmacy to notify patient when script is ready to /ship.**

## 2022-06-10 ENCOUNTER — PATIENT OUTREACH (OUTPATIENT)
Dept: CARE COORDINATION | Facility: CLINIC | Age: 57
End: 2022-06-10
Payer: COMMERCIAL

## 2022-06-10 NOTE — TELEPHONE ENCOUNTER
Central Prior Authorization Team - Phone: [phone]     PA Initiation    Medication: dapagliflozin (FARXIGA) 5 MG TABS tablet  Insurance Company:    Pharmacy Filling the Rx: [id number] - [place], [place] - [address]  Filling Pharmacy Phone: [phone]  Filling Pharmacy Fax:    Start Date: 6/10/2022

## 2022-06-10 NOTE — PROGRESS NOTES
Clinic Care Coordination Contact    Community Health Worker Follow Up    CHW spoke with patient. Patient currently does not have any goals with CCC. Patient stated that he does not need any further assistance at this time. CHW acknowledged.     Care Gaps:     Health Maintenance Due   Topic Date Due     HF ACTION PLAN  Never done     URINE DRUG SCREEN  Never done     CBC  03/12/2011     PREVENTIVE CARE VISIT  02/06/2014     Pneumococcal Vaccine: Pediatrics (0 to 5 Years) and At-Risk Patients (6 to 64 Years) (2 - PCV) 09/04/2015     LUNG CANCER SCREENING  Never done     COVID-19 Vaccine (4 - Booster for Pfizer series) 03/15/2022     Did not discuss       CHW Plan: Patient has completed all goals with Clinic Care Coordination.  Please review the chart and confirm if graduation is approved. Routed to lead CC.           Berna Trivedi  Community Health Worker  [facility]  Clinic Care Coordination  [email or web address]  Office: [phone]

## 2022-06-13 NOTE — TELEPHONE ENCOUNTER
Central Prior Authorization Team - Phone: [phone]     Prior Authorization Approval    Authorization Effective Date: 5/1/2022  Authorization Expiration Date: 6/11/2023  Medication: dapagliflozin (FARXIGA) 5 MG TABS tablet- [place] APPROVED  Approved Dose/Quantity: 90  Reference #:     Insurance Company:    Expected CoPay:       CoPay Card Available:      Foundation Assistance Needed:    Which Pharmacy is filling the prescription (Not needed for infusion/clinic administered): [id number] [place], [place] - [address] [address] [facility]  Pharmacy Notified: Yes  Patient Notified: YesComment:  PHARMACY WILL NOTIFY WHEN READY

## 2022-06-20 ENCOUNTER — PATIENT OUTREACH (OUTPATIENT)
Dept: CARE COORDINATION | Facility: CLINIC | Age: 57
End: 2022-06-20
Payer: COMMERCIAL

## 2022-06-20 NOTE — PROGRESS NOTES
Clinic Care Coordination Contact    Assessment: CHW contacted patient for 2 month follow up.  Patient has continued to follow the plan of care and assessment is negative for any new needs or concerns.    Enrollment status: Graduated.      Plan: No further outreaches at this time.  Patient will continue to follow the plan of care.  If new needs arise a new Care Coordination referral may be placed.     ANTONIA Ramos  Primary Care Clinic- Social Work Care Coordinator  [facility]  Ph: [phone]  6/20/2022 1:33 PM

## 2022-06-29 ENCOUNTER — ALLIED HEALTH/NURSE VISIT (OUTPATIENT)
Dept: EDUCATION SERVICES | Facility: CLINIC | Age: 57
End: 2022-06-29
Payer: COMMERCIAL

## 2022-06-29 DIAGNOSIS — E11.9 DIABETES MELLITUS WITHOUT COMPLICATION (H): Primary | ICD-10-CM

## 2022-06-29 PROCEDURE — 99207 PR DROP WITH A PROCEDURE: CPT

## 2022-06-29 PROCEDURE — G0108 DIAB MANAGE TRN  PER INDIV: HCPCS

## 2022-06-29 NOTE — PROGRESS NOTES
"Diabetes Self-Management Education & Support    Presents for: Follow-up    SUBJECTIVE/OBJECTIVE:  Presents for: Follow-up  Accompanied by: Self  Diabetes education in the past 24mo: Yes  Focus of Visit: Taking Medication, GLP-1 Start  GLP-1 Type: Trulicity  Disease course: Stable  How confident are you filling out medical forms by yourself:: Extremely  Transportation concerns: No  Difficulty affording diabetes medication?: Sometimes  Difficulty affording diabetes testing supplies?: Sometimes  Other concerns:: None  Cultural Influences/Ethnic Background:  Choose not to answer      Diabetes Symptoms & Complications:  Fatigue: Sometimes  Neuropathy: Yes  Polydipsia: No  Polyphagia: No  Polyuria: No  Visual change: Sometimes  Slow healing wounds: No  Symptom course: Stable  Weight trend: Stable       Patient Problem List and Family Medical History reviewed for relevant medical history, current medical status, and diabetes risk factors.    Vitals:  There were no vitals taken for this visit.  Estimated body mass index is 34.05 kg/m  as calculated from the following:    Height as of 1/17/22: 1.784 m (5' 10.25\").    Weight as of 1/17/22: 108.4 kg (239 lb).   Last 3 BP:   BP Readings from Last 3 Encounters:   01/17/22 (!) 84/49   01/03/22 93/62   11/15/21 (!) 86/54       History   Smoking Status     Former Smoker     Packs/day: 0.00     Years: 0.00     Types: Cigarettes   Smokeless Tobacco     Never Used     Comment: thinking about quitting       Labs:  Lab Results   Component Value Date    A1C 6.0 01/14/2022    A1C 9.9 05/21/2021     Lab Results   Component Value Date     01/14/2022     04/14/2021     Lab Results   Component Value Date    LDL 73 01/14/2022     05/21/2021     HDL Cholesterol   Date Value Ref Range Status   05/21/2021 33 (L) >40 mg/dL Final     Direct Measure HDL   Date Value Ref Range Status   01/14/2022 36 (L) >=40 mg/dL Final   ]  GFR Estimate   Date Value Ref Range Status   01/14/2022 " >90 >60 mL/min/1.73m2 Final     Comment:     Effective December 21, 2021 eGFRcr in adults is calculated using the 2021 CKD-EPI creatinine equation which includes age and gender (Leigh Ann et al., [place], DOI: 10.1056/VQUMgq1975596)   04/14/2021 >90 >60 mL/min/[1.73_m2] Final     Comment:     Non  GFR Calc  Starting 12/18/2018, serum creatinine based estimated GFR (eGFR) will be   calculated using the Chronic Kidney Disease Epidemiology Collaboration   (CKD-EPI) equation.       GFR Estimate If Black   Date Value Ref Range Status   04/14/2021 >90 >60 mL/min/[1.73_m2] Final     Comment:      GFR Calc  Starting 12/18/2018, serum creatinine based estimated GFR (eGFR) will be   calculated using the Chronic Kidney Disease Epidemiology Collaboration   (CKD-EPI) equation.       Lab Results   Component Value Date    CR 0.90 01/14/2022    CR 0.59 04/14/2021     No results found for: MICROALBUMIN    Healthy Eating:  Healthy Eating Assessed Today: Yes  Cultural/Anglican diet restrictions?: No  Meal planning/habits: Avoiding sweets, Low carb, Heart healthy, Low salt  How many times a week on average do you eat food made away from home (restaurant/take-out)?: 0  Meals include: Breakfast, Lunch, Dinner, Afternoon Snack, Evening Snack  Breakfast: avacado toast, or oatmeal, or pancake and eggs  Lunch: sandwich, cheese and turkey and bologna.  Dinner: soup and bagel and PB  Snacks: smoothies  Beverages: Water, Coffee, Milk, Other  Has patient met with a dietitian in the past?: Yes    Being Active:  Being Active Assessed Today: Yes  Exercise:: Yes  Days per week of moderate to strenuous exercise (like a brisk walk): 7  On average, minutes per day of exercise at this level: 30  How intense was your typical exercise? : Moderate (like brisk walking)  Exercise Minutes per Week: 210  Barrier to exercise: Physical limitation, None    Monitoring:  Monitoring Assessed Today: Yes  Did patient bring glucose meter to  appointment? : Yes  Blood Glucose Meter: CGM  Times checking blood sugar at home (number): 5+  Times checking blood sugar at home (per): Day  Blood glucose trend: Decreasing            Taking Medications:  Diabetes Medication(s)     Sodium-Glucose Co-Transporter 2 (SGLT2) Inhibitors       dapagliflozin (FARXIGA) 5 MG TABS tablet    Take 1 tablet (5 mg) by mouth daily    Incretin Mimetic Agents (GLP-1 Receptor Agonists)       dulaglutide (TRULICITY) 0.75 MG/0.5ML pen    Inject 0.75 mg Subcutaneous every 7 days          Taking Medication Assessed Today: Yes  Current Treatments: Non-insulin Injectables, Oral Medication (taken by mouth)  Dose schedule: Other  Given by: Patient  Injection/Infusion sites: Abdomen  Problems taking diabetes medications regularly?: No  Diabetes medication side effects?: No    Problem Solving:  Problem Solving Assessed Today: No  Is the patient at risk for hypoglycemia?: No  Hypoglycemia Frequency: Never              Reducing Risks:  Diabetes Risks: Age over 45 years  CAD Risks: Diabetes Mellitus, Dyslipidemia, Stress, Male sex  Has dilated eye exam at least once a year?: Yes  Sees dentist every 6 months?: Yes  Feet checked by healthcare provider in the last year?: Yes    Healthy Coping:  Healthy Coping Assessed Today: Yes  Emotional response to diabetes: Ready to learn  Informal Support system:: Family, Neighbors  Stage of change: ACTION (Actively working towards change)  Patient Activation Measure Survey Score:  JACQUELINE Score (Last Two) 12/21/2012 11/9/2021   JACQUELINE Raw Score 49 29   Activation Score 82.8 52.9   JACQUELINE Level 4 2       Diabetes knowledge and skills assessment:   Patient is knowledgeable in diabetes management concepts related to: Healthy Eating, Being Active, Monitoring, Taking Medication, Problem Solving, Reducing Risks and Healthy Coping    Patient needs further education on the following diabetes management concepts: Healthy Eating and Taking Medication    Based on learning  assessment above, most appropriate setting for further diabetes education would be: Individual setting.      INTERVENTIONS:    Education provided today on:  AADE Self-Care Behaviors:  Diabetes Pathophysiology  Healthy Eating: carbohydrate counting, consistency in amount, composition, and timing of food intake, heart healthy diet, portion control, plate planning method and label reading  Taking Medication: action of prescribed medication, drawing up, administering and storing injectable diabetes medications, proper site selection and rotation for injections, side effects of prescribed medications and when to take medications    Opportunities for ongoing education and support in diabetes-self management were discussed.    Pt verbalized understanding of concepts discussed and recommendations provided today.       Education Materials Provided:  Living Healthy with Diabetes      ASSESSMENT:  Lan is here for follow up to be sure he is on track of managing his diabetes.  He is doing well, great attitude and happy to be alive as he says.  Will let me know if his A1C increases then will need changes in his medications        Patient's most recent   Lab Results   Component Value Date    A1C 6.0 01/14/2022    A1C 9.9 05/21/2021    is meeting goal of <7.0    PLAN  See Patient Instructions for co-developed, patient-stated behavior change goals.  AVS printed and provided to patient today. See Follow-Up section for recommended follow-up.    Freya Fontenot RN/NALDO Monteiro Diabetes Educator    Time Spent: 30 minutes  Encounter Type: Individual    Any diabetes medication dose changes were made via the CDE Protocol and Collaborative Practice Agreement with the patient's primary care provider. A copy of this encounter was shared with the provider.

## 2022-06-29 NOTE — PATIENT INSTRUCTIONS
Diabetes Support Resources:  Continue with Farxiga 5 mg daily  Continue Trulicity 0.75 mg  weekly  If your A1C goes up, then we will increase Trulicity to 1.5 mg weekly  Keep up with the positive attitude.      Bring blood glucose meter and logbook with you to all doctor and follow-up appointments.    Diabetes Education Telephone Visit Follow-up:    We realize your time is valuable and your health is important! We offer a convenient Telephone Visit follow up! It s a quick way to check in for a medication dose adjustment without having to come back to clinic as soon.    Telephone Visits are often covered by insurance. Please check with your insurance plan to see if this type of visit is covered. If not, the cost is less expensive than an office visit:    Up to 10 minutes (Code 96586): $30  11-20 minutes (Code 12359): $59  More than 20 minutes (Code 41158): $85    Talk with your Diabetes Educator if you want to learn more.      [facility]:  For Your Diabetes Education and Nutrition Appointments Call:  [phone]   For Diabetes Education or Nutrition Related Questions:   Phone: [phone]  Send [employer] Message   If you need a medication refill please contact your pharmacy. Please allow 3 business days for your refills to be completed.

## 2022-07-15 NOTE — PROGRESS NOTES
ASSESSMENT / PLAN:  (E11.9) Type 2 diabetes mellitus without complication, without long-term current use of insulin (H)  (primary encounter diagnosis)  Comment: stable  Plan: HEMOGLOBIN A1C, BASIC METABOLIC PANEL        Continue meds/diet/exercise. Eye exam soon. Recheck in 6 months  Sooner if worse    (F41.1) JUAN (generalized anxiety disorder)  Comment: stable  Plan: escitalopram (LEXAPRO) 20 MG tablet, ALPRAZolam        (XANAX) 1 MG tablet        Prn xanax sleep. Avoid with ALCOHOL. Reveiwed risks and side effects of medication  If SUICIAL IDEATION OR HOMOCIDAL IDEATION OR DEJUAN TO ER. Recheck in 6 months  Sooner if worse    (E66.01) Morbid obesity (H)  Comment: needs help  Plan: low carb diet/exercise more. Recheck in 6 months  Continue meds.     (I50.22) CHF (congestive heart failure), NYHA class II, chronic, systolic (H)  Comment: stable  Plan: per cardiology. Chest pain or shortness of breath to er or rapid weight gain. Call/email with questions/concerns         Eric Montenegro is a 57 year old presenting for the following health issues:  New patient to me. History of dm, htn, gerd, cad, anxiety, chf and morbid obesity.   Seeing cardiology tomorrow -[facility]. No chest pain. No shortness of breath.   Exercise regulary. Higher protein diet. No weight lifting. Active. No nausea, vomiting or diarrhea or black/bloody stools. Emotionally doing ok.   Trazodone prn sleep. No urine changes or hematuria. No feet changes. Eye exam next.   . One son. Good support and friends/family. No sugars less than 50.   No chief complaint on file.      History of Present Illness       Diabetes:   He presents for follow up of diabetes.  He is checking home blood glucose with a continuous glucose monitor.  He checks blood glucose before meals, after meals, before and after meals and at bedtime.  Blood glucose is sometimes over 200 and never under 70. He is aware of hypoglycemia symptoms including weakness. He has no  concerns regarding his diabetes at this time.  He is having numbness in feet.         He eats 4 or more servings of fruits and vegetables daily.He consumes 0 sweetened beverage(s) daily.He exercises with enough effort to increase his heart rate 60 or more minutes per day.  He exercises with enough effort to increase his heart rate 7 days per week.   He is taking medications regularly.       Review of Systems         Objective    There were no vitals taken for this visit.  There is no height or weight on file to calculate BMI.   BP 97/61   Pulse 78   Temp 98.1  F (36.7  C) (Oral)   Wt 122.3 kg (269 lb 9.6 oz)   SpO2 96%   BMI 38.41 kg/m     Physical Exam   GENERAL: healthy, alert and no distress  NECK: no adenopathy, no asymmetry, masses, or scars and thyroid normal to palpation  RESP: lungs clear to auscultation - no rales, rhonchi or wheezes  CV: regular rate and rhythm, normal S1 S2, no S3 or S4, no murmur, click or rub, no peripheral edema and peripheral pulses strong  ABDOMEN: soft, nontender, no hepatosplenomegaly, no masses and bowel sounds normal  MS: no gross musculoskeletal defects noted, no edema  NEURO: Normal strength and tone, mentation intact and speech normal  PSYCH: mentation appears normal, affect normal/bright              .  ..

## 2022-07-18 ENCOUNTER — OFFICE VISIT (OUTPATIENT)
Dept: FAMILY MEDICINE | Facility: CLINIC | Age: 57
End: 2022-07-18
Payer: COMMERCIAL

## 2022-07-18 VITALS
SYSTOLIC BLOOD PRESSURE: 97 MMHG | WEIGHT: 269.6 LBS | BODY MASS INDEX: 38.41 KG/M2 | OXYGEN SATURATION: 96 % | DIASTOLIC BLOOD PRESSURE: 61 MMHG | HEART RATE: 78 BPM | TEMPERATURE: 98.1 F

## 2022-07-18 DIAGNOSIS — E66.01 MORBID OBESITY (H): ICD-10-CM

## 2022-07-18 DIAGNOSIS — I50.22 CHF (CONGESTIVE HEART FAILURE), NYHA CLASS II, CHRONIC, SYSTOLIC (H): ICD-10-CM

## 2022-07-18 DIAGNOSIS — F41.1 GAD (GENERALIZED ANXIETY DISORDER): ICD-10-CM

## 2022-07-18 DIAGNOSIS — E11.9 TYPE 2 DIABETES MELLITUS WITHOUT COMPLICATION, WITHOUT LONG-TERM CURRENT USE OF INSULIN (H): Primary | ICD-10-CM

## 2022-07-18 PROBLEM — I44.2 THIRD DEGREE AV BLOCK (H): Status: RESOLVED | Noted: 2022-01-03 | Resolved: 2022-07-18

## 2022-07-18 PROBLEM — R57.0 CARDIOGENIC SHOCK (H): Status: RESOLVED | Noted: 2022-01-03 | Resolved: 2022-07-18

## 2022-07-18 LAB — HBA1C MFR BLD: 6.3 % (ref 0–5.6)

## 2022-07-18 PROCEDURE — 80048 BASIC METABOLIC PNL TOTAL CA: CPT | Performed by: FAMILY MEDICINE

## 2022-07-18 PROCEDURE — 36415 COLL VENOUS BLD VENIPUNCTURE: CPT | Performed by: FAMILY MEDICINE

## 2022-07-18 PROCEDURE — 99214 OFFICE O/P EST MOD 30 MIN: CPT | Performed by: FAMILY MEDICINE

## 2022-07-18 PROCEDURE — 83036 HEMOGLOBIN GLYCOSYLATED A1C: CPT | Performed by: FAMILY MEDICINE

## 2022-07-18 RX ORDER — ESCITALOPRAM OXALATE 20 MG/1
20 TABLET ORAL EVERY MORNING
Qty: 90 TABLET | Refills: 1 | Status: SHIPPED | OUTPATIENT
Start: 2022-07-18 | End: 2023-01-19

## 2022-07-18 RX ORDER — ALPRAZOLAM 1 MG
TABLET ORAL
Qty: 30 TABLET | Refills: 0 | Status: SHIPPED | OUTPATIENT
Start: 2022-07-18 | End: 2022-10-13

## 2022-07-18 ASSESSMENT — PAIN SCALES - GENERAL: PAINLEVEL: MODERATE PAIN (4)

## 2022-07-19 LAB
ANION GAP SERPL CALCULATED.3IONS-SCNC: 8 MMOL/L (ref 3–14)
BUN SERPL-MCNC: 18 MG/DL (ref 7–30)
CALCIUM SERPL-MCNC: 9.4 MG/DL (ref 8.5–10.1)
CHLORIDE BLD-SCNC: 105 MMOL/L (ref 94–109)
CO2 SERPL-SCNC: 25 MMOL/L (ref 20–32)
CREAT SERPL-MCNC: 0.78 MG/DL (ref 0.66–1.25)
GFR SERPL CREATININE-BSD FRML MDRD: >90 ML/MIN/1.73M2
GLUCOSE BLD-MCNC: 189 MG/DL (ref 70–99)
POTASSIUM BLD-SCNC: 4.1 MMOL/L (ref 3.4–5.3)
SODIUM SERPL-SCNC: 138 MMOL/L (ref 133–144)

## 2022-09-06 ENCOUNTER — OFFICE VISIT (OUTPATIENT)
Dept: OPTOMETRY | Facility: CLINIC | Age: 57
End: 2022-09-06
Payer: COMMERCIAL

## 2022-09-06 DIAGNOSIS — Z01.01 VISION EXAM WITH ABNORMAL FINDINGS: Primary | ICD-10-CM

## 2022-09-06 DIAGNOSIS — E11.9 TYPE 2 DIABETES MELLITUS WITHOUT COMPLICATION, UNSPECIFIED WHETHER LONG TERM INSULIN USE (H): ICD-10-CM

## 2022-09-06 DIAGNOSIS — H52.223 REGULAR ASTIGMATISM OF BOTH EYES: ICD-10-CM

## 2022-09-06 DIAGNOSIS — H52.4 PRESBYOPIA: ICD-10-CM

## 2022-09-06 DIAGNOSIS — H10.13 ALLERGIC CONJUNCTIVITIS, BILATERAL: ICD-10-CM

## 2022-09-06 PROCEDURE — 92014 COMPRE OPH EXAM EST PT 1/>: CPT | Performed by: OPTOMETRIST

## 2022-09-06 PROCEDURE — 92015 DETERMINE REFRACTIVE STATE: CPT | Performed by: OPTOMETRIST

## 2022-09-06 RX ORDER — ASPIRIN 81 MG/1
TABLET, CHEWABLE ORAL
COMMUNITY
Start: 2022-01-18 | End: 2023-05-18

## 2022-09-06 ASSESSMENT — REFRACTION_WEARINGRX
OS_SPHERE: +2.00
OD_CYLINDER: +0.50
OS_SPHERE: +0.50
OD_SPHERE: PLANO
OS_CYLINDER: +0.75
OD_CYLINDER: +0.50
OS_AXIS: 030
OD_ADD: +1.50
OD_AXIS: 180
OS_ADD: +1.50
OS_AXIS: 030
SPECS_TYPE: SVL NEAR
OD_SPHERE: +1.50
OS_CYLINDER: +0.75
OD_AXIS: 180
SPECS_TYPE: PAL

## 2022-09-06 ASSESSMENT — REFRACTION_MANIFEST
OD_AXIS: 163
OS_ADD: +2.00
OS_AXIS: 041
OD_ADD: +2.00
OS_SPHERE: +1.25
OS_SPHERE: +0.25
OD_CYLINDER: +0.50
OD_AXIS: 150
OS_CYLINDER: +0.75
OD_SPHERE: PLANO
OD_SPHERE: +0.25
OS_CYLINDER: +0.75
METHOD_AUTOREFRACTION: 1
OD_CYLINDER: +0.50
OS_AXIS: 030

## 2022-09-06 ASSESSMENT — VISUAL ACUITY
OS_SC: 20/30
OD_SC: 20/20
METHOD: SNELLEN - LINEAR
OS_SC: 20/70-1
OD_SC: 20/70-3
OS_SC+: -2

## 2022-09-06 ASSESSMENT — EXTERNAL EXAM - RIGHT EYE: OD_EXAM: NORMAL

## 2022-09-06 ASSESSMENT — KERATOMETRY
OS_K2POWER_DIOPTERS: 44.25
OD_K2POWER_DIOPTERS: 44.25
OS_AXISANGLE2_DEGREES: 170
OD_K1POWER_DIOPTERS: 43.50
OS_K1POWER_DIOPTERS: 44.00
OD_AXISANGLE2_DEGREES: 15

## 2022-09-06 ASSESSMENT — TONOMETRY
OD_IOP_MMHG: 13
IOP_METHOD: APPLANATION
OS_IOP_MMHG: 13

## 2022-09-06 ASSESSMENT — SLIT LAMP EXAM - LIDS
COMMENTS: NORMAL
COMMENTS: NORMAL

## 2022-09-06 ASSESSMENT — CONF VISUAL FIELD
OD_NORMAL: 1
OS_NORMAL: 1
METHOD: COUNTING FINGERS

## 2022-09-06 ASSESSMENT — CUP TO DISC RATIO
OS_RATIO: 0.6
OD_RATIO: 0.6

## 2022-09-06 ASSESSMENT — EXTERNAL EXAM - LEFT EYE: OS_EXAM: NORMAL

## 2022-09-06 NOTE — PATIENT INSTRUCTIONS
No glasses advised  Keep blood sugar under good control  Use allergy drops as needed -   Use Zaditor or Alaway allergy drop twice a day as needed for itchy eyes if prescription is not covered by your insurance   Return in 1 year for diabetic eye exam      Blood sugar and blood pressure control are very important in the prevention of ocular complications from diabetes.       Danni Johnson, OD  [phone]

## 2022-09-06 NOTE — LETTER
9/6/2022         RE: Lan Potts Jr.  [address] [address] [address]        Dear Colleague,    Thank you for referring your patient, Lan Potts Jr., to the [facility].  No diabetic retinopathy was found at eye exam. Please see a copy of my visit note below.    Chief Complaint   Patient presents with     Diabetic Eye Exam        Chief Complaint(s) and History of Present Illness(es)     Diabetic Eye Exam     Vision: is stable    Diabetes Type: Type 2 and controlled with diet    Blood Sugars: is controlled              Comments     Controlled with diet and shot once a week                Lab Results   Component Value Date    A1C 6.3 07/18/2022    A1C 6.0 01/14/2022    A1C 7.2 09/22/2021    A1C 9.9 05/21/2021    A1C 10.4 04/14/2021    A1C 6.9 11/14/2019    A1C 6.4 09/19/2019    A1C 6.6 08/15/2019            Last Eye Exam: 8/25/21  Dilated Previously: Yes    What are you currently using to see?  Readers, uses from +1.00-+2.00. Did not bring any of them today     Distance Vision Acuity: Satisfied with vision, no changes     Near Vision Acuity: Uses his OTC readers to read and on the computer and on cell phone most of the time     Eye Comfort: good usually  and has some itching from allergies in spring and fall   Do you use eye drops? : Yes: Uses drops as needed for allergies. Would like a Rx for allergy drops if possible Spring and Fall   Occupation or Hobbies: Retired     [employer] Assistant      Medical, surgical and family histories reviewed and updated 9/6/2022.       OBJECTIVE: See Ophthalmology exam    ASSESSMENT:    ICD-10-CM    1. Vision exam with abnormal findings  Z01.01 EYE EXAM (SIMPLE-NONBILLABLE)     REFRACTION   2. Regular astigmatism of both eyes  H52.223 EYE EXAM (SIMPLE-NONBILLABLE)     REFRACTION   3. Allergic conjunctivitis, bilateral  H10.13 EYE EXAM (SIMPLE-NONBILLABLE)     REFRACTION     ketotifen (ZADITOR) 0.025 % ophthalmic solution   4.  Presbyopia  H52.4 EYE EXAM (SIMPLE-NONBILLABLE)     REFRACTION   5. Type 2 diabetes mellitus without complication, unspecified whether long term insulin use (H)  E11.9 EYE EXAM (SIMPLE-NONBILLABLE)     REFRACTION       PLAN:    Lan Potts Jr. aware  eye exam results will be sent to Elig[facility] Quevedo  Patient Instructions   No glasses advised  Keep blood sugar under good control  Use allergy drops as needed -   Use Zaditor or Alaway allergy drop twice a day as needed for itchy eyes if prescription is not covered by your insurance   Return in 1 year for diabetic eye exam      Blood sugar and blood pressure control are very important in the prevention of ocular complications from diabetes.       Danni Johnson, OD  [phone]                        Again, thank you for allowing me to participate in the care of your patient.        Sincerely,        Danni Johnson, OD

## 2022-09-06 NOTE — PROGRESS NOTES
Chief Complaint   Patient presents with     Diabetic Eye Exam        Chief Complaint(s) and History of Present Illness(es)     Diabetic Eye Exam     Vision: is stable    Diabetes Type: Type 2 and controlled with diet    Blood Sugars: is controlled              Comments     Controlled with diet and shot once a week                Lab Results   Component Value Date    A1C 6.3 07/18/2022    A1C 6.0 01/14/2022    A1C 7.2 09/22/2021    A1C 9.9 05/21/2021    A1C 10.4 04/14/2021    A1C 6.9 11/14/2019    A1C 6.4 09/19/2019    A1C 6.6 08/15/2019            Last Eye Exam: 8/25/21  Dilated Previously: Yes    What are you currently using to see?  Readers, uses from +1.00-+2.00. Did not bring any of them today     Distance Vision Acuity: Satisfied with vision, no changes     Near Vision Acuity: Uses his OTC readers to read and on the computer and on cell phone most of the time     Eye Comfort: good usually  and has some itching from allergies in spring and fall   Do you use eye drops? : Yes: Uses drops as needed for allergies. 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       OBJECTIVE: See Ophthalmology exam    ASSESSMENT:    ICD-10-CM    1. Vision exam with abnormal findings  Z01.01 EYE EXAM (SIMPLE-NONBILLABLE)     REFRACTION   2. Regular astigmatism of both eyes  H52.223 EYE EXAM (SIMPLE-NONBILLABLE)     REFRACTION   3. Allergic conjunctivitis, bilateral  H10.13 EYE EXAM (SIMPLE-NONBILLABLE)     REFRACTION     ketotifen (ZADITOR) 0.025 % ophthalmic solution   4. Presbyopia  H52.4 EYE EXAM (SIMPLE-NONBILLABLE)     REFRACTION   5. Type 2 diabetes mellitus without complication, unspecified whether long term insulin use (H)  E11.9 EYE EXAM (SIMPLE-NONBILLABLE)     REFRACTION       PLAN:    Lan Potts Jr. aware  eye exam results will be sent to Eligio Quevedo  D.  Patient Instructions   No glasses advised  Keep blood sugar under good control  Use allergy drops as needed -   Use Zaditor or Alaway allergy drop twice a day as needed for itchy eyes if prescription is not covered by your insurance   Return in 1 year for diabetic eye exam      Blood sugar and blood pressure control are very important in the prevention of ocular complications from diabetes.       Danni Johnson, OD  [phone]

## 2022-10-13 DIAGNOSIS — F41.1 GAD (GENERALIZED ANXIETY DISORDER): ICD-10-CM

## 2022-10-13 RX ORDER — ALPRAZOLAM 1 MG
TABLET ORAL
Qty: 30 TABLET | Refills: 0 | Status: SHIPPED | OUTPATIENT
Start: 2022-10-13 | End: 2022-11-23

## 2022-10-22 ENCOUNTER — HEALTH MAINTENANCE LETTER (OUTPATIENT)
Age: 57
End: 2022-10-22

## 2022-11-23 DIAGNOSIS — F41.1 GAD (GENERALIZED ANXIETY DISORDER): ICD-10-CM

## 2022-11-23 RX ORDER — ALPRAZOLAM 1 MG
TABLET ORAL
Qty: 20 TABLET | Refills: 0 | Status: SHIPPED | OUTPATIENT
Start: 2022-11-23 | End: 2023-01-16

## 2022-12-19 ENCOUNTER — ALLIED HEALTH/NURSE VISIT (OUTPATIENT)
Dept: EDUCATION SERVICES | Facility: CLINIC | Age: 57
End: 2022-12-19
Payer: COMMERCIAL

## 2022-12-19 DIAGNOSIS — E11.9 DIABETES MELLITUS WITHOUT COMPLICATION (H): Primary | ICD-10-CM

## 2022-12-19 DIAGNOSIS — E11.9 TYPE 2 DIABETES MELLITUS WITHOUT COMPLICATION, WITHOUT LONG-TERM CURRENT USE OF INSULIN (H): ICD-10-CM

## 2022-12-19 PROCEDURE — 99207 PR NO CHARGE NURSE ONLY: CPT

## 2022-12-19 PROCEDURE — G0108 DIAB MANAGE TRN  PER INDIV: HCPCS

## 2022-12-19 RX ORDER — PROCHLORPERAZINE 25 MG/1
1 SUPPOSITORY RECTAL CONTINUOUS
Qty: 1 EACH | Refills: 3 | Status: SHIPPED | OUTPATIENT
Start: 2022-12-19

## 2022-12-19 RX ORDER — PROCHLORPERAZINE 25 MG/1
1 SUPPOSITORY RECTAL CONTINUOUS
Qty: 1 EACH | Refills: 3 | Status: SHIPPED | OUTPATIENT
Start: 2022-12-19 | End: 2022-12-19

## 2022-12-19 RX ORDER — PROCHLORPERAZINE 25 MG/1
1 SUPPOSITORY RECTAL CONTINUOUS
Qty: 9 EACH | Refills: 11 | Status: SHIPPED | OUTPATIENT
Start: 2022-12-19 | End: 2022-12-19

## 2022-12-19 RX ORDER — PROCHLORPERAZINE 25 MG/1
1 SUPPOSITORY RECTAL CONTINUOUS
Qty: 9 EACH | Refills: 11 | Status: SHIPPED | OUTPATIENT
Start: 2022-12-19 | End: 2022-12-26

## 2022-12-19 RX ORDER — BLOOD-GLUCOSE METER
EACH MISCELLANEOUS
Qty: 150 EACH | Refills: 11 | Status: SHIPPED | OUTPATIENT
Start: 2022-12-19 | End: 2024-02-22

## 2022-12-19 NOTE — PROGRESS NOTES
Diabetes Self-Management Education & Support    Presents for: Follow-up    Type of Service: In Person Visit    Assessment Type:   ASSESSMENT:  Lan is here today, does not look very well, he has had the flu for a few wks, still recovering.  He looks pale and appears to be dehydrated.  I asked him about his meals, he is skipping some meals, and does not appear to be drinking enough.  He says his BG has been low, he contacted cardiology he said, and they told him not to get up fast, he does tend to get dizzy.  I stressed about dehydration and getting in enough carbs for energy.  See plan.  He agreed he would be better about fluids, he is on a GLP-1 and SGLT-2 both causes of dehydration.  I also asked him to get in contact with family to check in on him at times.  Will revisit him in a month.  Today he has the POGO meter tested 104, and called to get his Dexcom up to date on RX and his medications.      Patient's most recent   Lab Results   Component Value Date    A1C 6.3 07/18/2022    A1C 9.9 05/21/2021     is meeting goal of <7.0    Diabetes knowledge and skills assessment:   Patient is knowledgeable in diabetes management concepts related to: Healthy Eating, Being Active, Monitoring, Taking Medication, Problem Solving, Reducing Risks and Healthy Coping    Continue education with the following diabetes management concepts: Healthy Eating, Taking Medication and Problem Solving    Based on learning assessment above, most appropriate setting for further diabetes education would be: Individual setting.      PLAN  1. Eating breakfast when you wake up. For example , eggs and sausage and 2 toast and a banana or a cup of frozen berries.  2. Lunch about 1:00 soup and crackers and a fruit.    3. Have pasta or microwave meals and a fruit.    4. Get gatorade G2 or propel these would be good at least twice per day  5. Getting water or flavored water during the day, you are dehydrated.  6. You will be getting a call from [facility] and [facility] regarding Dexcom or from [facility].  7. When standing up, do not move until dizziness is gone, stand up slowly.   8. Make an appointment with Dr. Ky Almaguer to cover at upcoming visits: Healthy Eating, Monitoring, Taking Medication and Problem Solving    Follow-up: Jan 20    See Care Plan for co-developed, patient-state behavior change goals.  AVS provided for patient today.    Education Materials Provided:  No new materials provided today      SUBJECTIVE/OBJECTIVE:  Presents for: Follow-up  Accompanied by: Self  Diabetes education in the past 24mo: Yes  Focus of Visit: Taking Medication  Diabetes type: Type 2  Disease course: Stable  How confident are you filling out medical forms by yourself:: Extremely  Transportation concerns: No  Difficulty affording diabetes medication?: No  Difficulty affording diabetes testing supplies?: No  Other concerns:: None  Cultural Influences/Ethnic Background:  Choose not to answer      Diabetes Symptoms & Complications:  Fatigue: No  Neuropathy: Sometimes  Polydipsia: Sometimes  Polyphagia: Sometimes  Polyuria: No  Visual change: No  Slow healing wounds: No  Symptom course: Stable  Weight trend: Stable  Autonomic neuropathy: No  CVA: No  Heart disease: No  Nephropathy: No  Peripheral neuropathy: No  Peripheral Vascular Disease: No  Retinopathy: No  Sexual dysfunction: No    Patient Problem List and Family Medical History reviewed for relevant medical history, current medical status, and diabetes risk factors.    Vitals:  There were no vitals taken for this visit.  Estimated body mass index is 38.41 kg/m  as calculated from the following:    Height as of 1/17/22: 1.784 m (5' 10.25\").    Weight as of 7/18/22: 122.3 kg (269 lb 9.6 oz).   Last 3 BP:   BP Readings from Last 3 Encounters:   07/18/22 97/61   01/17/22 (!) 84/49   01/03/22 93/62       History   Smoking Status     Former     Packs/day: 0.00     Years: 0.00     Types: " Cigarettes   Smokeless Tobacco     Never     Comment: thinking about quitting       Labs:  Lab Results   Component Value Date    A1C 6.3 07/18/2022    A1C 9.9 05/21/2021     Lab Results   Component Value Date     07/18/2022     04/14/2021     Lab Results   Component Value Date    LDL 73 01/14/2022     05/21/2021     HDL Cholesterol   Date Value Ref Range Status   05/21/2021 33 (L) >40 mg/dL Final     Direct Measure HDL   Date Value Ref Range Status   01/14/2022 36 (L) >=40 mg/dL Final   ]  GFR Estimate   Date Value Ref Range Status   07/18/2022 >90 >60 mL/min/1.73m2 Final     Comment:     Effective December 21, 2021 eGFRcr in adults is calculated using the 2021 CKD-EPI creatinine equation which includes age and gender (Leigh Ann rocha al., NEJ, DOI: 10.1056/OYCDor6475682)   04/14/2021 >90 >60 mL/min/[1.73_m2] Final     Comment:     Non  GFR Calc  Starting 12/18/2018, serum creatinine based estimated GFR (eGFR) will be   calculated using the Chronic Kidney Disease Epidemiology Collaboration   (CKD-EPI) equation.       GFR Estimate If Black   Date Value Ref Range Status   04/14/2021 >90 >60 mL/min/[1.73_m2] Final     Comment:      GFR Calc  Starting 12/18/2018, serum creatinine based estimated GFR (eGFR) will be   calculated using the Chronic Kidney Disease Epidemiology Collaboration   (CKD-EPI) equation.       Lab Results   Component Value Date    CR 0.78 07/18/2022    CR 0.59 04/14/2021     No results found for: MICROALBUMIN    Healthy Eating:  Healthy Eating Assessed Today: Yes  Cultural/Buddhism diet restrictions?: No  Meal planning/habits: None  How many times a week on average do you eat food made away from home (restaurant/take-out)?: 0  Meals include: Lunch, Dinner, Morning Snack, Afternoon Snack  Breakfast: 2 eggs and sausage,  Lunch: has brunch  Dinner: soup or microwave meal.  almond milk, water coffee, tea  Snacks: ice cream cone between lunch and  dinner.  Beverages: Water, Tea, Coffee, Milk  Has patient met with a dietitian in the past?: Yes    Being Active:  Being Active Assessed Today: No  Days per week of moderate to strenuous exercise (like a brisk walk): 3  On average, minutes per day of exercise at this level: 20  How intense was your typical exercise? : Moderate (like brisk walking)  Exercise Minutes per Week: 60  Barrier to exercise: Physical limitation    Monitoring:  Monitoring Assessed Today: Yes  Did patient bring glucose meter to appointment? : No  Blood Glucose Meter: Accu-chek  Times checking blood sugar at home (number): Other  Times checking blood sugar at home (per): Day  Blood glucose trend: No change    Did not bring meter, has not used Dexcom since July    Taking Medications:  Diabetes Medication(s)     Sodium-Glucose Co-Transporter 2 (SGLT2) Inhibitors       dapagliflozin (FARXIGA) 5 MG TABS tablet    Take 1 tablet (5 mg) by mouth daily    Incretin Mimetic Agents       dulaglutide (TRULICITY) 0.75 MG/0.5ML pen    Inject 0.75 mg Subcutaneous every 7 days          Current Treatments: Non-insulin Injectables  Given by: Patient  Injection/Infusion sites: Abdomen  Problems taking diabetes medications regularly?: No  Diabetes medication side effects?: No    Problem Solving:  Problem Solving Assessed Today: No  Is the patient at risk for hypoglycemia?: No              Reducing Risks:  Reducing Risks Assessed Today: No  CAD Risks: Diabetes Mellitus  Has dilated eye exam at least once a year?: Yes  Sees dentist every 6 months?: Yes  Feet checked by healthcare provider in the last year?: Yes    Healthy Coping:  Emotional response to diabetes: Confidence diabetes can be controlled  Informal Support system:: Family, Friends, Neighbors, Parent  Stage of change: ACTION (Actively working towards change)  Patient Activation Measure Survey Score:  JACQUELINE Score (Last Two) 12/21/2012 11/9/2021   JACQUELINE Raw Score 49 29   Activation Score 82.8 52.9   JACQUELINE Level 4  2         Care Plan and Education Provided:  Care Plan: Diabetes   Updates made by Korin Fontenot RN since 12/19/2022 12:00 AM      Problem: HbA1C Not In Goal       Goal: Establish Regular Follow-Ups with PCP    Start Date: 12/19/2022   This Visit's Progress: 70%      Task: Discuss with PCP the recommended timing for patient's next follow up visit(s) Completed 12/19/2022   Responsible User: Korin Fontenot RN      Task: Discuss schedule for PCP visits with patient Completed 12/19/2022   Responsible User: Korin Fontenot RN      Goal: Get HbA1C Level in Goal    Start Date: 12/19/2022   This Visit's Progress: 70%      Task: Educate patient on diabetes education self-management topics Completed 12/19/2022   Responsible User: Korin Fontenot RN      Task: Educate patient on benefits of regular glucose monitoring Completed 12/19/2022   Responsible User: Korin Fontenot RN      Task: Refer patient to appropriate extended care team member, as needed (Medication Therapy Management, Behavioral Health, Physical Therapy, etc.)    Responsible User: Korin Fontenot RN      Task: Discuss diabetes treatment plan with patient Completed 12/19/2022   Responsible User: Korin Fontenot RN      Problem: Diabetes Self-Management Education Needed to Optimize Self-Care Behaviors       Goal: Understand diabetes pathophysiology and disease progression    Start Date: 12/19/2022   This Visit's Progress: 50%      Task: Provide education on diabetes pathophysiology and disease progression specfic to patient's diabetes type Completed 12/19/2022   Responsible User: Korin Fontenot RN      Goal: Healthy Eating - follow a healthy eating pattern for diabetes    Start Date: 12/19/2022   This Visit's Progress: 30%      Task: Provide education on portion control and consistency in amount, composition and timing of food intake Completed 12/19/2022   Responsible User: Korin Fontenot RN      Task: Provide education on managing carbohydrate intake  (carbohydrate counting, plate planning method, etc.) Completed 12/19/2022   Responsible User: Korin Fontenot RN      Task: Provide education on weight management    Responsible User: Korin Fontenot RN      Task: Provide education on heart healthy eating Completed 12/19/2022   Responsible User: Korin Fontenot RN      Task: Provide education on eating out    Responsible User: Korin Fontenot RN      Task: Develop individualized healthy eating plan with patient Completed 12/19/2022   Responsible User: Korin Fontenot RN      Goal: Being Active - get regular physical activity, working up to at least 150 minutes per week       Task: Provide education on relationship of activity to glucose and precautions to take if at risk for low glucose    Responsible User: Korin Fontenot RN      Task: Discuss barriers to physical activity with patient    Responsible User: Korin Fontenot RN      Task: Develop physical activity plan with patient    Responsible User: Korin Fontenot RN      Task: Explore community resources including walking groups, assistance programs, and home videos    Responsible User: Korin Fontenot RN      Goal: Monitoring - monitor glucose and ketones as directed    Start Date: 12/19/2022   This Visit's Progress: 20%      Task: Provide education on blood glucose monitoring (purpose, proper technique, frequency, glucose targets, interpreting results, when to use glucose control solution, sharps disposal) Completed 12/19/2022   Responsible User: Korin Fontenot RN      Task: Provide education on continuous glucose monitoring (sensor placement, use of anderson or /reader, understanding glucose trends, alerts and alarms, differences between sensor glucose and blood glucose) Completed 12/19/2022   Responsible User: Korin Fontenot RN      Task: Provide education on ketone monitoring (when to monitor, frequency, etc.)    Responsible User: Korin Fontenot RN      Goal: Taking Medication - patient is consistently  taking medications as directed    Start Date: 12/19/2022   This Visit's Progress: 100%      Task: Provide education on action of prescribed medication, including when to take and possible side effects Completed 12/19/2022   Responsible User: Korin Fontenot RN      Task: Provide education on insulin and injectable diabetes medications, including administration, storage, site selection and rotation for injection sites Completed 12/19/2022   Responsible User: Korin Fontenot RN      Task: Discuss barriers to medication adherence with patient and provide management technique ideas as appropriate Completed 12/19/2022   Responsible User: Korin Fontenot RN      Task: Provide education on frequency and refill details of medications Completed 12/19/2022   Responsible User: Korin Fontenot RN      Goal: Problem Solving - know how to prevent and manage short-term diabetes complications       Task: Provide education on high blood glucose - causes, signs/symptoms, prevention and treatment    Responsible User: Korin Fontenot RN      Task: Provide education on low blood glucose - causes, signs/symptoms, prevention, treatment, carrying a carbohydrate source at all times, and medical identification    Responsible User: Korin Fontenot RN      Task: Provide education on safe travel with diabetes    Responsible User: Korin Fontenot RN      Task: Provide education on how to care for diabetes on sick days    Responsible User: Korin Fontenot RN      Task: Provide education on when to call a health care provider    Responsible User: Korin Fontenot RN      Goal: Reducing Risks - know how to prevent and treat long-term diabetes complications       Task: Provide education on major complications of diabetes, prevention, early diagnostic measures and treatment of complications    Responsible User: Korin Fontenot RN      Task: Provide education on recommended care for dental, eye and foot health    Responsible User: Korin Fontenot RN       Task: Provide education on Hemoglobin A1c - goals and relationship to blood glucose levels    Responsible User: Korin Fontenot RN      Task: Provide education on recommendations for heart health - lipid levels and goals, blood pressure and goals, and aspirin therapy, if indicated    Responsible User: Korin Fontenot RN      Task: Provide education on tobacco cessation    Responsible User: Korin Fontenot RN      Goal: Healthy Coping - use available resources to cope with the challenges of managing diabetes       Task: Discuss recognizing feelings about having diabetes    Responsible User: Korin Fontenot RN      Task: Provide education on the benefits of making appropriate lifestyle changes    Responsible User: Korin Fontenot RN      Task: Provide education on benefits of utilizing support systems    Responsible User: Korin Fontenot RN      Task: Discuss methods for coping with stress    Responsible User: Korin Fontenot RN      Task: Provide education on when to seek professional counseling    Responsible User: Korin Fontenot RN Sue Truhler RN/NALDO Monteiro Diabetes Educator      Time Spent: 60 minutes  Encounter Type: Individual    Any diabetes medication dose changes were made via the CDE Protocol per the patient's primary care provider. A copy of this encounter was shared with the provider.

## 2022-12-19 NOTE — PATIENT INSTRUCTIONS
Diabetes Support Resources:  Eating breakfast when you wake up. For example , eggs and sausage and 2 toast and a banana or a cup of frozen berries.  Lunch about 1:00 soup and crackers and a fruit.    Have pasta or microwave meals and a fruit.    Get gatorade G2 or propel these would be good at least twice per day  Getting water or flavored water during the day, you are dehydrated.  You will be getting a call from [facility] and [facility] regarding Dexcom or from [facility].  When standing up, do not move until dizziness is gone, stand up slowly.   Make an appointment with Dr. Mcpherson     Bring blood glucose meter and logbook with you to all doctor and follow-up appointments.    Diabetes Education Telephone Visit Follow-up:    We realize your time is valuable and your health is important! We offer a convenient Telephone Visit follow up! It s a quick way to check in for a medication dose adjustment without having to come back to clinic as soon.    Telephone Visits are often covered by insurance. Please check with your insurance plan to see if this type of visit is covered. If not, the cost is less expensive than an office visit:    Up to 10 minutes (Code 01330): $30  11-20 minutes (Code 57438): $59  More than 20 minutes (Code 16557): $85    Talk with your Diabetes Educator if you want to learn more.      [facility]:  For Your Diabetes Education and Nutrition Appointments Call:  [phone]   For Diabetes Education or Nutrition Related Questions:   Phone: [phone]  Send [employer] Message   If you need a medication refill please contact your pharmacy. Please allow 3 business days for your refills to be completed.

## 2022-12-19 NOTE — LETTER
12/19/2022         RE: Lan Potts Jr.  [id number] [facility] [address]        Dear Colleague,    Thank you for referring your patient, Lan Potts Jr., to the [facility]. Please see a copy of my visit note below.    Diabetes Self-Management Education & Support    Presents for: Follow-up    Type of Service: In Person Visit    Assessment Type:   ASSESSMENT:  Lan is here today, does not look very well, he has had the flu for a few wks, still recovering.  He looks pale and appears to be dehydrated.  I asked him about his meals, he is skipping some meals, and does not appear to be drinking enough.  He says his BG has been low, he contacted cardiology he said, and they told him not to get up fast, he does tend to get dizzy.  I stressed about dehydration and getting in enough carbs for energy.  See plan.  He agreed he would be better about fluids, he is on a GLP-1 and SGLT-2 both causes of dehydration.  I also asked him to get in contact with family to check in on him at times.  Will revisit him in a month.  Today he has the POGO meter tested 104, and called to get his Dexcom up to date on RX and his medications.      Patient's most recent   Lab Results   Component Value Date    A1C 6.3 07/18/2022    A1C 9.9 05/21/2021     is meeting goal of <7.0    Diabetes knowledge and skills assessment:   Patient is knowledgeable in diabetes management concepts related to: Healthy Eating, Being Active, Monitoring, Taking Medication, Problem Solving, Reducing Risks and Healthy Coping    Continue education with the following diabetes management concepts: Healthy Eating, Taking Medication and Problem Solving    Based on learning assessment above, most appropriate setting for further diabetes education would be: Individual setting.      PLAN  1. Eating breakfast when you wake up. For example , eggs and sausage and 2 toast and a banana or a cup of frozen berries.  2. Lunch about 1:00 soup and  crackers and a fruit.    3. Have pasta or microwave meals and a fruit.    4. Get gatorade G2 or propel these would be good at least twice per day  5. Getting water or flavored water during the day, you are dehydrated.  6. You will be getting a call from [facility] and [facility] regarding Dexcom or from [facility].  7. When standing up, do not move until dizziness is gone, stand up slowly.   8. Make an appointment with Dr. Mcpherson    Topics to cover at upcoming visits: Healthy Eating, Monitoring, Taking Medication and Problem Solving    Follow-up: Jan 20    See Care Plan for co-developed, patient-state behavior change goals.  AVS provided for patient today.    Education Materials Provided:  No new materials provided today      SUBJECTIVE/OBJECTIVE:  Presents for: Follow-up  Accompanied by: Self  Diabetes education in the past 24mo: Yes  Focus of Visit: Taking Medication  Diabetes type: Type 2  Disease course: Stable  How confident are you filling out medical forms by yourself:: Extremely  Transportation concerns: No  Difficulty affording diabetes medication?: No  Difficulty affording diabetes testing supplies?: No  Other concerns:: None  Cultural Influences/Ethnic Background:  Choose not to answer      Diabetes Symptoms & Complications:  Fatigue: No  Neuropathy: Sometimes  Polydipsia: Sometimes  Polyphagia: Sometimes  Polyuria: No  Visual change: No  Slow healing wounds: No  Symptom course: Stable  Weight trend: Stable  Autonomic neuropathy: No  CVA: No  Heart disease: No  Nephropathy: No  Peripheral neuropathy: No  Peripheral Vascular Disease: No  Retinopathy: No  Sexual dysfunction: No    Patient Problem List and Family Medical History reviewed for relevant medical history, current medical status, and diabetes risk factors.    Vitals:  There were no vitals taken for this visit.  Estimated body mass index is 38.41 kg/m  as calculated from the following:    Height as of 1/17/22: 1.784 m (5'  "10.25\").    Weight as of 7/18/22: 122.3 kg (269 lb 9.6 oz).   Last 3 BP:   BP Readings from Last 3 Encounters:   07/18/22 97/61   01/17/22 (!) 84/49   01/03/22 93/62       History   Smoking Status     Former     Packs/day: 0.00     Years: 0.00     Types: Cigarettes   Smokeless Tobacco     Never     Comment: thinking about quitting       Labs:  Lab Results   Component Value Date    A1C 6.3 07/18/2022    A1C 9.9 05/21/2021     Lab Results   Component Value Date     07/18/2022     04/14/2021     Lab Results   Component Value Date    LDL 73 01/14/2022     05/21/2021     HDL Cholesterol   Date Value Ref Range Status   05/21/2021 33 (L) >40 mg/dL Final     Direct Measure HDL   Date Value Ref Range Status   01/14/2022 36 (L) >=40 mg/dL Final   ]  GFR Estimate   Date Value Ref Range Status   07/18/2022 >90 >60 mL/min/1.73m2 Final     Comment:     Effective December 21, 2021 eGFRcr in adults is calculated using the 2021 CKD-EPI creatinine equation which includes age and gender (Leigh Ann et al., NEJM, DOI: 10.1056/HSPStl1963647)   04/14/2021 >90 >60 mL/min/[1.73_m2] Final     Comment:     Non  GFR Calc  Starting 12/18/2018, serum creatinine based estimated GFR (eGFR) will be   calculated using the Chronic Kidney Disease Epidemiology Collaboration   (CKD-EPI) equation.       GFR Estimate If Black   Date Value Ref Range Status   04/14/2021 >90 >60 mL/min/[1.73_m2] Final     Comment:      GFR Calc  Starting 12/18/2018, serum creatinine based estimated GFR (eGFR) will be   calculated using the Chronic Kidney Disease Epidemiology Collaboration   (CKD-EPI) equation.       Lab Results   Component Value Date    CR 0.78 07/18/2022    CR 0.59 04/14/2021     No results found for: MICROALBUMIN    Healthy Eating:  Healthy Eating Assessed Today: Yes  Cultural/Judaism diet restrictions?: No  Meal planning/habits: None  How many times a week on average do you eat food made away from home " (restaurant/take-out)?: 0  Meals include: Lunch, Dinner, Morning Snack, Afternoon Snack  Breakfast: 2 eggs and sausage,  Lunch: has brunch  Dinner: soup or microwave meal.  almond milk, water coffee, tea  Snacks: ice cream cone between lunch and dinner.  Beverages: Water, Tea, Coffee, Milk  Has patient met with a dietitian in the past?: Yes    Being Active:  Being Active Assessed Today: No  Days per week of moderate to strenuous exercise (like a brisk walk): 3  On average, minutes per day of exercise at this level: 20  How intense was your typical exercise? : Moderate (like brisk walking)  Exercise Minutes per Week: 60  Barrier to exercise: Physical limitation    Monitoring:  Monitoring Assessed Today: Yes  Did patient bring glucose meter to appointment? : No  Blood Glucose Meter: Accu-chek  Times checking blood sugar at home (number): Other  Times checking blood sugar at home (per): Day  Blood glucose trend: No change    Did not bring meter, has not used Dexcom since July    Taking Medications:  Diabetes Medication(s)     Sodium-Glucose Co-Transporter 2 (SGLT2) Inhibitors       dapagliflozin (FARXIGA) 5 MG TABS tablet    Take 1 tablet (5 mg) by mouth daily    Incretin Mimetic Agents       dulaglutide (TRULICITY) 0.75 MG/0.5ML pen    Inject 0.75 mg Subcutaneous every 7 days          Current Treatments: Non-insulin Injectables  Given by: Patient  Injection/Infusion sites: Abdomen  Problems taking diabetes medications regularly?: No  Diabetes medication side effects?: No    Problem Solving:  Problem Solving Assessed Today: No  Is the patient at risk for hypoglycemia?: No              Reducing Risks:  Reducing Risks Assessed Today: No  CAD Risks: Diabetes Mellitus  Has dilated eye exam at least once a year?: Yes  Sees dentist every 6 months?: Yes  Feet checked by healthcare provider in the last year?: Yes    Healthy Coping:  Emotional response to diabetes: Confidence diabetes can be controlled  Informal Support  system:: Family, Friends, Neighbors, Parent  Stage of change: ACTION (Actively working towards change)  Patient Activation Measure Survey Score:  JACQUELINE Score (Last Two) 12/21/2012 11/9/2021   JACQUELINE Raw Score 49 29   Activation Score 82.8 52.9   JACQUELINE Level 4 2         Care Plan and Education Provided:  Care Plan: Diabetes   Updates made by Korin Fontenot RN since 12/19/2022 12:00 AM      Problem: HbA1C Not In Goal       Goal: Establish Regular Follow-Ups with PCP    Start Date: 12/19/2022   This Visit's Progress: 70%      Task: Discuss with PCP the recommended timing for patient's next follow up visit(s) Completed 12/19/2022   Responsible User: Korin Fontenot RN      Task: Discuss schedule for PCP visits with patient Completed 12/19/2022   Responsible User: Korin Fontenot RN      Goal: Get HbA1C Level in Goal    Start Date: 12/19/2022   This Visit's Progress: 70%      Task: Educate patient on diabetes education self-management topics Completed 12/19/2022   Responsible User: Korin Fontenot RN      Task: Educate patient on benefits of regular glucose monitoring Completed 12/19/2022   Responsible User: Korin Fontenot RN      Task: Refer patient to appropriate extended care team member, as needed (Medication Therapy Management, Behavioral Health, Physical Therapy, etc.)    Responsible User: Korin Fontenot RN      Task: Discuss diabetes treatment plan with patient Completed 12/19/2022   Responsible User: Korin Fontenot RN      Problem: Diabetes Self-Management Education Needed to Optimize Self-Care Behaviors       Goal: Understand diabetes pathophysiology and disease progression    Start Date: 12/19/2022   This Visit's Progress: 50%      Task: Provide education on diabetes pathophysiology and disease progression specfic to patient's diabetes type Completed 12/19/2022   Responsible User: Korin Fontenot RN      Goal: Healthy Eating - follow a healthy eating pattern for diabetes    Start Date: 12/19/2022   This Visit's  Progress: 30%      Task: Provide education on portion control and consistency in amount, composition and timing of food intake Completed 12/19/2022   Responsible User: Korin Fontenot RN      Task: Provide education on managing carbohydrate intake (carbohydrate counting, plate planning method, etc.) Completed 12/19/2022   Responsible User: Korin Fontenot RN      Task: Provide education on weight management    Responsible User: Korin Fontenot RN      Task: Provide education on heart healthy eating Completed 12/19/2022   Responsible User: Korin Fontenot RN      Task: Provide education on eating out    Responsible User: Korin Fontenot RN      Task: Develop individualized healthy eating plan with patient Completed 12/19/2022   Responsible User: Korin Fontenot RN      Goal: Being Active - get regular physical activity, working up to at least 150 minutes per week       Task: Provide education on relationship of activity to glucose and precautions to take if at risk for low glucose    Responsible User: Korin Fontenot RN      Task: Discuss barriers to physical activity with patient    Responsible User: Korin Fontenot RN      Task: Develop physical activity plan with patient    Responsible User: Korin Fontenot RN      Task: Explore community resources including walking groups, assistance programs, and home videos    Responsible User: Korin Fontenot RN      Goal: Monitoring - monitor glucose and ketones as directed    Start Date: 12/19/2022   This Visit's Progress: 20%      Task: Provide education on blood glucose monitoring (purpose, proper technique, frequency, glucose targets, interpreting results, when to use glucose control solution, sharps disposal) Completed 12/19/2022   Responsible User: Korin Fontenot RN      Task: Provide education on continuous glucose monitoring (sensor placement, use of anderson or /reader, understanding glucose trends, alerts and alarms, differences between sensor glucose and blood  glucose) Completed 12/19/2022   Responsible User: Korin Fontenot RN      Task: Provide education on ketone monitoring (when to monitor, frequency, etc.)    Responsible User: Korin Fontenot RN      Goal: Taking Medication - patient is consistently taking medications as directed    Start Date: 12/19/2022   This Visit's Progress: 100%      Task: Provide education on action of prescribed medication, including when to take and possible side effects Completed 12/19/2022   Responsible User: Korin Fontenot RN      Task: Provide education on insulin and injectable diabetes medications, including administration, storage, site selection and rotation for injection sites Completed 12/19/2022   Responsible User: Korin Fontenot RN      Task: Discuss barriers to medication adherence with patient and provide management technique ideas as appropriate Completed 12/19/2022   Responsible User: Korin Fontenot RN      Task: Provide education on frequency and refill details of medications Completed 12/19/2022   Responsible User: Korin Fontenot RN      Goal: Problem Solving - know how to prevent and manage short-term diabetes complications       Task: Provide education on high blood glucose - causes, signs/symptoms, prevention and treatment    Responsible User: Korin Fontenot RN      Task: Provide education on low blood glucose - causes, signs/symptoms, prevention, treatment, carrying a carbohydrate source at all times, and medical identification    Responsible User: Korin Fontenot RN      Task: Provide education on safe travel with diabetes    Responsible User: Korin Fontenot RN      Task: Provide education on how to care for diabetes on sick days    Responsible User: Korin Fontenot RN      Task: Provide education on when to call a health care provider    Responsible User: Korin Fontenot RN      Goal: Reducing Risks - know how to prevent and treat long-term diabetes complications       Task: Provide education on major  complications of diabetes, prevention, early diagnostic measures and treatment of complications    Responsible User: Korin Fontenot RN      Task: Provide education on recommended care for dental, eye and foot health    Responsible User: Korin Fontenot RN      Task: Provide education on Hemoglobin A1c - goals and relationship to blood glucose levels    Responsible User: Korin Fontenot RN      Task: Provide education on recommendations for heart health - lipid levels and goals, blood pressure and goals, and aspirin therapy, if indicated    Responsible User: Korin Fontenot RN      Task: Provide education on tobacco cessation    Responsible User: Korin Fontenot RN      Goal: Healthy Coping - use available resources to cope with the challenges of managing diabetes       Task: Discuss recognizing feelings about having diabetes    Responsible User: Korin Fontenot RN      Task: Provide education on the benefits of making appropriate lifestyle changes    Responsible User: Korin Fontenot RN      Task: Provide education on benefits of utilizing support systems    Responsible User: Korin Fontenot RN      Task: Discuss methods for coping with stress    Responsible User: Korin Fontenot RN      Task: Provide education on when to seek professional counseling    Responsible User: Korin Fontenot RN Sue Truhler RN/NALDO Monteiro Diabetes Educator      Time Spent: 60 minutes  Encounter Type: Individual    Any diabetes medication dose changes were made via the TEEE Protocol per the patient's primary care provider. A copy of this encounter was shared with the provider.

## 2022-12-20 ENCOUNTER — TELEPHONE (OUTPATIENT)
Dept: FAMILY MEDICINE | Facility: CLINIC | Age: 57
End: 2022-12-20

## 2022-12-20 NOTE — TELEPHONE ENCOUNTER
Prior Authorization Retail Medication Request    Medication/Dose: blood glucose monitoring (POGO AUTOMATIC) test cartridges  ICD code (if different than what is on RX):    Previously Tried and Failed:    Rationale:      Insurance Name:    Insurance ID:        Pharmacy Information (if different than what is on RX)  Name:    Phone:

## 2022-12-21 NOTE — TELEPHONE ENCOUNTER
Central Prior Authorization Team   Phone: [phone]    PA Initiation    Medication: blood glucose monitoring (POGO AUTOMATIC) test cartridges  Insurance Company: [employer] [place] - Phone [phone] Fax [phone]  Pharmacy Filling the Rx: [facility] -- [place], [place] - [address]  Filling Pharmacy Phone: [phone]  Filling Pharmacy Fax:    Start Date: 12/21/2022

## 2022-12-21 NOTE — TELEPHONE ENCOUNTER
PRIOR AUTHORIZATION DENIED    Medication: blood glucose monitoring (POGO AUTOMATIC) test cartridges    Denial Date: 12/21/2022    Denial Rational:  Patient must have a history of trial & failure to the formulary alternative(s) or have a contraindication or intolerance to the formulary alternatives:                Appeal Information:    If you would like to appeal, please supply P/A team with a letter of medical necessity with clinical reason.

## 2022-12-26 DIAGNOSIS — E11.9 TYPE 2 DIABETES MELLITUS WITHOUT COMPLICATION, WITHOUT LONG-TERM CURRENT USE OF INSULIN (H): ICD-10-CM

## 2022-12-26 RX ORDER — PROCHLORPERAZINE 25 MG/1
SUPPOSITORY RECTAL
Qty: 9 EACH | Refills: 11 | Status: SHIPPED | OUTPATIENT
Start: 2022-12-26

## 2023-01-16 DIAGNOSIS — F41.1 GAD (GENERALIZED ANXIETY DISORDER): ICD-10-CM

## 2023-01-16 RX ORDER — ALPRAZOLAM 1 MG
TABLET ORAL
Qty: 20 TABLET | Refills: 0 | Status: SHIPPED | OUTPATIENT
Start: 2023-01-16 | End: 2023-01-19

## 2023-01-19 ENCOUNTER — OFFICE VISIT (OUTPATIENT)
Dept: FAMILY MEDICINE | Facility: CLINIC | Age: 58
End: 2023-01-19
Payer: COMMERCIAL

## 2023-01-19 VITALS
BODY MASS INDEX: 39.98 KG/M2 | OXYGEN SATURATION: 96 % | WEIGHT: 285.6 LBS | HEIGHT: 71 IN | DIASTOLIC BLOOD PRESSURE: 56 MMHG | SYSTOLIC BLOOD PRESSURE: 96 MMHG | TEMPERATURE: 98.6 F | HEART RATE: 85 BPM

## 2023-01-19 DIAGNOSIS — N52.9 ERECTILE DYSFUNCTION, UNSPECIFIED ERECTILE DYSFUNCTION TYPE: ICD-10-CM

## 2023-01-19 DIAGNOSIS — F41.1 GAD (GENERALIZED ANXIETY DISORDER): ICD-10-CM

## 2023-01-19 DIAGNOSIS — E78.5 HYPERLIPIDEMIA WITH TARGET LDL LESS THAN 100: ICD-10-CM

## 2023-01-19 DIAGNOSIS — E66.01 MORBID OBESITY (H): ICD-10-CM

## 2023-01-19 DIAGNOSIS — E11.9 TYPE 2 DIABETES MELLITUS WITHOUT COMPLICATION, WITHOUT LONG-TERM CURRENT USE OF INSULIN (H): Primary | ICD-10-CM

## 2023-01-19 DIAGNOSIS — I50.22 CHF (CONGESTIVE HEART FAILURE), NYHA CLASS II, CHRONIC, SYSTOLIC (H): ICD-10-CM

## 2023-01-19 LAB
ANION GAP SERPL CALCULATED.3IONS-SCNC: 6 MMOL/L (ref 3–14)
BUN SERPL-MCNC: 17 MG/DL (ref 7–30)
CALCIUM SERPL-MCNC: 9.5 MG/DL (ref 8.5–10.1)
CHLORIDE BLD-SCNC: 104 MMOL/L (ref 94–109)
CO2 SERPL-SCNC: 28 MMOL/L (ref 20–32)
CREAT SERPL-MCNC: 0.88 MG/DL (ref 0.66–1.25)
GFR SERPL CREATININE-BSD FRML MDRD: >90 ML/MIN/1.73M2
GLUCOSE BLD-MCNC: 106 MG/DL (ref 70–99)
HBA1C MFR BLD: 6.5 % (ref 0–5.6)
POTASSIUM BLD-SCNC: 4.5 MMOL/L (ref 3.4–5.3)
SODIUM SERPL-SCNC: 138 MMOL/L (ref 133–144)

## 2023-01-19 PROCEDURE — 99214 OFFICE O/P EST MOD 30 MIN: CPT | Mod: 25 | Performed by: FAMILY MEDICINE

## 2023-01-19 PROCEDURE — 36415 COLL VENOUS BLD VENIPUNCTURE: CPT | Performed by: FAMILY MEDICINE

## 2023-01-19 PROCEDURE — 91312 COVID-19 VACCINE BIVALENT BOOSTER 12+ (PFIZER): CPT | Performed by: FAMILY MEDICINE

## 2023-01-19 PROCEDURE — 0124A COVID-19 VACCINE BIVALENT BOOSTER 12+ (PFIZER): CPT | Performed by: FAMILY MEDICINE

## 2023-01-19 PROCEDURE — 90471 IMMUNIZATION ADMIN: CPT | Performed by: FAMILY MEDICINE

## 2023-01-19 PROCEDURE — 90686 IIV4 VACC NO PRSV 0.5 ML IM: CPT | Performed by: FAMILY MEDICINE

## 2023-01-19 PROCEDURE — 83036 HEMOGLOBIN GLYCOSYLATED A1C: CPT | Performed by: FAMILY MEDICINE

## 2023-01-19 PROCEDURE — 80048 BASIC METABOLIC PNL TOTAL CA: CPT | Performed by: FAMILY MEDICINE

## 2023-01-19 RX ORDER — ROSUVASTATIN CALCIUM 10 MG/1
TABLET, COATED ORAL
Qty: 30 TABLET | Refills: 5 | Status: SHIPPED | OUTPATIENT
Start: 2023-01-19 | End: 2023-01-20

## 2023-01-19 RX ORDER — ALPRAZOLAM 1 MG
TABLET ORAL
Qty: 20 TABLET | Refills: 0 | COMMUNITY
Start: 2023-01-19 | End: 2023-09-13

## 2023-01-19 RX ORDER — SILDENAFIL CITRATE 20 MG/1
TABLET ORAL
Qty: 30 TABLET | Refills: 1 | Status: SHIPPED | OUTPATIENT
Start: 2023-01-19

## 2023-01-19 RX ORDER — ESCITALOPRAM OXALATE 20 MG/1
TABLET ORAL
Qty: 135 TABLET | Refills: 1 | Status: SHIPPED | OUTPATIENT
Start: 2023-01-19 | End: 2023-01-25

## 2023-01-19 ASSESSMENT — PAIN SCALES - GENERAL: PAINLEVEL: MODERATE PAIN (4)

## 2023-01-19 NOTE — PROGRESS NOTES
ASSESSMENT / PLAN:  (E11.9) Type 2 diabetes mellitus without complication, without long-term current use of insulin (H)  (primary encounter diagnosis)  Comment: stable  Plan: BASIC METABOLIC PANEL, Hemoglobin A1c        Diet/exercise and weight loss. Continue meds. Recheck in 6 months  Follow-up eye exam      (I50.22) CHF (congestive heart failure), NYHA class II, chronic, systolic (H)  Comment: stab;e  Plan: per cardiology. Chest pain or shortness of breath or rapid weight gain to er.    (E66.01) Morbid obesity (H)  Comment: needs help  Plan: see above    (F41.1) JUAN (generalized anxiety disorder)  Comment: needs help with winter blues  Plan: escitalopram (LEXAPRO) 20 MG tablet, ALPRAZolam        (XANAX) 1 MG tablet        Increase from 20 to 30mg. Possible back down in summer. Max #20/xanax/month and avoid with ALCOHOL or driving. If SUICIAL IDEATION OR HOMOCIDAL IDEATION OR DEJUAN TO ER. Call/email with questions/concerns. Recheck in 6 months sooner if wrose    (N52.9) Erectile dysfunction, unspecified erectile dysfunction type  Comment: viagra ok in past  Plan: sildenafil (REVATIO) 20 MG tablet        To ER if chest pain, shortness of breath , prolonged erections or visual change. Patient not taking nitroglycerin and told me he discussed with cardiology in past and ok. Urology/muse are options for future. Call/email with questions/concerns     (E78.5) Hyperlipidemia with target LDL less than 100  Comment: off crestor for body aches  Plan: rosuvastatin (CRESTOR) 10 MG tablet        Really needs statin. Start every other day and take vitaminD. Alternative per cardiology.         Subjective   Lan is a 57 year old presenting for the following health issues:  History of dm, htn, gerd, cad, anxiety, chf and morbid obesity. seeing cardiology.  Lower blood pressure - occasionally LIGHTHEADED.  Tolerating.   No LOSS OF CONSCIENCE. No chest pain or shortness of breath.   No black or blood sugars. Not interested in  colonoscopy.   No urine changes or hematuria.   Emotionally doing ok. lexapro   Trazodone - not very helpful.   Xanax helpful. No history florentin - s/p tonsillectomy.   Would like to increase lexapro to 30mg. No SUICIAL IDEATION OR HOMOCIDAL IDEATION OR DEJUAN TO ER   crestor - body aches. Was on 40mg - down 20mg.   No feet ulcers or changes. Eye exam ok.   No nitroglcerin needed. Ok for viagra in past.     No chief complaint on file.      History of Present Illness       Back Pain:  He presents for follow up of back pain. Patient's back pain is a chronic problem.  Location of back pain:  Right lower back and left lower back  Description of back pain: gnawing  Back pain spreads: right thigh    Since patient first noticed back pain, pain is: always present, but gets better and worse  Does back pain interfere with his job:  Not applicable      Diabetes:   He presents for follow up of diabetes.  He is checking home blood glucose with a continuous glucose monitor.  He checks blood glucose before meals, after meals, before and after meals and at bedtime.  Blood glucose is never over 200 and never under 70. He is aware of hypoglycemia symptoms including weakness and lethargy. He has no concerns regarding his diabetes at this time.  He is having numbness in feet.         Heart Failure:  He presents for follow up of heart failure. He is experiencing shortness of breath with activity only, which is slightly worse. He is experiencing lower extremity edema which is same as usual. He has orthopenea and is not coughing at night. Patient is not checking weight daily. He has recently had a weight increase. He has side effects from medications including other. He has had no other medical visits for heart failure since the last visit.    He eats 2-3 servings of fruits and vegetables daily.He consumes 0 sweetened beverage(s) daily.He exercises with enough effort to increase his heart rate 20 to 29 minutes per day.  He exercises with  "enough effort to increase his heart rate 4 days per week.   He is taking medications regularly.       Review of Systems         Objective    There were no vitals taken for this visit.  There is no height or weight on file to calculate BMI.   BP 96/56   Pulse 85   Temp 98.6  F (37  C) (Oral)   Ht 1.803 m (5' 11\")   Wt 129.5 kg (285 lb 9.6 oz)   SpO2 96%   BMI 39.83 kg/m     Physical Exam   GENERAL: healthy, alert and no distress  EYES: Eyes grossly normal to inspection, PERRL and conjunctivae and sclerae normal  HENT: ear canals and TM's normal, nose and mouth without ulcers or lesions  NECK: no adenopathy, no asymmetry, masses, or scars and thyroid normal to palpation  RESP: lungs clear to auscultation - no rales, rhonchi or wheezes  CV: regular rate and rhythm, normal S1 S2, no S3 or S4, no murmur, click or rub, no peripheral edema and peripheral pulses strong  ABDOMEN: soft, nontender, no hepatosplenomegaly, no masses and bowel sounds normal  MS: no gross musculoskeletal defects noted, no edema  PSYCH: mentation appears normal, affect normal/bright        "

## 2023-01-20 ENCOUNTER — TELEPHONE (OUTPATIENT)
Dept: FAMILY MEDICINE | Facility: CLINIC | Age: 58
End: 2023-01-20

## 2023-01-20 ENCOUNTER — ALLIED HEALTH/NURSE VISIT (OUTPATIENT)
Dept: EDUCATION SERVICES | Facility: CLINIC | Age: 58
End: 2023-01-20
Payer: COMMERCIAL

## 2023-01-20 DIAGNOSIS — E11.9 DIABETES MELLITUS WITHOUT COMPLICATION (H): Primary | ICD-10-CM

## 2023-01-20 DIAGNOSIS — E78.5 HYPERLIPIDEMIA WITH TARGET LDL LESS THAN 100: ICD-10-CM

## 2023-01-20 PROCEDURE — G0108 DIAB MANAGE TRN  PER INDIV: HCPCS | Mod: AE

## 2023-01-20 RX ORDER — ROSUVASTATIN CALCIUM 10 MG/1
TABLET, COATED ORAL
Qty: 30 TABLET | Refills: 5 | Status: SHIPPED | OUTPATIENT
Start: 2023-01-20

## 2023-01-20 NOTE — PROGRESS NOTES
Diabetes Self-Management Education & Support    Presents for: Follow-up    Type of Service: In Person Visit    Assessment Type:   ASSESSMENT:  Lan is at the best he can be considering his cardiac situation. He is tolerating his medications well.  He does have some light headedness at times.  This is due to dehydration and his BP and cardiac status.  He is to call if he needs any further help.    Patient's most recent   Lab Results   Component Value Date    A1C 6.5 01/19/2023    A1C 9.9 05/21/2021     is meeting goal of <7.0    Diabetes knowledge and skills assessment:   Patient is knowledgeable in diabetes management concepts related to: Healthy Eating, Being Active, Monitoring, Taking Medication, Problem Solving, Reducing Risks and Healthy Coping    Continue education with the following diabetes management concepts: Taking Medication    Based on learning assessment above, most appropriate setting for further diabetes education would be: Individual setting.      PLAN  1. Making sure you are getting in at least 64 oz per day of fluids  2. Weigh yourself daily, and report to either cardiology if having more than 5 lbs weight gain.  3. Check your BP 2-3 times per week or per the cardiologist orders  4. Continue with Farxiga 5 mg daily  5. Trulicity 0.75 mg weekly  6. If A1C goes > 7.0 % , then call [phone] for an appointment to see me    Topics to cover at upcoming visits: Taking Medication    Follow-up: as needed    See Care Plan for co-developed, patient-state behavior change goals.  AVS provided for patient today.    Education Materials Provided:  No new materials provided today      SUBJECTIVE/OBJECTIVE:  Presents for: Follow-up  Accompanied by: Self  Diabetes education in the past 24mo: Yes  Focus of Visit: Taking Medication  GLP-1 Type: Trulicity  Diabetes type: Type 2  Disease course: Stable  How confident are you filling out medical forms by yourself:: Extremely  Transportation concerns: No  Difficulty  "affording diabetes medication?: No  Difficulty affording diabetes testing supplies?: No  Other concerns:: None  Cultural Influences/Ethnic Background:  Choose not to answer      Diabetes Symptoms & Complications:  Fatigue: No  Neuropathy: Sometimes  Polydipsia: Sometimes  Polyphagia: Sometimes  Polyuria: No  Visual change: No  Slow healing wounds: No  Symptom course: Stable  Weight trend: Increasing  Autonomic neuropathy: No  CVA: No  Heart disease: Yes  Nephropathy: No  Peripheral neuropathy: No  Peripheral Vascular Disease: No  Retinopathy: No  Sexual dysfunction: No    Patient Problem List and Family Medical History reviewed for relevant medical history, current medical status, and diabetes risk factors.    Vitals:  There were no vitals taken for this visit.  Estimated body mass index is 39.83 kg/m  as calculated from the following:    Height as of 1/19/23: 1.803 m (5' 11\").    Weight as of 1/19/23: 129.5 kg (285 lb 9.6 oz).   Last 3 BP:   BP Readings from Last 3 Encounters:   01/19/23 96/56   07/18/22 97/61   01/17/22 (!) 84/49       History   Smoking Status     Former     Packs/day: 0.00     Years: 0.00     Types: Cigarettes   Smokeless Tobacco     Never     Comment: thinking about quitting       Labs:  Lab Results   Component Value Date    A1C 6.5 01/19/2023    A1C 9.9 05/21/2021     Lab Results   Component Value Date     01/19/2023     04/14/2021     Lab Results   Component Value Date    LDL 73 01/14/2022     05/21/2021     HDL Cholesterol   Date Value Ref Range Status   05/21/2021 33 (L) >40 mg/dL Final     Direct Measure HDL   Date Value Ref Range Status   01/14/2022 36 (L) >=40 mg/dL Final   ]  GFR Estimate   Date Value Ref Range Status   01/19/2023 >90 >60 mL/min/1.73m2 Final     Comment:     Effective December 21, 2021 eGFRcr in adults is calculated using the 2021 CKD-EPI creatinine equation which includes age and gender (Leigh Ann rocha al., [place], DOI: 10.1056/FXCLup7367481)   04/14/2021 " >90 >60 mL/min/[1.73_m2] Final     Comment:     Non  GFR Calc  Starting 12/18/2018, serum creatinine based estimated GFR (eGFR) will be   calculated using the Chronic Kidney Disease Epidemiology Collaboration   (CKD-EPI) equation.       GFR Estimate If Black   Date Value Ref Range Status   04/14/2021 >90 >60 mL/min/[1.73_m2] Final     Comment:      GFR Calc  Starting 12/18/2018, serum creatinine based estimated GFR (eGFR) will be   calculated using the Chronic Kidney Disease Epidemiology Collaboration   (CKD-EPI) equation.       Lab Results   Component Value Date    CR 0.88 01/19/2023    CR 0.59 04/14/2021     No results found for: MICROALBUMIN    Healthy Eating:  Healthy Eating Assessed Today: Yes  Cultural/Cheondoism diet restrictions?: No  Meal planning/habits: None  How many times a week on average do you eat food made away from home (restaurant/take-out)?: 0  Meals include: Lunch, Dinner, Morning Snack, Afternoon Snack  Breakfast: smoothies, blueberries, PB , protein powder, yogurt and banana and almond milk  Lunch: healthy choice microwave meals.  Dinner: soups  Snacks: ice cream cone, daytime, veggies and dips.  Beverages: Water, Tea, Coffee, Milk  Has patient met with a dietitian in the past?: No    Being Active:  Being Active Assessed Today: Yes  Exercise:: Yes  Days per week of moderate to strenuous exercise (like a brisk walk): 3  On average, minutes per day of exercise at this level: 20  How intense was your typical exercise? : Light (like stretching or slow walking)  Exercise Minutes per Week: 60  Barrier to exercise: Physical limitation    Monitoring:  Monitoring Assessed Today: Yes  Did patient bring glucose meter to appointment? : Yes  Blood Glucose Meter: Accu-chek, Other  Times checking blood sugar at home (number): 5+  Times checking blood sugar at home (per): Day  Blood glucose trend: No change        Taking Medications:  Diabetes Medication(s)     Sodium-Glucose  Co-Transporter 2 (SGLT2) Inhibitors       dapagliflozin (FARXIGA) 5 MG TABS tablet    Take 1 tablet (5 mg) by mouth daily    Incretin Mimetic Agents       dulaglutide (TRULICITY) 0.75 MG/0.5ML pen    Inject 0.75 mg Subcutaneous every 7 days          Taking Medication Assessed Today: Yes  Current Treatments: Non-insulin Injectables, Oral Medication (taken by mouth), Diet  Given by: Patient  Problems taking diabetes medications regularly?: No  Diabetes medication side effects?: No    Problem Solving:  Problem Solving Assessed Today: Yes  Is the patient at risk for hypoglycemia?: No  Hypoglycemia Frequency: Rarely              Reducing Risks:  CAD Risks: Diabetes Mellitus  Has dilated eye exam at least once a year?: Yes  Sees dentist every 6 months?: Yes  Feet checked by healthcare provider in the last year?: Yes    Healthy Coping:  Informal Support system:: Family, Friends, Neighbors, Parent  Stage of change: ACTION (Actively working towards change)  Patient Activation Measure Survey Score:  JACQUELINE Score (Last Two) 12/21/2012 11/9/2021   JACQUELINE Raw Score 49 29   Activation Score 82.8 52.9   JACQUELINE Level 4 2         Care Plan and Education Provided:  Care Plan: Diabetes   Updates made by Korin Fontenot, RN since 1/20/2023 12:00 AM      Problem: HbA1C Not In Goal       Goal: Establish Regular Follow-Ups with PCP    Start Date: 1/20/2023   This Visit's Progress: 100%   Recent Progress: 70%      Goal: Get HbA1C Level in Goal    Start Date: 1/20/2023   This Visit's Progress: 100%   Recent Progress: 70%      Problem: Diabetes Self-Management Education Needed to Optimize Self-Care Behaviors       Goal: Healthy Eating - follow a healthy eating pattern for diabetes    Start Date: 1/20/2023   This Visit's Progress: 50%   Recent Progress: 30%      Goal: Being Active - get regular physical activity, working up to at least 150 minutes per week       Task: Discuss barriers to physical activity with patient Completed 1/20/2023   Responsible  User: Korin Fontenot RN      Goal: Monitoring - monitor glucose and ketones as directed    Start Date: 1/20/2023   This Visit's Progress: 100%   Recent Progress: 20%          Freya Fontenot RN/NALDO Monteiro Diabetes Educator      Time Spent: 30 minutes  Encounter Type: Individual    Any diabetes medication dose changes were made via the CDE Protocol per the patient's primary care provider. A copy of this encounter was shared with the provider.

## 2023-01-20 NOTE — TELEPHONE ENCOUNTER
Fax message regarding Rosuvastatin  rcvd 1/19/2023:    Please clarify SIG. Need dosing. This does not have a specific dosing. Please resend stating how many tablets for each dose. Thanks.

## 2023-01-20 NOTE — TELEPHONE ENCOUNTER
PRIOR AUTHORIZATION DENIED    Medication: sildenafil (REVATIO) 20 MG tablet - EPA DENIED    Denial Date: 1/20/2023    Denial Rational: DX is not covered under plan    Appeal Information:

## 2023-01-20 NOTE — LETTER
1/20/2023         RE: Lan Potts Jr.  [address] [facility] [address]        Dear Colleague,    Thank you for referring your patient, Lan Potts Jr., to the [facility]. Please see a copy of my visit note below.    Diabetes Self-Management Education & Support    Presents for: Follow-up    Type of Service: In Person Visit    Assessment Type:   ASSESSMENT:  Lan is at the best he can be considering his cardiac situation. He is tolerating his medications well.  He does have some light headedness at times.  This is due to dehydration and his BP and cardiac status.  He is to call if he needs any further help.    Patient's most recent   Lab Results   Component Value Date    A1C 6.5 01/19/2023    A1C 9.9 05/21/2021     is meeting goal of <7.0    Diabetes knowledge and skills assessment:   Patient is knowledgeable in diabetes management concepts related to: Healthy Eating, Being Active, Monitoring, Taking Medication, Problem Solving, Reducing Risks and Healthy Coping    Continue education with the following diabetes management concepts: Taking Medication    Based on learning assessment above, most appropriate setting for further diabetes education would be: Individual setting.      PLAN  1. Making sure you are getting in at least 64 oz per day of fluids  2. Weigh yourself daily, and report to either cardiology if having more than 5 lbs weight gain.  3. Check your BP 2-3 times per week or per the cardiologist orders  4. Continue with Farxiga 5 mg daily  5. Trulicity 0.75 mg weekly  6. If A1C goes > 7.0 % , then call [phone] for an appointment to see me    Topics to cover at upcoming visits: Taking Medication    Follow-up: as needed    See Care Plan for co-developed, patient-state behavior change goals.  AVS provided for patient today.    Education Materials Provided:  No new materials provided today      SUBJECTIVE/OBJECTIVE:  Presents for: Follow-up  Accompanied by:  "Self  Diabetes education in the past 24mo: Yes  Focus of Visit: Taking Medication  GLP-1 Type: Trulicity  Diabetes type: Type 2  Disease course: Stable  How confident are you filling out medical forms by yourself:: Extremely  Transportation concerns: No  Difficulty affording diabetes medication?: No  Difficulty affording diabetes testing supplies?: No  Other concerns:: None  Cultural Influences/Ethnic Background:  Choose not to answer      Diabetes Symptoms & Complications:  Fatigue: No  Neuropathy: Sometimes  Polydipsia: Sometimes  Polyphagia: Sometimes  Polyuria: No  Visual change: No  Slow healing wounds: No  Symptom course: Stable  Weight trend: Increasing  Autonomic neuropathy: No  CVA: No  Heart disease: Yes  Nephropathy: No  Peripheral neuropathy: No  Peripheral Vascular Disease: No  Retinopathy: No  Sexual dysfunction: No    Patient Problem List and Family Medical History reviewed for relevant medical history, current medical status, and diabetes risk factors.    Vitals:  There were no vitals taken for this visit.  Estimated body mass index is 39.83 kg/m  as calculated from the following:    Height as of 1/19/23: 1.803 m (5' 11\").    Weight as of 1/19/23: 129.5 kg (285 lb 9.6 oz).   Last 3 BP:   BP Readings from Last 3 Encounters:   01/19/23 96/56   07/18/22 97/61   01/17/22 (!) 84/49       History   Smoking Status     Former     Packs/day: 0.00     Years: 0.00     Types: Cigarettes   Smokeless Tobacco     Never     Comment: thinking about quitting       Labs:  Lab Results   Component Value Date    A1C 6.5 01/19/2023    A1C 9.9 05/21/2021     Lab Results   Component Value Date     01/19/2023     04/14/2021     Lab Results   Component Value Date    LDL 73 01/14/2022     05/21/2021     HDL Cholesterol   Date Value Ref Range Status   05/21/2021 33 (L) >40 mg/dL Final     Direct Measure HDL   Date Value Ref Range Status   01/14/2022 36 (L) >=40 mg/dL Final   ]  GFR Estimate   Date Value Ref " Range Status   01/19/2023 >90 >60 mL/min/1.73m2 Final     Comment:     Effective December 21, 2021 eGFRcr in adults is calculated using the 2021 CKD-EPI creatinine equation which includes age and gender (Leigh Ann rocha al., NEJM, DOI: 10.1056/TODOxt3161715)   04/14/2021 >90 >60 mL/min/[1.73_m2] Final     Comment:     Non  GFR Calc  Starting 12/18/2018, serum creatinine based estimated GFR (eGFR) will be   calculated using the Chronic Kidney Disease Epidemiology Collaboration   (CKD-EPI) equation.       GFR Estimate If Black   Date Value Ref Range Status   04/14/2021 >90 >60 mL/min/[1.73_m2] Final     Comment:      GFR Calc  Starting 12/18/2018, serum creatinine based estimated GFR (eGFR) will be   calculated using the Chronic Kidney Disease Epidemiology Collaboration   (CKD-EPI) equation.       Lab Results   Component Value Date    CR 0.88 01/19/2023    CR 0.59 04/14/2021     No results found for: MICROALBUMIN    Healthy Eating:  Healthy Eating Assessed Today: Yes  Cultural/Hindu diet restrictions?: No  Meal planning/habits: None  How many times a week on average do you eat food made away from home (restaurant/take-out)?: 0  Meals include: Lunch, Dinner, Morning Snack, Afternoon Snack  Breakfast: smoothies, blueberries, PB , protein powder, yogurt and banana and almond milk  Lunch: healthy choice microwave meals.  Dinner: soups  Snacks: ice cream cone, daytime, veggies and dips.  Beverages: Water, Tea, Coffee, Milk  Has patient met with a dietitian in the past?: No    Being Active:  Being Active Assessed Today: Yes  Exercise:: Yes  Days per week of moderate to strenuous exercise (like a brisk walk): 3  On average, minutes per day of exercise at this level: 20  How intense was your typical exercise? : Light (like stretching or slow walking)  Exercise Minutes per Week: 60  Barrier to exercise: Physical limitation    Monitoring:  Monitoring Assessed Today: Yes  Did patient bring glucose  meter to appointment? : Yes  Blood Glucose Meter: Accu-chek, Other  Times checking blood sugar at home (number): 5+  Times checking blood sugar at home (per): Day  Blood glucose trend: No change        Taking Medications:  Diabetes Medication(s)     Sodium-Glucose Co-Transporter 2 (SGLT2) Inhibitors       dapagliflozin (FARXIGA) 5 MG TABS tablet    Take 1 tablet (5 mg) by mouth daily    Incretin Mimetic Agents       dulaglutide (TRULICITY) 0.75 MG/0.5ML pen    Inject 0.75 mg Subcutaneous every 7 days          Taking Medication Assessed Today: Yes  Current Treatments: Non-insulin Injectables, Oral Medication (taken by mouth), Diet  Given by: Patient  Problems taking diabetes medications regularly?: No  Diabetes medication side effects?: No    Problem Solving:  Problem Solving Assessed Today: Yes  Is the patient at risk for hypoglycemia?: No  Hypoglycemia Frequency: Rarely              Reducing Risks:  CAD Risks: Diabetes Mellitus  Has dilated eye exam at least once a year?: Yes  Sees dentist every 6 months?: Yes  Feet checked by healthcare provider in the last year?: Yes    Healthy Coping:  Informal Support system:: Family, Friends, Neighbors, Parent  Stage of change: ACTION (Actively working towards change)  Patient Activation Measure Survey Score:  JACQUELINE Score (Last Two) 12/21/2012 11/9/2021   JACQUELINE Raw Score 49 29   Activation Score 82.8 52.9   JACQUELINE Level 4 2         Care Plan and Education Provided:  Care Plan: Diabetes   Updates made by Korin Fontenot, RN since 1/20/2023 12:00 AM      Problem: HbA1C Not In Goal       Goal: Establish Regular Follow-Ups with PCP    Start Date: 1/20/2023   This Visit's Progress: 100%   Recent Progress: 70%      Goal: Get HbA1C Level in Goal    Start Date: 1/20/2023   This Visit's Progress: 100%   Recent Progress: 70%      Problem: Diabetes Self-Management Education Needed to Optimize Self-Care Behaviors       Goal: Healthy Eating - follow a healthy eating pattern for diabetes    Start  Date: 1/20/2023   This Visit's Progress: 50%   Recent Progress: 30%      Goal: Being Active - get regular physical activity, working up to at least 150 minutes per week       Task: Discuss barriers to physical activity with patient Completed 1/20/2023   Responsible User: Korin Fontenot RN      Goal: Monitoring - monitor glucose and ketones as directed    Start Date: 1/20/2023   This Visit's Progress: 100%   Recent Progress: 20%          Freya Fontenot RN/NALDO Monteiro Diabetes Educator      Time Spent: 30 minutes  Encounter Type: Individual    Any diabetes medication dose changes were made via the CDE Protocol per the patient's primary care provider. A copy of this encounter was shared with the provider.

## 2023-01-20 NOTE — PATIENT INSTRUCTIONS
Diabetes Support Resources:  Making sure you are getting in at least 64 oz per day of fluids  Weigh yourself daily, and report to either cardiology if having more than 5 lbs weight gain.  Check your BP 2-3 times per week or per the cardiologist orders  Continue with Farxiga 5 mg daily  Trulicity 0.75 mg weekly  If A1C goes > 7.0 % , then call [phone] for an appointment to see me     Bring blood glucose meter and logbook with you to all doctor and follow-up appointments.    Diabetes Education Telephone Visit Follow-up:    We realize your time is valuable and your health is important! We offer a convenient Telephone Visit follow up! It s a quick way to check in for a medication dose adjustment without having to come back to clinic as soon.    Telephone Visits are often covered by insurance. Please check with your insurance plan to see if this type of visit is covered. If not, the cost is less expensive than an office visit:    Up to 10 minutes (Code 38119): $30  11-20 minutes (Code 02984): $59  More than 20 minutes (Code 28931): $85    Talk with your Diabetes Educator if you want to learn more.      [facility]:  For Your Diabetes Education and Nutrition Appointments Call:  [phone]   For Diabetes Education or Nutrition Related Questions:   Phone: [phone]  Send Rentabilities Message   If you need a medication refill please contact your pharmacy. Please allow 3 business days for your refills to be completed.

## 2023-01-23 ENCOUNTER — TELEPHONE (OUTPATIENT)
Dept: FAMILY MEDICINE | Facility: CLINIC | Age: 58
End: 2023-01-23
Payer: COMMERCIAL

## 2023-01-23 DIAGNOSIS — F41.1 GAD (GENERALIZED ANXIETY DISORDER): ICD-10-CM

## 2023-01-23 NOTE — TELEPHONE ENCOUNTER
Prior Authorization Retail Medication Request    Medication/Dose: escitalopram (LEXAPRO) 20 MG tablet  ICD code (if different than what is on RX):  JUAN (generalized anxiety disorder) [F41.1]   Previously Tried and Failed:    Rationale:      Insurance Phone #:  [phone]  Insurance ID:  [id number]      Pharmacy Information (if different than what is on RX)  Name:  [facility]  Phone:  [phone]    Max 1 per day

## 2023-01-25 RX ORDER — ESCITALOPRAM OXALATE 10 MG/1
10 TABLET ORAL DAILY
Qty: 90 TABLET | Refills: 1 | Status: SHIPPED | OUTPATIENT
Start: 2023-01-25 | End: 2023-07-10

## 2023-01-25 RX ORDER — ESCITALOPRAM OXALATE 20 MG/1
TABLET ORAL
Qty: 90 TABLET | Refills: 1 | Status: SHIPPED | OUTPATIENT
Start: 2023-01-25 | End: 2023-07-10

## 2023-01-25 NOTE — TELEPHONE ENCOUNTER
PRIOR AUTHORIZATION DENIED    Medication: escitalopram (LEXAPRO) 20 MG tablet - Denied    Denial Date: 1/25/2023    Denial Rational: Plan will not pay for more than 1 tablet per day.         Appeal Information:

## 2023-01-25 NOTE — TELEPHONE ENCOUNTER
PA Initiation    Medication: escitalopram (LEXAPRO) 20 MG tablet - Initiated  Insurance Company: [employer] - Phone [phone] Fax [phone]  Pharmacy Filling the Rx: [id number] - [place], [place] - [address]  Filling Pharmacy Phone: [phone]  Filling Pharmacy Fax: [phone]  Start Date: 1/25/2023

## 2023-01-26 ENCOUNTER — TELEPHONE (OUTPATIENT)
Dept: FAMILY MEDICINE | Facility: CLINIC | Age: 58
End: 2023-01-26

## 2023-01-26 NOTE — TELEPHONE ENCOUNTER
[facility] is calling to clarify prescription if direction is correct.    rosuvastatin (CRESTOR) 10 MG tablet 30 tablet 5 1/20/2023  No   Sig: One tab Every other day. Increase to Daily if tolerated. Take vitaminD supplement     Directions read to pharmacist. The medication will be filled.    Sarah Reyes RN  [facility]

## 2023-02-08 DIAGNOSIS — E11.9 TYPE 2 DIABETES MELLITUS WITHOUT COMPLICATION, WITHOUT LONG-TERM CURRENT USE OF INSULIN (H): ICD-10-CM

## 2023-02-08 RX ORDER — DAPAGLIFLOZIN 5 MG/1
TABLET, FILM COATED ORAL
Qty: 90 TABLET | Refills: 1 | Status: SHIPPED | OUTPATIENT
Start: 2023-02-08 | End: 2023-08-09

## 2023-02-10 ENCOUNTER — E-VISIT (OUTPATIENT)
Dept: FAMILY MEDICINE | Facility: CLINIC | Age: 58
End: 2023-02-10
Payer: COMMERCIAL

## 2023-02-10 DIAGNOSIS — R09.81 CONGESTION OF PARANASAL SINUS: Primary | ICD-10-CM

## 2023-02-10 DIAGNOSIS — R05.9 COUGH, UNSPECIFIED TYPE: ICD-10-CM

## 2023-02-10 PROCEDURE — 99421 OL DIG E/M SVC 5-10 MIN: CPT | Performed by: FAMILY MEDICINE

## 2023-02-10 RX ORDER — AZITHROMYCIN 250 MG/1
TABLET, FILM COATED ORAL
Qty: 6 TABLET | Refills: 0 | Status: SHIPPED | OUTPATIENT
Start: 2023-02-10 | End: 2023-02-15

## 2023-02-10 NOTE — PATIENT INSTRUCTIONS
Dear Lan Potts Jr.    After reviewing your responses, I've been able to diagnose you with    Congestion of paranasal sinus  Cough, unspecified type.      Based on your responses and diagnosis, I have prescribed   Orders Placed This Encounter     azithromycin (ZITHROMAX) 250 MG tablet    to treat your symptoms. I have sent this to your pharmacy.?     It is also important to stay well hydrated, get lots of rest and take over-the-counter decongestants,?tylenol?or ibuprofen if you?are able to?take those medications per your primary care provider to help relieve discomfort.?     It is important that you take?all of?your prescribed medication even if your symptoms are improving after a few doses.? Taking?all of?your medicine helps prevent the symptoms from returning.?     If your symptoms worsen, you develop severe headache, vomiting, high fever (>102), or are not improving in 7 days, please contact your primary care provider for an appointment or visit any of our convenient Walk-in Care or Urgent Care Centers to be seen which can be found on our website?here.?     Thanks again for choosing?us?as your health care partner,?   ?  Zachariah Mcpherson MD?

## 2023-02-27 ENCOUNTER — E-VISIT (OUTPATIENT)
Dept: FAMILY MEDICINE | Facility: CLINIC | Age: 58
End: 2023-02-27
Payer: COMMERCIAL

## 2023-02-27 DIAGNOSIS — R09.81 CONGESTION OF PARANASAL SINUS: Primary | ICD-10-CM

## 2023-02-27 PROCEDURE — 99421 OL DIG E/M SVC 5-10 MIN: CPT | Performed by: FAMILY MEDICINE

## 2023-02-27 RX ORDER — CEFUROXIME AXETIL 500 MG/1
500 TABLET ORAL 2 TIMES DAILY
Qty: 20 TABLET | Refills: 0 | Status: SHIPPED | OUTPATIENT
Start: 2023-02-27 | End: 2023-03-09

## 2023-02-27 NOTE — PATIENT INSTRUCTIONS
Dear Lan Potts Jr.    After reviewing your responses, I've been able to diagnose you with Data Unavailable.      Based on your responses and diagnosis, I have prescribed No orders of the defined types were placed in this encounter.   to treat your symptoms. I have sent this to your pharmacy.?     It is also important to stay well hydrated, get lots of rest and take over-the-counter decongestants,?tylenol?or ibuprofen if you?are able to?take those medications per your primary care provider to help relieve discomfort.?     It is important that you take?all of?your prescribed medication even if your symptoms are improving after a few doses.? Taking?all of?your medicine helps prevent the symptoms from returning.?     If your symptoms worsen, you develop severe headache, vomiting, high fever (>102), or are not improving in 7 days, please contact your primary care provider for an appointment or visit any of our convenient Walk-in Care or Urgent Care Centers to be seen which can be found on our website?here.?     Thanks again for choosing?us?as your health care partner,?   ?  Zachariah Mcpherson MD?

## 2023-03-13 DIAGNOSIS — E11.9 TYPE 2 DIABETES MELLITUS WITHOUT COMPLICATION, WITHOUT LONG-TERM CURRENT USE OF INSULIN (H): ICD-10-CM

## 2023-03-13 RX ORDER — DULAGLUTIDE 0.75 MG/.5ML
INJECTION, SOLUTION SUBCUTANEOUS
Qty: 6 ML | Refills: 1 | Status: SHIPPED | OUTPATIENT
Start: 2023-03-13 | End: 2023-09-19

## 2023-04-01 ENCOUNTER — HEALTH MAINTENANCE LETTER (OUTPATIENT)
Age: 58
End: 2023-04-01

## 2023-04-20 ENCOUNTER — OFFICE VISIT (OUTPATIENT)
Dept: OPTOMETRY | Facility: CLINIC | Age: 58
End: 2023-04-20
Payer: COMMERCIAL

## 2023-04-20 DIAGNOSIS — H52.221 REGULAR ASTIGMATISM, RIGHT EYE: ICD-10-CM

## 2023-04-20 DIAGNOSIS — H25.042 POSTERIOR SUBCAPSULAR POLAR SENILE CATARACT OF LEFT EYE: Primary | ICD-10-CM

## 2023-04-20 DIAGNOSIS — E11.9 TYPE 2 DIABETES MELLITUS WITHOUT COMPLICATION, WITHOUT LONG-TERM CURRENT USE OF INSULIN (H): ICD-10-CM

## 2023-04-20 DIAGNOSIS — H52.4 PRESBYOPIA: ICD-10-CM

## 2023-04-20 PROCEDURE — 92015 DETERMINE REFRACTIVE STATE: CPT | Performed by: OPTOMETRIST

## 2023-04-20 PROCEDURE — 92014 COMPRE OPH EXAM EST PT 1/>: CPT | Performed by: OPTOMETRIST

## 2023-04-20 ASSESSMENT — REFRACTION_MANIFEST
OD_CYLINDER: +0.50
OS_CYLINDER: +0.75
OD_AXIS: 175
OS_CYLINDER: SPHERE
OD_SPHERE: PLANO
OS_SPHERE: +0.25
OS_AXIS: 039
OS_ADD: +2.00
OD_ADD: +2.00
OD_CYLINDER: +0.75
OD_AXIS: 172
OD_SPHERE: +0.25
OS_SPHERE: +1.25

## 2023-04-20 ASSESSMENT — CONF VISUAL FIELD
OD_INFERIOR_TEMPORAL_RESTRICTION: 0
OS_NORMAL: 1
OD_SUPERIOR_NASAL_RESTRICTION: 0
OS_SUPERIOR_TEMPORAL_RESTRICTION: 0
OD_NORMAL: 1
OS_INFERIOR_NASAL_RESTRICTION: 0
OD_SUPERIOR_TEMPORAL_RESTRICTION: 0
OS_SUPERIOR_NASAL_RESTRICTION: 0
OS_INFERIOR_TEMPORAL_RESTRICTION: 0
OD_INFERIOR_NASAL_RESTRICTION: 0

## 2023-04-20 ASSESSMENT — TONOMETRY
IOP_METHOD: APPLANATION
OD_IOP_MMHG: 12
OS_IOP_MMHG: 12

## 2023-04-20 ASSESSMENT — VISUAL ACUITY
OD_CC: 20/20
OS_SC: 20/50
METHOD: SNELLEN - LINEAR
OD_SC: 20/20
OS_CC: 20/40
OD_SC+: -2
OS_PH_SC: 20/30

## 2023-04-20 ASSESSMENT — REFRACTION_WEARINGRX
OD_CYLINDER: SPHERE
SPECS_TYPE: OTC READERS
OS_CYLINDER: SPHERE
OD_SPHERE: +2.00
OS_SPHERE: +2.00

## 2023-04-20 ASSESSMENT — SLIT LAMP EXAM - LIDS
COMMENTS: NORMAL
COMMENTS: NORMAL

## 2023-04-20 ASSESSMENT — CUP TO DISC RATIO
OS_RATIO: 0.6
OD_RATIO: 0.6

## 2023-04-20 ASSESSMENT — EXTERNAL EXAM - LEFT EYE: OS_EXAM: NORMAL

## 2023-04-20 ASSESSMENT — EXTERNAL EXAM - RIGHT EYE: OD_EXAM: NORMAL

## 2023-04-20 NOTE — PROGRESS NOTES
Chief Complaint   Patient presents with     Blurred Vision Evaluation     Last Eye Exam - 9/6/2022    Chief Complaint(s) and History of Present Illness(es)     Blurred Vision Evaluation            Laterality: left eye    Onset: sudden    Onset: 2 months ago    Quality: blurred vision    Frequency: Episodically - usually not noticeable as right eye sees well. Sees better when wearing OTC readers.               Type II DM - reports mildly higher - but overall stable.  Last blood sugar - 141 at this time (avg 127 over the last 90 days)  Lab Results   Component Value Date    A1C 6.5 01/19/2023    A1C 6.3 07/18/2022    A1C 6.0 01/14/2022    A1C 7.2 09/22/2021    A1C 9.9 05/21/2021    A1C 10.4 04/14/2021    A1C 6.9 11/14/2019    A1C 6.4 09/19/2019    A1C 6.6 08/15/2019       Norma Centeno, HERMAN, 10:53 AM, 04/20/2023          Medical, surgical and family histories reviewed and updated 4/20/2023.    2018 disc rupture  -had some injections for pain            OBJECTIVE: See Ophthalmology exam    ASSESSMENT:    ICD-10-CM    1. Posterior subcapsular polar senile cataract of left eye  H25.042       2. Type 2 diabetes mellitus without complication, without long-term current use of insulin (H)  E11.9     no diabetic retinopathy found at eye exam       3. Regular astigmatism, right eye  H52.221       4. Presbyopia  H52.4          PLAN:    Patient Instructions   Refer for cataract evaluation left eye - appointment scheduled with Dr Sifuentes  No diabetic retinopathy   Keep blood sugar under good control  Return in 1 year for diabetic eye exam    Danni Johnosn, OD  [phone]

## 2023-04-20 NOTE — PATIENT INSTRUCTIONS
Refer for cataract evaluation left eye - appointment scheduled with Dr Sifuentes  No diabetic retinopathy   Keep blood sugar under good control  Return in 1 year for diabetic eye exam    Danni Johnson, OD  [phone]

## 2023-04-20 NOTE — LETTER
4/20/2023         RE: Lan Potts Jr.  [address]  [place] [address]        Dear Colleague,    Thank you for referring your patient, Lan Potts Jr., to the [facility]. No diabetic retinopathy was found at eye exam.  Moderate cataract in left eye, advised to see Dr Sifuentes . Please see a copy of my visit note below.    Chief Complaint   Patient presents with     Blurred Vision Evaluation     Last Eye Exam - 9/6/2022    Chief Complaint(s) and History of Present Illness(es)     Blurred Vision Evaluation            Laterality: left eye    Onset: sudden    Onset: 2 months ago    Quality: blurred vision    Frequency: Episodically - usually not noticeable as right eye sees well. Sees better when wearing OTC readers.               Type II DM - reports mildly higher - but overall stable.  Last blood sugar - 141 at this time (avg 127 over the last 90 days)  Lab Results   Component Value Date    A1C 6.5 01/19/2023    A1C 6.3 07/18/2022    A1C 6.0 01/14/2022    A1C 7.2 09/22/2021    A1C 9.9 05/21/2021    A1C 10.4 04/14/2021    A1C 6.9 11/14/2019    A1C 6.4 09/19/2019    A1C 6.6 08/15/2019       HERMAN Worrell, 10:53 AM, 04/20/2023          Medical, surgical and family histories reviewed and updated 4/20/2023.    2018 disc rupture  -had some injections for pain            OBJECTIVE: See Ophthalmology exam    ASSESSMENT:    ICD-10-CM    1. Posterior subcapsular polar senile cataract of left eye  H25.042       2. Type 2 diabetes mellitus without complication, without long-term current use of insulin (H)  E11.9     no diabetic retinopathy found at eye exam       3. Regular astigmatism, right eye  H52.221       4. Presbyopia  H52.4          PLAN:    Patient Instructions   Refer for cataract evaluation left eye - appointment scheduled with Dr Sifuentes  No diabetic retinopathy   Keep blood sugar under good control  Return in 1 year for diabetic eye exam    Danni Johnson  OD  [phone]                      Again, thank you for allowing me to participate in the care of your patient.        Sincerely,        Danni Johnson, OD

## 2023-05-04 ENCOUNTER — OFFICE VISIT (OUTPATIENT)
Dept: OPHTHALMOLOGY | Facility: CLINIC | Age: 58
End: 2023-05-04
Payer: COMMERCIAL

## 2023-05-04 DIAGNOSIS — H25.049 POSTERIOR SUBCAPSULAR AGE-RELATED CATARACT: Primary | ICD-10-CM

## 2023-05-04 DIAGNOSIS — H04.123 DRY EYE SYNDROME, BILATERAL: ICD-10-CM

## 2023-05-04 PROCEDURE — 92014 COMPRE OPH EXAM EST PT 1/>: CPT | Performed by: STUDENT IN AN ORGANIZED HEALTH CARE EDUCATION/TRAINING PROGRAM

## 2023-05-04 ASSESSMENT — REFRACTION_MANIFEST
OD_AXIS: 175
OS_CYLINDER: +1.00
OD_CYLINDER: +0.50
OS_SPHERE: +0.25
OS_AXIS: 035
OD_SPHERE: PLANO

## 2023-05-04 ASSESSMENT — VISUAL ACUITY
OS_BAT_HIGH: 20/70+1
OD_SC: 20/15
OD_SC+: -2
METHOD: SNELLEN - LINEAR
OS_PH_SC: 20/25
OS_SC: 20/50
OS_SC+: -1
OS_BAT_MED: 20/50-2

## 2023-05-04 ASSESSMENT — EXTERNAL EXAM - RIGHT EYE: OD_EXAM: NORMAL

## 2023-05-04 ASSESSMENT — CUP TO DISC RATIO
OS_RATIO: 0.6
OD_RATIO: 0.6

## 2023-05-04 ASSESSMENT — SLIT LAMP EXAM - LIDS
COMMENTS: NORMAL
COMMENTS: NORMAL

## 2023-05-04 ASSESSMENT — EXTERNAL EXAM - LEFT EYE: OS_EXAM: NORMAL

## 2023-05-04 ASSESSMENT — TONOMETRY
OD_IOP_MMHG: 15
OS_IOP_MMHG: 11
IOP_METHOD: APPLANATION

## 2023-05-04 NOTE — LETTER
5/4/2023         RE: Lan Potts Jr.  [address] [address]        Dear Colleague,    Thank you for referring your patient, Lan Potts Jr., to the [facility]. Please see a copy of my visit note below.     Current Eye Medications:  Artificial tears both eyes as needed.  Allergy drops as needed.       Subjective:  Dr. Johnson recommended an evaluation of cataract, left eye.  Patient complains of very blurry vision  in his left eye for the past month.  Reading is difficult, and usually results in tension headaches, secondary to the unequal vision of his eyes.  Vision in his left eye is worse in bright sunlight.      He wears +2.00 over-the-counter readers.  He has never had prescription glasses for distance vision.     No history of eye injuries or surgery.   History of heart attacks with stent.       Objective:  See Ophthalmology Exam.      Assessment:  Lan Potts Jr. is a 57 year old male who presents with:   Encounter Diagnoses   Name Primary?     Posterior subcapsular age-related cataract - Left Eye Visually significant left eye. Recommend CE/IOL left eye. Ok for MG. Dil 6.5. He discussed desiring mostly distance target left eye.      Dry eye syndrome, bilateral      Visually significant cataract that is interfering with daily activities of living. Plan for cataract extraction and intraocular lens implant left eye.  Risks, benefits, complications, and alternatives discussed with patient including possibility of limitations from coexistent eye disease and loss of vision. Target refraction and lens options discussed.  Patient understands and wishes to proceed with surgery.     Plan:  Offered cataract surgery of the left eye at [facility]      Our surgery schedulers will contact you with appointments    Kam Sifuentes MD  [phone]           Again, thank you for allowing me to participate in the care of your patient.         Sincerely,        Kam Sifuentes MD

## 2023-05-04 NOTE — PROGRESS NOTES
Current Eye Medications:  Artificial tears both eyes as needed.  Allergy drops as needed.       Subjective:  Dr. Johnson recommended an evaluation of cataract, left eye.  Patient complains of very blurry vision  in his left eye for the past month.  Reading is difficult, and usually results in tension headaches, secondary to the unequal vision of his eyes.  Vision in his left eye is worse in bright sunlight.      He wears +2.00 over-the-counter readers.  He has never had prescription glasses for distance vision.     No history of eye injuries or surgery.   History of heart attacks with stent.       Objective:  See Ophthalmology Exam.      Assessment:  Lan Potts Jr. is a 57 year old male who presents with:   Encounter Diagnoses   Name Primary?     Posterior subcapsular age-related cataract - Left Eye Visually significant left eye. Recommend CE/IOL left eye. Ok for MG. Dil 6.5. He discussed desiring mostly distance target left eye.      Dry eye syndrome, bilateral      Visually significant cataract that is interfering with daily activities of living. Plan for cataract extraction and intraocular lens implant left eye.  Risks, benefits, complications, and alternatives discussed with patient including possibility of limitations from coexistent eye disease and loss of vision. Target refraction and lens options discussed.  Patient understands and wishes to proceed with surgery.     Plan:  Offered cataract surgery of the left eye at [facility]      Our surgery schedulers will contact you with appointments    Kam Sifuentes MD  [phone]

## 2023-05-04 NOTE — PATIENT INSTRUCTIONS
Offered cataract surgery of the left eye at [facility] or the [facility]     Our surgery schedulers will contact you with appointments    Kam Sifuentes MD  [phone]

## 2023-05-09 ENCOUNTER — PREP FOR PROCEDURE (OUTPATIENT)
Dept: OPHTHALMOLOGY | Facility: CLINIC | Age: 58
End: 2023-05-09
Payer: COMMERCIAL

## 2023-05-09 ENCOUNTER — TELEPHONE (OUTPATIENT)
Dept: OPHTHALMOLOGY | Facility: CLINIC | Age: 58
End: 2023-05-09
Payer: COMMERCIAL

## 2023-05-09 DIAGNOSIS — H25.812 COMBINED FORM OF AGE-RELATED CATARACT, LEFT EYE: Primary | ICD-10-CM

## 2023-05-09 DIAGNOSIS — H25.811 COMBINED FORM OF AGE-RELATED CATARACT, RIGHT EYE: ICD-10-CM

## 2023-05-09 NOTE — TELEPHONE ENCOUNTER
Type of surgery: LEFT PHACOEMULSIFICATION, CATARACT, WITH INTRAOCULAR LENS IMPLANT (Left)    CPT 07274   Posterior subcapsular age-related cataract - Left Eye H25.049     Dry eye syndrome, bilateral H04.123    [facility] of surgery:  [facility]  Date and time of surgery: 5-22-23  Surgeon: Maria  Pre-Op Appt Date:   Post-Op Appt Date:    Packet sent out: Yes  Pre-cert/Authorization completed:  No prior auth needed [place].    Date: 5-9-23    Charo Vasquez  Financial Securing/Prior Auth Dept  [phone]

## 2023-05-15 ENCOUNTER — TELEPHONE (OUTPATIENT)
Dept: FAMILY MEDICINE | Facility: CLINIC | Age: 58
End: 2023-05-15
Payer: COMMERCIAL

## 2023-05-15 NOTE — TELEPHONE ENCOUNTER
Patient Quality Outreach    Patient is due for the following:   Diabetes -  Foot Exam  Physical Preventive Adult Physical      Topic Date Due     Pneumococcal Vaccine (2 - PCV) 09/04/2015       Next Steps:   Schedule a Adult Preventative    Type of outreach:    Sent [employer] message.      Questions for provider review:               Ibis Alberts MA

## 2023-05-16 ENCOUNTER — OFFICE VISIT (OUTPATIENT)
Dept: OPHTHALMOLOGY | Facility: CLINIC | Age: 58
End: 2023-05-16
Payer: COMMERCIAL

## 2023-05-16 DIAGNOSIS — H25.812 COMBINED FORM OF AGE-RELATED CATARACT, LEFT EYE: Primary | ICD-10-CM

## 2023-05-16 PROCEDURE — 92136 OPHTHALMIC BIOMETRY: CPT | Mod: TC | Performed by: STUDENT IN AN ORGANIZED HEALTH CARE EDUCATION/TRAINING PROGRAM

## 2023-05-16 RX ORDER — KETOROLAC TROMETHAMINE 5 MG/ML
1 SOLUTION OPHTHALMIC 4 TIMES DAILY
Qty: 5 ML | Refills: 0 | Status: SHIPPED | OUTPATIENT
Start: 2023-05-16

## 2023-05-16 RX ORDER — MOXIFLOXACIN 5 MG/ML
1 SOLUTION/ DROPS OPHTHALMIC 4 TIMES DAILY
Qty: 3 ML | Refills: 0 | Status: SHIPPED | OUTPATIENT
Start: 2023-05-16 | End: 2023-06-13

## 2023-05-16 RX ORDER — PREDNISOLONE ACETATE 10 MG/ML
1 SUSPENSION/ DROPS OPHTHALMIC 4 TIMES DAILY
Qty: 5 ML | Refills: 0 | Status: SHIPPED | OUTPATIENT
Start: 2023-05-16

## 2023-05-16 ASSESSMENT — VISUAL ACUITY
OS_SC+: -2
METHOD: SNELLEN - LINEAR
OS_PH_SC: 20/25
OD_SC: 20/20
CORRECTION_TYPE: GLASSES
OS_PH_SC+: -2
OD_SC+: -1
OS_SC: 20/50

## 2023-05-16 ASSESSMENT — SLIT LAMP EXAM - LIDS
COMMENTS: NORMAL
COMMENTS: NORMAL

## 2023-05-16 ASSESSMENT — EXTERNAL EXAM - LEFT EYE: OS_EXAM: NORMAL

## 2023-05-16 ASSESSMENT — EXTERNAL EXAM - RIGHT EYE: OD_EXAM: NORMAL

## 2023-05-16 NOTE — PROGRESS NOTES
Current Eye Medications:  Artificial tears as needed     Subjective:  Here for pre op exam for cataract surgery left eye. Date of surgery: 05/22/2023. Patient is wondering what medications he should not take morning of surgery or leading up to surgery. Vision in left eye continues to be blurry at distance and near but is stable since last visit.      Objective:  See Ophthalmology Exam.       Assessment:  Lan Potts Jr. is a 57 year old male who presents with:   Encounter Diagnosis   Name Primary?     Combined form of age-related cataract, left eye Cataract pre-op left eye. Dil 6.5. 3 drops. [place]. Target mostly distance left eye.       Plan:  PRE-OP CATARACT INSTRUCTIONS    ** 3 eye drops from the pharmacy at least 3 days before surgery.    *Use the following drops in the left eye 4 times on the day before surgery and once the morning of surgery:                                   Vigamox or Ofloxacin (tan cap)  Ketorolac (gray cap)  Prednisolone (white or pink cap)    *If taking more than one drop, wait five minutes between drops.    *Please bring all your eyedrops to the surgery center.    *No solid food or drink after midnight on the morning of surgery. Any medications you would normally take in the morning, you can take AFTER surgery.    Kam Sifuentes MD  [phone]

## 2023-05-16 NOTE — PATIENT INSTRUCTIONS
PRE-OP CATARACT INSTRUCTIONS    ** 3 eye drops from the pharmacy at least 3 days before surgery.    *Use the following drops in the left eye 4 times on the day before surgery and once the morning of surgery:                                   Vigamox or Ofloxacin (tan cap)  Ketorolac (gray cap)  Prednisolone (white or pink cap)    *If taking more than one drop, wait five minutes between drops.    *Please bring all your eyedrops to the surgery center.    *No solid food or drink after midnight on the morning of surgery. Any medications you would normally take in the morning, you can take AFTER surgery.    Kam Sifuentes MD  [phone]

## 2023-05-16 NOTE — LETTER
5/16/2023         RE: Lan Potts Jr.  [address] [address]        Dear Colleague,    Thank you for referring your patient, Lan Potts Jr., to the [facility]. Please see a copy of my visit note below.     Current Eye Medications:  Artificial tears as needed     Subjective:  Here for pre op exam for cataract surgery left eye. Date of surgery: 05/22/2023. Patient is wondering what medications he should not take morning of surgery or leading up to surgery. Vision in left eye continues to be blurry at distance and near but is stable since last visit.      Objective:  See Ophthalmology Exam.       Assessment:  Lan Potts Jr. is a 57 year old male who presents with:   Encounter Diagnosis   Name Primary?     Combined form of age-related cataract, left eye Cataract pre-op left eye. Dil 6.5. 3 drops. [place]. Target mostly distance left eye.       Plan:  PRE-OP CATARACT INSTRUCTIONS    ** 3 eye drops from the pharmacy at least 3 days before surgery.    *Use the following drops in the left eye 4 times on the day before surgery and once the morning of surgery:                                   Vigamox or Ofloxacin (tan cap)  Ketorolac (gray cap)  Prednisolone (white or pink cap)    *If taking more than one drop, wait five minutes between drops.    *Please bring all your eyedrops to the surgery center.    *No solid food or drink after midnight on the morning of surgery. Any medications you would normally take in the morning, you can take AFTER surgery.    Kam Sifuentes MD  [phone]         Again, thank you for allowing me to participate in the care of your patient.        Sincerely,        Kam Sifuentes MD

## 2023-05-17 ENCOUNTER — ANESTHESIA EVENT (OUTPATIENT)
Dept: SURGERY | Facility: AMBULATORY SURGERY CENTER | Age: 58
End: 2023-05-17
Payer: COMMERCIAL

## 2023-05-17 NOTE — ANESTHESIA PREPROCEDURE EVALUATION
Anesthesia Pre-Procedure Evaluation    Patient: Lan Potts Jr.   MRN: [id number] : 1965        Procedure : Procedure(s):  LEFT PHACOEMULSIFICATION, CATARACT, WITH INTRAOCULAR LENS IMPLANT          Past Medical History:   Diagnosis Date     Chronic back pain      Congestive heart failure (H) 2021    ef of 27     Diabetes (H)      Heart attack (H)      Heart disease 2021     Migraine headaches      TINA (obstructive sleep apnea)     mild does not use CPAP at present     Pacemaker      Seasonal allergies      Tobacco use disorder       Past Surgical History:   Procedure Laterality Date     CARDIAC SURGERY  2021    Paemaker/ICD     ENT SURGERY       GENITOURINARY SURGERY       HERNIA REPAIR        Allergies   Allergen Reactions     Amoxicillin      rash     Metformin      Does not feel well on this     Pravastatin      Bilateral(ly) calf pain      Social History     Tobacco Use     Smoking status: Former     Packs/day: 0.00     Years: 0.00     Pack years: 0.00     Types: Cigarettes     Quit date:      Years since quitting: 3.3     Smokeless tobacco: Never   Vaping Use     Vaping status: Never Used   Substance Use Topics     Alcohol use: Yes     Comment: occ/rare      Wt Readings from Last 1 Encounters:   23 129.5 kg (285 lb 9.6 oz)           Physical Exam    Airway        Mallampati: II   TM distance: > 3 FB   Neck ROM: full   Mouth opening: > 3 cm    Respiratory Devices and Support         Dental           Cardiovascular             Pulmonary                   OUTSIDE LABS:  CBC:   Lab Results   Component Value Date    WBC 15.0 (H) 2010    HGB 15.9 2019    HGB 15.1 2013    HCT 43.8 2010     2010     BMP:   Lab Results   Component Value Date     2023     2022    POTASSIUM 4.5 2023    POTASSIUM 4.1 2022    CHLORIDE 104 2023    CHLORIDE 105 2022    CO2 28 2023    CO2 25 2022    BUN 17  01/19/2023    BUN 18 07/18/2022    CR 0.88 01/19/2023    CR 0.78 07/18/2022     (H) 01/19/2023     (H) 07/18/2022     COAGS:   Lab Results   Component Value Date    INR 1.2 05/23/2021     POC: No results found for: BGM, HCG, HCGS  HEPATIC:   Lab Results   Component Value Date    ALBUMIN 4.0 01/14/2022    PROTTOTAL 7.5 01/14/2022    ALT 22 01/14/2022    AST 9 01/14/2022    ALKPHOS 88 01/14/2022    BILITOTAL 1.1 01/14/2022     OTHER:   Lab Results   Component Value Date    A1C 6.5 (H) 01/19/2023    GRECIA 9.5 01/19/2023    PHOS 2.8 09/03/2015    TSH 2.70 01/14/2022    SED 10 10/17/2013       Anesthesia Plan    ASA Status:  3   NPO Status:  NPO Appropriate    Anesthesia Type: MAC.     - Reason for MAC: straight local not clinically adequate   Induction: Intravenous.           Consents    Anesthesia Plan(s) and associated risks, benefits, and realistic alternatives discussed. Questions answered and patient/representative(s) expressed understanding.    - Discussed:     - Discussed with:  Patient, Spouse      - Extended Intubation/Ventilatory Support Discussed: No.      - Patient is DNR/DNI Status: No    Use of blood products discussed: No .     Postoperative Care    Pain management: Multi-modal analgesia.        Comments:                Matthew Steven MD

## 2023-05-18 ENCOUNTER — OFFICE VISIT (OUTPATIENT)
Dept: FAMILY MEDICINE | Facility: CLINIC | Age: 58
End: 2023-05-18
Payer: COMMERCIAL

## 2023-05-18 VITALS
OXYGEN SATURATION: 94 % | SYSTOLIC BLOOD PRESSURE: 93 MMHG | TEMPERATURE: 97.5 F | WEIGHT: 250 LBS | BODY MASS INDEX: 35 KG/M2 | HEIGHT: 71 IN | DIASTOLIC BLOOD PRESSURE: 63 MMHG | HEART RATE: 79 BPM

## 2023-05-18 DIAGNOSIS — H25.812 COMBINED FORM OF AGE-RELATED CATARACT, LEFT EYE: ICD-10-CM

## 2023-05-18 DIAGNOSIS — Z01.818 PREOP GENERAL PHYSICAL EXAM: Primary | ICD-10-CM

## 2023-05-18 PROBLEM — R57.0 CARDIOGENIC SHOCK (H): Status: ACTIVE | Noted: 2021-05-21

## 2023-05-18 PROBLEM — R57.0 CARDIOGENIC SHOCK (H): Status: RESOLVED | Noted: 2021-05-21 | Resolved: 2023-05-18

## 2023-05-18 PROCEDURE — 99214 OFFICE O/P EST MOD 30 MIN: CPT | Performed by: NURSE PRACTITIONER

## 2023-05-18 NOTE — PROGRESS NOTES
[facility] [place]  [address] [facility] [address]  Phone: [phone]  Primary Provider: Zachariah Mcpherson  Pre-op Performing Provider: SARAH BUCK      PREOPERATIVE EVALUATION:  Today's date: 5/18/2023    Lan Potts Jr. is a 57 year old male who presents for a preoperative evaluation.      5/18/2023    11:17 AM   Additional Questions   Roomed by efrain     Surgical Information:  Surgery/Procedure: LEFT PHACOEMULSIFICATION, CATARACT, WITH INTRAOCULAR LENS IMPLANT  Surgery Location:  OR  Surgeon: Zohaib Sifuentes  Surgery Date: 5/22/2023  Time of Surgery: TBD  Where patient plans to recover: At home with family  Fax number for surgical facility: Note does not need to be faxed, will be available electronically in Epic.    Assessment & Plan     The proposed surgical procedure is considered LOW risk.    Preop general physical exam    Combined form of age-related cataract, left eye         Implanted Device:   - Type of device: CRT ICD Patient advised to bring device information on day of surgery.       - No identified additional risk factors other than previously addressed    Antiplatelet or Anticoagulation Medication Instructions:   - aspirin: Bleeding risk is low for this procedure and daily aspirin may be continued without modification.    - clopidrogel (Plavix), prasugrel (Effient), ticagrelor (Brilinta): Bleeding risk is low for this procedure and daily Plavix may be continued without modification.     Additional Medication Instructions:  Patient is to take all scheduled medications on the day of surgery    RECOMMENDATION:  APPROVAL GIVEN to proceed with proposed procedure, without further diagnostic evaluation.        Subjective       HPI related to upcoming procedure: Lan Potts Jr. Is 55 yo male who presents for a preoperative exam.  He will be having surgery on his left eye to treat a cataract.         5/17/2023    11:35 PM   Preop Questions   1. Have you ever  had a heart attack or stroke? YES - MI in 2021    2. Have you ever had surgery on your heart or blood vessels, such as a stent placement, a coronary artery bypass, or surgery on an artery in your head, neck, heart, or legs? YES - cardiac stenting, has Bi-V ICD   3. Do you have chest pain with activity? No   4. Do you have a history of  heart failure? YES - ischemic cardiomyopathy   5. Do you currently have a cold, bronchitis or symptoms of other infection? No   6. Do you have a cough, shortness of breath, or wheezing? No   7. Do you or anyone in your family have previous history of blood clots? No   8. Do you or does anyone in your family have a serious bleeding problem such as prolonged bleeding following surgeries or cuts? No   9. Have you ever had problems with anemia or been told to take iron pills? No   10. Have you had any abnormal blood loss such as black, tarry or bloody stools? No   11. Have you ever had a blood transfusion? No   12. Are you willing to have a blood transfusion if it is medically needed before, during, or after your surgery? Yes   13. Have you or any of your relatives ever had problems with anesthesia? No   14. Do you have sleep apnea, excessive snoring or daytime drowsiness? No   15. Do you have any artifical heart valves or other implanted medical devices like a pacemaker, defibrillator, or continuous glucose monitor? YES - Bi-V ICD   15a. What type of device do you have? pace/defib   15b. Name of the clinic that manages your device:  [facility]   16. Do you have artificial joints? No   17. Are you allergic to latex? No       Health Care Directive:  Patient does not have a Health Care Directive or Living Will: No    Preoperative Review of :   reviewed - controlled substances reflected in medication list.      Status of Chronic Conditions:  See problem list for active medical problems.  Problems all longstanding and stable, except as noted/documented.  See ROS for pertinent symptoms related  to these conditions.      Review of Systems  CONSTITUTIONAL: NEGATIVE for fever, chills, change in weight  INTEGUMENTARY/SKIN: NEGATIVE for worrisome rashes, moles or lesions  EYES: NEGATIVE for vision changes or irritation  ENT/MOUTH: NEGATIVE for ear, mouth and throat problems  RESP: NEGATIVE for significant cough or SOB  CV: NEGATIVE for chest pain, palpitations or peripheral edema  GI: NEGATIVE for nausea, abdominal pain, heartburn, or change in bowel habits  : NEGATIVE for frequency, dysuria, or hematuria  MUSCULOSKELETAL: NEGATIVE for significant arthralgias or myalgia  NEURO: NEGATIVE for weakness, dizziness or paresthesias  ENDOCRINE: NEGATIVE for temperature intolerance, skin/hair changes  HEME: NEGATIVE for bleeding problems  PSYCHIATRIC: NEGATIVE for changes in mood or affect    Patient Active Problem List    Diagnosis Date Noted     Combined form of age-related cataract, left eye 05/09/2023     Priority: Medium     Added automatically from request for surgery [id number]       CHF (congestive heart failure), NYHA class II, chronic, systolic (H) 01/03/2022     Priority: Medium     JUAN (generalized anxiety disorder) 08/19/2019     Priority: Medium     Morbid obesity (H) 07/12/2018     Priority: Medium     Umbilical hernia without obstruction and without gangrene 07/12/2018     Priority: Medium     Varicose veins of left lower extremity 07/12/2018     Priority: Medium     Tobacco abuse 04/05/2018     Priority: Medium     Erectile dysfunction, unspecified erectile dysfunction type 04/05/2018     Priority: Medium     10 year risk of MI or stroke 7.5% or greater, 12.6% in April 2017 04/06/2017     Priority: Medium     Type 2 diabetes mellitus without complication, without long-term current use of insulin (H) 01/05/2017     Priority: Medium     Migraine without status migrainosus, not intractable, unspecified migraine type 01/21/2016     Priority: Medium     High triglycerides 12/11/2014     Priority: Medium      HDL deficiency 12/11/2014     Priority: Medium     Hyperlipidemia with target LDL less than 100 02/17/2014     Priority: Medium     Diagnosis updated by automated process. Provider to review and confirm.       Chronic back pain      Priority: Medium     Seasonal allergies      Priority: Medium      Past Medical History:   Diagnosis Date     Cardiogenic shock (H) 5/21/2021     Chronic back pain      Congestive heart failure (H) 06/20/2021    ef of 27     Diabetes (H)      Heart attack (H)      Heart disease 05/20/2021     Migraine headaches      TINA (obstructive sleep apnea)     mild does not use CPAP at present     Pacemaker      Seasonal allergies      Tobacco use disorder      Past Surgical History:   Procedure Laterality Date     CARDIAC SURGERY  05/21/2021    Paemaker/ICD     ENT SURGERY       GENITOURINARY SURGERY       HERNIA REPAIR       Current Outpatient Medications   Medication Sig Dispense Refill     ALPRAZolam (XANAX) 1 MG tablet Max #20/month 20 tablet 0     aspirin (ASA) 81 MG tablet Take 1 tablet (81 mg) by mouth daily       blood glucose monitoring (POGO AUTOMATIC) test cartridges Use to test blood sugar 5 times daily or as directed. 150 each 11     bumetanide (BUMEX) 1 MG tablet Take 1 mg by mouth daily       carvedilol (COREG) 6.25 MG tablet Take 6.25 mg by mouth 2 times daily       clopidogrel (PLAVIX) 75 MG tablet Take 75 mg by mouth       Continuous Blood Gluc Sensor (DEXCOM G6 SENSOR) MISC CHANGE EVERY 10 DAYS 9 each 11     Continuous Blood Gluc Transmit (DEXCOM G6 TRANSMITTER) MISC 1 Device continuous To change out every 3 months 1 each 3     eletriptan (RELPAX) 40 MG tablet Take 1 tablet (40 mg) by mouth at onset of headache May repeat in 2 hours as needed but no more than 2 pills in 24 hours. 12 tablet 5     escitalopram (LEXAPRO) 10 MG tablet Take 1 tablet (10 mg) by mouth daily Take with 20mg tab for 30mg total 90 tablet 1     escitalopram (LEXAPRO) 20 MG tablet 1 tab daily Strength: 20 mg  "90 tablet 1     FARXIGA 5 MG TABS tablet TAKE 1 TABLET BY MOUTH EVERY DAY 90 tablet 1     ketorolac (ACULAR) 0.5 % ophthalmic solution Place 1 drop Into the left eye 4 times daily 5 mL 0     moxifloxacin (VIGAMOX) 0.5 % ophthalmic solution Place 1 drop Into the left eye 4 times daily 3 mL 0     nitroGLYcerin (NITROSTAT) 0.4 MG sublingual tablet Place 0.4 mg under the tongue       prednisoLONE acetate (PRED FORTE) 1 % ophthalmic suspension Place 1 drop Into the left eye 4 times daily 5 mL 0     rosuvastatin (CRESTOR) 10 MG tablet One tab Every other day. Increase to Daily if tolerated. Take vitaminD supplement 30 tablet 5     sacubitril-valsartan (ENTRESTO) 24-26 MG per tablet Take 1 tablet by mouth 2 times daily 1/2 tablet twice a day(s)       sildenafil (REVATIO) 20 MG tablet 2-3 tab daily as needed for ed. Avoid with nitroglycerin. Call 911 if chest pain or severe LIGHTHEADEDness 30 tablet 1     TRULICITY 0.75 MG/0.5ML pen INJECT 0.75MG SUBCUTANEOUSLY EVERY 7 DAYS 6 mL 1       Allergies   Allergen Reactions     Amoxicillin      rash     Metformin      Does not feel well on this     Pravastatin      Bilateral(ly) calf pain        Social History     Tobacco Use     Smoking status: Former     Packs/day: 0.00     Years: 0.00     Pack years: 0.00     Types: Cigarettes     Quit date: 2020     Years since quitting: 3.3     Smokeless tobacco: Never   Vaping Use     Vaping status: Never Used   Substance Use Topics     Alcohol use: Yes     Comment: occ/rare       History   Drug Use No         Objective     BP 93/63   Pulse 79   Temp 97.5  F (36.4  C)   Ht 1.803 m (5' 11\")   Wt 113.4 kg (250 lb)   SpO2 94%   BMI 34.87 kg/m      Physical Exam    GENERAL APPEARANCE: healthy, alert and no distress     EYES: EOMI,  PERRL     HENT: nose and mouth without ulcers or lesions     NECK: no adenopathy, no asymmetry, masses, or scars and thyroid normal to palpation     RESP: lungs clear to auscultation - no rales, rhonchi or " wheezes     CV: regular rates and rhythm, normal S1 S2, no S3 or S4 and no murmur, click or rub     MS: extremities normal- no gross deformities noted, no evidence of inflammation in joints, FROM in all extremities.     SKIN: no suspicious lesions or rashes     NEURO: Normal strength and tone, sensory exam grossly normal, mentation intact and speech normal     PSYCH: mentation appears normal. and affect normal/bright     LYMPHATICS: No cervical adenopathy    Recent Labs   Lab Test 01/19/23  1409 07/18/22  1200 09/22/21  1117 05/23/21  0426   INR  --   --   --  1.2    138   < >  --    POTASSIUM 4.5 4.1   < >  --    CR 0.88 0.78   < >  --    A1C 6.5* 6.3*   < >  --     < > = values in this interval not displayed.        Diagnostics:  No labs were ordered during this visit.   No EKG required for low risk surgery (cataract, skin procedure, breast biopsy, etc).    Revised Cardiac Risk Index (RCRI):  The patient has the following serious cardiovascular risks for perioperative complications:   - Coronary Artery Disease (MI, positive stress test, angina, Qs on EKG) = 1 point   - Congestive Heart Failure (pulmonary edema, PND, s3 justine, CXR with pulmonary congestion, basilar rales) = 1 point     RCRI Interpretation: 2 points: Class III (moderate risk - 6.6% complication rate)     Estimated Functional Capacity: Performs 4 METS exercise without symptoms (e.g., light housework, stairs, 4 mph walk, 7 mph bike, slow step dance)           Signed Electronically by: Alanna Yap CNP  Copy of this evaluation report is provided to requesting physician.

## 2023-05-18 NOTE — PATIENT INSTRUCTIONS
For informational purposes only. Not to replace the advice of your health care provider. Copyright   2003,  [employer] [facility]. All rights reserved. Clinically reviewed by Daphnie Weiner MD. [place] [id number] - REV .  Preparing for Your Surgery  Getting started  A nurse will call you to review your health history and instructions. They will give you an arrival time based on your scheduled surgery time. Please be ready to share:  Your doctor's clinic name and phone number  Your medical, surgical, and anesthesia history  A list of allergies and sensitivities  A list of medicines, including herbal treatments and over-the-counter drugs  Whether the patient has a legal guardian (ask how to send us the papers in advance)  Please tell us if you're pregnant--or if there's any chance you might be pregnant. Some surgeries may injure a fetus (unborn baby), so they require a pregnancy test. Surgeries that are safe for a fetus don't always need a test, and you can choose whether to have one.   If you have a child who's having surgery, please ask for a copy of Preparing for Your Child's Surgery.    Preparing for surgery  Within 10 to 30 days of surgery: Have a pre-op exam (sometimes called an H&P, or History and Physical). This can be done at a clinic or pre-operative center.  If you're having a , you may not need this exam. Talk to your care team.  At your pre-op exam, talk to your care team about all medicines you take. If you need to stop any medicines before surgery, ask when to start taking them again.  We do this for your safety. Many medicines can make you bleed too much during surgery. Some change how well surgery (anesthesia) drugs work.  Call your insurance company to let them know you're having surgery. (If you don't have insurance, call [phone].)  Call your clinic if there's any change in your health. This includes signs of a cold or flu (sore throat, runny nose, cough, rash, fever). It  also includes a scrape or scratch near the surgery site.  If you have questions on the day of surgery, call your hospital or surgery center.  Eating and drinking guidelines  For your safety: Unless your surgeon tells you otherwise, follow the guidelines below.  Eat and drink as usual until 8 hours before you arrive for surgery. After that, no food or milk.  Drink clear liquids until 2 hours before you arrive. These are liquids you can see through, like water, Gatorade, and Propel Water. They also include plain black coffee and tea (no cream or milk), candy, and breath mints. You can spit out gum when you arrive.  If you drink alcohol: Stop drinking it the night before surgery.  If your care team tells you to take medicine on the morning of surgery, it's okay to take it with a sip of water.  Preventing infection  Shower or bathe the night before and morning of your surgery. Follow the instructions your clinic gave you. (If no instructions, use regular soap.)  Don't shave or clip hair near your surgery site. We'll remove the hair if needed.  Don't smoke or vape the morning of surgery. You may chew nicotine gum up to 2 hours before surgery. A nicotine patch is okay.  Note: Some surgeries require you to completely quit smoking and nicotine. Check with your surgeon.  Your care team will make every effort to keep you safe from infection. We will:  Clean our hands often with soap and water (or an alcohol-based hand rub).  Clean the skin at your surgery site with a special soap that kills germs.  Give you a special gown to keep you warm. (Cold raises the risk of infection.)  Wear special hair covers, masks, gowns and gloves during surgery.  Give antibiotic medicine, if prescribed. Not all surgeries need antibiotics.  What to bring on the day of surgery  Photo ID and insurance card  Copy of your health care directive, if you have one  Glasses and hearing aids (bring cases)  You can't wear contacts during surgery  Inhaler and  eye drops, if you use them (tell us about these when you arrive)  CPAP machine or breathing device, if you use them  A few personal items, if spending the night  If you have . . .  A pacemaker, ICD (cardiac defibrillator) or other implant: Bring the ID card.  An implanted stimulator: Bring the remote control.  A legal guardian: Bring a copy of the certified (court-stamped) guardianship papers.  Please remove any jewelry, including body piercings. Leave jewelry and other valuables at home.  If you're going home the day of surgery  You must have a responsible adult drive you home. They should stay with you overnight as well.  If you don't have someone to stay with you, and you aren't safe to go home alone, we may keep you overnight. Insurance often won't pay for this.  After surgery  If it's hard to control your pain or you need more pain medicine, please call your surgeon's office.  Questions?   If you have any questions for your care team, list them here: _________________________________________________________________________________________________________________________________________________________________________ ____________________________________ ____________________________________ ____________________________________    How to Take Your Medication Before Surgery  Has early morning appointment, plans to take all meds when he gets home    For informational purposes only. Not to replace the advice of your health care provider. Copyright   2003, 2019 [facility]. All rights reserved. Clinically reviewed by Dahpnie Weiner MD. [employer] [id number] - REV 12/22.

## 2023-05-18 NOTE — H&P (VIEW-ONLY)
[facility]  [address] [address] [facility] [address]  Phone: [phone]  Primary Provider: Zachariah Mcpherson  Pre-op Performing Provider: SARAH BUCK      PREOPERATIVE EVALUATION:  Today's date: 5/18/2023    Lan Potts Jr. is a 57 year old male who presents for a preoperative evaluation.      5/18/2023    11:17 AM   Additional Questions   Roomed by efrain     Surgical Information:  Surgery/Procedure: LEFT PHACOEMULSIFICATION, CATARACT, WITH INTRAOCULAR LENS IMPLANT  Surgery Location:  OR  Surgeon: Zohaib Sifuentes  Surgery Date: 5/22/2023  Time of Surgery: TBD  Where patient plans to recover: At home with family  Fax number for surgical facility: Note does not need to be faxed, will be available electronically in Epic.    Assessment & Plan     The proposed surgical procedure is considered LOW risk.    Preop general physical exam    Combined form of age-related cataract, left eye         Implanted Device:   - Type of device: CRT ICD Patient advised to bring device information on day of surgery.       - No identified additional risk factors other than previously addressed    Antiplatelet or Anticoagulation Medication Instructions:   - aspirin: Bleeding risk is low for this procedure and daily aspirin may be continued without modification.    - clopidrogel (Plavix), prasugrel (Effient), ticagrelor (Brilinta): Bleeding risk is low for this procedure and daily Plavix may be continued without modification.     Additional Medication Instructions:  Patient is to take all scheduled medications on the day of surgery    RECOMMENDATION:  APPROVAL GIVEN to proceed with proposed procedure, without further diagnostic evaluation.        Subjective       HPI related to upcoming procedure: Lan oPtts Jr. Is 53 yo male who presents for a preoperative exam.  He will be having surgery on his left eye to treat a cataract.         5/17/2023    11:35 PM   Preop Questions   1. Have you ever  had a heart attack or stroke? YES - MI in 2021    2. Have you ever had surgery on your heart or blood vessels, such as a stent placement, a coronary artery bypass, or surgery on an artery in your head, neck, heart, or legs? YES - cardiac stenting, has Bi-V ICD   3. Do you have chest pain with activity? No   4. Do you have a history of  heart failure? YES - ischemic cardiomyopathy   5. Do you currently have a cold, bronchitis or symptoms of other infection? No   6. Do you have a cough, shortness of breath, or wheezing? No   7. Do you or anyone in your family have previous history of blood clots? No   8. Do you or does anyone in your family have a serious bleeding problem such as prolonged bleeding following surgeries or cuts? No   9. Have you ever had problems with anemia or been told to take iron pills? No   10. Have you had any abnormal blood loss such as black, tarry or bloody stools? No   11. Have you ever had a blood transfusion? No   12. Are you willing to have a blood transfusion if it is medically needed before, during, or after your surgery? Yes   13. Have you or any of your relatives ever had problems with anesthesia? No   14. Do you have sleep apnea, excessive snoring or daytime drowsiness? No   15. Do you have any artifical heart valves or other implanted medical devices like a pacemaker, defibrillator, or continuous glucose monitor? YES - Bi-V ICD   15a. What type of device do you have? pace/defib   15b. Name of the clinic that manages your device:  [facility]   16. Do you have artificial joints? No   17. Are you allergic to latex? No       Health Care Directive:  Patient does not have a Health Care Directive or Living Will: No    Preoperative Review of :   reviewed - controlled substances reflected in medication list.      Status of Chronic Conditions:  See problem list for active medical problems.  Problems all longstanding and stable, except as noted/documented.  See ROS for pertinent symptoms related  to these conditions.      Review of Systems  CONSTITUTIONAL: NEGATIVE for fever, chills, change in weight  INTEGUMENTARY/SKIN: NEGATIVE for worrisome rashes, moles or lesions  EYES: NEGATIVE for vision changes or irritation  ENT/MOUTH: NEGATIVE for ear, mouth and throat problems  RESP: NEGATIVE for significant cough or SOB  CV: NEGATIVE for chest pain, palpitations or peripheral edema  GI: NEGATIVE for nausea, abdominal pain, heartburn, or change in bowel habits  : NEGATIVE for frequency, dysuria, or hematuria  MUSCULOSKELETAL: NEGATIVE for significant arthralgias or myalgia  NEURO: NEGATIVE for weakness, dizziness or paresthesias  ENDOCRINE: NEGATIVE for temperature intolerance, skin/hair changes  HEME: NEGATIVE for bleeding problems  PSYCHIATRIC: NEGATIVE for changes in mood or affect    Patient Active Problem List    Diagnosis Date Noted     Combined form of age-related cataract, left eye 05/09/2023     Priority: Medium     Added automatically from request for surgery [id number]       CHF (congestive heart failure), NYHA class II, chronic, systolic (H) 01/03/2022     Priority: Medium     JUAN (generalized anxiety disorder) 08/19/2019     Priority: Medium     Morbid obesity (H) 07/12/2018     Priority: Medium     Umbilical hernia without obstruction and without gangrene 07/12/2018     Priority: Medium     Varicose veins of left lower extremity 07/12/2018     Priority: Medium     Tobacco abuse 04/05/2018     Priority: Medium     Erectile dysfunction, unspecified erectile dysfunction type 04/05/2018     Priority: Medium     10 year risk of MI or stroke 7.5% or greater, 12.6% in April 2017 04/06/2017     Priority: Medium     Type 2 diabetes mellitus without complication, without long-term current use of insulin (H) 01/05/2017     Priority: Medium     Migraine without status migrainosus, not intractable, unspecified migraine type 01/21/2016     Priority: Medium     High triglycerides 12/11/2014     Priority: Medium      HDL deficiency 12/11/2014     Priority: Medium     Hyperlipidemia with target LDL less than 100 02/17/2014     Priority: Medium     Diagnosis updated by automated process. Provider to review and confirm.       Chronic back pain      Priority: Medium     Seasonal allergies      Priority: Medium      Past Medical History:   Diagnosis Date     Cardiogenic shock (H) 5/21/2021     Chronic back pain      Congestive heart failure (H) 06/20/2021    ef of 25     Diabetes (H)      Heart attack (H)      Heart disease 05/20/2021     Migraine headaches      TINA (obstructive sleep apnea)     mild does not use CPAP at present     Pacemaker      Seasonal allergies      Tobacco use disorder      Past Surgical History:   Procedure Laterality Date     CARDIAC SURGERY  05/21/2021    Paemaker/ICD     ENT SURGERY       GENITOURINARY SURGERY       HERNIA REPAIR       Current Outpatient Medications   Medication Sig Dispense Refill     ALPRAZolam (XANAX) 1 MG tablet Max #20/month 20 tablet 0     aspirin (ASA) 81 MG tablet Take 1 tablet (81 mg) by mouth daily       blood glucose monitoring (POGO AUTOMATIC) test cartridges Use to test blood sugar 5 times daily or as directed. 150 each 11     bumetanide (BUMEX) 1 MG tablet Take 1 mg by mouth daily       carvedilol (COREG) 6.25 MG tablet Take 6.25 mg by mouth 2 times daily       clopidogrel (PLAVIX) 75 MG tablet Take 75 mg by mouth       Continuous Blood Gluc Sensor (DEXCOM G6 SENSOR) MISC CHANGE EVERY 10 DAYS 9 each 11     Continuous Blood Gluc Transmit (DEXCOM G6 TRANSMITTER) MISC 1 Device continuous To change out every 3 months 1 each 3     eletriptan (RELPAX) 40 MG tablet Take 1 tablet (40 mg) by mouth at onset of headache May repeat in 2 hours as needed but no more than 2 pills in 24 hours. 12 tablet 5     escitalopram (LEXAPRO) 10 MG tablet Take 1 tablet (10 mg) by mouth daily Take with 20mg tab for 30mg total 90 tablet 1     escitalopram (LEXAPRO) 20 MG tablet 1 tab daily Strength: 20 mg  "90 tablet 1     FARXIGA 5 MG TABS tablet TAKE 1 TABLET BY MOUTH EVERY DAY 90 tablet 1     ketorolac (ACULAR) 0.5 % ophthalmic solution Place 1 drop Into the left eye 4 times daily 5 mL 0     moxifloxacin (VIGAMOX) 0.5 % ophthalmic solution Place 1 drop Into the left eye 4 times daily 3 mL 0     nitroGLYcerin (NITROSTAT) 0.4 MG sublingual tablet Place 0.4 mg under the tongue       prednisoLONE acetate (PRED FORTE) 1 % ophthalmic suspension Place 1 drop Into the left eye 4 times daily 5 mL 0     rosuvastatin (CRESTOR) 10 MG tablet One tab Every other day. Increase to Daily if tolerated. Take vitaminD supplement 30 tablet 5     sacubitril-valsartan (ENTRESTO) 24-26 MG per tablet Take 1 tablet by mouth 2 times daily 1/2 tablet twice a day(s)       sildenafil (REVATIO) 20 MG tablet 2-3 tab daily as needed for ed. Avoid with nitroglycerin. Call 911 if chest pain or severe LIGHTHEADEDness 30 tablet 1     TRULICITY 0.75 MG/0.5ML pen INJECT 0.75MG SUBCUTANEOUSLY EVERY 7 DAYS 6 mL 1       Allergies   Allergen Reactions     Amoxicillin      rash     Metformin      Does not feel well on this     Pravastatin      Bilateral(ly) calf pain        Social History     Tobacco Use     Smoking status: Former     Packs/day: 0.00     Years: 0.00     Pack years: 0.00     Types: Cigarettes     Quit date: 2020     Years since quitting: 3.3     Smokeless tobacco: Never   Vaping Use     Vaping status: Never Used   Substance Use Topics     Alcohol use: Yes     Comment: occ/rare       History   Drug Use No         Objective     BP 93/63   Pulse 79   Temp 97.5  F (36.4  C)   Ht 1.803 m (5' 11\")   Wt 113.4 kg (250 lb)   SpO2 94%   BMI 34.87 kg/m      Physical Exam    GENERAL APPEARANCE: healthy, alert and no distress     EYES: EOMI,  PERRL     HENT: nose and mouth without ulcers or lesions     NECK: no adenopathy, no asymmetry, masses, or scars and thyroid normal to palpation     RESP: lungs clear to auscultation - no rales, rhonchi or " wheezes     CV: regular rates and rhythm, normal S1 S2, no S3 or S4 and no murmur, click or rub     MS: extremities normal- no gross deformities noted, no evidence of inflammation in joints, FROM in all extremities.     SKIN: no suspicious lesions or rashes     NEURO: Normal strength and tone, sensory exam grossly normal, mentation intact and speech normal     PSYCH: mentation appears normal. and affect normal/bright     LYMPHATICS: No cervical adenopathy    Recent Labs   Lab Test 01/19/23  1409 07/18/22  1200 09/22/21  1117 05/23/21  0426   INR  --   --   --  1.2    138   < >  --    POTASSIUM 4.5 4.1   < >  --    CR 0.88 0.78   < >  --    A1C 6.5* 6.3*   < >  --     < > = values in this interval not displayed.        Diagnostics:  No labs were ordered during this visit.   No EKG required for low risk surgery (cataract, skin procedure, breast biopsy, etc).    Revised Cardiac Risk Index (RCRI):  The patient has the following serious cardiovascular risks for perioperative complications:   - Coronary Artery Disease (MI, positive stress test, angina, Qs on EKG) = 1 point   - Congestive Heart Failure (pulmonary edema, PND, s3 justine, CXR with pulmonary congestion, basilar rales) = 1 point     RCRI Interpretation: 2 points: Class III (moderate risk - 6.6% complication rate)     Estimated Functional Capacity: Performs 4 METS exercise without symptoms (e.g., light housework, stairs, 4 mph walk, 7 mph bike, slow step dance)           Signed Electronically by: Alanna Yap CNP  Copy of this evaluation report is provided to requesting physician.

## 2023-05-20 RX ORDER — ONDANSETRON 4 MG/1
4 TABLET, ORALLY DISINTEGRATING ORAL EVERY 30 MIN PRN
Status: CANCELLED | OUTPATIENT
Start: 2023-05-20

## 2023-05-20 RX ORDER — FENTANYL CITRATE 50 UG/ML
25 INJECTION, SOLUTION INTRAMUSCULAR; INTRAVENOUS
Status: CANCELLED | OUTPATIENT
Start: 2023-05-20

## 2023-05-20 RX ORDER — OXYCODONE HYDROCHLORIDE 5 MG/1
10 TABLET ORAL EVERY 4 HOURS PRN
Status: CANCELLED | OUTPATIENT
Start: 2023-05-20

## 2023-05-20 RX ORDER — OXYCODONE HYDROCHLORIDE 5 MG/1
5 TABLET ORAL EVERY 4 HOURS PRN
Status: CANCELLED | OUTPATIENT
Start: 2023-05-20

## 2023-05-20 RX ORDER — ONDANSETRON 2 MG/ML
4 INJECTION INTRAMUSCULAR; INTRAVENOUS EVERY 30 MIN PRN
Status: CANCELLED | OUTPATIENT
Start: 2023-05-20

## 2023-05-22 ENCOUNTER — HOSPITAL ENCOUNTER (OUTPATIENT)
Facility: AMBULATORY SURGERY CENTER | Age: 58
Discharge: HOME | End: 2023-05-22
Attending: STUDENT IN AN ORGANIZED HEALTH CARE EDUCATION/TRAINING PROGRAM | Admitting: STUDENT IN AN ORGANIZED HEALTH CARE EDUCATION/TRAINING PROGRAM
Payer: COMMERCIAL

## 2023-05-22 ENCOUNTER — ANESTHESIA (OUTPATIENT)
Dept: SURGERY | Facility: AMBULATORY SURGERY CENTER | Age: 58
End: 2023-05-22
Payer: COMMERCIAL

## 2023-05-22 VITALS
BODY MASS INDEX: 34.87 KG/M2 | TEMPERATURE: 97.5 F | WEIGHT: 250 LBS | SYSTOLIC BLOOD PRESSURE: 101 MMHG | OXYGEN SATURATION: 95 % | RESPIRATION RATE: 16 BRPM | DIASTOLIC BLOOD PRESSURE: 53 MMHG

## 2023-05-22 DIAGNOSIS — H25.812 COMBINED FORM OF AGE-RELATED CATARACT, LEFT EYE: ICD-10-CM

## 2023-05-22 LAB — GLUCOSE BLDC GLUCOMTR-MCNC: 152 MG/DL (ref 70–99)

## 2023-05-22 PROCEDURE — 66984 XCAPSL CTRC RMVL W/O ECP: CPT | Mod: LT

## 2023-05-22 PROCEDURE — 66984 XCAPSL CTRC RMVL W/O ECP: CPT | Mod: LT | Performed by: STUDENT IN AN ORGANIZED HEALTH CARE EDUCATION/TRAINING PROGRAM

## 2023-05-22 PROCEDURE — G8918 PT W/O PREOP ORDER IV AB PRO: HCPCS

## 2023-05-22 PROCEDURE — G8907 PT DOC NO EVENTS ON DISCHARG: HCPCS

## 2023-05-22 DEVICE — TECNIS 1-PC CLEAR MONO 6.0MM 25.5D
Type: IMPLANTABLE DEVICE | Site: EYE | Status: FUNCTIONAL
Brand: TECNIS IOL

## 2023-05-22 RX ORDER — FENTANYL CITRATE 50 UG/ML
INJECTION, SOLUTION INTRAMUSCULAR; INTRAVENOUS PRN
Status: DISCONTINUED | OUTPATIENT
Start: 2023-05-22 | End: 2023-05-22

## 2023-05-22 RX ORDER — LIDOCAINE 40 MG/G
CREAM TOPICAL
Status: DISCONTINUED | OUTPATIENT
Start: 2023-05-22 | End: 2023-05-23 | Stop reason: HOSPADM

## 2023-05-22 RX ORDER — TETRACAINE HYDROCHLORIDE 5 MG/ML
SOLUTION OPHTHALMIC PRN
Status: DISCONTINUED | OUTPATIENT
Start: 2023-05-22 | End: 2023-05-22 | Stop reason: HOSPADM

## 2023-05-22 RX ORDER — CYCLOPENTOLAT/TROPIC/PHENYLEPH 1%-1%-2.5%
1 DROPS (EA) OPHTHALMIC (EYE)
Status: COMPLETED | OUTPATIENT
Start: 2023-05-22 | End: 2023-05-22

## 2023-05-22 RX ORDER — MOXIFLOXACIN IN NACL,ISO-OS/PF 0.3MG/0.3
SYRINGE (ML) INTRAOCULAR PRN
Status: DISCONTINUED | OUTPATIENT
Start: 2023-05-22 | End: 2023-05-22 | Stop reason: HOSPADM

## 2023-05-22 RX ORDER — SODIUM CHLORIDE, SODIUM LACTATE, POTASSIUM CHLORIDE, CALCIUM CHLORIDE 600; 310; 30; 20 MG/100ML; MG/100ML; MG/100ML; MG/100ML
INJECTION, SOLUTION INTRAVENOUS CONTINUOUS
Status: DISCONTINUED | OUTPATIENT
Start: 2023-05-22 | End: 2023-05-23 | Stop reason: HOSPADM

## 2023-05-22 RX ORDER — ACETAMINOPHEN 325 MG/1
975 TABLET ORAL ONCE
Status: COMPLETED | OUTPATIENT
Start: 2023-05-22 | End: 2023-05-22

## 2023-05-22 RX ORDER — PROPARACAINE HYDROCHLORIDE 5 MG/ML
1 SOLUTION/ DROPS OPHTHALMIC ONCE
Status: COMPLETED | OUTPATIENT
Start: 2023-05-22 | End: 2023-05-22

## 2023-05-22 RX ADMIN — Medication 1 DROP: at 08:20

## 2023-05-22 RX ADMIN — Medication 1 DROP: at 08:13

## 2023-05-22 RX ADMIN — FENTANYL CITRATE 25 MCG: 50 INJECTION, SOLUTION INTRAMUSCULAR; INTRAVENOUS at 09:33

## 2023-05-22 RX ADMIN — PROPARACAINE HYDROCHLORIDE 1 DROP: 5 SOLUTION/ DROPS OPHTHALMIC at 08:13

## 2023-05-22 RX ADMIN — ACETAMINOPHEN 975 MG: 325 TABLET ORAL at 08:02

## 2023-05-22 RX ADMIN — SODIUM CHLORIDE, SODIUM LACTATE, POTASSIUM CHLORIDE, CALCIUM CHLORIDE: 600; 310; 30; 20 INJECTION, SOLUTION INTRAVENOUS at 09:32

## 2023-05-22 RX ADMIN — Medication 1 DROP: at 08:25

## 2023-05-22 NOTE — OP NOTE
PreOp Diagnosis: Visually significant nuclear sclerotic cataract left eye  PostOp Diagnosis: Same  Surgeon: Kam Sifuentes MD  Implant: Tecnis [id number] +25.5D    Procedures:   1. Review of intraocular lens calculations, both eyes   2. Phacoemulsification and extraction of lens  left eye   3. Intraocular lens implantation left eye  Anesthesia: MAC/topical  Complications: None  EBL: <1cc    Lan Potts Jr. suffers from a visually significant cataract of the left eye. This has caused problems with distance and reading vision, including glare. After discussing the risks, benefits, and alternatives, the patient wishes to proceed with cataract surgery.    The patient was identified in the pre-op area where the left eye was marked. The patient was then brought to the operating room where a time out was called, identifying the patient, the procedure, and the correct site. Tetracaine drops were applied to the operative eye. The operative eye was then prepped and draped in the usual sterile ophthalmic fashion. An eyelid speculum was placed into the operative eye. Additional tetracaine drops were applied. A paracentesis was made inferior with a side port blade. 1% preservative-free lidocaine and epinephrine was injected into the anterior chamber. Endocoat was injected to deepen the anterior chamber. A 2.4mm clear corneal wound was created with a keratome blade temporally. A continuous curvilinear capsulorrhexis was started with a bent cystitome and completed with Utrada forceps. Hydrodissection of the lens nucleus was performed with BSS on a cannula. The lens nucleus was rotated. Phacoemulsification of the lens nucleus was accomplished in a phacoemulsification stop and chop technique. Remaining cortex was removed with irrigation and aspiration. The lens capsule was noted to be intact. Healon was used to inflate the capsular bag.  The lens was injected into the capsular bag. Remaining viscoelastic was removed with  irrigation and aspiration. The wounds were checked and found to be watertight after hydration.  Intracameral moxifloxacin was injected into the anterior chamber. The eyelid speculum was removed. The patient tolerated the procedure well and was in stable condition on the way to the recovery area.     Kam Sifuentes MD

## 2023-05-22 NOTE — ANESTHESIA CARE TRANSFER NOTE
Patient: Lan Potts JrCarolina    Procedure: Procedure(s):  LEFT PHACOEMULSIFICATION, CATARACT, WITH INTRAOCULAR LENS IMPLANT       Diagnosis: Combined form of age-related cataract, left eye [H25.812]  Diagnosis Additional Information: No value filed.    Anesthesia Type:   MAC     Note:    Oropharynx: oropharynx clear of all foreign objects  Level of Consciousness: awake  Oxygen Supplementation: room air    Independent Airway: airway patency satisfactory and stable  Dentition: dentition unchanged  Vital Signs Stable: post-procedure vital signs reviewed and stable  Report to RN Given: handoff report given  Patient transferred to: Phase II  Comments: Able to maintain own airway  Handoff Report: Identifed the Patient, Identified the Reponsible Provider, Reviewed the pertinent medical history, Discussed the surgical course, Reviewed Intra-OP anesthesia mangement and issues during anesthesia, Set expectations for post-procedure period and Allowed opportunity for questions and acknowledgement of understanding      Vitals:  Vitals Value Taken Time   BP     Temp     Pulse     Resp     SpO2         Electronically Signed By: GEOVANI Blackman CRNA  May 22, 2023  9:57 AM

## 2023-05-22 NOTE — DISCHARGE INSTRUCTIONS
Tylenol 975 mg was given at 8:00 am.    You should not take more then 4,000 mg of tylenol/acetaminophen in a 24 hour period.    CATARACT SURGERY POST-OP INSTRUCTIONS  Dr. Kam Sifuentes  [phone]      Use all three eye drops today, including   - Vigamox (tan top)   - Ketolorac (grey top)   - Prednisolone (white or pink top)    You should get 3 doses in today and 4 doses daily starting tomorrow.  Wait a few minutes in between putting each drop in.    Keep the eye shield taped in place unless putting drops in. We will remove it for you in the office tomorrow.    Light sensitivity may be noticed. Sunglasses may be worn for comfort.    Do not rub the operated eye.    Keep the operated eye dry. You may wash your hair, bathe or shower, but keep the operated eye closed while doing so.     No swimming, hot tub, or sauna for 2 weeks.    No make up around eye for 5 days.    No bending at the waist or lifting more than 10 pounds for one week.    May take Tylenol (per directions on bottle) for mild pain.    Call the office at [phone] and ask to speak to the on-call ophthalmologist  if any of the following should occur:  Any sudden vision changes  Nausea or severe headache  Increase in pain not controlled  Or signs of infection (pus, increasing redness or tenderness)      [facility]  Same-Day Surgery   Adult Discharge Orders & Instructions   For 24 hours after surgery  Get plenty of rest.  A responsible adult must stay with you for at least 24 hours after you leave the hospital.   Do not drive or use heavy equipment.  If you have weakness or tingling, don't drive or use heavy equipment until this feeling goes away.  Do not drink alcohol.  Avoid strenuous or risky activities.  Ask for help when climbing stairs.   You may feel lightheaded.  IF so, sit for a few minutes before standing.  Have someone help you get up.   If you have nausea (feel sick to your stomach): Drink only clear liquids such  as apple juice, ginger ale, broth or 7-Up.  Rest may also help.  Be sure to drink enough fluids.  Move to a regular diet as you feel able.  You may have a slight fever. Call the doctor if your fever is over 100 F (37.7 C) (taken under the tongue) or lasts longer than 24 hours.  You may have a dry mouth, a sore throat, muscle aches or trouble sleeping.  These should go away after 24 hours.  Do not make important or legal decisions.   Call your doctor for any of the followin.  Signs of infection (fever, growing tenderness at the surgery site, a large amount of drainage or bleeding, severe pain, foul-smelling drainage, redness, swelling).  2. It has been over 8 to 10 hours since surgery and you are still not able to urinate (pass water).

## 2023-05-22 NOTE — INTERVAL H&P NOTE
"I have reviewed the surgical (or preoperative) H&P that is linked to this encounter, and examined the patient. There are no significant changes    Clinical Conditions Present on Arrival:  Clinically Significant Risk Factors Present on Admission                 # Drug Induced Platelet Defect: home medication list includes an antiplatelet medication  # DMII: A1C = N/A within past 6 months  # Obesity: Estimated body mass index is 34.87 kg/m  as calculated from the following:    Height as of 5/18/23: 1.803 m (5' 11\").    Weight as of this encounter: 113.4 kg (250 lb).       "

## 2023-05-22 NOTE — ANESTHESIA POSTPROCEDURE EVALUATION
Patient: Lan Potts Jr.    Procedure: Procedure(s):  LEFT PHACOEMULSIFICATION, CATARACT, WITH INTRAOCULAR LENS IMPLANT       Anesthesia Type:  MAC    Note:  Disposition: Outpatient   Postop Pain Control: Uneventful            Sign Out: Well controlled pain   PONV: No   Neuro/Psych: Uneventful            Sign Out: Acceptable/Baseline neuro status   Airway/Respiratory: Uneventful            Sign Out: Acceptable/Baseline resp. status   CV/Hemodynamics: Uneventful            Sign Out: Acceptable CV status; No obvious hypovolemia; No obvious fluid overload   Other NRE:    DID A NON-ROUTINE EVENT OCCUR?            Last vitals:  Vitals Value Taken Time   /53 05/22/23 1013   Temp 97.5  F (36.4  C) 05/22/23 1013   Pulse     Resp 16 05/22/23 1013   SpO2 95 % 05/22/23 1013       Electronically Signed By: Matthew Steven MD  May 22, 2023  11:17 AM

## 2023-05-23 ENCOUNTER — OFFICE VISIT (OUTPATIENT)
Dept: OPHTHALMOLOGY | Facility: CLINIC | Age: 58
End: 2023-05-23
Payer: COMMERCIAL

## 2023-05-23 DIAGNOSIS — Z96.1 PSEUDOPHAKIA: Primary | ICD-10-CM

## 2023-05-23 PROCEDURE — 99024 POSTOP FOLLOW-UP VISIT: CPT | Performed by: STUDENT IN AN ORGANIZED HEALTH CARE EDUCATION/TRAINING PROGRAM

## 2023-05-23 ASSESSMENT — VISUAL ACUITY
OD_SC: 20/20
OS_SC: 20/20
METHOD: SNELLEN - LINEAR

## 2023-05-23 ASSESSMENT — TONOMETRY
OS_IOP_MMHG: 17
IOP_METHOD: APPLANATION
OD_IOP_MMHG: 15

## 2023-05-23 ASSESSMENT — SLIT LAMP EXAM - LIDS
COMMENTS: NORMAL
COMMENTS: NORMAL

## 2023-05-23 ASSESSMENT — EXTERNAL EXAM - RIGHT EYE: OD_EXAM: NORMAL

## 2023-05-23 ASSESSMENT — EXTERNAL EXAM - LEFT EYE: OS_EXAM: NORMAL

## 2023-05-23 NOTE — PATIENT INSTRUCTIONS
POST-OP CATARACT INSTRUCTIONS    *   Use the following drop(s) in the LEFT EYE four times a day until the bottle(s) run out:        moxifloxacin or ofloxacin (tan top), ketorolac (grey top), and prednisolone (white or pink top)     *   Wear eye shield when sleeping for one week. Do not rub the operated eye.     *   No bending or lifting more than 10 pounds for one week.    *   Keep water out of eye for two weeks.    *   OK to resume aspirin and/or other blood thinners if you stopped.     *   If your vision worsens, eye becomes increasingly red, or becomes painful, call [phone].     Kam Sifuentes M.D.

## 2023-05-23 NOTE — LETTER
5/23/2023         RE: Lan Potts Jr.  [address] [address]        Dear Colleague,    Thank you for referring your patient, Lan Potts Jr., to the [facility]. Please see a copy of my visit note below.     Current Eye Medications:  Prednisolone, moxifloxacin, and ketorolac four times a day left eye. Took tylenol at 3 am.     Subjective:  Po1, KPE/IOL left eye 5/22/23. Patient slept pretty well last night. Woke up one time and left eye felt dry, used Refresh PF. Patient sees edge of lens left eye. Vision is much improved left eye. Colors very good. No eye pain or discomfort in either eye.      Objective:  See Ophthalmology Exam.       Assessment:  Lan Potts Jr. is a 57 year old male who presents with:   Encounter Diagnosis   Name Primary?     Pseudophakia - Left Eye POD1 s/p CE/IOL left eye - doing well.        Plan:  POST-OP CATARACT INSTRUCTIONS    *   Use the following drop(s) in the LEFT EYE four times a day until the bottle(s) run out:        moxifloxacin or ofloxacin (tan top), ketorolac (grey top), and prednisolone (white or pink top)     *   Wear eye shield when sleeping for one week. Do not rub the operated eye.     *   No bending or lifting more than 10 pounds for one week.    *   Keep water out of eye for two weeks.    *   OK to resume aspirin and/or other blood thinners if you stopped.     *   If your vision worsens, eye becomes increasingly red, or becomes painful, call [phone].     Kam Sifuentes M.D.           Again, thank you for allowing me to participate in the care of your patient.        Sincerely,        Kam Sifuentes MD

## 2023-05-23 NOTE — PROGRESS NOTES
Current Eye Medications:  Prednisolone, moxifloxacin, and ketorolac four times a day left eye. Took tylenol at 3 am.     Subjective:  Po1, KPE/IOL left eye 5/22/23. Patient slept pretty well last night. Woke up one time and left eye felt dry, used Refresh PF. Patient sees edge of lens left eye. Vision is much improved left eye. Colors very good. No eye pain or discomfort in either eye.      Objective:  See Ophthalmology Exam.       Assessment:  Lan Potts Jr. is a 57 year old male who presents with:   Encounter Diagnosis   Name Primary?     Pseudophakia - Left Eye POD1 s/p CE/IOL left eye - doing well.        Plan:  POST-OP CATARACT INSTRUCTIONS    *   Use the following drop(s) in the LEFT EYE four times a day until the bottle(s) run out:        moxifloxacin or ofloxacin (tan top), ketorolac (grey top), and prednisolone (white or pink top)     *   Wear eye shield when sleeping for one week. Do not rub the operated eye.     *   No bending or lifting more than 10 pounds for one week.    *   Keep water out of eye for two weeks.    *   OK to resume aspirin and/or other blood thinners if you stopped.     *   If your vision worsens, eye becomes increasingly red, or becomes painful, call [phone].     Kam Sifuentes M.D.

## 2023-06-12 DIAGNOSIS — F51.04 PSYCHOPHYSIOLOGIC INSOMNIA: ICD-10-CM

## 2023-06-12 RX ORDER — TRAZODONE HYDROCHLORIDE 50 MG/1
TABLET, FILM COATED ORAL
Qty: 90 TABLET | Refills: 1 | Status: SHIPPED | OUTPATIENT
Start: 2023-06-12 | End: 2023-12-07

## 2023-06-13 ENCOUNTER — OFFICE VISIT (OUTPATIENT)
Dept: OPHTHALMOLOGY | Facility: CLINIC | Age: 58
End: 2023-06-13
Payer: COMMERCIAL

## 2023-06-13 DIAGNOSIS — Z96.1 PSEUDOPHAKIA: Primary | ICD-10-CM

## 2023-06-13 DIAGNOSIS — H04.123 DRY EYE SYNDROME, BILATERAL: ICD-10-CM

## 2023-06-13 DIAGNOSIS — H25.811 COMBINED FORM OF AGE-RELATED CATARACT, RIGHT EYE: ICD-10-CM

## 2023-06-13 PROCEDURE — 99024 POSTOP FOLLOW-UP VISIT: CPT | Performed by: STUDENT IN AN ORGANIZED HEALTH CARE EDUCATION/TRAINING PROGRAM

## 2023-06-13 ASSESSMENT — VISUAL ACUITY
OS_SC: 20/20
OD_SC: 20/20
METHOD: SNELLEN - LINEAR

## 2023-06-13 ASSESSMENT — SLIT LAMP EXAM - LIDS
COMMENTS: NORMAL
COMMENTS: NORMAL

## 2023-06-13 ASSESSMENT — TONOMETRY
IOP_METHOD: APPLANATION
OS_IOP_MMHG: 11

## 2023-06-13 ASSESSMENT — REFRACTION_MANIFEST
OS_ADD: +2.00
OS_SPHERE: -0.25
OS_CYLINDER: SPHERE

## 2023-06-13 ASSESSMENT — CUP TO DISC RATIO
OD_RATIO: 0.6
OS_RATIO: 0.6

## 2023-06-13 ASSESSMENT — EXTERNAL EXAM - LEFT EYE: OS_EXAM: NORMAL

## 2023-06-13 ASSESSMENT — EXTERNAL EXAM - RIGHT EYE: OD_EXAM: NORMAL

## 2023-06-13 NOTE — PATIENT INSTRUCTIONS
"Continue ketorolac (grey top) and prednisolone (white or pink top) eyedrops four times a day  in the left eye until the bottles run out    If the eyes feel dry after stopping your prescription drops, use artificial tears up to four times a day (like Refresh Optive or Systane Balance. Avoid \"get the red out\" drops).     May use over the counter readers or can fill glasses prescription given    Kam Sifuentes MD  [phone]   "

## 2023-06-13 NOTE — PROGRESS NOTES
" Current Eye Medications:  Ketorolac and prednisolone - left eye QID     Subjective:  Post-op KPE/IOL left eye 05/22/23 - vision is better, looks like he can see the edge of a contact lens on the far temporal side. No pain.      Objective:  See Ophthalmology Exam.      Assessment:  Lan Potts Jr. is a 57 year old male who presents with:   Encounter Diagnoses   Name Primary?     Pseudophakia - Left Eye Final MR left eye - well healed.     Combined form of age-related cataract, right eye      Dry eye syndrome, bilateral        Plan:  Continue ketorolac (grey top) and prednisolone (white or pink top) eyedrops four times a day  in the left eye until the bottles run out    If the eyes feel dry after stopping your prescription drops, use artificial tears up to four times a day (like Refresh Optive or Systane Balance. Avoid \"get the red out\" drops).     May use over the counter readers or can fill glasses prescription given    Kam Sifuentes MD  [phone]     "

## 2023-06-13 NOTE — LETTER
"    6/13/2023         RE: Lan Potts Jr.  [address] [facility] [address]        Dear Colleague,    Thank you for referring your patient, Lan Potts Jr., to the [facility]. Please see a copy of my visit note below.     Current Eye Medications:  Ketorolac and prednisolone - left eye QID     Subjective:  Post-op KPE/IOL left eye 05/22/23 - vision is better, looks like he can see the edge of a contact lens on the far temporal side. No pain.      Objective:  See Ophthalmology Exam.      Assessment:  Lan Potts Jr. is a 57 year old male who presents with:   Encounter Diagnoses   Name Primary?     Pseudophakia - Left Eye Final MR left eye - well healed.     Combined form of age-related cataract, right eye      Dry eye syndrome, bilateral        Plan:  Continue ketorolac (grey top) and prednisolone (white or pink top) eyedrops four times a day  in the left eye until the bottles run out    If the eyes feel dry after stopping your prescription drops, use artificial tears up to four times a day (like Refresh Optive or Systane Balance. Avoid \"get the red out\" drops).     May use over the counter readers or can fill glasses prescription given    Kam Sifuentes MD  [phone]         Again, thank you for allowing me to participate in the care of your patient.        Sincerely,        Kam Sifuentes MD    "

## 2023-06-14 ENCOUNTER — TELEPHONE (OUTPATIENT)
Dept: FAMILY MEDICINE | Facility: CLINIC | Age: 58
End: 2023-06-14
Payer: COMMERCIAL

## 2023-06-14 NOTE — TELEPHONE ENCOUNTER
Prior Authorization Retail Medication Request    Medication/Dose: TRULICITY 0.75 MG/0.5ML pen  ICD code (if different than what is on RX):  [E11.9]   Previously Tried and Failed:    Rationale:      Insurance Name:  [employer]  Insurance ID:  [id number]      Pharmacy Information (if different than what is on RX)  Name:    Phone:

## 2023-06-19 NOTE — TELEPHONE ENCOUNTER
Prior Authorization Approval    Authorization Effective Date: 3/21/2023  Authorization Expiration Date: 6/19/2024  Medication: TRULICITY 0.75 MG/0.5ML pen  Approved Dose/Quantity:    Reference #:     Insurance Company: [employer] [place] - Phone [phone] Fax [phone]  Expected CoPay:       CoPay Card Available:      Foundation Assistance Needed:    Which Pharmacy is filling the prescription (Not needed for infusion/clinic administered): [id number] - [place], [place] - [address] [address]  Pharmacy Notified: Yes  Patient Notified: Yes  **Instructed pharmacy to notify patient when script is ready to /ship.**

## 2023-06-19 NOTE — TELEPHONE ENCOUNTER
Central Prior Authorization Team   Phone: [phone]    PA Initiation    Medication: TRULICITY 0.75 MG/0.5ML pen  Insurance Company: [employer] [place] - Phone [phone] Fax [phone]  Pharmacy Filling the Rx: [id number] - [place], [place] - [address]  Filling Pharmacy Phone: [phone]  Filling Pharmacy Fax:    Start Date: 6/19/2023

## 2023-08-09 DIAGNOSIS — E11.9 TYPE 2 DIABETES MELLITUS WITHOUT COMPLICATION, WITHOUT LONG-TERM CURRENT USE OF INSULIN (H): ICD-10-CM

## 2023-08-09 RX ORDER — DAPAGLIFLOZIN 5 MG/1
TABLET, FILM COATED ORAL
Qty: 90 TABLET | Refills: 0 | Status: SHIPPED | OUTPATIENT
Start: 2023-08-09 | End: 2023-11-27

## 2023-08-09 NOTE — LETTER
August 9, 2023    Lan Potts Jr.  [address] [facility] [address]    Dear Lan,       We recently received a refill request for FARXIGA 5 MG TABS tablet .  We have refilled this for a one time 90 day supply only because you are due for a:    Diabetes office visit and fasting lab appointment      Please schedule an office visit with your provider and a lab appointment 4-5 days prior to the office visit.     Please call at your earliest convenience so that there will not be a delay with your future refills.          Thank you,   Your [facility] Team/  [phone]

## 2023-08-27 ENCOUNTER — HEALTH MAINTENANCE LETTER (OUTPATIENT)
Age: 58
End: 2023-08-27

## 2023-09-06 ENCOUNTER — OFFICE VISIT (OUTPATIENT)
Dept: OPTOMETRY | Facility: CLINIC | Age: 58
End: 2023-09-06
Payer: COMMERCIAL

## 2023-09-06 DIAGNOSIS — E11.9 TYPE 2 DIABETES MELLITUS WITHOUT COMPLICATION, WITHOUT LONG-TERM CURRENT USE OF INSULIN (H): ICD-10-CM

## 2023-09-06 DIAGNOSIS — H52.221 REGULAR ASTIGMATISM, RIGHT EYE: ICD-10-CM

## 2023-09-06 DIAGNOSIS — Z01.01 VISION EXAM WITH ABNORMAL FINDINGS: Primary | ICD-10-CM

## 2023-09-06 DIAGNOSIS — H52.4 PRESBYOPIA: ICD-10-CM

## 2023-09-06 PROCEDURE — 99207 PR NO CHARGE LOS: CPT | Performed by: OPTOMETRIST

## 2023-09-06 ASSESSMENT — CONF VISUAL FIELD
OD_INFERIOR_NASAL_RESTRICTION: 0
OD_SUPERIOR_NASAL_RESTRICTION: 0
OS_SUPERIOR_TEMPORAL_RESTRICTION: 0
OS_INFERIOR_TEMPORAL_RESTRICTION: 0
OD_INFERIOR_TEMPORAL_RESTRICTION: 0
OS_NORMAL: 1
OS_SUPERIOR_NASAL_RESTRICTION: 0
OD_NORMAL: 1
METHOD: COUNTING FINGERS
OS_INFERIOR_NASAL_RESTRICTION: 0
OD_SUPERIOR_TEMPORAL_RESTRICTION: 0

## 2023-09-06 ASSESSMENT — REFRACTION_MANIFEST
OS_AXIS: 167
OS_CYLINDER: +0.25
OD_SPHERE: +0.50
OS_SPHERE: +0.00
OD_AXIS: 176
OD_CYLINDER: +0.50

## 2023-09-06 ASSESSMENT — KERATOMETRY
OS_AXISANGLE2_DEGREES: 171
OD_AXISANGLE2_DEGREES: 177
OS_K1POWER_DIOPTERS: 44.00
OD_K1POWER_DIOPTERS: 43.50
OD_K2POWER_DIOPTERS: 44.00
OS_K2POWER_DIOPTERS: 44.25

## 2023-09-06 ASSESSMENT — REFRACTION_WEARINGRX
OS_SPHERE: +2.00
SPECS_TYPE: OTC'S
OD_SPHERE: +2.00

## 2023-09-06 ASSESSMENT — VISUAL ACUITY
OD_SC: 20/20
CORRECTION_TYPE: GLASSES
OS_SC: 20/20
METHOD: SNELLEN - LINEAR
OS_CC: 20/20
OD_CC: 20/20-1

## 2023-09-06 NOTE — LETTER
2023         RE: Lan Potts Jr.  [address] [address] [address]        Dear Colleague,    Thank you for referring your patient, Lan Potts Jr., to the [facility]. Please see a copy of my visit note below.    Chief Complaint   Patient presents with     Diabetic Eye Exam     Weekly injection of Trulicity        Chief Complaint(s) and History of Present Illness(es)       Diabetic Eye Exam              Vision: is stable    Diabetes Type: Type 2 and taking oral medications    Blood Sugars: is controlled    Comments: Weekly injection of Trulicity                   Lab Results   Component Value Date    A1C 6.5 2023    A1C 6.3 2022    A1C 6.0 2022    A1C 7.2 2021    A1C 9.9 2021    A1C 10.4 2021    A1C 6.9 2019    A1C 6.4 2019    A1C 6.6 08/15/2019        Had cataract surgery in May 2023    Last Eye Exam: 22  Dilated Previously: Yes, had Cataract surgery May of this year, doesn't think that it's necessary.     What are you currently using to see?  Readers, uses +2.00    Distance Vision Acuity: Satisfied with vision    Near Vision Acuity: Satisfied with vision while reading and using computer with readers    Eye Comfort: dry. Also mentioned that the he sometimes feels a presence in that left eye that he had surgery. Like a fold or something.   Do you use eye drops? : Yes: As needed using NPT for Dry Eyes   Occupation or Hobbies: Retired     [employer] Assistant       Chart reviewed at appointment.     He has no concerns about vision or diabetes.  Last dilation 23 with Dr Sifuentes shows no diabetic retinopathy.   Last Refraction 23 is minimal and not      No need for this visit - discussed this with Lan , he agreed  Advised him to return in  May 2024 for diabetic eye exam  and as needed     Danni Johnson O.D.  [facility]  [address] [place]  [address]  [phone]                Again, thank you for allowing me to participate in the care of your patient.        Sincerely,        Danni Johnson, OD

## 2023-09-06 NOTE — PROGRESS NOTES
Chief Complaint   Patient presents with    Diabetic Eye Exam     Weekly injection of Trulicity        Chief Complaint(s) and History of Present Illness(es)       Diabetic Eye Exam              Vision: is stable    Diabetes Type: Type 2 and taking oral medications    Blood Sugars: is controlled    Comments: Weekly injection of Trulicity                   Lab Results   Component Value Date    A1C 6.5 2023    A1C 6.3 2022    A1C 6.0 2022    A1C 7.2 2021    A1C 9.9 2021    A1C 10.4 2021    A1C 6.9 2019    A1C 6.4 2019    A1C 6.6 08/15/2019        Had cataract surgery in May 2023    Last Eye Exam: 22  Dilated Previously: Yes, had Cataract surgery May of this year, doesn't think that it's necessary.     What are you currently using to see?  Readers, uses +2.00    Distance Vision Acuity: Satisfied with vision    Near Vision Acuity: Satisfied with vision while reading and using computer with readers    Eye Comfort: dry. Also mentioned that the he sometimes feels a presence in that left eye that he had surgery. Like a fold or something.   Do you use eye drops? 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     He has no concerns about vision or diabetes.  Last dilation 23 with Dr Sifuentes shows no diabetic retinopathy.   Last Refraction 23 is minimal and not      No need for this visit - discussed this with Lan , he agreed  Advised him to return in  May 2024 for diabetic eye exam  and as needed     Danni Johnson O.D.  [facility]  [address] [place] [address] [phone]

## 2023-09-12 DIAGNOSIS — F41.1 GAD (GENERALIZED ANXIETY DISORDER): ICD-10-CM

## 2023-09-13 RX ORDER — ALPRAZOLAM 1 MG
TABLET ORAL
Qty: 20 TABLET | Refills: 0 | Status: SHIPPED | OUTPATIENT
Start: 2023-09-13

## 2023-09-19 DIAGNOSIS — E11.9 TYPE 2 DIABETES MELLITUS WITHOUT COMPLICATION, WITHOUT LONG-TERM CURRENT USE OF INSULIN (H): ICD-10-CM

## 2023-09-19 RX ORDER — DULAGLUTIDE 0.75 MG/.5ML
0.75 INJECTION, SOLUTION SUBCUTANEOUS
Qty: 6 ML | Refills: 0 | Status: SHIPPED | OUTPATIENT
Start: 2023-09-19

## 2023-09-29 ENCOUNTER — OFFICE VISIT (OUTPATIENT)
Dept: FAMILY MEDICINE | Facility: CLINIC | Age: 58
End: 2023-09-29
Payer: COMMERCIAL

## 2023-09-29 VITALS
SYSTOLIC BLOOD PRESSURE: 97 MMHG | TEMPERATURE: 98.6 F | OXYGEN SATURATION: 94 % | HEIGHT: 71 IN | DIASTOLIC BLOOD PRESSURE: 63 MMHG | WEIGHT: 298.4 LBS | BODY MASS INDEX: 41.77 KG/M2 | HEART RATE: 85 BPM | RESPIRATION RATE: 24 BRPM

## 2023-09-29 DIAGNOSIS — E66.01 MORBID OBESITY (H): ICD-10-CM

## 2023-09-29 DIAGNOSIS — I50.22 CHF (CONGESTIVE HEART FAILURE), NYHA CLASS II, CHRONIC, SYSTOLIC (H): ICD-10-CM

## 2023-09-29 DIAGNOSIS — E11.9 TYPE 2 DIABETES MELLITUS WITHOUT COMPLICATION, WITHOUT LONG-TERM CURRENT USE OF INSULIN (H): Primary | ICD-10-CM

## 2023-09-29 DIAGNOSIS — E78.5 HYPERLIPIDEMIA WITH TARGET LDL LESS THAN 100: ICD-10-CM

## 2023-09-29 DIAGNOSIS — F41.1 GAD (GENERALIZED ANXIETY DISORDER): ICD-10-CM

## 2023-09-29 PROBLEM — Z72.0 TOBACCO ABUSE: Status: RESOLVED | Noted: 2018-04-05 | Resolved: 2023-09-29

## 2023-09-29 LAB
ANION GAP SERPL CALCULATED.3IONS-SCNC: 12 MMOL/L (ref 7–15)
BUN SERPL-MCNC: 14.8 MG/DL (ref 6–20)
CALCIUM SERPL-MCNC: 9 MG/DL (ref 8.6–10)
CHLORIDE SERPL-SCNC: 100 MMOL/L (ref 98–107)
CHOLEST SERPL-MCNC: 210 MG/DL
CREAT SERPL-MCNC: 0.84 MG/DL (ref 0.67–1.17)
DEPRECATED HCO3 PLAS-SCNC: 24 MMOL/L (ref 22–29)
EGFRCR SERPLBLD CKD-EPI 2021: >90 ML/MIN/1.73M2
GLUCOSE SERPL-MCNC: 191 MG/DL (ref 70–99)
HBA1C MFR BLD: 7.2 % (ref 0–5.6)
HDLC SERPL-MCNC: 29 MG/DL
LDLC SERPL CALC-MCNC: 114 MG/DL
NONHDLC SERPL-MCNC: 181 MG/DL
POTASSIUM SERPL-SCNC: 4.3 MMOL/L (ref 3.4–5.3)
SODIUM SERPL-SCNC: 136 MMOL/L (ref 135–145)
TRIGL SERPL-MCNC: 334 MG/DL

## 2023-09-29 PROCEDURE — 91320 SARSCV2 VAC 30MCG TRS-SUC IM: CPT | Performed by: FAMILY MEDICINE

## 2023-09-29 PROCEDURE — 90471 IMMUNIZATION ADMIN: CPT | Performed by: FAMILY MEDICINE

## 2023-09-29 PROCEDURE — 80048 BASIC METABOLIC PNL TOTAL CA: CPT | Performed by: FAMILY MEDICINE

## 2023-09-29 PROCEDURE — 36415 COLL VENOUS BLD VENIPUNCTURE: CPT | Performed by: FAMILY MEDICINE

## 2023-09-29 PROCEDURE — 83036 HEMOGLOBIN GLYCOSYLATED A1C: CPT | Performed by: FAMILY MEDICINE

## 2023-09-29 PROCEDURE — 90682 RIV4 VACC RECOMBINANT DNA IM: CPT | Performed by: FAMILY MEDICINE

## 2023-09-29 PROCEDURE — 99214 OFFICE O/P EST MOD 30 MIN: CPT | Mod: 25 | Performed by: FAMILY MEDICINE

## 2023-09-29 PROCEDURE — 80061 LIPID PANEL: CPT | Performed by: FAMILY MEDICINE

## 2023-09-29 PROCEDURE — 90480 ADMN SARSCOV2 VAC 1/ONLY CMP: CPT | Performed by: FAMILY MEDICINE

## 2023-09-29 RX ORDER — BUMETANIDE 1 MG/1
1 TABLET ORAL 2 TIMES DAILY
Qty: 180 TABLET | Refills: 1 | Status: SHIPPED | OUTPATIENT
Start: 2023-09-29

## 2023-09-29 ASSESSMENT — PAIN SCALES - GENERAL: PAINLEVEL: NO PAIN (0)

## 2023-09-29 NOTE — PROGRESS NOTES
ASSESSMENT / PLAN:  (E11.9) Type 2 diabetes mellitus without complication, without long-term current use of insulin (H)  (primary encounter diagnosis)  Comment: neds help  Plan: HEMOGLOBIN A1C, BASIC METABOLIC PANEL,         dulaglutide (TRULICITY) 1.5 MG/0.5ML pen        Diet//exercise. Will double trulicity. Exercise and weight light weight lifting.Recheck in 6 months  Sooner if worse.follow-up eye exam in winter    (E78.5) Hyperlipidemia with target LDL less than 100  Comment: stable in past  Plan: Lipid panel reflex to direct LDL Fasting        Continue crestor    (E66.01) Morbid obesity (H)  Comment: needs help  Plan: dulaglutide (TRULICITY) 1.5 MG/0.5ML pen        More protein and regular exercise. Recheck in 6 months      (I50.22) CHF (congestive heart failure), NYHA class II, chronic, systolic (H)  Comment: stable in BID dosage  Plan: bumetanide (BUMEX) 1 MG tablet        Reveiwed risks and side effects of medication  Follow-up per cardiolgoy.   Repeat weight gain/worsening shortness of breath to er.     (F41.1) JUAN (generalized anxiety disorder)  Comment: stable  Plan: continue lexapro and prn xanax average once/month. Avoid with ALCOHOL. If SUICIAL IDEATION OR HOMOCIDAL IDEATION OR DEJUAN TO ER       Subjective   Lan is a 58 year old, presenting for the following health issues:  History of dm, htn, gerd, cad, anxiety, ed, chf and morbid obesity. seeing cardiology. Fluid retention - bumex BID.   Has scale at home. Lower sodium diet.   No issues trulicity. Will start lifting weights.   No nausea, vomiting or diarrhea or black/bloody stools. No feet changes. Hair still. Eye exam in past year ok.   Emotoinally doing ok.   Xanax rare average 3/month.  Lexapro done from 30mg to 20mg.    - dating going ok. One son - doing ok.   No urine changes or hematuria.    Diabetes      9/29/2023    10:36 AM   Additional Questions   Roomed by KYA Mahajan CMA       History of Present Illness       Diabetes:   He presents  "for follow up of diabetes.  He is checking home blood glucose three times daily.   He checks blood glucose after meals.  Blood glucose is never over 200 and never under 70. He is aware of hypoglycemia symptoms including shakiness.    He has no concerns regarding his diabetes at this time.  He is having weight gain.            Heart Failure:  He presents for follow up of heart failure. He is experiencing shortness of breath with activity only, which is same as usual. He is not experiencing any lower extremity edema.   He denies orthopenea and is not coughing at night. Patient is checking weight daily. He has recently had a weight increase.  He has side effects from medications including dizziness and fatigue.  He has has a medical visit for heart failure 1 time since the last visit.    He eats 2-3 servings of fruits and vegetables daily.He consumes 1 sweetened beverage(s) daily.He exercises with enough effort to increase his heart rate 20 to 29 minutes per day.  He exercises with enough effort to increase his heart rate 3 or less days per week.   He is taking medications regularly.     Last Echo: No results found.        Objective    BP 97/63   Pulse 85   Temp 98.6  F (37  C) (Oral)   Resp 24   Ht 1.803 m (5' 11\")   Wt 135.4 kg (298 lb 6.4 oz)   SpO2 94%   BMI 41.62 kg/m    Body mass index is 41.62 kg/m .  Physical Exam   GENERAL: healthy, alert and no distress  EYES: Eyes grossly normal to inspection, PERRL and conjunctivae and sclerae normal  NECK: no adenopathy, no asymmetry, masses, or scars and thyroid normal to palpation  RESP: lungs clear to auscultation - no rales, rhonchi or wheezes  CV: regular rate and rhythm, normal S1 S2, no S3 or S4, no murmur, click or rub, no peripheral edema and peripheral pulses strong  ABDOMEN: soft, nontender, no hepatosplenomegaly, no masses and bowel sounds normal  MS: no gross musculoskeletal defects noted, no edema  NEURO: Normal strength and tone, mentation intact and " speech normal  PSYCH: mentation appears normal, affect normal/bright

## 2023-10-08 DIAGNOSIS — F41.1 GAD (GENERALIZED ANXIETY DISORDER): ICD-10-CM

## 2023-10-09 RX ORDER — ESCITALOPRAM OXALATE 10 MG/1
TABLET ORAL
Qty: 90 TABLET | Refills: 0 | Status: SHIPPED | OUTPATIENT
Start: 2023-10-09 | End: 2024-02-22

## 2023-10-09 RX ORDER — ESCITALOPRAM OXALATE 20 MG/1
TABLET ORAL
Qty: 90 TABLET | Refills: 0 | Status: SHIPPED | OUTPATIENT
Start: 2023-10-09 | End: 2024-02-22

## 2023-11-22 ENCOUNTER — TELEPHONE (OUTPATIENT)
Dept: FAMILY MEDICINE | Facility: CLINIC | Age: 58
End: 2023-11-22
Payer: COMMERCIAL

## 2023-11-22 NOTE — TELEPHONE ENCOUNTER
Patient Quality Outreach    Patient is due for the following:   Diabetes -  Microalbumin and Foot Exam  Physical Preventive Adult Physical      Topic Date Due    Pneumococcal Vaccine (2 - PCV) 09/04/2015       Next Steps:   Schedule a Adult Preventative    Type of outreach:    Sent [email or web address] message.      Questions for provider review:               Ibis Alberts MA

## 2023-11-25 DIAGNOSIS — E11.9 TYPE 2 DIABETES MELLITUS WITHOUT COMPLICATION, WITHOUT LONG-TERM CURRENT USE OF INSULIN (H): ICD-10-CM

## 2023-11-27 RX ORDER — DAPAGLIFLOZIN 5 MG/1
TABLET, FILM COATED ORAL
Qty: 90 TABLET | Refills: 0 | Status: SHIPPED | OUTPATIENT
Start: 2023-11-27 | End: 2024-03-06

## 2023-12-07 DIAGNOSIS — F51.04 PSYCHOPHYSIOLOGIC INSOMNIA: ICD-10-CM

## 2023-12-07 RX ORDER — TRAZODONE HYDROCHLORIDE 50 MG/1
TABLET, FILM COATED ORAL
Qty: 90 TABLET | Refills: 1 | Status: SHIPPED | OUTPATIENT
Start: 2023-12-07 | End: 2024-05-17

## 2024-01-31 ENCOUNTER — MYC MEDICAL ADVICE (OUTPATIENT)
Dept: FAMILY MEDICINE | Facility: CLINIC | Age: 59
End: 2024-01-31

## 2024-01-31 DIAGNOSIS — E66.01 MORBID OBESITY (H): ICD-10-CM

## 2024-01-31 DIAGNOSIS — E11.9 TYPE 2 DIABETES MELLITUS WITHOUT COMPLICATION, WITHOUT LONG-TERM CURRENT USE OF INSULIN (H): ICD-10-CM

## 2024-01-31 NOTE — TELEPHONE ENCOUNTER
Prescription wegovy to his pharmacy to replace trulicity. If not able to get he can call around to see if available at another pharmacy and I will send there. Will discuss alternative at upcoming appointment if not covered/available. Zachariah Mcpherson MD

## 2024-01-31 NOTE — TELEPHONE ENCOUNTER
Routing to provider to review and advise, see "[employer] message below.  Trulicity on  backorder, patient unable to get Rx from pharmacy. Asking for an alternative.

## 2024-02-01 ENCOUNTER — TELEPHONE (OUTPATIENT)
Dept: FAMILY MEDICINE | Facility: CLINIC | Age: 59
End: 2024-02-01
Payer: COMMERCIAL

## 2024-02-01 NOTE — TELEPHONE ENCOUNTER
Very low carb/high protein diet and light weight lifting for 20minutes every other day.  We can discuss alternatives if any at upcoming appointment. Zachariah Mcpherson MD

## 2024-02-01 NOTE — TELEPHONE ENCOUNTER
Prior Authorization Retail Medication Request    Medication/Dose: Semaglutide-Weight Management (WEGOVY) 1 MG/0.5ML pen  Diagnosis and ICD code (if different than what is on RX):    Type 2 diabetes mellitus without complication, without long-term current use of insulin (H) [E11.9]      Morbid obesity (H) [E66.01]        New/renewal/insurance change PA/secondary ins. PA:  Previously Tried and Failed:    Rationale:      Insurance   Primary: Phone 3: [phone]  Insurance ID:  [id number]      Pharmacy Information (if different than what is on RX)  Name:  [facility]  Phone:  [phone]  Fax:[phone]

## 2024-02-02 NOTE — TELEPHONE ENCOUNTER
RN pended order for a 90-day supply per patient request.  Routing to provider to advise.  Allie HERBERTN, RN

## 2024-02-14 NOTE — TELEPHONE ENCOUNTER
PA Initiation    Medication: WEGOVY 1 MG/0.5ML SC SOAJ  Insurance Company: [employer] Part D - Phone [phone] Fax [phone]  Pharmacy Filling the Rx: [id number] - [place], [place] - [address] [address] [facility] [address]  Filling Pharmacy Phone: [phone]  Filling Pharmacy Fax: [phone]  Start Date: 2/14/2024         Thank you,     Virgil Corado [facility]  Pharmacy Clinic [facility]  [email or web address]   Phone: [phone]  Fax: [phone]

## 2024-02-15 NOTE — TELEPHONE ENCOUNTER
PRIOR AUTHORIZATION DENIED    Medication: WEGOVY 1 MG/0.5ML SC SOAJ  Insurance Company: [employer] Part D - Phone [phone] Fax [phone]  Denial Date: 2/15/2024  Denial Reason(s): Medications used for weight loss are plan exclusion and not covered under pharmacy insurance.    Appeal Information: N/A  Patient Notified: No          Thank you,     Virgil Corado [facility]  Pharmacy Clinic [facility]  [email or web address]   Phone: [phone]  Fax: [phone]

## 2024-02-22 ENCOUNTER — OFFICE VISIT (OUTPATIENT)
Dept: FAMILY MEDICINE | Facility: CLINIC | Age: 59
End: 2024-02-22
Payer: COMMERCIAL

## 2024-02-22 VITALS
HEIGHT: 71 IN | DIASTOLIC BLOOD PRESSURE: 71 MMHG | WEIGHT: 285.4 LBS | BODY MASS INDEX: 39.96 KG/M2 | RESPIRATION RATE: 20 BRPM | TEMPERATURE: 98.2 F | SYSTOLIC BLOOD PRESSURE: 106 MMHG | HEART RATE: 80 BPM | OXYGEN SATURATION: 97 %

## 2024-02-22 DIAGNOSIS — F41.1 GAD (GENERALIZED ANXIETY DISORDER): ICD-10-CM

## 2024-02-22 DIAGNOSIS — E11.9 TYPE 2 DIABETES MELLITUS WITHOUT COMPLICATION, WITHOUT LONG-TERM CURRENT USE OF INSULIN (H): Primary | ICD-10-CM

## 2024-02-22 DIAGNOSIS — E66.01 MORBID OBESITY (H): ICD-10-CM

## 2024-02-22 DIAGNOSIS — G47.00 INSOMNIA, UNSPECIFIED TYPE: ICD-10-CM

## 2024-02-22 PROCEDURE — 99214 OFFICE O/P EST MOD 30 MIN: CPT | Performed by: FAMILY MEDICINE

## 2024-02-22 RX ORDER — CARVEDILOL 3.12 MG/1
3.12 TABLET ORAL DAILY
COMMUNITY
Start: 2024-02-13

## 2024-02-22 RX ORDER — SACUBITRIL AND VALSARTAN 49; 51 MG/1; MG/1
1 TABLET, FILM COATED ORAL
COMMUNITY
Start: 2024-01-02

## 2024-02-22 RX ORDER — METFORMIN HYDROCHLORIDE 750 MG/1
750 TABLET, EXTENDED RELEASE ORAL 2 TIMES DAILY WITH MEALS
COMMUNITY

## 2024-02-22 RX ORDER — ESCITALOPRAM OXALATE 20 MG/1
TABLET ORAL
Qty: 90 TABLET | Refills: 1 | Status: SHIPPED | OUTPATIENT
Start: 2024-02-22

## 2024-02-22 RX ORDER — METFORMIN HCL 500 MG
500 TABLET, EXTENDED RELEASE 24 HR ORAL 2 TIMES DAILY WITH MEALS
Qty: 180 TABLET | Refills: 1 | Status: SHIPPED | OUTPATIENT
Start: 2024-02-22 | End: 2024-08-26

## 2024-02-22 RX ORDER — BLOOD-GLUCOSE METER
EACH MISCELLANEOUS
Qty: 150 EACH | Refills: 11 | Status: SHIPPED | OUTPATIENT
Start: 2024-02-22

## 2024-02-22 RX ORDER — DIAZEPAM 5 MG
5 TABLET ORAL
Qty: 20 TABLET | Refills: 1 | Status: SHIPPED | OUTPATIENT
Start: 2024-02-22 | End: 2024-07-22

## 2024-02-22 RX ORDER — ESCITALOPRAM OXALATE 10 MG/1
TABLET ORAL
Qty: 90 TABLET | Refills: 1 | Status: SHIPPED | OUTPATIENT
Start: 2024-02-22

## 2024-02-22 ASSESSMENT — PAIN SCALES - GENERAL: PAINLEVEL: MILD PAIN (3)

## 2024-02-22 NOTE — PROGRESS NOTES
ASSESSMENT / PLAN:  (E11.9) Type 2 diabetes mellitus without complication, without long-term current use of insulin (H)  (primary encounter diagnosis)  Comment: out of Trucility  Plan: Semaglutide-Weight Management (WEGOVY) 1         MG/0.5ML pen, blood glucose monitoring (POGO         AUTOMATIC) test cartridges, metFORMIN         (GLUCOPHAGE XR) 500 MG 24 hr tablet, CANCELED:         Albumin Random Urine Quantitative with Creat         Ratio, CANCELED: Hemoglobin A1c        Diet/exercise. Lower metformin with GI issues. (Down from 750mg). Will ask our pharmacy if they can find wegovy or similar. Recheck in 3 months  Follow-up eye exam    (E66.01) Morbid obesity (H)  Comment: needs help  Plan: Semaglutide-Weight Management (WEGOVY) 1         MG/0.5ML pen        Reveiwed risks and side effects of medication      (F41.1) JUAN (generalized anxiety disorder)  Comment: needs hepl  Plan: escitalopram (LEXAPRO) 20 MG tablet,         escitalopram (LEXAPRO) 10 MG tablet, diazepam         (VALIUM) 5 MG tablet        Consider theapist. Patient would like valium for sleep instead of xanax. If SUICIAL IDEATION OR HOMOCIDAL IDEATION OR DEJUAN TO ER. Recheck in 3 months  Sooner if worwse. Call/email with questions/concerns      (G47.00) Insomnia, unspecified type    Plan: diazepam (VALIUM) 5 MG tablet        Avoid with ALCOHOL.       Subjective   Lan is a 58 year old, presenting for the following health issues:  History of dm, htn, gerd, cad, anxiety, ed, chf and morbid obesity. seeing cardiology   Trulicity dosage doubled in fall.   Can't find injection meds. Metformin.  Issues with blood pressure too low and exercise difficult.    Weight down.   Single - broke up 2 months ago.   Sibling in [place]. Kid 36yo -in [place].   Xanax helpful with sleep.  Valium   No acute feet changes. Vision cataracts.   Diabetes      2/22/2024     1:37 PM   Additional Questions   Roomed by KYA Mahajan CMA     History of Present Illness  "      Diabetes:   He presents for follow up of diabetes.  He is checking home blood glucose one time daily.   He checks blood glucose after meals.  Blood glucose is never over 200 and never under 70. He is aware of hypoglycemia symptoms including shakiness, dizziness and weakness.   He is concerned about other.   He is having numbness in feet.            He eats 2-3 servings of fruits and vegetables daily.He consumes 0 sweetened beverage(s) daily.He exercises with enough effort to increase his heart rate 20 to 29 minutes per day.  He exercises with enough effort to increase his heart rate 4 days per week.   He is taking medications regularly.           Objective    /71   Pulse 80   Temp 98.2  F (36.8  C) (Oral)   Resp 20   Ht 1.81 m (5' 11.25\")   Wt 129.5 kg (285 lb 6.4 oz)   SpO2 97%   BMI 39.53 kg/m    Body mass index is 39.53 kg/m .  Physical Exam   GENERAL: alert and no distress  EYES: Eyes grossly normal to inspection, PERRL and conjunctivae and sclerae normal  NECK: no adenopathy, no asymmetry, masses, or scars  RESP: lungs clear to auscultation - no rales, rhonchi or wheezes  CV: regular rate and rhythm, normal S1 S2, no S3 or S4, no murmur, click or rub, no peripheral edema   ABDOMEN: soft, nontender, no hepatosplenomegaly, no masses and bowel sounds normal  MS: no gross musculoskeletal defects noted, no edema  NEURO: Normal strength and tone, mentation intact and speech normal  PSYCH: mentation appears normal, affect normal/bright            Signed Electronically by: Zachariah Mcpherson MD    "

## 2024-02-23 ENCOUNTER — TELEPHONE (OUTPATIENT)
Dept: FAMILY MEDICINE | Facility: CLINIC | Age: 59
End: 2024-02-23
Payer: COMMERCIAL

## 2024-02-23 NOTE — TELEPHONE ENCOUNTER
Prior Authorization Retail Medication Request    Medication/Dose: blood glucose monitoring (POGO AUTOMATIC) test cartridges  Diagnosis and ICD code (if different than what is on RX):    Type 2 diabetes mellitus without complication, without long-term current use of insulin (H) [E11.9]      New/renewal/insurance change PA/secondary ins. PA:  Previously Tried and Failed:    Rationale:      Covermymeds Key: [id number]

## 2024-03-01 NOTE — TELEPHONE ENCOUNTER
PA Initiation    Medication: POGO AUTOMATIC TEST CARTRIDGES VI TEST  Insurance Company: [employer] Part D - Phone [phone] Fax [phone]  Pharmacy Filling the Rx: [id number] - [place], [place] - [address] [facility] AT [address]  Filling Pharmacy Phone: [phone]  Filling Pharmacy Fax: [phone]  Start Date: 3/1/2024         Thank you,     Virgil Corado [facility]  Pharmacy Clinic [facility]  [email or web address]   Phone: [phone]  Fax: [phone]

## 2024-03-06 DIAGNOSIS — E11.9 TYPE 2 DIABETES MELLITUS WITHOUT COMPLICATION, WITHOUT LONG-TERM CURRENT USE OF INSULIN (H): ICD-10-CM

## 2024-03-06 RX ORDER — DAPAGLIFLOZIN 5 MG/1
5 TABLET, FILM COATED ORAL DAILY
Qty: 90 TABLET | Refills: 0 | Status: SHIPPED | OUTPATIENT
Start: 2024-03-06 | End: 2024-07-22

## 2024-03-08 NOTE — TELEPHONE ENCOUNTER
Prior Authorization Approval    Medication: POGO AUTOMATIC TEST CARTRIDGES VI TEST  Authorization Effective Date: 3/6/2024  Authorization Expiration Date: 12/31/2024  Approved Dose/Quantity: up to 300 per 30 days  Reference #: [id number]   Insurance Company: [employer] Part D - Phone [phone] Fax [phone]  Expected CoPay: $    CoPay Card Available:      Financial Assistance Needed: No  Which Pharmacy is filling the prescription: [id number] - [place], [place] - [address] [facility] [facility]  Pharmacy Notified: Yes  Patient Notified: Yes **pharmacy will notify pt when ready**          Thank you,     Virgil Corado [facility]  Pharm[facility] Clinic [email or web address] [email or web address]   Phone: [phone]  Fax: [phone]

## 2024-03-24 ENCOUNTER — HEALTH MAINTENANCE LETTER (OUTPATIENT)
Age: 59
End: 2024-03-24

## 2024-05-17 DIAGNOSIS — F51.04 PSYCHOPHYSIOLOGIC INSOMNIA: ICD-10-CM

## 2024-05-17 RX ORDER — TRAZODONE HYDROCHLORIDE 50 MG/1
TABLET, FILM COATED ORAL
Qty: 90 TABLET | Refills: 1 | Status: SHIPPED | OUTPATIENT
Start: 2024-05-17

## 2024-06-02 ENCOUNTER — HEALTH MAINTENANCE LETTER (OUTPATIENT)
Age: 59
End: 2024-06-02

## 2024-07-21 DIAGNOSIS — E11.9 TYPE 2 DIABETES MELLITUS WITHOUT COMPLICATION, WITHOUT LONG-TERM CURRENT USE OF INSULIN (H): ICD-10-CM

## 2024-07-21 DIAGNOSIS — G47.00 INSOMNIA, UNSPECIFIED TYPE: ICD-10-CM

## 2024-07-21 DIAGNOSIS — F41.1 GAD (GENERALIZED ANXIETY DISORDER): ICD-10-CM

## 2024-07-21 NOTE — LETTER
July 22, 2024    Lan Potts Jr.  [address] [address] [facility] [address]    Dear Lan,       We recently received a refill request for diazepam and farxiga .  We have refilled this for a one time 90 day supply only because you are due for a:    Medication check office visit and fasting lab appointment-neede in the next 2 months per Dr Mcpherson.      Please schedule an office visit with your provider and a lab appointment 4-5 days prior to the office visit.     Please call at your earliest convenience so that there will not be a delay with your future refills.          Thank you,   Your [facility] Team/  [phone]

## 2024-07-22 RX ORDER — DAPAGLIFLOZIN 5 MG/1
5 TABLET, FILM COATED ORAL DAILY
Qty: 90 TABLET | Refills: 0 | Status: SHIPPED | OUTPATIENT
Start: 2024-07-22

## 2024-07-22 RX ORDER — DIAZEPAM 5 MG
TABLET ORAL
Qty: 10 TABLET | Refills: 0 | Status: SHIPPED | OUTPATIENT
Start: 2024-07-22

## 2024-08-15 ENCOUNTER — MYC MEDICAL ADVICE (OUTPATIENT)
Dept: FAMILY MEDICINE | Facility: CLINIC | Age: 59
End: 2024-08-15

## 2024-08-15 ENCOUNTER — PATIENT OUTREACH (OUTPATIENT)
Dept: FAMILY MEDICINE | Facility: CLINIC | Age: 59
End: 2024-08-15
Payer: COMMERCIAL

## 2024-08-15 NOTE — TELEPHONE ENCOUNTER
Patient Quality Outreach    Patient is due for the following:   Diabetes -  A1C, Microalbumin, Diabetic Follow-Up Visit, and Foot Exam  Physical Annual Wellness Visit      Topic Date Due    Pneumococcal Vaccine (2 of 2 - PCV) 09/04/2015       Next Steps:   Schedule a Annual Wellness Visit    Type of outreach:    Sent [employer] message.      Questions for provider review:    None           Francia Lynn, CMA

## 2024-08-26 DIAGNOSIS — E11.9 TYPE 2 DIABETES MELLITUS WITHOUT COMPLICATION, WITHOUT LONG-TERM CURRENT USE OF INSULIN (H): ICD-10-CM

## 2024-08-26 RX ORDER — METFORMIN HCL 500 MG
500 TABLET, EXTENDED RELEASE 24 HR ORAL 2 TIMES DAILY WITH MEALS
Qty: 180 TABLET | Refills: 0 | Status: SHIPPED | OUTPATIENT
Start: 2024-08-26 | End: 2024-10-04

## 2024-08-26 NOTE — LETTER
August 26, 2024    Lan Potts Jr.  [address] [address] [address]    Dear Lan,       We recently received a refill request for metFORMIN (GLUCOPHAGE XR) 500 MG 24 hr tablet .  We have refilled this for a one time 90 day supply only because you are due for a:    Diabetes office visit and fasting lab appointment-needed in the next couple of months per Dr Mcpherson.      Please schedule an office visit with your provider and a lab appointment 4-5 days prior to the office visit.     Please call at your earliest convenience so that there will not be a delay with your future refills.          Thank you,   Your [facility] Team/  [phone]

## 2024-09-16 DIAGNOSIS — E11.9 TYPE 2 DIABETES MELLITUS WITHOUT COMPLICATION, WITHOUT LONG-TERM CURRENT USE OF INSULIN (H): ICD-10-CM

## 2024-09-16 DIAGNOSIS — E66.01 MORBID OBESITY (H): ICD-10-CM

## 2024-09-16 RX ORDER — TIRZEPATIDE 7.5 MG/.5ML
INJECTION, SOLUTION SUBCUTANEOUS
Qty: 6 ML | Refills: 0 | Status: SHIPPED | OUTPATIENT
Start: 2024-09-16

## 2024-09-16 NOTE — LETTER
September 16, 2024    Lan Potts Jr.  [address] [address] [address]    Dear Lan,       We recently received a refill request for tirzepatide (MOUNJARO) 7.5 MG/0.5ML pen .  We have refilled this for one time only because you are due for a:    Diabetes office visit and fasting lab appointment-needed in the next couple of months, per Dr Mcpherson.      Please schedule an office visit with your provider and a lab appointment 4-5 days prior to the office visit.     Please call at your earliest convenience so that there will not be a delay with your future refills.          Thank you,   Your [facility] Team/  [phone]

## 2024-10-04 DIAGNOSIS — E11.9 TYPE 2 DIABETES MELLITUS WITHOUT COMPLICATION, WITHOUT LONG-TERM CURRENT USE OF INSULIN (H): ICD-10-CM

## 2024-10-04 RX ORDER — METFORMIN HYDROCHLORIDE 500 MG/1
500 TABLET, EXTENDED RELEASE ORAL 2 TIMES DAILY WITH MEALS
Qty: 180 TABLET | Refills: 0 | Status: SHIPPED | OUTPATIENT
Start: 2024-10-04 | End: 2024-11-04

## 2024-10-04 NOTE — LETTER
October 4, 2024    Lan Potts Jr.  [address] [facility] [address]    Dear Lan,       We recently received a refill request for metFORMIN (GLUCOPHAGE XR) 500 MG 24 hr tablet .  We have refilled this for a one time 90 day supply only because you are due for a:    Diabetes office visit and fasting lab appointment-needed in the next couple of months per Dr Mcpherson.      Please schedule an office visit with your provider and a lab appointment 4-5 days prior to the office visit.     Please call at your earliest convenience so that there will not be a delay with your future refills.          Thank you,   Your [facility] Team/  [phone]

## 2024-11-04 ENCOUNTER — OFFICE VISIT (OUTPATIENT)
Dept: FAMILY MEDICINE | Facility: CLINIC | Age: 59
End: 2024-11-04
Payer: COMMERCIAL

## 2024-11-04 VITALS
SYSTOLIC BLOOD PRESSURE: 113 MMHG | HEIGHT: 71 IN | TEMPERATURE: 96.8 F | RESPIRATION RATE: 16 BRPM | HEART RATE: 102 BPM | WEIGHT: 270 LBS | OXYGEN SATURATION: 93 % | BODY MASS INDEX: 37.8 KG/M2 | DIASTOLIC BLOOD PRESSURE: 74 MMHG

## 2024-11-04 DIAGNOSIS — F41.1 GAD (GENERALIZED ANXIETY DISORDER): ICD-10-CM

## 2024-11-04 DIAGNOSIS — E78.5 HYPERLIPIDEMIA WITH TARGET LDL LESS THAN 100: ICD-10-CM

## 2024-11-04 DIAGNOSIS — E11.9 TYPE 2 DIABETES MELLITUS WITHOUT COMPLICATION, WITHOUT LONG-TERM CURRENT USE OF INSULIN (H): Primary | ICD-10-CM

## 2024-11-04 DIAGNOSIS — G47.00 INSOMNIA, UNSPECIFIED TYPE: ICD-10-CM

## 2024-11-04 DIAGNOSIS — I50.22 CHF (CONGESTIVE HEART FAILURE), NYHA CLASS II, CHRONIC, SYSTOLIC (H): ICD-10-CM

## 2024-11-04 DIAGNOSIS — E78.1 HIGH TRIGLYCERIDES: ICD-10-CM

## 2024-11-04 DIAGNOSIS — E66.01 MORBID OBESITY (H): ICD-10-CM

## 2024-11-04 LAB
EST. AVERAGE GLUCOSE BLD GHB EST-MCNC: 134 MG/DL
HBA1C MFR BLD: 6.3 % (ref 0–5.6)

## 2024-11-04 PROCEDURE — 99214 OFFICE O/P EST MOD 30 MIN: CPT | Mod: 25 | Performed by: FAMILY MEDICINE

## 2024-11-04 PROCEDURE — 90673 RIV3 VACCINE NO PRESERV IM: CPT | Performed by: FAMILY MEDICINE

## 2024-11-04 PROCEDURE — 83036 HEMOGLOBIN GLYCOSYLATED A1C: CPT | Performed by: FAMILY MEDICINE

## 2024-11-04 PROCEDURE — G0008 ADMIN INFLUENZA VIRUS VAC: HCPCS | Performed by: FAMILY MEDICINE

## 2024-11-04 PROCEDURE — 36415 COLL VENOUS BLD VENIPUNCTURE: CPT | Performed by: FAMILY MEDICINE

## 2024-11-04 PROCEDURE — 80048 BASIC METABOLIC PNL TOTAL CA: CPT | Performed by: FAMILY MEDICINE

## 2024-11-04 PROCEDURE — 80061 LIPID PANEL: CPT | Performed by: FAMILY MEDICINE

## 2024-11-04 RX ORDER — DAPAGLIFLOZIN 5 MG/1
5 TABLET, FILM COATED ORAL DAILY
Qty: 90 TABLET | Refills: 0 | Status: SHIPPED | OUTPATIENT
Start: 2024-11-04

## 2024-11-04 RX ORDER — ESCITALOPRAM OXALATE 20 MG/1
TABLET ORAL
Qty: 90 TABLET | Refills: 1 | Status: SHIPPED | OUTPATIENT
Start: 2024-11-04

## 2024-11-04 RX ORDER — TIRZEPATIDE 7.5 MG/.5ML
7.5 INJECTION, SOLUTION SUBCUTANEOUS WEEKLY
Qty: 6 ML | Refills: 1 | Status: SHIPPED | OUTPATIENT
Start: 2024-11-04

## 2024-11-04 RX ORDER — METFORMIN HYDROCHLORIDE 500 MG/1
500 TABLET, EXTENDED RELEASE ORAL 2 TIMES DAILY WITH MEALS
Qty: 180 TABLET | Refills: 0 | Status: SHIPPED | OUTPATIENT
Start: 2024-11-04

## 2024-11-04 RX ORDER — DIAZEPAM 5 MG/1
5 TABLET ORAL
Qty: 20 TABLET | Refills: 2 | Status: SHIPPED | OUTPATIENT
Start: 2024-11-04

## 2024-11-04 RX ORDER — ROSUVASTATIN CALCIUM 10 MG/1
TABLET, COATED ORAL
Qty: 30 TABLET | Refills: 5 | Status: SHIPPED | OUTPATIENT
Start: 2024-11-04

## 2024-11-04 RX ORDER — ESCITALOPRAM OXALATE 10 MG/1
TABLET ORAL
Qty: 90 TABLET | Refills: 1 | Status: SHIPPED | OUTPATIENT
Start: 2024-11-04

## 2024-11-04 NOTE — PROGRESS NOTES
ASSESSMENT / PLAN:  (E11.9) Type 2 diabetes mellitus without complication, without long-term current use of insulin (H)  (primary encounter diagnosis)  Comment: great control  Plan: HEMOGLOBIN A1C, BASIC METABOLIC PANEL,         Tirzepatide (MOUNJARO) 7.5 MG/0.5ML SOAJ,         metFORMIN (GLUCOPHAGE XR) 500 MG 24 hr tablet,         dapagliflozin (FARXIGA) 5 MG TABS tablet         Continue meds/diet/exercise. Recheck in 6 months  Follow-up eye exam    (I50.22) CHF (congestive heart failure), NYHA class II, chronic, systolic (H)  Comment: stable  Plan:  per cardiology. Chest pain or shortness of breath or [place] weight gain to er.    (E78.1) High triglycerides  Comment: stable in past  Plan: continue crestor/weight losss    (E78.5) Hyperlipidemia with target LDL less than 100    Plan: Lipid panel reflex to direct LDL Non-fasting,         rosuvastatin (CRESTOR) 10 MG tablet            (E66.01) Morbid obesity (H)  Comment: improving  Plan: Tirzepatide (MOUNJARO) 7.5 MG/0.5ML SOAJ        Reveiwed risks and side effects of medication  Recheck in 6 months  Continue diet/exercis    (F41.1) JUAN (generalized anxiety disorder)  Comment: stable  Plan: escitalopram (LEXAPRO) 20 MG tablet,         escitalopram (LEXAPRO) 10 MG tablet, diazepam         (VALIUM) 5 MG tablet        Prn valium sleep. If SUICIAL IDEATION OR HOMOCIDAL IDEATION OR DEJUAN TO ER     (G47.00) Insomnia, unspecified type  Comment: stable  Plan: diazepam (VALIUM) 5 MG tablet        prn      Subjective   Lan is a 59 year old, presenting for the following health issues:      History of dm, htn, gerd, cad, anxiety, ed, chf, insomnia and morbid obesity. seeing cardiology   Weight loss  Sibling in [place]. Kid 34yo -in [place].   Valium helpful. No xanax or trazodone.   No acute feet changes. Vision cataracts  Seeing cardiology. No currently dating.   Some constipation mounjaro. Fiber supplement helpful. Needs more water.   Rare caffeine and no ALCOHOL.  "Exercise regularly. Emotionally doing ok.   Cardiology 3 months.   No feet changes.  Due for eye exam. No shortness of breath. Weight stable.  No hematuria/dysuria.   Recheck Medication (Review medication ) and Diabetes      11/4/2024     2:00 PM   Additional Questions   Roomed by Francia   Accompanied by self         11/4/2024     2:00 PM   Patient Reported Additional Medications   Patient reports taking the following new medications n/a     History of Present Illness       Diabetes:   He presents for follow up of diabetes.  He is checking home blood glucose a few times a week.   He checks blood glucose after meals.  Blood glucose is never over 200 and never under 70. He is aware of hypoglycemia symptoms including shakiness, dizziness and weakness.    He has no concerns regarding his diabetes at this time.   He is not experiencing numbness or burning in feet, excessive thirst, blurry vision, weight changes or redness, sores or blisters on feet. The patient has had a diabetic eye exam in the last 12 months. Eye exam performed on 2023. Location of last eye exam [place].        He eats 2-3 servings of fruits and vegetables daily.He consumes 0 sweetened beverage(s) daily.He exercises with enough effort to increase his heart rate 10 to 19 minutes per day.  He exercises with enough effort to increase his heart rate 3 or less days per week.   He is taking medications regularly.                     Objective    /74   Pulse 102   Temp 96.8  F (36  C) (Tympanic)   Resp 16   Ht 1.81 m (5' 11.25\")   Wt 122.5 kg (270 lb)   SpO2 93%   BMI 37.39 kg/m    Body mass index is 37.39 kg/m .  Physical Exam   GENERAL: alert and no distress  EYES: Eyes grossly normal to inspection, PERRL and conjunctivae and sclerae normal  HENT: normal cephalic/atraumatic, ear canals and TM's normal, nose and mouth without ulcers or lesions, oropharynx clear, and oral mucous membranes moist  NECK: no adenopathy, no asymmetry, masses, or " scars  RESP: lungs clear to auscultation - no rales, rhonchi or wheezes  CV: regular rate and rhythm, normal S1 S2, no S3 or S4, no murmur, click or rub, no peripheral edema   ABDOMEN: soft, nontender, no hepatosplenomegaly, no masses and bowel sounds normal  MS: no gross musculoskeletal defects noted, no edema  NEURO: Normal strength and tone, mentation intact and speech normal  PSYCH: mentation appears normal, affect normal/bright            Signed Electronically by: Zachariah Mcpherson MD

## 2024-11-05 LAB
ANION GAP SERPL CALCULATED.3IONS-SCNC: 16 MMOL/L (ref 7–15)
BUN SERPL-MCNC: 12.8 MG/DL (ref 6–20)
CALCIUM SERPL-MCNC: 9 MG/DL (ref 8.8–10.4)
CHLORIDE SERPL-SCNC: 103 MMOL/L (ref 98–107)
CHOLEST SERPL-MCNC: 162 MG/DL
CREAT SERPL-MCNC: 0.87 MG/DL (ref 0.67–1.17)
EGFRCR SERPLBLD CKD-EPI 2021: >90 ML/MIN/1.73M2
FASTING STATUS PATIENT QL REPORTED: NO
FASTING STATUS PATIENT QL REPORTED: NO
GLUCOSE SERPL-MCNC: 132 MG/DL (ref 70–99)
HCO3 SERPL-SCNC: 19 MMOL/L (ref 22–29)
HDLC SERPL-MCNC: 29 MG/DL
LDLC SERPL CALC-MCNC: 93 MG/DL
NONHDLC SERPL-MCNC: 133 MG/DL
POTASSIUM SERPL-SCNC: 4.3 MMOL/L (ref 3.4–5.3)
SODIUM SERPL-SCNC: 138 MMOL/L (ref 135–145)
TRIGL SERPL-MCNC: 201 MG/DL

## 2024-11-05 RX ORDER — ROSUVASTATIN CALCIUM 10 MG/1
TABLET, COATED ORAL
Qty: 45 TABLET | OUTPATIENT
Start: 2024-11-05

## 2025-01-10 DIAGNOSIS — E66.01 MORBID OBESITY (H): ICD-10-CM

## 2025-01-10 DIAGNOSIS — E11.9 TYPE 2 DIABETES MELLITUS WITHOUT COMPLICATION, WITHOUT LONG-TERM CURRENT USE OF INSULIN (H): ICD-10-CM

## 2025-01-13 RX ORDER — TIRZEPATIDE 7.5 MG/.5ML
INJECTION, SOLUTION SUBCUTANEOUS
Qty: 6 ML | Refills: 0 | Status: SHIPPED | OUTPATIENT
Start: 2025-01-13

## 2025-02-12 NOTE — Clinical Note
The [facility] received a fax that requires your attention. The document has been indexed to the patient’s chart for your review.      Reason for sending: RECEIVED MEDICAL CLEARANCE REQUEST    Documents Description: MEDICAL    Name of Sender: STEF PYLE NP [facility] [phone]  FAX: [phone]    Date Indexed: 02/12/25    Notes (if needed): THANKS!    Please also fax the EKG with the preoperative physical exam

## 2025-04-13 ENCOUNTER — HEALTH MAINTENANCE LETTER (OUTPATIENT)
Age: 60
End: 2025-04-13

## 2025-04-16 DIAGNOSIS — E66.01 MORBID OBESITY (H): ICD-10-CM

## 2025-04-16 DIAGNOSIS — E11.9 TYPE 2 DIABETES MELLITUS WITHOUT COMPLICATION, WITHOUT LONG-TERM CURRENT USE OF INSULIN (H): ICD-10-CM

## 2025-04-16 DIAGNOSIS — F41.1 GAD (GENERALIZED ANXIETY DISORDER): ICD-10-CM

## 2025-04-16 RX ORDER — ESCITALOPRAM OXALATE 20 MG/1
TABLET ORAL
Qty: 30 TABLET | Refills: 1 | Status: SHIPPED | OUTPATIENT
Start: 2025-04-16

## 2025-04-16 RX ORDER — ESCITALOPRAM OXALATE 10 MG/1
TABLET ORAL
Qty: 30 TABLET | Refills: 1 | Status: SHIPPED | OUTPATIENT
Start: 2025-04-16

## 2025-04-16 RX ORDER — TIRZEPATIDE 7.5 MG/.5ML
INJECTION, SOLUTION SUBCUTANEOUS
Qty: 2 ML | Refills: 0 | Status: SHIPPED | OUTPATIENT
Start: 2025-04-16

## 2025-04-16 NOTE — TELEPHONE ENCOUNTER
Spoke to patient back on 4/7/25 and he understands he needs appt for refill but still has not made appt and is due for refill. He is clearly nonadherent. Sending to you to determine if refill can be authorized or have your staff try to contact him to set up appt.     Thank You  Kayla Howard [facility]  [phone]

## 2025-05-14 DIAGNOSIS — E11.9 TYPE 2 DIABETES MELLITUS WITHOUT COMPLICATION, WITHOUT LONG-TERM CURRENT USE OF INSULIN (H): ICD-10-CM

## 2025-05-14 RX ORDER — DAPAGLIFLOZIN 5 MG/1
5 TABLET, FILM COATED ORAL DAILY
Qty: 90 TABLET | Refills: 0 | Status: SHIPPED | OUTPATIENT
Start: 2025-05-14

## 2025-05-14 NOTE — TELEPHONE ENCOUNTER
Appointment with previsit fasting labs in 2 months.   
Mailed letter.Carolynn BAUM [facility]    
Patient seen at bedside time spent evaluating and treating the patient's acute illness as well as time spent reviewing labs, radiology, discussing with patient and/or patient's family, and discussing the case with a multidisciplinary team.

## 2025-05-14 NOTE — LETTER
May 14, 2025    Lan Potts Jr.  [address] [facility] [address]    Dear Lan,     We recently received a refill request for Farxiga 5 mg tablets.  We have refilled this for a one time 90 day supply only because you are due for a:    Medication Recheck with previsit, fasting labs within the next 1-2 months.    Please call at your earliest convenience so that there will not be a delay with your future refills.    Thank you,         Your [facility] Team/[facility]  [phone]          Electronically signed

## 2025-05-14 NOTE — TELEPHONE ENCOUNTER
Reminder letter mailed to patient with provider's message written verbatim.  Yue ALFARO    [facility]

## 2025-05-25 ENCOUNTER — HEALTH MAINTENANCE LETTER (OUTPATIENT)
Age: 60
End: 2025-05-25

## 2025-08-14 DIAGNOSIS — E11.9 TYPE 2 DIABETES MELLITUS WITHOUT COMPLICATION, WITHOUT LONG-TERM CURRENT USE OF INSULIN (H): ICD-10-CM

## 2025-08-14 RX ORDER — METFORMIN HYDROCHLORIDE 500 MG/1
500 TABLET, EXTENDED RELEASE ORAL 2 TIMES DAILY WITH MEALS
Qty: 180 TABLET | Refills: 0 | Status: SHIPPED | OUTPATIENT
Start: 2025-08-14

## 2025-08-14 RX ORDER — DAPAGLIFLOZIN 5 MG/1
5 TABLET, FILM COATED ORAL DAILY
Qty: 90 TABLET | Refills: 0 | Status: SHIPPED | OUTPATIENT
Start: 2025-08-14

## 2025-08-27 ENCOUNTER — DOCUMENTATION ONLY (OUTPATIENT)
Dept: FAMILY MEDICINE | Facility: CLINIC | Age: 60
End: 2025-08-27
Payer: COMMERCIAL

## 2025-08-27 DIAGNOSIS — E78.5 HYPERLIPIDEMIA WITH TARGET LDL LESS THAN 100: ICD-10-CM

## 2025-08-27 DIAGNOSIS — Z12.5 SCREENING FOR PROSTATE CANCER: Primary | ICD-10-CM

## 2025-08-27 DIAGNOSIS — E11.9 TYPE 2 DIABETES MELLITUS WITHOUT COMPLICATION, WITHOUT LONG-TERM CURRENT USE OF INSULIN (H): ICD-10-CM

## (undated) DEVICE — EYE PACK CUSTOM CATARACT AS12127-01

## (undated) RX ORDER — ACETAMINOPHEN 325 MG/1
TABLET ORAL
Status: DISPENSED
Start: 2023-05-22

## (undated) RX ORDER — FENTANYL CITRATE 50 UG/ML
INJECTION, SOLUTION INTRAMUSCULAR; INTRAVENOUS
Status: DISPENSED
Start: 2023-05-22